# Patient Record
Sex: FEMALE | Race: WHITE | NOT HISPANIC OR LATINO | Employment: OTHER | URBAN - METROPOLITAN AREA
[De-identification: names, ages, dates, MRNs, and addresses within clinical notes are randomized per-mention and may not be internally consistent; named-entity substitution may affect disease eponyms.]

---

## 2017-01-30 DIAGNOSIS — Z32.01 PREGNANCY TEST POSITIVE: Primary | ICD-10-CM

## 2017-02-01 ENCOUNTER — PRENATAL OFFICE VISIT (OUTPATIENT)
Dept: NURSING | Facility: CLINIC | Age: 37
End: 2017-02-01
Payer: COMMERCIAL

## 2017-02-01 DIAGNOSIS — Z32.01 PREGNANCY TEST POSITIVE: Primary | ICD-10-CM

## 2017-02-01 PROCEDURE — 99207 ZZC NO CHARGE NURSE ONLY: CPT

## 2017-02-01 NOTE — NURSING NOTE
"NEW PRENATAL EDUCATion  The following encounter information was actually performed during a group prenatal education class on 2/1/17 by COTY Izquierdo RN  and entered at a later date.  Patient given information for prenatal education along with \"The Expectant Family\" booklet.     "

## 2017-02-03 DIAGNOSIS — Z32.01 PREGNANCY TEST POSITIVE: ICD-10-CM

## 2017-02-03 LAB
ABO + RH BLD: NORMAL
ABO + RH BLD: NORMAL
BETA HCG QUAL IFA URINE: POSITIVE
BLD GP AB SCN SERPL QL: NORMAL
BLOOD BANK CMNT PATIENT-IMP: NORMAL
ERYTHROCYTE [DISTWIDTH] IN BLOOD BY AUTOMATED COUNT: 13.7 % (ref 10–15)
HBV SURFACE AG SERPL QL IA: NONREACTIVE
HCT VFR BLD AUTO: 38.3 % (ref 35–47)
HGB BLD-MCNC: 12.8 G/DL (ref 11.7–15.7)
HIV 1+2 AB+HIV1 P24 AG SERPL QL IA: NORMAL
MCH RBC QN AUTO: 28.8 PG (ref 26.5–33)
MCHC RBC AUTO-ENTMCNC: 33.4 G/DL (ref 31.5–36.5)
MCV RBC AUTO: 86 FL (ref 78–100)
PLATELET # BLD AUTO: 228 10E9/L (ref 150–450)
RBC # BLD AUTO: 4.44 10E12/L (ref 3.8–5.2)
SPECIMEN EXP DATE BLD: NORMAL
WBC # BLD AUTO: 6.8 10E9/L (ref 4–11)

## 2017-02-03 PROCEDURE — 86762 RUBELLA ANTIBODY: CPT | Performed by: OBSTETRICS & GYNECOLOGY

## 2017-02-03 PROCEDURE — 84703 CHORIONIC GONADOTROPIN ASSAY: CPT | Performed by: OBSTETRICS & GYNECOLOGY

## 2017-02-03 PROCEDURE — 86780 TREPONEMA PALLIDUM: CPT | Performed by: OBSTETRICS & GYNECOLOGY

## 2017-02-03 PROCEDURE — 86850 RBC ANTIBODY SCREEN: CPT | Performed by: OBSTETRICS & GYNECOLOGY

## 2017-02-03 PROCEDURE — 87086 URINE CULTURE/COLONY COUNT: CPT | Performed by: OBSTETRICS & GYNECOLOGY

## 2017-02-03 PROCEDURE — 87389 HIV-1 AG W/HIV-1&-2 AB AG IA: CPT | Performed by: OBSTETRICS & GYNECOLOGY

## 2017-02-03 PROCEDURE — 86900 BLOOD TYPING SEROLOGIC ABO: CPT | Performed by: OBSTETRICS & GYNECOLOGY

## 2017-02-03 PROCEDURE — 87340 HEPATITIS B SURFACE AG IA: CPT | Performed by: OBSTETRICS & GYNECOLOGY

## 2017-02-03 PROCEDURE — 86901 BLOOD TYPING SEROLOGIC RH(D): CPT | Performed by: OBSTETRICS & GYNECOLOGY

## 2017-02-03 PROCEDURE — 85027 COMPLETE CBC AUTOMATED: CPT | Performed by: OBSTETRICS & GYNECOLOGY

## 2017-02-03 PROCEDURE — 36415 COLL VENOUS BLD VENIPUNCTURE: CPT | Performed by: OBSTETRICS & GYNECOLOGY

## 2017-02-04 LAB — T PALLIDUM IGG+IGM SER QL: NEGATIVE

## 2017-02-06 ENCOUNTER — RADIANT APPOINTMENT (OUTPATIENT)
Dept: ULTRASOUND IMAGING | Facility: CLINIC | Age: 37
End: 2017-02-06
Attending: OBSTETRICS & GYNECOLOGY
Payer: COMMERCIAL

## 2017-02-06 DIAGNOSIS — Z32.01 PREGNANCY TEST POSITIVE: ICD-10-CM

## 2017-02-06 LAB — RUBV IGG SERPL IA-ACNC: 5 IU/ML

## 2017-02-06 PROCEDURE — 76801 OB US < 14 WKS SINGLE FETUS: CPT | Performed by: OBSTETRICS & GYNECOLOGY

## 2017-02-07 LAB
BACTERIA SPEC CULT: NORMAL
MICRO REPORT STATUS: NORMAL
SPECIMEN SOURCE: NORMAL

## 2017-02-07 NOTE — PROGRESS NOTES
Quick Note:    Please notify pt of pos UPT and the remaining nl results  Galindo Kwong M.D.    ______

## 2017-02-16 ENCOUNTER — OFFICE VISIT (OUTPATIENT)
Dept: INTERNAL MEDICINE | Facility: CLINIC | Age: 37
End: 2017-02-16
Payer: COMMERCIAL

## 2017-02-16 VITALS
DIASTOLIC BLOOD PRESSURE: 82 MMHG | HEIGHT: 66 IN | SYSTOLIC BLOOD PRESSURE: 130 MMHG | BODY MASS INDEX: 42.65 KG/M2 | TEMPERATURE: 98.3 F | OXYGEN SATURATION: 97 % | HEART RATE: 93 BPM | WEIGHT: 265.4 LBS

## 2017-02-16 DIAGNOSIS — Z33.1 PREGNANCY, INCIDENTAL: ICD-10-CM

## 2017-02-16 DIAGNOSIS — J06.9 UPPER RESPIRATORY TRACT INFECTION, UNSPECIFIED TYPE: Primary | ICD-10-CM

## 2017-02-16 DIAGNOSIS — J45.20 INTERMITTENT ASTHMA, UNCOMPLICATED: ICD-10-CM

## 2017-02-16 PROCEDURE — 99213 OFFICE O/P EST LOW 20 MIN: CPT | Performed by: PHYSICIAN ASSISTANT

## 2017-02-16 RX ORDER — ALBUTEROL SULFATE 90 UG/1
1-2 AEROSOL, METERED RESPIRATORY (INHALATION) EVERY 4 HOURS PRN
Qty: 1 INHALER | Refills: 3 | Status: SHIPPED | OUTPATIENT
Start: 2017-02-16 | End: 2018-05-30

## 2017-02-16 NOTE — LETTER
[facility]  [address] [id number] [phone] (fax)      Regarding:  Ninoska Cottrell                                                  YOB: 1980  Date:  2/16/2017        To Whom It May Concern,    Ninoska Cottrell was seen and treated today at our clinic.   Please excuse from work/school today due to illness    Sincerely,        Cathy Trujillo PA-C  Internal Medicine  [facility]

## 2017-02-16 NOTE — PROGRESS NOTES
"  SUBJECTIVE:                                                    Ninoska Cottrell is a 36 year old female who presents to clinic today for the following health issues:      Acute Illness   Acute illness concerns: Conjestion/Cough/runny nose  Onset: x2 days    Fever: no     Chills/Sweats: YES- clamy    Headache (location?): YES- a little    Sinus Pressure:YES    Conjunctivitis:  no    Ear Pain: YES- itch    Rhinorrhea: no     Congestion: YES    Sore Throat: YES     Cough: YES    Wheeze: YES    Decreased Appetite: no     Nausea: no     Vomiting: no     Diarrhea:  no     Dysuria/Freq.: no     Fatigue/Achiness: YES    Sick/Strep Exposure: no      Therapies Tried and outcome:   New pregnancy + test on 2/3    -------------------------------------    Problem list and histories reviewed & adjusted, as indicated.  Additional history: as documented    Labs reviewed in EPIC  Problem list, Medication list, Allergies, and Medical/Social/Surgical histories reviewed in [facility] and updated as appropriate.    ROS:  Constitutional, HEENT, cardiovascular, pulmonary, gi and gu systems are negative, except as otherwise noted.    OBJECTIVE:                                                    /82 (BP Location: Left arm, Patient Position: Chair, Cuff Size: Adult Regular)  Pulse 93  Temp 98.3  F (36.8  C) (Oral)  Ht 5' 6\" (1.676 m)  Wt 265 lb 6.4 oz (120.4 kg)  LMP 12/14/2016 (Approximate)  SpO2 97%  BMI 42.84 kg/m2  Body mass index is 42.84 kg/(m^2).  GENERAL: healthy, alert and no distress  HENT: normal cephalic/atraumatic, ear canals and TM's normal, nose and mouth without ulcers or lesions, nasal mucosa edematous , rhinorrhea clear, oropharynx clear and oral mucous membranes moist  NECK: no adenopathy, no asymmetry, masses, or scars and thyroid normal to palpation  RESP: lungs clear to auscultation - no rales, rhonchi or wheezes  CV: regular rate and rhythm, normal S1 S2, no S3 or S4, no murmur, click or rub, no peripheral edema and " peripheral pulses strong  SKIN: no suspicious lesions or rashes    Diagnostic Test Results:  none      ASSESSMENT/PLAN:                                                            1. Upper respiratory tract infection, unspecified type    - albuterol (VENTOLIN HFA) 108 (90 BASE) MCG/ACT Inhaler; Inhale 1-2 puffs into the lungs every 4 hours as needed for shortness of breath / dyspnea or wheezing Reported on 2/16/2017  Dispense: 1 Inhaler; Refill: 3    2. Pregnancy, incidental      3. Intermittent asthma, uncomplicated    - albuterol (VENTOLIN HFA) 108 (90 BASE) MCG/ACT Inhaler; Inhale 1-2 puffs into the lungs every 4 hours as needed for shortness of breath / dyspnea or wheezing Reported on 2/16/2017  Dispense: 1 Inhaler; Refill: 3    See Patient Instructions    Cathy Trujillo PA-C  [facility]

## 2017-02-16 NOTE — NURSING NOTE
"Chief Complaint   Patient presents with     Nasal Congestion     runny nose/conjestion/slight cough        Initial /82 (BP Location: Left arm, Patient Position: Chair, Cuff Size: Adult Regular)  Pulse 93  Temp 98.3  F (36.8  C) (Oral)  Ht 5' 6\" (1.676 m)  Wt 265 lb 6.4 oz (120.4 kg)  LMP 12/14/2016 (Approximate)  SpO2 97%  BMI 42.84 kg/m2 Estimated body mass index is 42.84 kg/(m^2) as calculated from the following:    Height as of this encounter: 5' 6\" (1.676 m).    Weight as of this encounter: 265 lb 6.4 oz (120.4 kg).  Medication Reconciliation: complete    "

## 2017-02-16 NOTE — MR AVS SNAPSHOT
"              After Visit Summary   2/16/2017    Ninoska Cottrell    MRN: [id number]           Patient Information     Date Of Birth          1980        Visit Information        Provider Department      2/16/2017 10:20 AM Cathy Trujillo PA-C [facility]        Today's Diagnoses     Upper respiratory tract infection, unspecified type    -  1    Pregnancy, incidental        Intermittent asthma, uncomplicated          Care Instructions    Benadryl ok for nasal symptom  Ok to use Albuterol inhaler for coughing.  Tylenol.          Follow-ups after your visit        Your next 10 appointments already scheduled     Feb 24, 2017  8:30 AM CST   New Prenatal with Galindo Kwong MD   [facility] ([facility])    [address] [address] [phone]              Who to contact     If you have questions or need follow up information about today's clinic visit or your schedule please contact [facility] directly at [phone].  Normal or non-critical lab and imaging results will be communicated to you by [employer], letter or phone within 4 business days after the clinic has received the results. If you do not hear from us within 7 days, please contact the clinic through [employer] or phone. If you have a critical or abnormal lab result, we will notify you by phone as soon as possible.  Submit refill requests through [employer] or call your pharmacy and they will forward the refill request to us. Please allow 3 business days for your refill to be completed.          Additional Information About Your Visit        [employer] Information     [employer] lets you send messages to your doctor, view your test results, renew your prescriptions, schedule appointments and more. To sign up, go to [email or web address] . Click on \"Log in\" on the left side of the screen, which will take you to the Welcome page. Then click on " "\"Sign up Now\" on the right side of the page.     You will be asked to enter the access code listed below, as well as some personal information. Please follow the directions to create your username and password.     Your access code is: [email or web address]  Expires: 2017 10:46 AM     Your access code will  in 90 days. If you need help or a new code, please call your [facility] clinic or [phone].        Care EveryWhere ID     This is your Care EveryWhere ID. This could be used by other organizations to access your [facility] medical records  [id number]        Your Vitals Were     Pulse Temperature Height Last Period Pulse Oximetry BMI (Body Mass Index)    93 98.3  F (36.8  C) (Oral) 5' 6\" (1.676 m) 2016 (Approximate) 97% 42.84 kg/m2       Blood Pressure from Last 3 Encounters:   17 130/82   16 126/90   16 132/90    Weight from Last 3 Encounters:   17 265 lb 6.4 oz (120.4 kg)   16 257 lb (116.6 kg)   16 252 lb 11.2 oz (114.6 kg)              Today, you had the following     No orders found for display         Today's Medication Changes          These changes are accurate as of: 17 10:47 AM.  If you have any questions, ask your nurse or doctor.               These medicines have changed or have updated prescriptions.        Dose/Directions    albuterol 108 (90 BASE) MCG/ACT Inhaler   Commonly known as:  VENTOLIN HFA   This may have changed:    - how much to take  - how to take this  - when to take this  - reasons to take this   Used for:  Upper respiratory tract infection, unspecified type, Intermittent asthma, uncomplicated   Changed by:  Cathy Trujillo PA-C        Dose:  1-2 puff   Inhale 1-2 puffs into the lungs every 4 hours as needed for shortness of breath / dyspnea or wheezing Reported on 2017   Quantity:  1 Inhaler   Refills:  3         Stop taking these medicines if you haven't already. Please contact your care team if you have questions.     " oxyCODONE-acetaminophen 5-325 MG per tablet   Commonly known as:  PERCOCET   Stopped by:  Cathy Trujillo PA-C                Where to get your medicines      These medications were sent to [id number] - [place], [place] - [address]  [address] [facility] [address]     Phone:  [phone]     albuterol 108 (90 BASE) MCG/ACT Inhaler                Primary Care Provider Office Phone # Fax #    Gage Pacheco [phone]       [facility] [address]  [facility] [address]        Thank you!     Thank you for choosing [facility]  for your care. Our goal is always to provide you with excellent care. Hearing back from our patients is one way we can continue to improve our services. Please take a few minutes to complete the written survey that you may receive in the mail after your visit with us. Thank you!             Your Updated Medication List - Protect others around you: Learn how to safely use, store and throw away your medicines at www.disposemymeds.org.          This list is accurate as of: 2/16/17 10:47 AM.  Always use your most recent med list.                   Brand Name Dispense Instructions for use    ADVIL PO      Take 800 mg by mouth every 6 hours as needed for moderate pain Reported on 2/16/2017       albuterol 108 (90 BASE) MCG/ACT Inhaler    VENTOLIN HFA    1 Inhaler    Inhale 1-2 puffs into the lungs every 4 hours as needed for shortness of breath / dyspnea or wheezing Reported on 2/16/2017       CYCLOBENZAPRINE HCL PO      Take 10 mg by mouth 3 times daily as needed for muscle spasms (5 mg at HS) Reported on 2/16/2017       ELAVIL PO      Take 30 mg by mouth Reported on 2/16/2017       RIZATRIPTAN BENZOATE PO      Take 10 mg by mouth Reported on 2/16/2017

## 2017-02-24 ENCOUNTER — PRENATAL OFFICE VISIT (OUTPATIENT)
Dept: OBGYN | Facility: CLINIC | Age: 37
End: 2017-02-24
Payer: COMMERCIAL

## 2017-02-24 VITALS
WEIGHT: 264 LBS | DIASTOLIC BLOOD PRESSURE: 72 MMHG | SYSTOLIC BLOOD PRESSURE: 136 MMHG | HEART RATE: 97 BPM | BODY MASS INDEX: 42.61 KG/M2 | OXYGEN SATURATION: 98 %

## 2017-02-24 DIAGNOSIS — Z83.2 FAMILY HISTORY OF HEMOPHILIA: ICD-10-CM

## 2017-02-24 DIAGNOSIS — Z23 NEED FOR PROPHYLACTIC VACCINATION AND INOCULATION AGAINST INFLUENZA: ICD-10-CM

## 2017-02-24 DIAGNOSIS — O09.529 SUPERVISION OF HIGH-RISK PREGNANCY OF ELDERLY MULTIGRAVIDA: Primary | ICD-10-CM

## 2017-02-24 DIAGNOSIS — Z11.3 SCREEN FOR STD (SEXUALLY TRANSMITTED DISEASE): ICD-10-CM

## 2017-02-24 DIAGNOSIS — O26.90 PREGNANCY RELATED CONDITION, UNSPECIFIED TRIMESTER: Primary | ICD-10-CM

## 2017-02-24 PROCEDURE — 99207 ZZC FIRST OB VISIT: CPT | Performed by: OBSTETRICS & GYNECOLOGY

## 2017-02-24 PROCEDURE — 87491 CHLMYD TRACH DNA AMP PROBE: CPT | Performed by: OBSTETRICS & GYNECOLOGY

## 2017-02-24 PROCEDURE — 90686 IIV4 VACC NO PRSV 0.5 ML IM: CPT | Performed by: OBSTETRICS & GYNECOLOGY

## 2017-02-24 PROCEDURE — 90471 IMMUNIZATION ADMIN: CPT | Performed by: OBSTETRICS & GYNECOLOGY

## 2017-02-24 PROCEDURE — 87591 N.GONORRHOEAE DNA AMP PROB: CPT | Performed by: OBSTETRICS & GYNECOLOGY

## 2017-02-24 RX ORDER — PRENATAL VIT/IRON FUM/FOLIC AC 27MG-0.8MG
1 TABLET ORAL
COMMUNITY
Start: 2012-07-17 | End: 2022-05-15

## 2017-02-24 NOTE — PROGRESS NOTES
SUBJECTIVE: CC: Ninoska Cottrell is a 36 year old female  10w2d Estimated Date of Delivery: Sep 20, 2017 blood group O Rh pos, Rubella not immune s/p vac assisted vaginal birth with her second for fetal concerns who presents for an initial prenatal visit. AMA issues discussed     HPI: she notes that her nausea is controlled when she continues to eat periodically through out the day. She states that it worse at night when she doesn't eat for an extended period of time, and she will have to get up and eat during the middle of the night.   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 She has a nephew who has hemophilia.    ULTRASOUND - EARLY OB (0-11 WEEKS) - 17   INDICATIONS FOR ULTRASOUND:   OB History: 1st trimester loss (miscarriage)   Present Conditions: Initial S&D (0-17 weeks)   CLINICAL INFORMATION   LMP: 14 Dec 16 - sure EDC: 20 Sep 17 EGA: 7w5d   ===================   MEASUREMENTS   Gest Sac: vis 4.6 x 1.5 x 3.5cm   Position:nl Contour:smth/reg   Yolk sac: 2.2 mm wnl   CRL: 1.56 cm. EGA: 8w0d   U/S EDC: 18 Sep 17 correspond   Cardiac Activity:Yes FHR: 153 bpm reg   Rt Ov: NV   Lt Ov: 4.2 x 2.7 x 3.4cm Complex cyst 2.6 x 1.8 x 2.5cm   Cul de sac: no free fluid   *Other Findings:   ======================================   Early obstetric transabdominal ultrasound. Living intrauterine pregnancy. Corresponding sonographic and menstrual EGA and EDC. Small , complex , left ovarian cyst.     testing including amniocentesis, CVS sampling, the Verify screen, the  1st trimester screen, the quad test, the AFP test, the modified sequential test, cystic fibrosis screening and Ultrasound and the time frames for each of these were reviewed.       HISTORIES:  Patient Active Problem List   Diagnosis     Intermittent asthma     Migraine headache      Past Medical History   Diagnosis Date     Anxiety, generalized      Asthma      Hyperlipidemia       Migraine      Obesity      Past Surgical History   Procedure Laterality Date     Appendectomy       C nonspecific procedure       right ankle fracture      Allergies   Allergen Reactions     Blueberry      Codeine      Latex      Social History     Social History     Marital status:      Spouse name: Deandre     Number of children: 2     Years of education: N/A     Occupational History      [employer]     Social History Main Topics     Smoking status: Former Smoker     Quit date: 1/1/2000     Smokeless tobacco: Never Used     Alcohol use No     Drug use: No     Sexual activity: Yes     Partners: Male     Birth control/ protection: Pill     Other Topics Concern      Service No     Blood Transfusions No     Caffeine Concern No     Occupational Exposure No     Hobby Hazards No     Sleep Concern No     Stress Concern No     Weight Concern No     Special Diet No     Back Care No     Exercise Yes     1 time a week for 2 hours     Bike Helmet Yes     Seat Belt Yes     Self-Exams No     Social History Narrative     Family History   Problem Relation Age of Onset     Blood Disease Paternal Grandmother      Blood Disease Paternal Grandfather      Blood Disease Maternal Uncle      Blood Disease Father      Hemophilia     HEALTH MAINTENANCE:  Health Maintenance   Topic Date Due     ASTHMA ACTION PLAN Q1 YR (NO INBASKET)  01/13/2015     ASTHMA CONTROL TEST Q6 MOS (NO INBASKET)  12/15/2015     INFLUENZA VACCINE (SYSTEM ASSIGNED)  09/01/2016     PAP Q3 YR  06/21/2019     TETANUS IMMUNIZATION (SYSTEM ASSIGNED)  05/29/2023     MIGRAINE ACTION PLAN,ONE TIME,NO INBASKET  Completed       REVIEW OF OUTSIDE RECORDS: NO    ROS:  Constitutional, HEENT, cardiovascular, pulmonary, gi and gu systems are negative, except as otherwise noted.    This document serves as a record of the services and decisions personally performed and made by Galindo Kwong M.D. It was created on his behalf by Yesenia Tucker, a trained medical scribe. The  creation of this document is based the provider's statements to the medical scribe.  Yesenia Tucker February 24, 2017 8:29 AM     /72 (Cuff Size: Adult Large)  Pulse 97  Wt 264 lb (119.7 kg)  LMP 12/14/2016 (Approximate)  SpO2 98%  BMI 42.61 kg/m2   EXAM:  GENERAL APPEARANCE: healthy, alert and no distress  EYES: Eyes grossly normal to inspection, PERRL and conjunctivae and sclerae normal  NECK: no adenopathy, no asymmetry, masses, or scars and thyroid normal to palpation  RESP: lungs clear to auscultation - no rales, rhonchi or wheezes  BREAST: normal without masses, tenderness or nipple discharge and no palpable axillary masses or adenopathy  CV: regular rates and rhythm, normal S1 S2, no S3 or S4 and no murmur, click or rub  ABDOMEN: soft, nontender, without hepatosplenomegaly or masses and bowel sounds normal   (female): normal external genitalia, normal cervix, adnexae, and 10 weeks in  uterus without masses or discharge, pubic arch is 90 degrees, pelvis is adequate, RV: normal sphincter tone, no nodularity, no masses   MS: extremities normal- no gross deformities noted  SKIN: no suspicious lesions or rashes  NEURO: Normal strength and tone, mentation intact and speech normal  PSYCH: mentation appears normal and affect normal/bright  Low paravertebral tenderness     ASSESSMENT/PLAN  (O09.529) Supervision of high-risk pregnancy of elderly multigravida  (primary encounter diagnosis)  Comment: normal exam, Follow up in 4 weeks.   Plan: MAT FETAL MED CTR REFERRAL-PREGNANCY          (Z23) Need for prophylactic vaccination and inoculation against influenza  Comment:   Plan: FLU VAC, SPLIT VIRUS IM > 3 YO (QUADRIVALENT)         [05283]          (Z11.3) Screen for STD (sexually transmitted disease)  Comment:   Plan: NEISSERIA GONORRHOEA PCR, CHLAMYDIA TRACHOMATIS        PCR        Detailed written plan and outline given  (Z83.2) Family history of hemophilia  Comment: patient is carrier for hemophilia, previous  pregnancies have been female  Plan: MAT FETAL MED CTR REFERRAL-PREGNANCY                          I have discussed with patient the risks, benefits, medications, treatment options and modalities.   I have instructed the patient to call or schedule a follow-up appointment if any problems or failure to improve.          The information in this document, created by the medical scribe for me, accurately reflects the services I personally performed and the decisions made by me. I have reviewed and approved this document for accuracy prior to leaving the patient care area.  2/24/2017 8:29 AM        Galindo Kwong M.D.

## 2017-02-24 NOTE — LETTER
[address] [address]  Tel. [phone]  Fax [phone]        February 27, 2017    Ninoska Cottrell  [address] [place] [address]            Dear Ms. Ninoska Cottrell:      The results of your recent GC Chlamydia cultures were NORMAL.  If you have any further questions or problems, please contact our office.    Sincerely,      DR. Varghese MCCANN/NUBIA RN

## 2017-02-24 NOTE — PATIENT INSTRUCTIONS
You can reach your [facility] Care Team any time of the day by calling [phone]. This number will put you in touch with the 24 hour nurse line if the clinic is closed.    To contact your OB/GYN Surgery Scheduler please call [phone]. This is a direct number for your care team between 8 a.m. and 4 p.m. Monday through Friday.    [facility] is open for your convenience: [phone]  Monday through Friday 8 a.m. to 8:30 p.m.  Saturday 9 a.m. to 6 p.m.  Sunday Noon to 6 p.m.    They are closed on all major holidays.

## 2017-02-24 NOTE — MR AVS SNAPSHOT
After Visit Summary   2/24/2017    Ninoska Cottrell    MRN: [id number]           Patient Information     Date Of Birth          1980        Visit Information        Provider Department      2/24/2017 8:30 AM Galindo Kwong MD [facility]        Today's Diagnoses     Supervision of high-risk pregnancy of elderly multigravida    -  1    Need for prophylactic vaccination and inoculation against influenza        Screen for STD (sexually transmitted disease)        Family history of hemophilia          Care Instructions    You can reach your [facility] Care Team any time of the day by calling [phone]. This number will put you in touch with the 24 hour nurse line if the clinic is closed.    To contact your OB/GYN Surgery Scheduler please call [phone]. This is a direct number for your care team between 8 a.m. and 4 p.m. Monday through Friday.    [facility] is open for your convenience: [phone]  Monday through Friday 8 a.m. to 8:30 p.m.  Saturday 9 a.m. to 6 p.m.  Sunday Noon to 6 p.m.    They are closed on all major holidays.           Follow-ups after your visit        Additional Services     MAT FETAL MED CTR REFERRAL-PREGNANCY       >> Patient may proceed with recommendations for further testing as directed by the Maternal Fetal Medicine Specialist >>    >> If requesting Fetal Echo: MFM will determine appropriate location for exam due to indication.    >> If requesting Lung Maturity Amnio:  If results indicate fetal lung maturity, induction or C/S is recommended within 36 hours.  Please schedule accordingly.     Dear Patient:   Please be aware that coverage of these services is subject to the terms and limitations of your health insurance plan.  Call member services at your health plan with any benefit or coverage questions.      Please bring the following to your appointment:    >>  Any x-rays, CTs or MRIs which have been performed.  Contact the facility where  "they were done to arrange for  prior to your scheduled appointment.  Any new CT, MRI or other procedures ordered by your specialist must be performed at a [place] facility or coordinated by your clinic's referral office.  >>  List of current medications   >>  This referral request   >>  Any documents/labs given to you for this referral                  Follow-up notes from your care team     Return in about 1 month (around 3/24/2017).      Who to contact     If you have questions or need follow up information about today's clinic visit or your schedule please contact [facility] directly at [phone].  Normal or non-critical lab and imaging results will be communicated to you by [employer], letter or phone within 4 business days after the clinic has received the results. If you do not hear from us within 7 days, please contact the clinic through [employer] or phone. If you have a critical or abnormal lab result, we will notify you by phone as soon as possible.  Submit refill requests through [employer] or call your pharmacy and they will forward the refill request to us. Please allow 3 business days for your refill to be completed.          Additional Information About Your Visit        MyChart Information     [employer] lets you send messages to your doctor, view your test results, renew your prescriptions, schedule appointments and more. To sign up, go to [email or web address] . Click on \"Log in\" on the left side of the screen, which will take you to the Welcome page. Then click on \"Sign up Now\" on the right side of the page.     You will be asked to enter the access code listed below, as well as some personal information. Please follow the directions to create your username and password.     Your access code is: [email or web address]  Expires: 2017 10:46 AM     Your access code will  in 90 days. If you need help or a new code, please call your [facility] or [phone].      "   Care EveryWhere ID     This is your Care EveryWhere ID. This could be used by other organizations to access your [facility] medical records  [id number]        Your Vitals Were     Pulse Last Period Pulse Oximetry BMI (Body Mass Index)          97 12/14/2016 (Approximate) 98% 42.61 kg/m2         Blood Pressure from Last 3 Encounters:   02/24/17 136/72   02/16/17 130/82   11/03/16 126/90    Weight from Last 3 Encounters:   02/24/17 264 lb (119.7 kg)   02/16/17 265 lb 6.4 oz (120.4 kg)   11/03/16 257 lb (116.6 kg)              We Performed the Following     CHLAMYDIA TRACHOMATIS PCR     FLU VAC, SPLIT VIRUS IM > 3 YO (QUADRIVALENT) [07630]     MAT FETAL MED CTR REFERRAL-PREGNANCY     NEISSERIA GONORRHOEA PCR        Primary Care Provider Office Phone # Fax #    Gage Pacheco [phone]       [facility] [address] [facility] [address]        Thank you!     Thank you for choosing [facility]  for your care. Our goal is always to provide you with excellent care. Hearing back from our patients is one way we can continue to improve our services. Please take a few minutes to complete the written survey that you may receive in the mail after your visit with us. Thank you!             Your Updated Medication List - Protect others around you: Learn how to safely use, store and throw away your medicines at www.disposemymeds.org.          This list is accurate as of: 2/24/17 11:49 AM.  Always use your most recent med list.                   Brand Name Dispense Instructions for use    ADVIL PO      Take 800 mg by mouth every 6 hours as needed for moderate pain Reported on 2/16/2017       albuterol 108 (90 BASE) MCG/ACT Inhaler    VENTOLIN HFA    1 Inhaler    Inhale 1-2 puffs into the lungs every 4 hours as needed for shortness of breath / dyspnea or wheezing Reported on 2/16/2017       CYCLOBENZAPRINE HCL PO      Take 10 mg by mouth 3 times daily as needed for  muscle spasms (5 mg at HS) Reported on 2/16/2017       ELAVIL PO      Take 30 mg by mouth Reported on 2/16/2017       prenatal multivitamin  plus iron 27-0.8 MG Tabs per tablet      Take 1 tablet by mouth       RIZATRIPTAN BENZOATE PO      Take 10 mg by mouth Reported on 2/16/2017

## 2017-02-24 NOTE — PROGRESS NOTES
Injectable Influenza Immunization Documentation    1.  Is the person to be vaccinated sick today?  No    2. Does the person to be vaccinated have an allergy to eggs or to a component of the vaccine?  No    3. Has the person to be vaccinated today ever had a serious reaction to influenza vaccine in the past?  No    4. Has the person to be vaccinated ever had Guillain-Century syndrome?  No     Form completed by Gaby Florian [facility]

## 2017-02-24 NOTE — NURSING NOTE
"Chief Complaint   Patient presents with     Prenatal Care     Flu       Initial /72 (Cuff Size: Adult Large)  Pulse 97  Wt 264 lb (119.7 kg)  LMP 2016 (Approximate)  SpO2 98%  BMI 42.61 kg/m2 Estimated body mass index is 42.61 kg/(m^2) as calculated from the following:    Height as of 17: 5' 6\" (1.676 m).    Weight as of this encounter: 264 lb (119.7 kg).  BP completed using cuff size: regular        The following HM Due: NONE      The following patient reported/Care Every where data was sent to:  P ABSTRACT QUALITY INITIATIVES [49608]       10w2d  States feeling good, No concerns.         Gaby Florian, [facility]                "

## 2017-02-26 LAB
C TRACH DNA SPEC QL NAA+PROBE: NORMAL
N GONORRHOEA DNA SPEC QL NAA+PROBE: NORMAL
SPECIMEN SOURCE: NORMAL
SPECIMEN SOURCE: NORMAL

## 2017-03-03 ENCOUNTER — PRE VISIT (OUTPATIENT)
Dept: MATERNAL FETAL MEDICINE | Facility: CLINIC | Age: 37
End: 2017-03-03

## 2017-03-07 ENCOUNTER — OFFICE VISIT (OUTPATIENT)
Dept: MATERNAL FETAL MEDICINE | Facility: CLINIC | Age: 37
End: 2017-03-07
Attending: OBSTETRICS & GYNECOLOGY
Payer: COMMERCIAL

## 2017-03-07 ENCOUNTER — HOSPITAL ENCOUNTER (OUTPATIENT)
Dept: LAB | Facility: CLINIC | Age: 37
Discharge: HOME | End: 2017-03-07
Attending: OBSTETRICS & GYNECOLOGY | Admitting: OBSTETRICS & GYNECOLOGY
Payer: COMMERCIAL

## 2017-03-07 DIAGNOSIS — O09.522 ELDERLY MULTIGRAVIDA IN SECOND TRIMESTER: Primary | ICD-10-CM

## 2017-03-07 DIAGNOSIS — O09.521 MULTIGRAVIDA OF ADVANCED MATERNAL AGE IN FIRST TRIMESTER: ICD-10-CM

## 2017-03-07 DIAGNOSIS — O26.90 PREGNANCY RELATED CONDITION, UNSPECIFIED TRIMESTER: ICD-10-CM

## 2017-03-07 PROCEDURE — 36415 COLL VENOUS BLD VENIPUNCTURE: CPT | Performed by: OBSTETRICS & GYNECOLOGY

## 2017-03-07 PROCEDURE — 96040 ZZH GENETIC COUNSELING, EACH 30 MINUTES: CPT | Mod: ZF | Performed by: GENETIC COUNSELOR, MS

## 2017-03-07 PROCEDURE — 40000791 ZZHCL STATISTIC VERIFI PRENATAL TRISOMY 21,18,13: Performed by: OBSTETRICS & GYNECOLOGY

## 2017-03-07 NOTE — MR AVS SNAPSHOT
After Visit Summary   3/7/2017    Ninoska Cottrell    MRN: [id number]           Patient Information     Date Of Birth          1980        Visit Information        Provider Department      3/7/2017 1:30 PM Kari Saucedo, THELMA [facility] Maternal Fetal Medicine [facility]        Today's Diagnoses     Elderly multigravida in second trimester    -  1    Pregnancy related condition, unspecified trimester        Multigravida of advanced maternal age in first trimester           Follow-ups after your visit        Your next 10 appointments already scheduled     Apr 21, 2017  1:30 PM CDT   M US COMP with [id number]   [facility] Maternal Fetal Medicine Ultrasound - [facility] ([facility])    [address]  [facility] [address] [phone]           Wear comfortable clothes and leave your valuables at home.            Apr 21, 2017  2:00 PM CDT   Radiology MD with [facility]   [facility] Maternal Fetal Medicine [facility] ([facility])    [address]  [facility] [address] [phone]           Please arrive at the time given for your first appointment.  This visit is used internally to schedule the physician's time during your ultrasound.              Future tests that were ordered for you today     Open Future Orders        Priority Expected Expires Ordered    MFM US Comprehensive Single Routine  1/7/2018 3/7/2017    Verifi Test by [employer] Routine  6/5/2017 3/7/2017            Who to contact     If you have questions or need follow up information about today's clinic visit or your schedule please contact [facility] directly at [phone].  Normal or non-critical lab and imaging results will be communicated to you by MyChart, letter or phone within 4 business days after the clinic has received the results. If you do not hear from us within 7 days, please contact the clinic through MyChart or phone. If you have a  "critical or abnormal lab result, we will notify you by phone as soon as possible.  Submit refill requests through [employer] or call your pharmacy and they will forward the refill request to us. Please allow 3 business days for your refill to be completed.          Additional Information About Your Visit        MyChart Information     [employer] lets you send messages to your doctor, view your test results, renew your prescriptions, schedule appointments and more. To sign up, go to [email or web address] . Click on \"Log in\" on the left side of the screen, which will take you to the Welcome page. Then click on \"Sign up Now\" on the right side of the page.     You will be asked to enter the access code listed below, as well as some personal information. Please follow the directions to create your username and password.     Your access code is: [email or web address]  Expires: 2017 10:46 AM     Your access code will  in 90 days. If you need help or a new code, please call your [place] clinic or [phone].        Care EveryWhere ID     This is your Care EveryWhere ID. This could be used by other organizations to access your [facility] medical records  [id number]        Your Vitals Were     Last Period                   2016 (Approximate)            Blood Pressure from Last 3 Encounters:   17 136/72   17 130/82   16 126/90    Weight from Last 3 Encounters:   17 119.7 kg (264 lb)   17 120.4 kg (265 lb 6.4 oz)   16 116.6 kg (257 lb)              We Performed the Following     [employer] [facility]        Primary Care Provider Office Phone # Fax #    Gage Pacheco [phone]       [facility] [address] [facility] [address]        Thank you!     Thank you for choosing [facility]  for your care. Our goal is always to provide you with excellent care. Hearing back from our patients is one way we can continue to " improve our services. Please take a few minutes to complete the written survey that you may receive in the mail after your visit with us. Thank you!             Your Updated Medication List - Protect others around you: Learn how to safely use, store and throw away your medicines at www.disposemymeds.org.          This list is accurate as of: 3/7/17  4:26 PM.  Always use your most recent med list.                   Brand Name Dispense Instructions for use    ADVIL PO      Take 800 mg by mouth every 6 hours as needed for moderate pain Reported on 2/16/2017       albuterol 108 (90 BASE) MCG/ACT Inhaler    VENTOLIN HFA    1 Inhaler    Inhale 1-2 puffs into the lungs every 4 hours as needed for shortness of breath / dyspnea or wheezing Reported on 2/16/2017       CYCLOBENZAPRINE HCL PO      Take 10 mg by mouth 3 times daily as needed for muscle spasms (5 mg at HS) Reported on 2/16/2017       ELAVIL PO      Take 30 mg by mouth Reported on 2/16/2017       prenatal multivitamin  plus iron 27-0.8 MG Tabs per tablet      Take 1 tablet by mouth       RIZATRIPTAN BENZOATE PO      Take 10 mg by mouth Reported on 2/16/2017

## 2017-03-07 NOTE — PROGRESS NOTES
Patient Name: Ninoska Cottrell   YOB: 1980  Date of Service: 3/7/2017    Ninoska Cottrell was seen at [facility] for genetic consultation and first trimester screening.  The indication for genetic counseling is AMA and a family history of hemophilia A.  She was accompanied today by her , Adelfo.      Impression/Plan:     1.  Ninoska had a blood draw for NIPT (Innatal (formally Verifi) test through [facility]).  Results are expected within 7-10 days, and will be sent to her OB.  She requested we call her at [phone], and to leave a detailed message with results including fetal sex information on her voicemail.    2. 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  If this is a male fetus, there is a 50% risk for him to be affected.  Prenatal testing for hemophilia was declined and Ninoska plans evaluation after birth.  If this is a male fetus, we will contact the [facility] at the [facility] for additional consultation on delivery/infant care.      2.  Maternal serum AFP (single marker screen) is recommended after 15 weeks to screen for open neural tube defects.    3. Ninoska will return for a comprehensive ultrasound at 18-20 weeks. A fetal echocardiogram is also recommended as Ninoska's daughter had a VSD.      Pregnancy History:     /Parity:   Age at Delivery: 37 years old     ZARA: 2017, by LMP c/w ultrasound  Gestational Age: 11w6d    Rangels pregnancy history is as follows:    : SAB first trimester    2012: 36w6d spont vag, F.    2013: 40w0d vag, F.  Miscarriage of a twin also occurred in this pregnancy.    - No significant complications or exposures were reported in the current pregnancy.    Family History:     A three-generation pedigree was obtained, and is scanned under the  Media  tab.    The following is per Ninoska's report:      Ninoska's 3-year was born with a VSD that resolved by age 1; she is now  healthy.  This history increases the risk of a heart defect in the current pregnancy.  Level II ultrasound is indicated along with fetal echocardiogram later in pregnancy.    Ninoska's father, a  paternal uncle, and two sons of Ninoska's sister all have/had hemophilia A.  See assessment below.    Ninoska's sister and father both have rheumatoid arthritis.  We briefly discussed that auto-immune diseases can run in families, generally with multifactorial inheritance.    Adelfo's mother, maternal grandmother, and a maternal cousin all have Durcem s disease.  We discussed that the genetic etiology of familial Durcem s disease is unknown but can follow an autosomal dominant inheritance pattern and that women are much more frequently affected then men.  Therefore, it is unclear what the recurrence risk would be for Adelfo's children.    The reported family history is negative intellectual or learning disability, childhood hearing or vision loss, or consanguinity.    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  Ninoska reports that she has not had factor level or genetic testing.  Affected family members include Ninoska's father, a  paternal uncle, and two sons of Rangels sister.  These individuals were diagnosed by factor level testing showing factor VIII deficiency; there has not been genetic testing.  Ninoska's report of the affected individuals is consistent with a more mild disease presentation.  She reports that affected family members do not bleed spontaneously but that some receive blood transfusions with dental procedures and that one nephew born with ankyloglossia had his tongue surgery delayed due to bleeding concerns.  We discussed that for most affected individuals, treatment with recombinant clotting factor leads to normal lifespan with few bleeding crises.    We discussed that female carriers can show symptoms, and Ninoska  report that she bruises easily and has had excessive bleeding from small cuts.  We discussed that in some cases, special precautions are taken during pregnancy and delivery for symptomatic female carriers.  Ninoska reports no special management in her previous pregnancies and deliveries.    We reviewed that hemophilia A is a genetic condition caused by mutation of the factor VIII gene (F8).  Inheritance follows an X-linked recessive pattern.  In the current pregnancy, there is a 25% chance of an affected male, a 25% chance of an unaffected male who is not a carrier, a 25% chance of a female carrier, and a 25% chance of a female who is not a carrier.  We discussed that prenatal testing for hemophilia would only be possible if there were a known familial mutation.  Genetic testing of Ninoska or an affected relative would need to occur first. This testing was declined.      We discussed that there is a specialty [facility] at the [facility] which could assist with consultation and/or genetic testing.  Factor VIII testing can be performed at birth and certain precautions until disease status is known (such as delaying circumcision) is recommended.  Ninoska reports that she is not concerned about the risk of hemophilia because it is a treatable condition and her affected relatives are generally healthy.  She declined prenatal testing options for hemophilia.      Carrier Screening:       Expanded carrier screening for mutations in a large panel of genes associated with autosomal recessive conditions including cystic fibrosis, spinal muscular atrophy, and others, is now available.  In addition,  screening in the [facility] includes cystic fibrosis.    Carrier screening options were not discussed today.  If desired, I am available to address testing later in pregnancy.    Risk Assessment for Chromosome Conditions:     We explained that the risk for fetal chromosome  abnormalities increases with maternal age. We discussed specific features of common chromosome abnormalities, including Down syndrome, trisomy 13, trisomy 18, and sex chromosome trisomies.    - At age 37, the mid-pregnancy risk for Down syndrome is 1 in 168.     - At age 37, the mid-pregnancy risk for any chromosome abnormality is 1 in 82.      Testing Options:     We discussed the following options:    Non-invasive Prenatal Testing (NIPT)  - Maternal plasma cell-free DNA testing; first trimester ultrasound with nuchal translucency and nasal bone assessment is recommended, when appropriate  - Screens for fetal trisomy 21, trisomy 13, trisomy 18, and sex chromosome aneuploidy  - Cannot screen for open neural tube defects; maternal serum AFP after 15 weeks is recommended    Chorionic villus sampling (CVS)  - Invasive procedure typically performed in the first trimester by which placental villi are obtained for the purpose of chromosome analysis and/or other prenatal genetic analysis  - Diagnostic results; >99% sensitivity for fetal chromosome abnormalities  - Cannot test for open neural tube defects; maternal serum AFP after 15 weeks is recommended    Genetic Amniocentesis  - Invasive procedure typically performed in the second trimester by which amniotic fluid is obtained for the purpose of chromosome analysis and/or other prenatal genetic analysis  - Diagnostic results; >99% sensitivity for fetal chromosome abnormalities  - AFAFP measurement tests for open neural tube defects    Comprehensive (Level II) ultrasound: Detailed ultrasound performed between 18-22 weeks gestation to screen for major birth defects and markers for aneuploidy.    We reviewed the benefits and limitations of this testing.  Screening tests provide a risk assessment specific to the pregnancy for certain fetal chromosome abnormalities, but cannot definitively diagnose or exclude a fetal chromosome abnormality.  Follow-up genetic counseling and  consideration of diagnostic testing is recommended with any abnormal screening result. Diagnostic tests carry inherent risks- including risk of miscarriage- that require careful consideration.  These tests can detect fetal chromosome abnormalities with greater than 99% certainty.  Results can be compromised by maternal cell contamination or mosaicism, and are limited by the resolution of cytogenetic G-banding technology.  There is no screening nor diagnostic test that can detect all forms of birth defects or mental disability.      It was a pleasure to be involved with Ninoska's care. Face-to-face time of the meeting was 60 minutes.    Maddie Mahmood  Genetic counseling intern, scribing for:    Kari Saucedo MS, [facility]  Certified Genetic Counselor

## 2017-03-15 ENCOUNTER — TELEPHONE (OUTPATIENT)
Dept: MATERNAL FETAL MEDICINE | Facility: CLINIC | Age: 37
End: 2017-03-15

## 2017-03-15 LAB — LAB SCANNED RESULT: NORMAL

## 2017-03-15 NOTE — TELEPHONE ENCOUNTER
Spoke with Ninoska regarding normal Innatal (Verifi) results consistent with diploid chromosomes 21,18 and 13. This result significantly reduces the chance for her pregnancy to be affected with Down syndrome, trisomy 18 or trisomy 13, but does not reduce it to zero.  Fetal sex was (XX) female.  This daughter has a 50% to be a carrier for hemophilia A as Ninoska is a known carrier.  Discussed fetal sex will also be evaluated at her level II ultrasound.      Kari Saucedo MS, [facility]  Certified Genetic Counselor

## 2017-03-24 ENCOUNTER — OFFICE VISIT (OUTPATIENT)
Dept: URGENT CARE | Facility: URGENT CARE | Age: 37
End: 2017-03-24
Payer: COMMERCIAL

## 2017-03-24 VITALS
SYSTOLIC BLOOD PRESSURE: 130 MMHG | WEIGHT: 268.4 LBS | DIASTOLIC BLOOD PRESSURE: 76 MMHG | HEIGHT: 66 IN | BODY MASS INDEX: 43.13 KG/M2 | TEMPERATURE: 97.8 F | HEART RATE: 89 BPM | OXYGEN SATURATION: 99 %

## 2017-03-24 DIAGNOSIS — G44.209 TENSION HEADACHE: ICD-10-CM

## 2017-03-24 DIAGNOSIS — Z20.828 EXPOSURE TO THE FLU: ICD-10-CM

## 2017-03-24 DIAGNOSIS — Z20.818 STREP THROAT EXPOSURE: Primary | ICD-10-CM

## 2017-03-24 LAB
DEPRECATED S PYO AG THROAT QL EIA: NORMAL
FLUAV+FLUBV AG SPEC QL: NEGATIVE
FLUAV+FLUBV AG SPEC QL: NORMAL
MICRO REPORT STATUS: NORMAL
SPECIMEN SOURCE: NORMAL
SPECIMEN SOURCE: NORMAL

## 2017-03-24 PROCEDURE — 99213 OFFICE O/P EST LOW 20 MIN: CPT | Performed by: FAMILY MEDICINE

## 2017-03-24 PROCEDURE — 87880 STREP A ASSAY W/OPTIC: CPT | Performed by: FAMILY MEDICINE

## 2017-03-24 PROCEDURE — 87804 INFLUENZA ASSAY W/OPTIC: CPT | Performed by: FAMILY MEDICINE

## 2017-03-24 PROCEDURE — 87081 CULTURE SCREEN ONLY: CPT | Performed by: FAMILY MEDICINE

## 2017-03-24 NOTE — MR AVS SNAPSHOT
After Visit Summary   3/24/2017    Ninoska Cottrell    MRN: [id number]           Patient Information     Date Of Birth          1980        Visit Information        Provider Department      3/24/2017 8:15 PM Martha Leon MD [facility]        Today's Diagnoses     Strep throat exposure    -  1    Exposure to the flu        Tension headache           Follow-ups after your visit        Your next 10 appointments already scheduled     Apr 04, 2017  4:15 PM CDT   ESTABLISHED PRENATAL with Traci Padrno MD   [facility]    [address] [address]   [phone]            Apr 21, 2017  1:30 PM CDT   MFM US COMP with [id number]   [facility] Maternal Fetal Medicine Ultrasound - [facility])    [address]  [facility] [address] [phone]           Wear comfortable clothes and leave your valuables at home.            Apr 21, 2017  2:00 PM CDT   Radiology MD with  KOJO MCCANN   [facility] Maternal Fetal Medicine [facility])    [address]  [facility] [address] [phone]           Please arrive at the time given for your first appointment.  This visit is used internally to schedule the physician's time during your ultrasound.              Who to contact     If you have questions or need follow up information about today's clinic visit or your schedule please contact [facility] directly at [phone].  Normal or non-critical lab and imaging results will be communicated to you by MyChart, letter or phone within 4 business days after the clinic has received the results. If you do not hear from us within 7 days, please contact the clinic through MyChart or phone. If you have a critical or abnormal lab result, we will notify you by phone as soon as possible.  Submit refill requests  "through [employer] or call your pharmacy and they will forward the refill request to us. Please allow 3 business days for your refill to be completed.          Additional Information About Your Visit        Recombinehart Information     [employer] lets you send messages to your doctor, view your test results, renew your prescriptions, schedule appointments and more. To sign up, go to [email or web address] . Click on \"Log in\" on the left side of the screen, which will take you to the Welcome page. Then click on \"Sign up Now\" on the right side of the page.     You will be asked to enter the access code listed below, as well as some personal information. Please follow the directions to create your username and password.     Your access code is: [email or web address]  Expires: 2017 11:46 AM     Your access code will  in 90 days. If you need help or a new code, please call your [place] clinic or [phone].        Care EveryWhere ID     This is your Care EveryWhere ID. This could be used by other organizations to access your [place] medical records  [id number]        Your Vitals Were     Pulse Temperature Height Last Period Pulse Oximetry BMI (Body Mass Index)    89 97.8  F (36.6  C) (Oral) 5' 6\" (1.676 m) 2016 (Approximate) 99% 43.32 kg/m2       Blood Pressure from Last 3 Encounters:   17 130/76   17 136/72   17 130/82    Weight from Last 3 Encounters:   17 268 lb 6.4 oz (121.7 kg)   17 264 lb (119.7 kg)   17 265 lb 6.4 oz (120.4 kg)              We Performed the Following     Beta strep group A culture     Influenza A/B antigen     Rapid strep screen        Primary Care Provider Office Phone # Fax #    Gage Pacheco [phone]       [facility] [address] [facility] [address]        Thank you!     Thank you for choosing [facility]  for your care. Our goal is always to provide you with excellent care. Hearing " back from our patients is one way we can continue to improve our services. Please take a few minutes to complete the written survey that you may receive in the mail after your visit with us. Thank you!             Your Updated Medication List - Protect others around you: Learn how to safely use, store and throw away your medicines at www.disposemymeds.org.          This list is accurate as of: 3/24/17  9:59 PM.  Always use your most recent med list.                   Brand Name Dispense Instructions for use    ADVIL PO      Take 800 mg by mouth every 6 hours as needed for moderate pain Reported on 2/16/2017       albuterol 108 (90 BASE) MCG/ACT Inhaler    VENTOLIN HFA    1 Inhaler    Inhale 1-2 puffs into the lungs every 4 hours as needed for shortness of breath / dyspnea or wheezing Reported on 2/16/2017       CYCLOBENZAPRINE HCL PO      Take 10 mg by mouth 3 times daily as needed for muscle spasms (5 mg at HS) Reported on 2/16/2017       ELAVIL PO      Take 30 mg by mouth Reported on 2/16/2017       prenatal multivitamin  plus iron 27-0.8 MG Tabs per tablet      Take 1 tablet by mouth       RIZATRIPTAN BENZOATE PO      Take 10 mg by mouth Reported on 2/16/2017

## 2017-03-25 NOTE — NURSING NOTE
"Chief Complaint   Patient presents with     Flu     for percation due to 15x weeks pregnacy        Initial /76  Pulse 89  Temp 97.8  F (36.6  C) (Oral)  Ht 5' 6\" (1.676 m)  Wt 268 lb 6.4 oz (121.7 kg)  LMP 12/14/2016 (Approximate)  SpO2 99%  BMI 43.32 kg/m2 Estimated body mass index is 43.32 kg/(m^2) as calculated from the following:    Height as of this encounter: 5' 6\" (1.676 m).    Weight as of this encounter: 268 lb 6.4 oz (121.7 kg).  Medication Reconciliation: complete    "

## 2017-03-25 NOTE — PROGRESS NOTES
SUBJECTIVE:  Ninoska Cottrell, a 37 year old female 15 weeks preg scheduled an appointment to discuss the following issues:     Strep throat exposure  Exposure to the flu     She is here to be tested for strep and flu as daughter got diagnosed with strep and flu   She has no sorethroat or uri symptoms just has mild cough   She has HA which is not new for her , she denies any vision changes     Past Medical History:   Diagnosis Date     Anxiety, generalized      Asthma      Hyperlipidemia      Migraine      Obesity       Past Surgical History:   Procedure Laterality Date     APPENDECTOMY       C NONSPECIFIC PROCEDURE      right ankle fracture        Social History     Social History     Marital status:      Spouse name: Deandre     Number of children: 2     Years of education: N/A     Occupational History      [employer]     Social History Main Topics     Smoking status: Former Smoker     Quit date: 1/1/2000     Smokeless tobacco: Never Used     Alcohol use No     Drug use: No     Sexual activity: Yes     Partners: Male     Birth control/ protection: Pill     Other Topics Concern      Service No     Blood Transfusions No     Caffeine Concern No     Occupational Exposure No     Hobby Hazards No     Sleep Concern No     Stress Concern No     Weight Concern No     Special Diet No     Back Care No     Exercise Yes     1 time a week for 2 hours     Bike Helmet Yes     Seat Belt Yes     Self-Exams No     Social History Narrative        Current Outpatient Prescriptions   Medication Sig Dispense Refill     Prenatal Vit-Fe Fumarate-FA (PRENATAL MULTIVITAMIN  PLUS IRON) 27-0.8 MG TABS per tablet Take 1 tablet by mouth       albuterol (VENTOLIN HFA) 108 (90 BASE) MCG/ACT Inhaler Inhale 1-2 puffs into the lungs every 4 hours as needed for shortness of breath / dyspnea or wheezing Reported on 2/16/2017 1 Inhaler 3     Amitriptyline HCl (ELAVIL PO) Take 30 mg by mouth Reported on 2/16/2017       RIZATRIPTAN BENZOATE PO Take  "10 mg by mouth Reported on 2/16/2017       CYCLOBENZAPRINE HCL PO Take 10 mg by mouth 3 times daily as needed for muscle spasms (5 mg at HS) Reported on 2/16/2017       Ibuprofen (ADVIL PO) Take 800 mg by mouth every 6 hours as needed for moderate pain Reported on 2/16/2017         Health Maintenance   Topic Date Due     ASTHMA ACTION PLAN Q1 YR (NO INBASKET)  01/13/2015     ASTHMA CONTROL TEST Q6 MOS (NO INBASKET)  12/15/2015     MATERNAL SCREENING  03/29/2017     INFLUENZA VACCINE (SYSTEM ASSIGNED)  09/01/2017     PAP Q3 YR  06/21/2019     TETANUS IMMUNIZATION (SYSTEM ASSIGNED)  05/29/2023     MIGRAINE ACTION PLAN,ONE TIME,NO INBASKET  Completed        ROS:  CONSTITUTIONAL:no fever, no chills or sweats, no excessive fatigue, no significant change in weight  CV: neg   RESP -neg  GI:  Neg   NEURO: neg   MSK - neg   Skin - neg   Pyschiatry-neg     Results for orders placed or performed in visit on 03/24/17   Influenza A/B antigen   Result Value Ref Range    Influenza A/B Agn Specimen Nasal     Influenza A Negative NEG    Influenza B  NEG     Negative   Test results must be correlated with clinical data. If necessary, results   should be confirmed by a molecular assay or viral culture.     Rapid strep screen   Result Value Ref Range    Specimen Description Throat     Rapid Strep A Screen       NEGATIVE: No Group A streptococcal antigen detected by immunoassay, await   culture report.      Micro Report Status FINAL 03/24/2017        OBJECTIVE:  /76  Pulse 89  Temp 97.8  F (36.6  C) (Oral)  Ht 5' 6\" (1.676 m)  Wt 268 lb 6.4 oz (121.7 kg)  LMP 12/14/2016 (Approximate)  SpO2 99%  BMI 43.32 kg/m2      EXAM:  GENERAL APPEARANCE: healthy, alert and no distress  EYES: EOMI,  PERRL  HENT: ear canals and TM's normal and nose and mouth without ulcers or lesions  The neck is supple and free of adenopathy or masses, the thyroid is normal without enlargement or nodules.  RESP: lungs clear to auscultation - no rales, " rhonchi or wheezes  CV: regular rates and rhythm, normal S1 S2, no S3 or S4 and no murmur, click or rub -  ABDOMEN:  soft, nontender, no HSM or masses and bowel sounds normal  NEURO: Normal strength and tone, sensory exam grossly normal, mentation intact and speech normal        ASSESSMENT/PLAN:  Ninoska was seen today for flu.    Diagnoses and all orders for this visit:    Strep throat exposure  -     Rapid strep screen  -     Beta strep group A culture    Exposure to the flu  -     Influenza A/B antigen    possible tension headache   For headache advised to do tylenol   If worsens should f/u     Notified pt about negative results     Follow up if  symptoms fail to improve or worsens   Pt understood and agreed with plan           Martha Leon MD

## 2017-03-26 LAB
BACTERIA SPEC CULT: NORMAL
MICRO REPORT STATUS: NORMAL
SPECIMEN SOURCE: NORMAL

## 2017-04-04 ENCOUNTER — PRENATAL OFFICE VISIT (OUTPATIENT)
Dept: OBGYN | Facility: CLINIC | Age: 37
End: 2017-04-04
Payer: COMMERCIAL

## 2017-04-04 VITALS
DIASTOLIC BLOOD PRESSURE: 64 MMHG | WEIGHT: 270.7 LBS | HEART RATE: 92 BPM | OXYGEN SATURATION: 99 % | BODY MASS INDEX: 43.69 KG/M2 | SYSTOLIC BLOOD PRESSURE: 112 MMHG

## 2017-04-04 DIAGNOSIS — O09.529 SUPERVISION OF HIGH-RISK PREGNANCY OF ELDERLY MULTIGRAVIDA: Primary | ICD-10-CM

## 2017-04-04 PROCEDURE — 36415 COLL VENOUS BLD VENIPUNCTURE: CPT | Performed by: OBSTETRICS & GYNECOLOGY

## 2017-04-04 PROCEDURE — 82105 ALPHA-FETOPROTEIN SERUM: CPT | Mod: 90 | Performed by: OBSTETRICS & GYNECOLOGY

## 2017-04-04 PROCEDURE — 99000 SPECIMEN HANDLING OFFICE-LAB: CPT | Performed by: OBSTETRICS & GYNECOLOGY

## 2017-04-04 PROCEDURE — 99207 ZZC PRENATAL VISIT: CPT | Performed by: OBSTETRICS & GYNECOLOGY

## 2017-04-04 PROCEDURE — 83036 HEMOGLOBIN GLYCOSYLATED A1C: CPT | Performed by: OBSTETRICS & GYNECOLOGY

## 2017-04-04 NOTE — PROGRESS NOTES
Routine OB Visit:    S: Ongoing nausea, relieved by eating every 2 hours. no contractions. Good fetal movement. No LoF, VB. Alternating constipation and diarrhea.    O: /64  Pulse 92  Wt 270 lb 11.2 oz (122.8 kg)  LMP 2016 (Approximate)  SpO2 99%  BMI 43.69 kg/m2   Gen: resting comfortably, in NAD  See Prenatal flowsheet    A: Ninoska Cottrell is a 37 year old female  at 15w6d, here for routine OB care. 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     P:   1) PNC: O+/RNI. Verify 46XX, within normal limits. Level II in late April. APF today.  2) Obesity: plan HgA1c to evaluate for DM II today.  3) return to clinic in 4 weeks for routine prenatal care    Traci Padron MD  Obstetrics and Gynecology  2017

## 2017-04-04 NOTE — NURSING NOTE
"Chief Complaint   Patient presents with     Prenatal Care       Initial /64  Pulse 92  Wt 270 lb 11.2 oz (122.8 kg)  LMP 2016 (Approximate)  SpO2 99%  BMI 43.69 kg/m2 Estimated body mass index is 43.69 kg/(m^2) as calculated from the following:    Height as of 3/24/17: 5' 6\" (1.676 m).    Weight as of this encounter: 270 lb 11.2 oz (122.8 kg).  BP completed using cuff size: large        The following HM Due: NONE      The following patient reported/Care Every where data was sent to:  P ABSTRACT QUALITY INITIATIVES [62584]       Pt complains of abdominal pain past couple weeks on and off.    15w6d    Gaby Florian, [facility]                "

## 2017-04-04 NOTE — MR AVS SNAPSHOT
After Visit Summary   4/4/2017    Ninoska Cottrell    MRN: [id number]           Patient Information     Date Of Birth          1980        Visit Information        Provider Department      4/4/2017 4:15 PM Traci Padron MD [facility]        Today's Diagnoses     Supervision of high-risk pregnancy of elderly multigravida    -  1       Follow-ups after your visit        Your next 10 appointments already scheduled     Apr 21, 2017  1:30 PM CDT   MFM US COMP with [id number]   [facility] Maternal Fetal Medicine Ultrasound - [facility])    [address]  [facility] [address] [phone]           Wear comfortable clothes and leave your valuables at home.            Apr 21, 2017  2:00 PM CDT   Radiology MD with  KOJO MCCANN   [facility] Maternal Fetal Medicine [facility])    [address]  [facility] [address] [phone]           Please arrive at the time given for your first appointment.  This visit is used internally to schedule the physician's time during your ultrasound.              Who to contact     If you have questions or need follow up information about today's clinic visit or your schedule please contact [facility] directly at [phone].  Normal or non-critical lab and imaging results will be communicated to you by MyChart, letter or phone within 4 business days after the clinic has received the results. If you do not hear from us within 7 days, please contact the clinic through MyChart or phone. If you have a critical or abnormal lab result, we will notify you by phone as soon as possible.  Submit refill requests through [employer] or call your pharmacy and they will forward the refill request to us. Please allow 3 business days for your refill to be completed.          Additional Information About Your Visit        [email or web address] Information     [employer] lets you send  "messages to your doctor, view your test results, renew your prescriptions, schedule appointments and more. To sign up, go to [email or web address] . Click on \"Log in\" on the left side of the screen, which will take you to the Welcome page. Then click on \"Sign up Now\" on the right side of the page.     You will be asked to enter the access code listed below, as well as some personal information. Please follow the directions to create your username and password.     Your access code is: [email or web address]  Expires: 2017 11:46 AM     Your access code will  in 90 days. If you need help or a new code, please call your [place] clinic or [phone].        Care EveryWhere ID     This is your Care EveryWhere ID. This could be used by other organizations to access your [place] medical records  [id number]        Your Vitals Were     Pulse Last Period Pulse Oximetry BMI (Body Mass Index)          92 2016 (Approximate) 99% 43.69 kg/m2         Blood Pressure from Last 3 Encounters:   17 112/64   17 130/76   17 136/72    Weight from Last 3 Encounters:   17 270 lb 11.2 oz (122.8 kg)   17 268 lb 6.4 oz (121.7 kg)   17 264 lb (119.7 kg)              We Performed the Following     Alpha fetoprotein maternal screen     Hemoglobin A1c        Primary Care Provider Office Phone # Fax #    Gage Pacheco [phone]       XXX RETIRED  [address] [place] [phone]        Thank you!     Thank you for choosing [facility]  for your care. Our goal is always to provide you with excellent care. Hearing back from our patients is one way we can continue to improve our services. Please take a few minutes to complete the written survey that you may receive in the mail after your visit with us. Thank you!             Your Updated Medication List - Protect others around you: Learn how to safely use, store and throw away your medicines at " www.disposemymeds.org.          This list is accurate as of: 4/4/17  4:49 PM.  Always use your most recent med list.                   Brand Name Dispense Instructions for use    ADVIL PO      Take 800 mg by mouth every 6 hours as needed for moderate pain Reported on 2/16/2017       albuterol 108 (90 BASE) MCG/ACT Inhaler    VENTOLIN HFA    1 Inhaler    Inhale 1-2 puffs into the lungs every 4 hours as needed for shortness of breath / dyspnea or wheezing Reported on 2/16/2017       CYCLOBENZAPRINE HCL PO      Take 10 mg by mouth 3 times daily as needed for muscle spasms (5 mg at HS) Reported on 2/16/2017       ELAVIL PO      Take 30 mg by mouth Reported on 2/16/2017       prenatal multivitamin  plus iron 27-0.8 MG Tabs per tablet      Take 1 tablet by mouth       RIZATRIPTAN BENZOATE PO      Take 10 mg by mouth Reported on 2/16/2017

## 2017-04-05 LAB — HBA1C MFR BLD: 4.8 % (ref 4.3–6)

## 2017-04-06 LAB
# FETUSES US: NORMAL
AFP ADJ MOM AMN: 1.52
AFP SERPL-MCNC: 32 NG/ML
AGE - REPORTED: 37.5
DATING METHOD: NORMAL
DIABETIC AT CONCEPTION: NO
FAMILY MEMBER DISEASES HX: NO
FAMILY MEMBER DISEASES HX: NO
GA METHOD: NORMAL
GA: 15.86 WK
HX OF HEREDITARY DISORDERS: NORMAL
IDDM PATIENT QL: NO
INTEGRATED SCN PATIENT-IMP: NORMAL
LMP START DATE: NORMAL
PREV HX CHROMOSOME ABNORMALITY: NO
SPECIMEN DRAWN SERPL: NORMAL
TWINS: NORMAL

## 2017-04-25 ENCOUNTER — HOSPITAL ENCOUNTER (OUTPATIENT)
Dept: ULTRASOUND IMAGING | Facility: CLINIC | Age: 37
Discharge: HOME | End: 2017-04-25
Attending: OBSTETRICS & GYNECOLOGY | Admitting: OBSTETRICS & GYNECOLOGY
Payer: COMMERCIAL

## 2017-04-25 ENCOUNTER — OFFICE VISIT (OUTPATIENT)
Dept: MATERNAL FETAL MEDICINE | Facility: CLINIC | Age: 37
End: 2017-04-25
Attending: OBSTETRICS & GYNECOLOGY
Payer: COMMERCIAL

## 2017-04-25 DIAGNOSIS — Z82.79 FAMILY HISTORY OF CONGENITAL HEART DEFECT: ICD-10-CM

## 2017-04-25 DIAGNOSIS — O35.9XX0 SUSPECTED FETAL ANOMALY, ANTEPARTUM, NOT APPLICABLE OR UNSPECIFIED FETUS: ICD-10-CM

## 2017-04-25 DIAGNOSIS — O09.522 ELDERLY MULTIGRAVIDA IN SECOND TRIMESTER: ICD-10-CM

## 2017-04-25 DIAGNOSIS — O09.522 AMA (ADVANCED MATERNAL AGE) MULTIGRAVIDA 35+, SECOND TRIMESTER: Primary | ICD-10-CM

## 2017-04-25 PROCEDURE — 76811 OB US DETAILED SNGL FETUS: CPT

## 2017-04-25 NOTE — MR AVS SNAPSHOT
After Visit Summary   4/25/2017    Ninoska Cottrell    MRN: [id number]           Patient Information     Date Of Birth          1980        Visit Information        Provider Department      4/25/2017 2:00 PM Barbie Lyons MD [facility] Maternal Fetal Medicine [facility]        Today's Diagnoses     AMA (advanced maternal age) multigravida 35+, second trimester    -  1    Suspected fetal anomaly, antepartum, not applicable or unspecified fetus        Family history of congenital heart defect           Follow-ups after your visit        Your next 10 appointments already scheduled     May 02, 2017  3:30 PM CDT   ESTABLISHED PRENATAL with Tarci Padron MD   [facility]    [address] [id number] [phone]            May 30, 2017  1:30 PM CDT   MFM US COMPRE SINGLE F/U with [id number]   [facility] Maternal Fetal Medicine Ultrasound - [facility] ([facility])    [address]  [facility] [address] [phone]           Wear comfortable clothes and leave your valuables at home.            May 30, 2017  2:15 PM CDT   MFM READ SCREEN FETAL EHCO Vazquez with RHMFMUSR3   [facility] Maternal Fetal Medicine Ultrasound - [facility] ([facility])    [address]  [facility] [id number] [phone]            May 30, 2017  2:45 PM CDT   Radiology MD with  MFM MD   [facility] Maternal Fetal Medicine [facility] ([facility])    [address]  [facility] [id number] [phone]           Please arrive at the time given for your first appointment.  This visit is used internally to schedule the physician's time during your ultrasound.              Future tests that were ordered for you today     Open Future Orders        Priority Expected Expires Ordered    MFM US Comprehensive Single F/U Routine  4/25/2018 4/25/2017    MFM Read Screen Fetal  "Echo Single Routine  2018            Who to contact     If you have questions or need follow up information about today's clinic visit or your schedule please contact [facility]ERNAL FETAL MEDICINE [facility] directly at [phone].  Normal or non-critical lab and imaging results will be communicated to you by MyChart, letter or phone within 4 business days after the clinic has received the results. If you do not hear from us within 7 days, please contact the clinic through [employer] or phone. If you have a critical or abnormal lab result, we will notify you by phone as soon as possible.  Submit refill requests through [employer] or call your pharmacy and they will forward the refill request to us. Please allow 3 business days for your refill to be completed.          Additional Information About Your Visit        [employer] Information     [employer] lets you send messages to your doctor, view your test results, renew your prescriptions, schedule appointments and more. To sign up, go to [email or web address] . Click on \"Log in\" on the left side of the screen, which will take you to the Welcome page. Then click on \"Sign up Now\" on the right side of the page.     You will be asked to enter the access code listed below, as well as some personal information. Please follow the directions to create your username and password.     Your access code is: [email or web address]  Expires: 2017 11:46 AM     Your access code will  in 90 days. If you need help or a new code, please call your [place] clinic or [phone].        Care EveryWhere ID     This is your Care EveryWhere ID. This could be used by other organizations to access your [place] medical records  [id number]        Your Vitals Were     Last Period                   2016 (Approximate)            Blood Pressure from Last 3 Encounters:   17 112/64   17 130/76   17 136/72    Weight from Last 3 Encounters:   17 122.8 kg (270 " lb 11.2 oz)   03/24/17 121.7 kg (268 lb 6.4 oz)   02/24/17 119.7 kg (264 lb)               Primary Care Provider Office Phone # Fax #    Gage Pacheco [phone]       XXX RETIRED  [address] [facility] [address]        Thank you!     Thank you for choosing [facility]  for your care. Our goal is always to provide you with excellent care. Hearing back from our patients is one way we can continue to improve our services. Please take a few minutes to complete the written survey that you may receive in the mail after your visit with us. Thank you!             Your Updated Medication List - Protect others around you: Learn how to safely use, store and throw away your medicines at www.disposemymeds.org.          This list is accurate as of: 4/25/17  2:21 PM.  Always use your most recent med list.                   Brand Name Dispense Instructions for use    ADVIL PO      Take 800 mg by mouth every 6 hours as needed for moderate pain Reported on 2/16/2017       albuterol 108 (90 BASE) MCG/ACT Inhaler    VENTOLIN HFA    1 Inhaler    Inhale 1-2 puffs into the lungs every 4 hours as needed for shortness of breath / dyspnea or wheezing Reported on 2/16/2017       CYCLOBENZAPRINE HCL PO      Take 10 mg by mouth 3 times daily as needed for muscle spasms (5 mg at HS) Reported on 2/16/2017       ELAVIL PO      Take 30 mg by mouth Reported on 2/16/2017       prenatal multivitamin  plus iron 27-0.8 MG Tabs per tablet      Take 1 tablet by mouth       RIZATRIPTAN BENZOATE PO      Take 10 mg by mouth Reported on 2/16/2017

## 2017-05-02 ENCOUNTER — PRENATAL OFFICE VISIT (OUTPATIENT)
Dept: OBGYN | Facility: CLINIC | Age: 37
End: 2017-05-02
Payer: COMMERCIAL

## 2017-05-02 VITALS
BODY MASS INDEX: 44.06 KG/M2 | HEART RATE: 99 BPM | DIASTOLIC BLOOD PRESSURE: 62 MMHG | OXYGEN SATURATION: 99 % | WEIGHT: 273 LBS | SYSTOLIC BLOOD PRESSURE: 120 MMHG

## 2017-05-02 DIAGNOSIS — O09.529 SUPERVISION OF HIGH-RISK PREGNANCY OF ELDERLY MULTIGRAVIDA: Primary | ICD-10-CM

## 2017-05-02 PROCEDURE — 99207 ZZC PRENATAL VISIT: CPT | Performed by: OBSTETRICS & GYNECOLOGY

## 2017-05-02 NOTE — MR AVS SNAPSHOT
After Visit Summary   5/2/2017    Ninoska Cottrell    MRN: [id number]           Patient Information     Date Of Birth          1980        Visit Information        Provider Department      5/2/2017 3:30 PM Traci Padron MD [facility]        Today's Diagnoses     Supervision of high-risk pregnancy of elderly multigravida    -  1       Follow-ups after your visit        Your next 10 appointments already scheduled     May 30, 2017  1:30 PM CDT   MFM US COMPRE SINGLE F/U with [id number]   [facility] Maternal [facility]etal [facility] Ultrasound - [facility] ([facility])    [address]  [facility] [address]   [phone]           Wear comfortable clothes and leave your valuables at home.            May 30, 2017  2:15 PM CDT   MFM READ SCREEN FETAL EHCO Vazquez with [id number]   [facility] Maternal [facility]etal Medicine Ultrasound - [facility] ([facility])    [address]  [facility] [id number] [phone]            May 30, 2017  2:45 PM CDT   Radiology MD with  MFM MD   [facility] - [facility] ([facility])    [address]  [facility] [phone] [phone]           Please arrive at the time given for your first appointment.  This visit is used internally to schedule the physician's time during your ultrasound.            Jun 06, 2017  2:45 PM CDT   ESTABLISHED PRENATAL with Traci Padron MD   [facility])    [address] [place] [address] [phone]              Who to contact     If you have questions or need follow up information about today's clinic visit or your schedule please contact [facility] directly at [phone].  Normal or non-critical lab and imaging results will be communicated to you by MyChart, letter or phone within 4 business days after the clinic has  "received the results. If you do not hear from us within 7 days, please contact the clinic through [employer] or phone. If you have a critical or abnormal lab result, we will notify you by phone as soon as possible.  Submit refill requests through [employer] or call your pharmacy and they will forward the refill request to us. Please allow 3 business days for your refill to be completed.          Additional Information About Your Visit        [employer] Information     [employer] lets you send messages to your doctor, view your test results, renew your prescriptions, schedule appointments and more. To sign up, go to [email or web address] . Click on \"Log in\" on the left side of the screen, which will take you to the Welcome page. Then click on \"Sign up Now\" on the right side of the page.     You will be asked to enter the access code listed below, as well as some personal information. Please follow the directions to create your username and password.     Your access code is: [email or web address]  Expires: 2017 11:46 AM     Your access code will  in 90 days. If you need help or a new code, please call your [place] clinic or [phone].        Care EveryWhere ID     This is your Care EveryWhere ID. This could be used by other organizations to access your [place] medical records  [id number]        Your Vitals Were     Pulse Last Period Pulse Oximetry BMI (Body Mass Index)          99 2016 (Approximate) 99% 44.06 kg/m2         Blood Pressure from Last 3 Encounters:   17 120/62   17 112/64   17 130/76    Weight from Last 3 Encounters:   17 273 lb (123.8 kg)   17 270 lb 11.2 oz (122.8 kg)   17 268 lb 6.4 oz (121.7 kg)              Today, you had the following     No orders found for display       Primary Care Provider Office Phone # Fax #    Gage Pacheco [phone]       XXX RETIRED  [address] [place] [phone]        Thank you!     Thank you " for choosing [facility]  for your care. Our goal is always to provide you with excellent care. Hearing back from our patients is one way we can continue to improve our services. Please take a few minutes to complete the written survey that you may receive in the mail after your visit with us. Thank you!             Your Updated Medication List - Protect others around you: Learn how to safely use, store and throw away your medicines at www.disposemymeds.org.          This list is accurate as of: 5/2/17  4:14 PM.  Always use your most recent med list.                   Brand Name Dispense Instructions for use    ADVIL PO      Take 800 mg by mouth every 6 hours as needed for moderate pain Reported on 2/16/2017       albuterol 108 (90 BASE) MCG/ACT Inhaler    VENTOLIN HFA    1 Inhaler    Inhale 1-2 puffs into the lungs every 4 hours as needed for shortness of breath / dyspnea or wheezing Reported on 2/16/2017       CYCLOBENZAPRINE HCL PO      Take 10 mg by mouth 3 times daily as needed for muscle spasms (5 mg at HS) Reported on 2/16/2017       ELAVIL PO      Take 30 mg by mouth Reported on 2/16/2017       prenatal multivitamin  plus iron 27-0.8 MG Tabs per tablet      Take 1 tablet by mouth       RIZATRIPTAN BENZOATE PO      Take 10 mg by mouth Reported on 2/16/2017       vitamin B complex with vitamin C Tabs tablet      Take 1 tablet by mouth daily       VITAMIN D PO      Take 2,000 Units by mouth daily

## 2017-05-02 NOTE — NURSING NOTE
"Chief Complaint   Patient presents with     Prenatal Care       Initial /62 (Cuff Size: Adult Large)  Pulse 99  Wt 273 lb (123.8 kg)  LMP 2016 (Approximate)  SpO2 99%  BMI 44.06 kg/m2 Estimated body mass index is 44.06 kg/(m^2) as calculated from the following:    Height as of 3/24/17: 5' 6\" (1.676 m).    Weight as of this encounter: 273 lb (123.8 kg).  BP completed using cuff size: regular        The following HM Due: NONE      The following patient reported/Care Every where data was sent to:  P ABSTRACT QUALITY INITIATIVES [36776]       19w6d  States feeling good, No concerns.  Having good fetal movements.      Gaby Florian, [facility]                "

## 2017-05-02 NOTE — PROGRESS NOTES
Routine OB Visit:    S: Nausea resolved. Continued fatigue, sleeping poorly with interruptions during the night from her cat. no contractions. No LoF, VB.     O: /62 (Cuff Size: Adult Large)  Pulse 99  Wt 273 lb (123.8 kg)  LMP 2016 (Approximate)  SpO2 99%  BMI 44.06 kg/m2   Gen: resting comfortably, in NAD  See Prenatal flowsheet    A: Ninoska Cottrell is a 37 year old female  at 19w6d, here for routine OB care. 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     P:   1) PNC: O+/RNI. Verify 46XX, within normal limits. Level II with small stomach, repeat in 4 wks. APF and NIPT normal. Echo 2/2 hx child with VSD.  2) Obesity: plan HgA1c normal at 15 wks. Plan GCT at usual time.  3) Topical pain relief: patient using [employer] Gel, wondering about safety. Reviewed ingredient list, no concerns.  4) return to clinic in 4 weeks for routine prenatal care    Traci Padron MD  Obstetrics and Gynecology

## 2017-05-30 ENCOUNTER — HOSPITAL ENCOUNTER (OUTPATIENT)
Dept: ULTRASOUND IMAGING | Facility: CLINIC | Age: 37
Discharge: HOME | End: 2017-05-30
Attending: OBSTETRICS & GYNECOLOGY | Admitting: OBSTETRICS & GYNECOLOGY
Payer: COMMERCIAL

## 2017-05-30 ENCOUNTER — HOSPITAL ENCOUNTER (OUTPATIENT)
Dept: ULTRASOUND IMAGING | Facility: CLINIC | Age: 37
End: 2017-05-30
Attending: OBSTETRICS & GYNECOLOGY
Payer: COMMERCIAL

## 2017-05-30 ENCOUNTER — OFFICE VISIT (OUTPATIENT)
Dept: MATERNAL FETAL MEDICINE | Facility: CLINIC | Age: 37
End: 2017-05-30
Attending: OBSTETRICS & GYNECOLOGY
Payer: COMMERCIAL

## 2017-05-30 DIAGNOSIS — Z82.79 FAMILY HISTORY OF CONGENITAL HEART DEFECT: ICD-10-CM

## 2017-05-30 DIAGNOSIS — Z03.73 SUSPECTED FETAL ANOMALY NOT FOUND: ICD-10-CM

## 2017-05-30 DIAGNOSIS — O35.9XX0 SUSPECTED FETAL ANOMALY, ANTEPARTUM, NOT APPLICABLE OR UNSPECIFIED FETUS: ICD-10-CM

## 2017-05-30 DIAGNOSIS — O09.522 AMA (ADVANCED MATERNAL AGE) MULTIGRAVIDA 35+, SECOND TRIMESTER: Primary | ICD-10-CM

## 2017-05-30 PROCEDURE — 76816 OB US FOLLOW-UP PER FETUS: CPT

## 2017-05-30 PROCEDURE — 76825 ECHO EXAM OF FETAL HEART: CPT

## 2017-05-30 NOTE — PROGRESS NOTES
"Please see \"Imaging\" tab under \"Chart Review\" for details of today's US.      Brittany Kearney, DO  Maternal-Fetal Medicine        "

## 2017-05-30 NOTE — MR AVS SNAPSHOT
After Visit Summary   5/30/2017    Ninoska Cottrell    MRN: [id number]           Patient Information     Date Of Birth          1980        Visit Information        Provider Department      5/30/2017 2:45 PM Travis Pinedo MD [facility]        Today's Diagnoses     AMA (advanced maternal age) multigravida 35+, second trimester    -  1    Suspected fetal anomaly not found        Family history of congenital heart defect           Follow-ups after your visit        Your next 10 appointments already scheduled     May 30, 2017  2:45 PM CDT   Radiology MD with Travis Pinedo MD   [facility] ([facility])    [address]  [facility] [address] [phone]           Please arrive at the time given for your first appointment.  This visit is used internally to schedule the physician's time during your ultrasound.            Jun 06, 2017  2:45 PM CDT   ESTABLISHED PRENATAL with Traci Padron MD   [facility] ([facility]    [address] [address] [phone]              Who to contact     If you have questions or need follow up information about today's clinic visit or your schedule please contact [facility] directly at [phone].  Normal or non-critical lab and imaging results will be communicated to you by MyChart, letter or phone within 4 business days after the clinic has received the results. If you do not hear from us within 7 days, please contact the clinic through MyChart or phone. If you have a critical or abnormal lab result, we will notify you by phone as soon as possible.  Submit refill requests through [employer] or call your pharmacy and they will forward the refill request to us. Please allow 3 business days for your refill to be completed.          Additional Information About Your  "Visit        MyChart Information     [employer] lets you send messages to your doctor, view your test results, renew your prescriptions, schedule appointments and more. To sign up, go to [email or web address] . Click on \"Log in\" on the left side of the screen, which will take you to the Welcome page. Then click on \"Sign up Now\" on the right side of the page.     You will be asked to enter the access code listed below, as well as some personal information. Please follow the directions to create your username and password.     Your access code is: [email or web address]  Expires: 2017  2:18 PM     Your access code will  in 90 days. If you need help or a new code, please call your [place] clinic or [phone].        Care EveryWhere ID     This is your Care EveryWhere ID. This could be used by other organizations to access your [place] medical records  [id number]        Your Vitals Were     Last Period                   2016 (Approximate)            Blood Pressure from Last 3 Encounters:   17 120/62   17 112/64   17 130/76    Weight from Last 3 Encounters:   17 123.8 kg (273 lb)   17 122.8 kg (270 lb 11.2 oz)   17 121.7 kg (268 lb 6.4 oz)              Today, you had the following     No orders found for display       Primary Care Provider Office Phone # Fax #    Traci Padron [phone]       [place] [address] [address] [facility] [address]        Thank you!     Thank you for choosing [facility]  for your care. Our goal is always to provide you with excellent care. Hearing back from our patients is one way we can continue to improve our services. Please take a few minutes to complete the written survey that you may receive in the mail after your visit with us. Thank you!             Your Updated Medication List - Protect others around you: Learn how to safely use, store and throw away your medicines at " www.disposemymeds.org.          This list is accurate as of: 5/30/17  2:18 PM.  Always use your most recent med list.                   Brand Name Dispense Instructions for use    ADVIL PO      Take 800 mg by mouth every 6 hours as needed for moderate pain Reported on 2/16/2017       albuterol 108 (90 BASE) MCG/ACT Inhaler    VENTOLIN HFA    1 Inhaler    Inhale 1-2 puffs into the lungs every 4 hours as needed for shortness of breath / dyspnea or wheezing Reported on 2/16/2017       CYCLOBENZAPRINE HCL PO      Take 10 mg by mouth 3 times daily as needed for muscle spasms (5 mg at HS) Reported on 2/16/2017       ELAVIL PO      Take 30 mg by mouth Reported on 2/16/2017       prenatal multivitamin  plus iron 27-0.8 MG Tabs per tablet      Take 1 tablet by mouth       RIZATRIPTAN BENZOATE PO      Take 10 mg by mouth Reported on 2/16/2017       vitamin B complex with vitamin C Tabs tablet      Take 1 tablet by mouth daily       VITAMIN D PO      Take 2,000 Units by mouth daily

## 2017-05-30 NOTE — PROGRESS NOTES
"Please see \"Imaging\" tab under \"Chart Review\" for details of the fetal echo at the [facility].    Travis Pinedo MD  Maternal-Fetal Medicine    "

## 2017-06-06 ENCOUNTER — PRENATAL OFFICE VISIT (OUTPATIENT)
Dept: OBGYN | Facility: CLINIC | Age: 37
End: 2017-06-06
Payer: COMMERCIAL

## 2017-06-06 VITALS
SYSTOLIC BLOOD PRESSURE: 138 MMHG | HEART RATE: 96 BPM | BODY MASS INDEX: 45.19 KG/M2 | DIASTOLIC BLOOD PRESSURE: 86 MMHG | OXYGEN SATURATION: 99 % | WEIGHT: 280 LBS

## 2017-06-06 DIAGNOSIS — O09.529 SUPERVISION OF HIGH-RISK PREGNANCY OF ELDERLY MULTIGRAVIDA: Primary | ICD-10-CM

## 2017-06-06 LAB
ERYTHROCYTE [DISTWIDTH] IN BLOOD BY AUTOMATED COUNT: 14 % (ref 10–15)
GLUCOSE 1H P 50 G GLC PO SERPL-MCNC: 141 MG/DL (ref 60–129)
HCT VFR BLD AUTO: 32.2 % (ref 35–47)
HGB BLD-MCNC: 10.6 G/DL (ref 11.7–15.7)
MCH RBC QN AUTO: 29.9 PG (ref 26.5–33)
MCHC RBC AUTO-ENTMCNC: 32.9 G/DL (ref 31.5–36.5)
MCV RBC AUTO: 91 FL (ref 78–100)
PLATELET # BLD AUTO: 157 10E9/L (ref 150–450)
RBC # BLD AUTO: 3.54 10E12/L (ref 3.8–5.2)
WBC # BLD AUTO: 9.2 10E9/L (ref 4–11)

## 2017-06-06 PROCEDURE — 99207 ZZC PRENATAL VISIT: CPT | Performed by: OBSTETRICS & GYNECOLOGY

## 2017-06-06 PROCEDURE — 85027 COMPLETE CBC AUTOMATED: CPT | Performed by: OBSTETRICS & GYNECOLOGY

## 2017-06-06 PROCEDURE — 82950 GLUCOSE TEST: CPT | Performed by: OBSTETRICS & GYNECOLOGY

## 2017-06-06 PROCEDURE — 36415 COLL VENOUS BLD VENIPUNCTURE: CPT | Performed by: OBSTETRICS & GYNECOLOGY

## 2017-06-06 NOTE — PROGRESS NOTES
Routine OB Visit:    S: +Back pain and increased pelvic pressure. Working with physical therapy at [facility] for relief measures. no contractions. No LoF, VB.     O: /86  Pulse 96  Wt 280 lb (127 kg)  LMP 2016 (Approximate)  SpO2 99%  BMI 45.19 kg/m2   Gen: resting comfortably, in NAD  Abd: obese. Fundal height difficult to obtain due to body habitus.  See Prenatal flowsheet    A: Ninoska Cottrell is a 37 year old female  at 24w6d, here for routine OB care. 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     P:   1) PNC: O+/RNI. Verify 46XX, within normal limits. Level II with small stomach, 4wk repeat normal. APF and NIPT normal. Echo 2/2 hx child with VSD, normal.  2) Obesity:  HgA1c normal at 15 wks. GCT today.  3) Back pain: discussed relief measures. Encouraged continued physical therapy and maternity support belt  4) return to clinic in 4 weeks for routine prenatal care    Traci Padron MD  Obstetrics and Gynecology

## 2017-06-06 NOTE — NURSING NOTE
"Chief Complaint   Patient presents with     Prenatal Care       Initial /86  Pulse 96  Wt 280 lb (127 kg)  LMP 2016 (Approximate)  SpO2 99%  BMI 45.19 kg/m2 Estimated body mass index is 45.19 kg/(m^2) as calculated from the following:    Height as of 3/24/17: 5' 6\" (1.676 m).    Weight as of this encounter: 280 lb (127 kg).  BP completed using cuff size: large        The following HM Due: NONE      The following patient reported/Care Every where data was sent to:  P ABSTRACT QUALITY INITIATIVES [1    24w6d  States feeling good,  Having good fetal movements.     Pelvic pressure  Back pain-in physical therapy       GTT  CBC    Gaby Florian, CMA             "

## 2017-06-06 NOTE — MR AVS SNAPSHOT
"              After Visit Summary   2017    Ninoska Cottrell    MRN: [id number]           Patient Information     Date Of Birth          1980        Visit Information        Provider Department      2017 2:45 PM Traci Padron MD [facility]        Today's Diagnoses     Supervision of high-risk pregnancy of elderly multigravida    -  1       Follow-ups after your visit        Who to contact     If you have questions or need follow up information about today's clinic visit or your schedule please contact [facility] directly at [phone].  Normal or non-critical lab and imaging results will be communicated to you by nDream[employer]art, letter or phone within 4 business days after the clinic has received the results. If you do not hear from us within 7 days, please contact the clinic through nDreamshart or phone. If you have a critical or abnormal lab result, we will notify you by phone as soon as possible.  Submit refill requests through [employer] or call your pharmacy and they will forward the refill request to us. Please allow 3 business days for your refill to be completed.          Additional Information About Your Visit        MyChart Information     [employer] lets you send messages to your doctor, view your test results, renew your prescriptions, schedule appointments and more. To sign up, go to [email or web address] . Click on \"Log in\" on the left side of the screen, which will take you to the Welcome page. Then click on \"Sign up Now\" on the right side of the page.     You will be asked to enter the access code listed below, as well as some personal information. Please follow the directions to create your username and password.     Your access code is: [email or web address]  Expires: 2017  2:18 PM     Your access code will  in 90 days. If you need help or a new code, please call your [facility] or [phone].        Care EveryWhere ID     This is your Care " EveryWhere ID. This could be used by other organizations to access your [place] medical records  [id number]        Your Vitals Were     Pulse Last Period Pulse Oximetry BMI (Body Mass Index)          96 12/14/2016 (Approximate) 99% 45.19 kg/m2         Blood Pressure from Last 3 Encounters:   06/06/17 138/86   05/02/17 120/62   04/04/17 112/64    Weight from Last 3 Encounters:   06/06/17 280 lb (127 kg)   05/02/17 273 lb (123.8 kg)   04/04/17 270 lb 11.2 oz (122.8 kg)              We Performed the Following     CBC ([place])     Glucose tolerance gest screen 1 hour        Primary Care Provider Office Phone # Fax #    Traci Padron [phone]       [place] [address] [facility] [address]        Thank you!     Thank you for choosing [facility]  for your care. Our goal is always to provide you with excellent care. Hearing back from our patients is one way we can continue to improve our services. Please take a few minutes to complete the written survey that you may receive in the mail after your visit with us. Thank you!             Your Updated Medication List - Protect others around you: Learn how to safely use, store and throw away your medicines at www.disposemymeds.org.          This list is accurate as of: 6/6/17  3:13 PM.  Always use your most recent med list.                   Brand Name Dispense Instructions for use    ADVIL PO      Take 800 mg by mouth every 6 hours as needed for moderate pain Reported on 2/16/2017       albuterol 108 (90 BASE) MCG/ACT Inhaler    VENTOLIN HFA    1 Inhaler    Inhale 1-2 puffs into the lungs every 4 hours as needed for shortness of breath / dyspnea or wheezing Reported on 2/16/2017       CYCLOBENZAPRINE HCL PO      Take 10 mg by mouth 3 times daily as needed for muscle spasms (5 mg at HS) Reported on 2/16/2017       ELAVIL PO      Take 30 mg by mouth Reported on 2/16/2017       prenatal multivitamin  plus  iron 27-0.8 MG Tabs per tablet      Take 1 tablet by mouth       RIZATRIPTAN BENZOATE PO      Take 10 mg by mouth Reported on 2/16/2017       vitamin B complex with vitamin C Tabs tablet      Take 1 tablet by mouth daily       VITAMIN D PO      Take 2,000 Units by mouth daily

## 2017-06-07 ENCOUNTER — TELEPHONE (OUTPATIENT)
Dept: OBGYN | Facility: CLINIC | Age: 37
End: 2017-06-07

## 2017-06-07 DIAGNOSIS — O09.529 SUPERVISION OF HIGH-RISK PREGNANCY OF ELDERLY MULTIGRAVIDA: Primary | ICD-10-CM

## 2017-06-13 DIAGNOSIS — O09.529 SUPERVISION OF HIGH-RISK PREGNANCY OF ELDERLY MULTIGRAVIDA: ICD-10-CM

## 2017-06-13 PROCEDURE — 82951 GLUCOSE TOLERANCE TEST (GTT): CPT | Performed by: OBSTETRICS & GYNECOLOGY

## 2017-06-13 PROCEDURE — 36415 COLL VENOUS BLD VENIPUNCTURE: CPT | Performed by: OBSTETRICS & GYNECOLOGY

## 2017-06-13 PROCEDURE — 82952 GTT-ADDED SAMPLES: CPT | Performed by: OBSTETRICS & GYNECOLOGY

## 2017-06-13 NOTE — LETTER
[facility]  [address] [address] [phone]          Ninoska Cottrell  [address] [place] [address]          Dear Ninoska,        I tried to reach you by phone but your mailbox is full.  The results of your recent 3 hour glucose test were NORMAL.  If you have any further questions or problems, please contact our office.    Sincerely,      Traci Padron MD /shahram

## 2017-06-14 LAB
GLUCOSE 1H P 100 G GLC PO SERPL-MCNC: 160 MG/DL (ref 60–179)
GLUCOSE 2H P 100 G GLC PO SERPL-MCNC: 157 MG/DL (ref 60–154)
GLUCOSE 3H P 100 G GLC PO SERPL-MCNC: 78 MG/DL (ref 60–139)
GLUCOSE P FAST SERPL-MCNC: 88 MG/DL (ref 60–94)

## 2017-06-20 ENCOUNTER — TELEPHONE (OUTPATIENT)
Dept: OBGYN | Facility: CLINIC | Age: 37
End: 2017-06-20

## 2017-06-20 DIAGNOSIS — M54.50 LOW BACK PAIN DURING PREGNANCY: Primary | ICD-10-CM

## 2017-06-20 DIAGNOSIS — O26.899 LOW BACK PAIN DURING PREGNANCY: Primary | ICD-10-CM

## 2017-06-20 NOTE — TELEPHONE ENCOUNTER
Patient can obtain a maternity belt OTC, I often recommend looking on amazon or at any local pharmacy or [employer]. If there is a specific belt she desires based on recommendation by her physical therapy then we will need to know the exact belt desired. If she is able to provide this information you can place this order and co-sign to me.     Thanks,  Traci Padron MD

## 2017-06-20 NOTE — TELEPHONE ENCOUNTER
Reason for Call: Request for an order or referral:    Order or referral being requested: Maternity belt for back pain    Date needed: as soon as possible    Has the patient been seen by the PCP for this problem? YES    Additional comments: fax to [facility] --attn: Carolann Reed fax # [phone]    Phone number Patient can be reached at:  Home number on file [phone] (home)    Best Time:  Anytime     Can we leave a detailed message on this number?  YES    Call taken on 6/20/2017 at 1:31 PM by CALIXTO VARGAS

## 2017-06-22 NOTE — TELEPHONE ENCOUNTER
Received a call from [facility] Courtney marv Belcher about needing the order.  The state they have the product to give but need the order from the MD.    Dr. Padron wrote I could write the order and sign as a VERB and then leave it for the MD to sign it next time they are in.    I had realized Dr. Padron was on a weeks vacation.  Asked Dr. Anderson's to sign.    Faxed RX with my signature on it to [phone]  Attn:  Carolann at [facility].    Amanuel HERRERA RN

## 2017-06-22 NOTE — TELEPHONE ENCOUNTER
Set RX on Dr. Queen' and asked to sign for Dr. Padron.    RX was placed on my desk.    So re-faxed RX order with Dr. Queen' signature.  Put into Dr. Queen' basket.    Amanuel HERRERA RN

## 2017-07-16 ENCOUNTER — NURSE TRIAGE (OUTPATIENT)
Dept: NURSING | Facility: CLINIC | Age: 37
End: 2017-07-16

## 2017-07-16 NOTE — TELEPHONE ENCOUNTER
"  Reason for Disposition    MODERATE-SEVERE abdominal pain (e.g., interferes with normal activities, awakens from sleep)    Additional Information    Negative: Passed out (i.e., lost consciousness, collapsed and was not responding)    Negative: Shock suspected (e.g., cold/pale/clammy skin, too weak to stand, low BP, rapid pulse)    Negative: Difficult to awaken or acting confused  (e.g., disoriented, slurred speech)    Negative: [1] SEVERE abdominal pain (e.g., excruciating) AND [2] constant AND [3] present > 1 hour    Negative: SEVERE vaginal bleeding (e.g., continuous red blood from vagina, or large blood clots)    Negative: Sounds like a life-threatening emergency to the triager    Negative: Followed an abdomen (stomach) injury    Negative: [1] Having contractions or other symptoms of labor AND [2] < 37 weeks pregnant (i.e., )    Negative: [1] Having contractions or other symptoms of labor AND [2] >= 37 weeks pregnant (i.e., term pregnancy)    Negative: [1] Abdominal pain AND [2] pregnant < 20 weeks    Negative: [1] Vomiting AND [2] contains red blood or black (\"coffee ground\") material  (Exception: few red streaks in vomit that only happened once)    Protocols used: PREGNANCY - ABDOMINAL PAIN GREATER THAN 20 WEEKS EGA-ADULT-  FNA paged on call MD KISHAN Ferrara to phone patient back at 5:38am.    "

## 2017-07-18 ENCOUNTER — PRENATAL OFFICE VISIT (OUTPATIENT)
Dept: OBGYN | Facility: CLINIC | Age: 37
End: 2017-07-18
Payer: COMMERCIAL

## 2017-07-18 ENCOUNTER — TELEPHONE (OUTPATIENT)
Dept: OBGYN | Facility: CLINIC | Age: 37
End: 2017-07-18

## 2017-07-18 VITALS
HEART RATE: 89 BPM | SYSTOLIC BLOOD PRESSURE: 128 MMHG | OXYGEN SATURATION: 99 % | BODY MASS INDEX: 46 KG/M2 | WEIGHT: 285 LBS | DIASTOLIC BLOOD PRESSURE: 72 MMHG

## 2017-07-18 DIAGNOSIS — Z23 ENCOUNTER FOR IMMUNIZATION: ICD-10-CM

## 2017-07-18 DIAGNOSIS — O09.529 SUPERVISION OF HIGH-RISK PREGNANCY OF ELDERLY MULTIGRAVIDA: Primary | ICD-10-CM

## 2017-07-18 PROCEDURE — 90471 IMMUNIZATION ADMIN: CPT | Performed by: OBSTETRICS & GYNECOLOGY

## 2017-07-18 PROCEDURE — 90715 TDAP VACCINE 7 YRS/> IM: CPT | Performed by: OBSTETRICS & GYNECOLOGY

## 2017-07-18 PROCEDURE — 99207 ZZC PRENATAL VISIT: CPT | Performed by: OBSTETRICS & GYNECOLOGY

## 2017-07-18 NOTE — NURSING NOTE
"Chief Complaint   Patient presents with     Prenatal Care       Initial /72  Pulse 89  Wt 285 lb (129.3 kg)  LMP 2016 (Approximate)  SpO2 99%  BMI 46 kg/m2 Estimated body mass index is 46 kg/(m^2) as calculated from the following:    Height as of 3/24/17: 5' 6\" (1.676 m).    Weight as of this encounter: 285 lb (129.3 kg).  BP completed using cuff size: large        The following HM Due: NONE      The following patient reported/Care Every where data was sent to:  P ABSTRACT QUALITY INITIATIVES [86237]       30w6d  States feeling good,.  Having good fetal movements.    Pt is having swelling  Tired  Abd cramping and diarrhea over the weekend      Gaby Florian, AMY               "

## 2017-07-18 NOTE — MR AVS SNAPSHOT
"              After Visit Summary   2017    Ninoska Cottrell    MRN: [id number]           Patient Information     Date Of Birth          1980        Visit Information        Provider Department      2017 2:30 PM Traci Padron MD [facility]        Today's Diagnoses     Supervision of high-risk pregnancy of elderly multigravida    -  1    Encounter for immunization           Follow-ups after your visit        Who to contact     If you have questions or need follow up information about today's clinic visit or your schedule please contact [facility] directly at [phone].  Normal or non-critical lab and imaging results will be communicated to you by MyChart, letter or phone within 4 business days after the clinic has received the results. If you do not hear from us within 7 days, please contact the clinic through H[employer] or phone. If you have a critical or abnormal lab result, we will notify you by phone as soon as possible.  Submit refill requests through [employer] or call your pharmacy and they will forward the refill request to us. Please allow 3 business days for your refill to be completed.          Additional Information About Your Visit        MyChart Information     [employer] lets you send messages to your doctor, view your test results, renew your prescriptions, schedule appointments and more. To sign up, go to [email or web address] . Click on \"Log in\" on the left side of the screen, which will take you to the Welcome page. Then click on \"Sign up Now\" on the right side of the page.     You will be asked to enter the access code listed below, as well as some personal information. Please follow the directions to create your username and password.     Your access code is: [email or web address]  Expires: 2017  2:18 PM     Your access code will  in 90 days. If you need help or a new code, please call your [facility] or [phone].        Care " EveryWhere ID     This is your Care EveryWhere ID. This could be used by other organizations to access your [place] medical records  [id number]        Your Vitals Were     Pulse Last Period Pulse Oximetry BMI (Body Mass Index)          89 12/14/2016 (Approximate) 99% 46 kg/m2         Blood Pressure from Last 3 Encounters:   07/18/17 128/72   06/06/17 138/86   05/02/17 120/62    Weight from Last 3 Encounters:   07/18/17 285 lb (129.3 kg)   06/06/17 280 lb (127 kg)   05/02/17 273 lb (123.8 kg)              We Performed the Following     TDAP VACCINE (ADACEL)        Primary Care Provider Office Phone # Fax #    Traci Padron [phone]       [facility] [address] [facility] [address]        Equal Access to Services     [facility] : Hadii aad ku hadasho Soomaali, waaxda luqadaha, qaybta kaalmada adeegyada, waxay alicein hayaan balta elmore . So [facility] [phone].    ATENCIÓN: Si habla español, tiene a irvin disposición servicios gratuitos de asistencia lingüística. Llame al [phone].    We comply with applicable federal civil rights laws and [place] laws. We do not discriminate on the basis of race, color, national origin, age, disability sex, sexual orientation or gender identity.            Thank you!     Thank you for choosing [facility]  for your care. Our goal is always to provide you with excellent care. Hearing back from our patients is one way we can continue to improve our services. Please take a few minutes to complete the written survey that you may receive in the mail after your visit with us. Thank you!             Your Updated Medication List - Protect others around you: Learn how to safely use, store and throw away your medicines at www.disposemymeds.org.          This list is accurate as of: 7/18/17  2:59 PM.  Always use your most recent med list.                   Brand Name Dispense Instructions for use Diagnosis     ADVIL PO      Take 800 mg by mouth every 6 hours as needed for moderate pain Reported on 2/16/2017        albuterol 108 (90 BASE) MCG/ACT Inhaler    VENTOLIN HFA    1 Inhaler    Inhale 1-2 puffs into the lungs every 4 hours as needed for shortness of breath / dyspnea or wheezing Reported on 2/16/2017    Upper respiratory tract infection, unspecified type, Intermittent asthma, uncomplicated       CYCLOBENZAPRINE HCL PO      Take 10 mg by mouth 3 times daily as needed for muscle spasms (5 mg at HS) Reported on 2/16/2017        ELAVIL PO      Take 30 mg by mouth Reported on 2/16/2017        order for DME     1 Device    Lumbar Maternity Corset    Low back pain during pregnancy       prenatal multivitamin  plus iron 27-0.8 MG Tabs per tablet      Take 1 tablet by mouth    Supervision of high-risk pregnancy of elderly multigravida       RIZATRIPTAN BENZOATE PO      Take 10 mg by mouth Reported on 2/16/2017        vitamin B complex with vitamin C Tabs tablet      Take 1 tablet by mouth daily        VITAMIN D PO      Take 2,000 Units by mouth daily

## 2017-07-18 NOTE — TELEPHONE ENCOUNTER
Reason for Call:  Same Day Appointment, Requested Provider:  Sheri Padron    PCP: Traci Padron    Reason for visit: Est. prenatal    Duration of symptoms: n/a    Have you been treated for this in the past? Yes    Additional comments: pt is now seeing doctor every two weeks. Saw doctor today and two weeks from today is august 1st. No openings. Pt is going out of town on the 4th and would like to be seen before they go. She is scheduled on the 8th as a back up.    Can we leave a detailed message on this number? YES    Phone number patient can be reached at: Home number on file [phone] (home)    Best Time: asap    Call taken on 7/18/2017 at 3:28 PM by Leslie Shepard

## 2017-07-18 NOTE — PROGRESS NOTES
Routine OB Visit:    S: +fatigue, difficulty sleeping. no contractions. No LoF, VB.  Planning a trip to [place] in 2 weeks. Discussed taking prenatal record with her and stops every 2 hours to ambulate.    O: /72  Pulse 89  Wt 285 lb (129.3 kg)  LMP 2016 (Approximate)  SpO2 99%  BMI 46 kg/m2   Gen: resting comfortably, in NAD  Abd: obese. Fundal height difficult to obtain due to body habitus.  See Prenatal flowsheet    A: Ninoska Cottrell is a 37 year old female  at 24w6d, here for routine OB care. 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     P:   1) PNC: O+/RNI. Verify 46XX, within normal limits. Level II with small stomach, 4wk repeat normal. APF and NIPT normal. Echo 2/2 hx child with VSD, normal. Failed GCT, passed GTT.  2) Obesity:  HgA1c normal at 15 wks. Plan 40w delivery.  3) PP: considering restarting oral contraceptives vs LARC option for ease of use. Information provided, will discuss further at future appointments.   4) return to clinic in 2 weeks for routine prenatal care    Traci Padron MD  Obstetrics and Gynecology

## 2017-07-19 NOTE — TELEPHONE ENCOUNTER
Called pt, left detailed vm (per consent to communicate) relaying provider does want every 2 week appt and okay to see different provider for next appt. Asked pt to call back and re-schedule with different provider during more desired timing of appt.

## 2017-07-19 NOTE — TELEPHONE ENCOUNTER
No openings in [place] or [place] with Dr. Padron during the week of July 31st. Dr. Albright and Debra Holliday CNM have availabilities that week in [place] (pt's normal location).     Please advise if you'd like pt to be fit in with you vs alternative provider for that week, thanks.

## 2017-07-30 ENCOUNTER — HOSPITAL ENCOUNTER (INPATIENT)
Facility: CLINIC | Age: 37
LOS: 6 days | Discharge: HOME | End: 2017-08-07
Attending: OBSTETRICS & GYNECOLOGY | Admitting: OBSTETRICS & GYNECOLOGY
Payer: COMMERCIAL

## 2017-07-30 ENCOUNTER — NURSE TRIAGE (OUTPATIENT)
Dept: NURSING | Facility: CLINIC | Age: 37
End: 2017-07-30

## 2017-07-30 DIAGNOSIS — O09.529 SUPERVISION OF HIGH-RISK PREGNANCY OF ELDERLY MULTIGRAVIDA: Primary | ICD-10-CM

## 2017-07-30 LAB
ABO + RH BLD: NORMAL
ABO + RH BLD: NORMAL
ALT SERPL W P-5'-P-CCNC: 13 U/L (ref 0–50)
ALT SERPL W P-5'-P-CCNC: 15 U/L (ref 0–50)
AST SERPL W P-5'-P-CCNC: 12 U/L (ref 0–45)
AST SERPL W P-5'-P-CCNC: 14 U/L (ref 0–45)
BLD GP AB SCN SERPL QL: NORMAL
BLOOD BANK CMNT PATIENT-IMP: NORMAL
CREAT SERPL-MCNC: 0.47 MG/DL (ref 0.52–1.04)
CREAT SERPL-MCNC: 0.52 MG/DL (ref 0.52–1.04)
CREAT UR-MCNC: 38 MG/DL
ERYTHROCYTE [DISTWIDTH] IN BLOOD BY AUTOMATED COUNT: 14.6 % (ref 10–15)
ERYTHROCYTE [DISTWIDTH] IN BLOOD BY AUTOMATED COUNT: 14.6 % (ref 10–15)
FIBRINOGEN PPP-MCNC: 474 MG/DL (ref 200–420)
FIBRINOGEN PPP-MCNC: 485 MG/DL (ref 200–420)
GFR SERPL CREATININE-BSD FRML MDRD: ABNORMAL ML/MIN/1.7M2
GFR SERPL CREATININE-BSD FRML MDRD: NORMAL ML/MIN/1.7M2
HCT VFR BLD AUTO: 34.3 % (ref 35–47)
HCT VFR BLD AUTO: 35.2 % (ref 35–47)
HGB BLD-MCNC: 11.5 G/DL (ref 11.7–15.7)
HGB BLD-MCNC: 11.6 G/DL (ref 11.7–15.7)
MCH RBC QN AUTO: 28.8 PG (ref 26.5–33)
MCH RBC QN AUTO: 29 PG (ref 26.5–33)
MCHC RBC AUTO-ENTMCNC: 33 G/DL (ref 31.5–36.5)
MCHC RBC AUTO-ENTMCNC: 33.5 G/DL (ref 31.5–36.5)
MCV RBC AUTO: 87 FL (ref 78–100)
MCV RBC AUTO: 87 FL (ref 78–100)
PLATELET # BLD AUTO: 179 10E9/L (ref 150–450)
PLATELET # BLD AUTO: 188 10E9/L (ref 150–450)
PROT UR-MCNC: 0.07 G/L
PROT/CREAT 24H UR: 0.18 G/G CR (ref 0–0.2)
RBC # BLD AUTO: 3.96 10E12/L (ref 3.8–5.2)
RBC # BLD AUTO: 4.03 10E12/L (ref 3.8–5.2)
SPECIMEN EXP DATE BLD: NORMAL
WBC # BLD AUTO: 10.2 10E9/L (ref 4–11)
WBC # BLD AUTO: 9.2 10E9/L (ref 4–11)

## 2017-07-30 PROCEDURE — 76815 OB US LIMITED FETUS(S): CPT

## 2017-07-30 PROCEDURE — 84450 TRANSFERASE (AST) (SGOT): CPT | Performed by: OBSTETRICS & GYNECOLOGY

## 2017-07-30 PROCEDURE — 84156 ASSAY OF PROTEIN URINE: CPT | Performed by: OBSTETRICS & GYNECOLOGY

## 2017-07-30 PROCEDURE — 59025 FETAL NON-STRESS TEST: CPT

## 2017-07-30 PROCEDURE — 85384 FIBRINOGEN ACTIVITY: CPT | Performed by: OBSTETRICS & GYNECOLOGY

## 2017-07-30 PROCEDURE — 86900 BLOOD TYPING SEROLOGIC ABO: CPT | Performed by: OBSTETRICS & GYNECOLOGY

## 2017-07-30 PROCEDURE — 84460 ALANINE AMINO (ALT) (SGPT): CPT | Performed by: OBSTETRICS & GYNECOLOGY

## 2017-07-30 PROCEDURE — 96374 THER/PROPH/DIAG INJ IV PUSH: CPT

## 2017-07-30 PROCEDURE — 86850 RBC ANTIBODY SCREEN: CPT | Performed by: OBSTETRICS & GYNECOLOGY

## 2017-07-30 PROCEDURE — 85027 COMPLETE CBC AUTOMATED: CPT | Performed by: OBSTETRICS & GYNECOLOGY

## 2017-07-30 PROCEDURE — 25000125 ZZHC RX 250: Performed by: OBSTETRICS & GYNECOLOGY

## 2017-07-30 PROCEDURE — 40000809 ZZH STATISTIC NO DOCUMENTATION TO SUPPORT CHARGE

## 2017-07-30 PROCEDURE — 96372 THER/PROPH/DIAG INJ SC/IM: CPT

## 2017-07-30 PROCEDURE — 99219 ZZC INITIAL OBSERVATION CARE,LEVL II: CPT | Performed by: OBSTETRICS & GYNECOLOGY

## 2017-07-30 PROCEDURE — 36415 COLL VENOUS BLD VENIPUNCTURE: CPT | Performed by: OBSTETRICS & GYNECOLOGY

## 2017-07-30 PROCEDURE — 82565 ASSAY OF CREATININE: CPT | Performed by: OBSTETRICS & GYNECOLOGY

## 2017-07-30 PROCEDURE — 86901 BLOOD TYPING SEROLOGIC RH(D): CPT | Performed by: OBSTETRICS & GYNECOLOGY

## 2017-07-30 PROCEDURE — G0378 HOSPITAL OBSERVATION PER HR: HCPCS

## 2017-07-30 RX ORDER — LIDOCAINE 40 MG/G
CREAM TOPICAL
Status: DISCONTINUED | OUTPATIENT
Start: 2017-07-30 | End: 2017-08-07 | Stop reason: HOSPADM

## 2017-07-30 RX ORDER — BETAMETHASONE SODIUM PHOSPHATE AND BETAMETHASONE ACETATE 3; 3 MG/ML; MG/ML
12 INJECTION, SUSPENSION INTRA-ARTICULAR; INTRALESIONAL; INTRAMUSCULAR; SOFT TISSUE EVERY 24 HOURS
Status: COMPLETED | OUTPATIENT
Start: 2017-07-30 | End: 2017-07-31

## 2017-07-30 RX ADMIN — BETAMETHASONE SODIUM PHOSPHATE AND BETAMETHASONE ACETATE 12 MG: 3; 3 INJECTION, SUSPENSION INTRA-ARTICULAR; INTRALESIONAL; INTRAMUSCULAR at 02:44

## 2017-07-30 NOTE — LETTER
[facility] [address], [address]  [place]  [address]  Telephone [phone]  Fax [phone]              Date: 7/31/2017      Name: Ninoska Cottrell                       YOB: 1980      To Whom It May Concern;      Ms. Ninoska Cottrell is pregnant with an Estimated Date of Delivery: Sep 20, 2017.  Mr. Cottrell    -has been present with his wife during her hospitalization beginning 7/30 until present                  _________________________  Lita MCCANN

## 2017-07-30 NOTE — PROVIDER NOTIFICATION
07/30/17 1029   Provider Notification   Provider Name/Title chante   Method of Notification At Bedside   Request Evaluate in Person   Notification Reason Status Update   [facility] monitoring 2 hours per shift

## 2017-07-30 NOTE — H&P
[facility] Labor and Delivery History and Physical    Ninoska Cottrell MRN# [id number]   Age: 37 year old YOB: 1980     Date of Admission:  2017    Primary care provider: Traci Padron           Chief Complaint:   Ninoska Cottrell is a 37 year old white female  Estimated Date of Delivery: Sep 20, 2017  who is 32w4d pregnant and being admitted for observation and evaluation of an acute episode of painless vaginal bleeding without recent intercourse or other known ppt'g event. On admission she was also noted to have elevated BP readings and a concern of possible preeclampsia.  PIH labs were drawn and have been reviewed.  An OB U/S was done with the following results  FINDINGS:      Presentation: Breech.  Cardiac activity: 149 bpm. Regular rhythm.  Movement: Unremarkable.  Placenta: Anterior. No evidence for placenta previa.  Adnexa: Not visualized.   Cervical length: 3.9 cm.   Amniotic fluid: Unremarkable. JOHNY: 14.4 cm.      Other findings: None. No convincing sonographic evidence for hematoma  or placental abruption.  A complete anatomy scan was not performed.      Measured parameters:       BPD:  8.0 cm      Age: 32 weeks 1 day.       HC:    29.0 cm      Age: 32 weeks.       AC:  29.0 cm      Age: 33 weeks.       FL:   6.0 cm      Age: 33 weeks 2 days.     Gestational age by current ultrasound measurement: 32 weeks 5 days,  corresponding to an ZARA of 2017.     Gestational age based on the reported previously established due date:  32 weeks 4 days, corresponding to an ZARA of 2017.     Estimated fetal weight: 2057 grams, corresponding to the 62nd  percentile based on the reported previously established due date.     The pt has received a dose of Betamethasone.  Her BP readings have returned to a more acceptable range and she has not had any more vaginal bleeding            Pregnancy history:     OBSTETRIC HISTORY:    Obstetric History       T1      L2     SAB0    TAB0   Ectopic0   Multiple0   Live Births2       # Outcome Date GA Lbr Anthony/2nd Weight Sex Delivery Anes PTL Lv   4 Current            3 Term 13 40w0d / 00:51 2.855 kg (6 lb 4.7 oz) F Vag-Vacuum EPI N MILLER      Name: GISSEL YOUNGBLOOD      Apgar1:  8                Apgar5: 9   2  12 36w6d 03:30 / 02:06 2.78 kg (6 lb 2.1 oz) F Vag-Spont   MILLER      Name: Melissa      Apgar1:  9                Apgar5: 9   1 SAB 2011                  EDC: Estimated Date of Delivery: 17    Prenatal Labs:   Lab Results   Component Value Date    ABO O 2017    RH  Pos 2017    AS Neg 2017    HEPBANG Nonreactive 2017    CHPCRT  2017     Negative   Negative for C. trachomatis rRNA by transcription mediated amplification.   A negative result by transcription mediated amplification does not preclude the   presence of C. trachomatis infection because results are dependent on proper   and adequate collection, absence of inhibitors, and sufficient rRNA to be   detected.      GCPCRT  2017     Negative   Negative for N. gonorrhoeae rRNA by transcription mediated amplification.   A negative result by transcription mediated amplification does not preclude the   presence of N. gonorrhoeae infection because results are dependent on proper   and adequate collection, absence of inhibitors, and sufficient rRNA to be   detected.      TREPAB Negative 2017    RUBELLAABIGG 23 2012    HGB 11.5 (L) 2017    HIV Negative 2012       GBS Status:   Lab Results   Component Value Date    GBS  2013     Positive: GBS DNA detected, presumed positive for GBS.   Assay performed on incubated broth culture of specimen using [employer] real-time   PCR.       Active Problem List  Patient Active Problem List   Diagnosis     Intermittent asthma     Migraine headache     Supervision of high-risk pregnancy of elderly multigravida     Indication for care in labor or delivery       Medication Prior to  Admission  Prescriptions Prior to Admission   Medication Sig Dispense Refill Last Dose     Acetaminophen (TYLENOL PO) Take 1,000 mg by mouth every 8 hours as needed for mild pain or fever   7/29/2017 at Unknown time     Cholecalciferol (VITAMIN D PO) Take 2,000 Units by mouth daily   7/29/2017 at Unknown time     vitamin B complex with vitamin C (VITAMIN  B COMPLEX) TABS tablet Take 1 tablet by mouth daily   Past Week at Unknown time     Prenatal Vit-Fe Fumarate-FA (PRENATAL MULTIVITAMIN  PLUS IRON) 27-0.8 MG TABS per tablet Take 1 tablet by mouth   7/29/2017 at Unknown time     albuterol (VENTOLIN HFA) 108 (90 BASE) MCG/ACT Inhaler Inhale 1-2 puffs into the lungs every 4 hours as needed for shortness of breath / dyspnea or wheezing Reported on 2/16/2017 1 Inhaler 3 Past Month at Unknown time     order for DME Lumbar Maternity Corset 1 Device 0    .        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     Hyperlipidemia      Migraine      Obesity                        Family History:   I have reviewed this patient's family history            Social History:   I have reviewed this patient's social history         Review of Systems:   C: NEGATIVE for fever, chills, change in weight  I: NEGATIVE for worrisome rashes, moles or lesions  E: NEGATIVE for vision changes or irritation  E/M: NEGATIVE for ear, mouth and throat problems  R: NEGATIVE for significant cough or SOB  B: NEGATIVE for masses, tenderness or discharge  CV: NEGATIVE for chest pain, palpitations or peripheral edema  GI: NEGATIVE for nausea, abdominal pain, heartburn, or change in bowel habits  : NEGATIVE for frequency, dysuria, or hematuria  M: NEGATIVE for significant arthralgias or myalgia  N: NEGATIVE for weakness, dizziness or paresthesias  E: NEGATIVE for temperature intolerance, skin/hair  changes  H: NEGATIVE for bleeding problems  P: NEGATIVE for changes in mood or affect          Physical Exam:   Vitals were reviewed  All vitals stable  Temp: 98.2  F (36.8  C) Temp src: Oral BP: 110/61 Pulse: 96   Resp: (P) 18          Constitutional:   awake, alert, cooperative, no apparent distress, and appears stated age     Eyes:   Lids and lashes normal, pupils equal, round and reactive to light, extra ocular muscles intact, sclera clear, conjunctiva normal     ENT:   Normocephalic, without obvious abnormality, atraumatic, sinuses nontender on palpation, external ears without lesions, oral pharynx with moist mucous membranes, tonsils without erythema or exudates, gums normal and good dentition.     Neck:   Supple, symmetrical, trachea midline, no adenopathy, thyroid symmetric, not enlarged and no tenderness, skin normal     Hematologic / Lymphatic:   no cervical lymphadenopathy and no supraclavicular lymphadenopathy     Back:   Symmetric, no curvature, spinous processes are non-tender on palpation, paraspinous muscles are non-tender on palpation, no costal vertebral tenderness     Lungs:   No increased work of breathing, good air exchange, clear to auscultation bilaterally, no crackles or wheezing     Cardiovascular:   Normal apical impulse, regular rate and rhythm, normal S1 and S2, no S3 or S4, and no murmur noted     Abdomen:   No scars, normal bowel sounds, soft, gravid uterus lindquist infant breech rare infrequent ctx's, no masses palpated, no hepatosplenomegally     Genitounirinary:   External Genitalia:  General appearance; normal     Musculoskeletal:   There is no redness, warmth, or swelling of the joints.  Full range of motion noted.  Motor strength is 5 out of 5 all extremities bilaterally.  Tone is normal.  tone is normal     Neurologic:   Awake, alert, oriented to name, place and time.  Cranial nerves II-XII are grossly intact.  Motor is 5 out of 5 bilaterally.  Cerebellar finger to nose, heel to  shin intact.  Sensory is intact.  Babinski down going, Romberg negative, and gait is normal.  Deep Tendon Reflexes:  Reflexes are intact and symmetrical bilaterally     Skin:   no bruising or bleeding, normal skin color, texture, turgor and no redness, warmth, or swelling                                Cervix:   Membranes: intact   Dilation: closed   Effacement: 0%   Station:FLOATING   Consistency: firm   Position: Posterior  Presentation:Breech  Fetal Heart Rate Tracing: reactive and reassuring  Tocometer: external monitor                       Assessment:   Ninoska Cottrell is a 32w4d pregnant female admitted with observation and monitoring in a pt with an acute episode of painless vaginal bleeding and hypertension who has received a first dose of BMZ  .          Plan:   Observe  PIH labs acceptable  No more vag bleeding  Will monitor BP and give 2nd dose of BMZ   Admit - see IP orders  Harrison Kwong MD

## 2017-07-30 NOTE — PROVIDER NOTIFICATION
07/30/17 0152   Provider Notification   Provider Name/Title Dr. Ibanez   Method of Notification Phone   Request Evaluate - Remote   Notification Reason Patient Arrived   Dr. Ibanez updated on pt arrival, gestational age, G&P, pt complaint (see admitting nursing note), FHR difficult to trace and had period of minimal variability, no uterine activity noted, no bleeding visualized on external exam.  Reviewed BP's of 177/102, 164/90, 168/82 over the last 30 minutes.  Discussed pt report of a headache with pain 3/10, denies visual disturbances, abdominal pain at this time.  Discussed pt report of right sided abdominal pain on Friday.  Discussed pt history, including pt history of pre-eclampsia with IOL at 36 weeks with her first pregnancy.  Pt denies history of hypertension otherwise.  Dr. Ibanez verbalized understanding, TORB for PIH labs, protein random urine, fibrinogen, start PIV, give betamethasone IM 12mg q24 hours for 2 doses, bedside ultrasound for EFW, fetal position, and to r/o placental abruption.  Will update pt on POC and continue to monitor.

## 2017-07-30 NOTE — PROVIDER NOTIFICATION
"   07/30/17 0610   Provider Notification   Provider Name/Title Dr. Kwong   Method of Notification Phone   Request Evaluate - Remote   Notification Reason Status Update   Dr. Kwong called unit for update on pt - he is taking over today.  Discussed BP's in the 140's/80's, pt sleeping since room transfer.  Updated that over the last hour pt beginning to contract more regularly - when this RN asked pt about UC's pt reports, \"I maybe feel one every half hour or so. But they aren't painful, they feel like [facility].\"  Dr. Kwong verbalized understanding.  No new orders at this time.  Will continue to monitor.  "

## 2017-07-30 NOTE — PLAN OF CARE
Pt transferred to room 407 via wheelchair without complication.  Oriented to room and call light.  At bedside adjusting EFM for quality tracing.

## 2017-07-30 NOTE — PLAN OF CARE
"[facility]  Reason for maternal/fetal assessment per patient is vaginal bleeding. Patient reports getting up to use the bathroom and noting dark red blood in the toilet.  Patient reports it \"wasn't really a lot of blood.\"  Patient then reports wiping and noticed a bright red streak of blood on the toilet tissue.  Patient wore pad in to hospital which has pea sized red spotting on it.  Patient reports she had right sided abdominal pain on Friday night that woke her up, but denies abdominal pain at this time.  Patient reports headache pain 3/10.  Patient is a .  Prenatal record reviewed.  Gestational Age 32.3. Fetal movement present. Attempting to trace fetal heart rate, two RN's at bedside attempting to obtain quality tracing.  Fetal heart rate baseline heard at 140bpm.  Patient denies contractions, backache, abnormal vaginal discharge, pelvic pressure, UTI symptoms, GI problems, visual disturbances, epigastric or URQ pain, abdominal pain, rupture of membranes. Support person, Adelfo Richards), is present.   Action: Verbal consent for EFM. Triage assessment completed. Bill of rights reviewed.    Response: Patient verbalized agreement with plan. Will contact Dr Ibanez.  "

## 2017-07-30 NOTE — IP AVS SNAPSHOT
MRN:[id number]                      After Visit Summary   7/30/2017    Ninoska Cottrell    MRN: [id number]           Thank you!     Thank you for choosing [facility] for your care. Our goal is always to provide you with excellent care. Hearing back from our patients is one way we can continue to improve our services. Please take a few minutes to complete the written survey that you may receive in the mail after you visit. If you would like to speak to someone directly about your visit please contact Patient Relations at [phone]. Thank you!          Patient Information     Date Of Birth          1980        About your hospital stay     You were admitted on:  July 30, 2017 You last received care in the:  [facility] Labor and Delivery    You were discharged on:  August 7, 2017        Reason for your hospital stay       IUP 33.5 weeks admitted for 2nd episode of vaginal bleeding and labile hypertension  Labs normal BPP reassuring no definite source of bleeding noted  See D/C summary                  Who to Call     For medical emergencies, please call 911.  For non-urgent questions about your medical care, please call your primary care provider or clinic, [phone]          Attending Provider     Provider Specialty    Galindo Kwong MD OB/Gyn    Ralph Persaud MD OB/Gyn    Joy Queen DO OB/Gyn    Vito, Traci HERRERA MD OB/Gyn    Shamar, Toshia Martínez MD OB/Gyn    Jose Ramon, Silvestre Christy MD OB/Gyn       Primary Care Provider Office Phone # Fax #    Traci Padron [phone]      After Care Instructions     Activity       Your activity upon discharge: bedrest with bathroom privileges  Pelvic rest            Diet       Follow this diet upon discharge: Orders Placed This Encounter      Regular Diet Adult                  Follow-up Appointments     Follow-up and recommended labs and tests        Follow up with primary care provider, Traci Padron,  within  5 days, for hospital follow- up. The following labs/tests are recommended: twice weekly BPP, daily home BP checks and call if > 140/90  Monitor fetal kick counts, pelvic rest  Pt to be on BR with BRPs.                  Your next 10 appointments already scheduled     Aug 08, 2017  3:00 PM CDT   ESTABLISHED PRENATAL with Traci Padron MD   [facility]    [address] [address] [phone]              Further instructions from your care team       Discharge Instruction for Undelivered Patients      You were seen for: Bleeding Assessment, PIH Evaluation   We Consulted: [facility] OB/Gyn & [facility]FM  You had (Test or Medicine):Labs, NST's, Betamethasone & Observation of bleeding     Diet:   Drink 8 to 12 glasses of liquids (milk, juice, water) every day.  You may eat meals and snacks.     Activity:  Rest the pelvic area. No sex. Do not stimulate breasts or nipples.  Stay on bed rest or partial bed rest. This means activity as discussed with your doctor  Call your doctor or nurse midwife if your baby is moving less than usual.     Call your provider if you notice:  Swelling in your face or increased swelling in your hands or legs.  Headaches that are not relieved by Tylenol (acetaminophen).  Changes in your vision (blurring: seeing spots or stars.)  Nausea (sick to your stomach) and vomiting (throwing up).   Weight gain of 5 pounds or more per week.  Heartburn that doesn't go away.  Signs of bladder infection: pain when you urinate (use the toilet), need to go more often and more urgently.  The bag of moraes (rupture of membranes) breaks, or you notice leaking in your underwear.  Bright red blood in your underwear.  Abdominal (lower belly) or stomach pain.  Second (plus) baby: Contractions (tightening) less than 10 minutes apart and getting stronger.  *If less than 34 weeks: Contractions (tightenings) more than 6 times in  "one hour.  Increase or change in vaginal discharge (note the color and amount)  Other: MFM on 17 at 3:30 pm and 17 at 3:00 pm    Follow-up:  Make an appointment to be seen on this week with Dr Padron          Pending Results     No orders found from 2017 to 2017.            Statement of Approval     Ordered          17 0949  I have reviewed and agree with all the recommendations and orders detailed in this document.  EFFECTIVE NOW     Approved and electronically signed by:  Galindo Kwong MD             Admission Information     Date & Time Provider Department Dept. Phone    2017 Silvestre Albright MD [facility] Labor and Delivery [phone]      Your Vitals Were     Blood Pressure Pulse Temperature Respirations Height Weight    133/75 86 98.5  F (36.9  C) (Oral) 18 1.676 m (5' 6\") 126.3 kg (278 lb 8 oz)    Last Period BMI (Body Mass Index)                2016 (Approximate) 44.95 kg/m2          [facility] Information     myaNUMBER lets you send messages to your doctor, view your test results, renew your prescriptions, schedule appointments and more. To sign up, go to [email or web address] . Click on \"Log in\" on the left side of the screen, which will take you to the Welcome page. Then click on \"Sign up Now\" on the right side of the page.     You will be asked to enter the access code listed below, as well as some personal information. Please follow the directions to create your username and password.     Your access code is: [email or web address]  Expires: 2017  2:18 PM     Your access code will  in 90 days. If you need help or a new code, please call your [facility] clinic or [phone].        Care EveryWhere ID     This is your Care EveryWhere ID. This could be used by other organizations to access your [facility] medical records  [id number]        Equal Access to Services     LAUREN CALDWELL AH: yecenia Parham qaybta kaalmada adeegyada, waxay " dawna gutierrezeric agnieszkarichard la'aan ah. So [facility] [phone].    ATENCIÓN: Si goldla aldair, tiene a irvin disposición servicios gratuitos de asistencia lingüística. Jocelyn al [phone].    We comply with applicable federal civil rights laws and [place] laws. We do not discriminate on the basis of race, color, national origin, age, disability sex, sexual orientation or gender identity.               Review of your medicines      START taking        Dose / Directions    calcium carbonate 500 MG chewable tablet   Commonly known as:  TUMS   Used for:  Supervision of high-risk pregnancy of elderly multigravida        Dose:  2 chew tab   Take 2 tablets (1,000 mg) by mouth 3 times daily   Quantity:  150 tablet   Refills:  0         CONTINUE these medicines which have NOT CHANGED        Dose / Directions    albuterol 108 (90 BASE) MCG/ACT Inhaler   Commonly known as:  VENTOLIN HFA   Used for:  Upper respiratory tract infection, unspecified type, Intermittent asthma, uncomplicated        Dose:  1-2 puff   Inhale 1-2 puffs into the lungs every 4 hours as needed for shortness of breath / dyspnea or wheezing Reported on 2/16/2017   Quantity:  1 Inhaler   Refills:  3       order for DME   Used for:  Low back pain during pregnancy        Lumbar Maternity Corset   Quantity:  1 Device   Refills:  0       prenatal multivitamin plus iron 27-0.8 MG Tabs per tablet   Used for:  Supervision of high-risk pregnancy of elderly multigravida        Dose:  1 tablet   Take 1 tablet by mouth   Refills:  0       TYLENOL PO        Dose:  1000 mg   Take 1,000 mg by mouth every 8 hours as needed for mild pain or fever   Refills:  0       vitamin B complex with vitamin C Tabs tablet        Dose:  1 tablet   Take 1 tablet by mouth daily   Refills:  0       VITAMIN D PO        Dose:  2000 Units   Take 2,000 Units by mouth daily   Refills:  0            Where to get your medicines      Some of these will need a paper prescription and others can be  bought over the counter. Ask your nurse if you have questions.     You don't need a prescription for these medications     calcium carbonate 500 MG chewable tablet                Protect others around you: Learn how to safely use, store and throw away your medicines at www.disposemymeds.org.             Medication List: This is a list of all your medications and when to take them. Check marks below indicate your daily home schedule. Keep this list as a reference.      Medications           Morning Afternoon Evening Bedtime As Needed    albuterol 108 (90 BASE) MCG/ACT Inhaler   Commonly known as:  VENTOLIN HFA   Inhale 1-2 puffs into the lungs every 4 hours as needed for shortness of breath / dyspnea or wheezing Reported on 2/16/2017                                calcium carbonate 500 MG chewable tablet   Commonly known as:  TUMS   Take 2 tablets (1,000 mg) by mouth 3 times daily   Last time this was given:  500 mg on 8/6/2017 12:18 AM                                order for DME   Lumbar Maternity Corset                                prenatal multivitamin plus iron 27-0.8 MG Tabs per tablet   Take 1 tablet by mouth                                TYLENOL PO   Take 1,000 mg by mouth every 8 hours as needed for mild pain or fever   Last time this was given:  975 mg on 8/2/2017 12:51 AM                                vitamin B complex with vitamin C Tabs tablet   Take 1 tablet by mouth daily                                VITAMIN D PO   Take 2,000 Units by mouth daily

## 2017-07-30 NOTE — IP AVS SNAPSHOT
[facility] Labor and Delivery    [address]    [facility] [address]    Phone:  [phone]    Fax:  [phone]                                       After Visit Summary   7/30/2017    Ninoska Cottrell    MRN: [id number]           After Visit Summary Signature Page     I have received my discharge instructions, and my questions have been answered. I have discussed any challenges I see with this plan with the nurse or doctor.    ..........................................................................................................................................  Patient/Patient Representative Signature      ..........................................................................................................................................  Patient Representative Print Name and Relationship to Patient    ..................................................               ................................................  Date                                            Time    ..........................................................................................................................................  Reviewed by Signature/Title    ...................................................              ..............................................  Date                                                            Time

## 2017-07-30 NOTE — PROVIDER NOTIFICATION
Dr. Kwong updated re: status.  No further bleeding, BP's stabilizied. Can go to every 2 hour BP checks and q 4 at night if remains stable.

## 2017-07-30 NOTE — TELEPHONE ENCOUNTER
"  Reason for Disposition    Vaginal bleeding    (Exception: spotting after intercourse or pelvic exam, or slight pinkish or brownish mucous discharge)    Additional Information    Negative: Passed out (i.e., lost consciousness, collapsed and was not responding)    Negative: Shock suspected (e.g., cold/pale/clammy skin, too weak to stand, low BP, rapid pulse)    Negative: Difficult to awaken or acting confused  (e.g., disoriented, slurred speech)    Negative: [1] SEVERE abdominal pain (e.g., excruciating) AND [2] constant AND [3] present > 1 hour    Negative: Severe bleeding (e.g., continuous red blood from vagina, or large blood clots)    Negative: Umbilical cord hanging out of the vagina (shiny, white, curled appearance, \"like telephone cord\")    Negative: Uncontrollable urge to push (i.e., feels like baby is coming out now)    Negative: Can see baby    Negative: Sounds like a life-threatening emergency to the triager    Negative: MODERATE-SEVERE abdominal pain    Negative: Contractions < 10 minutes apart for 1 hour (i.e., 6 or more contractions an hour)    Negative: [1] Contractions > 10 minutes apart AND [2] persist > 24 hours AND [3] no improvement using Care Advice    Negative: [1] Contractions AND [2] any vaginal bleeding (including: red blood, clots, spotting, or pink/brown mucous)    Protocols used: PREGNANCY - LABOR - -ADULT-AH    32.5 weeks pregnant. Went to the bathroom and there was blood with wiping. Right sided pains for two days.  Aylin Paredes RN-[facility]    "

## 2017-07-30 NOTE — PROVIDER NOTIFICATION
07/30/17 0331   Provider Notification   Provider Name/Title Dr. Ibanez   Method of Notification Phone   Request Evaluate - Remote   Notification Reason Lab/Diagnostic Study;Status Update   Called Dr. Ibanez for updated lab results.  Dr. Ibanez currently viewing pt labs and BP's.  Reviewed pt labs, BP's, and preliminary ultrasound report including the fetal position being breech.  Orders to admit pt for observation, and repeat PIH labs and fibrinogen at 0830.  Orders to check BP q30 min x4, and then if <160/110, may take BP's q1 hour.  Will update pt on POC and continue to monitor.

## 2017-07-31 LAB
ALT SERPL W P-5'-P-CCNC: 12 U/L (ref 0–50)
AST SERPL W P-5'-P-CCNC: 10 U/L (ref 0–45)
CREAT SERPL-MCNC: 0.52 MG/DL (ref 0.52–1.04)
ERYTHROCYTE [DISTWIDTH] IN BLOOD BY AUTOMATED COUNT: 14.5 % (ref 10–15)
GFR SERPL CREATININE-BSD FRML MDRD: NORMAL ML/MIN/1.7M2
HCT VFR BLD AUTO: 32.9 % (ref 35–47)
HGB BLD-MCNC: 10.9 G/DL (ref 11.7–15.7)
MCH RBC QN AUTO: 29.1 PG (ref 26.5–33)
MCHC RBC AUTO-ENTMCNC: 33.1 G/DL (ref 31.5–36.5)
MCV RBC AUTO: 88 FL (ref 78–100)
PLATELET # BLD AUTO: 165 10E9/L (ref 150–450)
RBC # BLD AUTO: 3.74 10E12/L (ref 3.8–5.2)
WBC # BLD AUTO: 11.3 10E9/L (ref 4–11)

## 2017-07-31 PROCEDURE — 25000132 ZZH RX MED GY IP 250 OP 250 PS 637: Performed by: OBSTETRICS & GYNECOLOGY

## 2017-07-31 PROCEDURE — 36415 COLL VENOUS BLD VENIPUNCTURE: CPT | Performed by: OBSTETRICS & GYNECOLOGY

## 2017-07-31 PROCEDURE — 59025 FETAL NON-STRESS TEST: CPT | Mod: 76

## 2017-07-31 PROCEDURE — G0378 HOSPITAL OBSERVATION PER HR: HCPCS

## 2017-07-31 PROCEDURE — 82565 ASSAY OF CREATININE: CPT | Performed by: OBSTETRICS & GYNECOLOGY

## 2017-07-31 PROCEDURE — 84450 TRANSFERASE (AST) (SGOT): CPT | Performed by: OBSTETRICS & GYNECOLOGY

## 2017-07-31 PROCEDURE — 84460 ALANINE AMINO (ALT) (SGPT): CPT | Performed by: OBSTETRICS & GYNECOLOGY

## 2017-07-31 PROCEDURE — 85027 COMPLETE CBC AUTOMATED: CPT | Performed by: OBSTETRICS & GYNECOLOGY

## 2017-07-31 PROCEDURE — 59025 FETAL NON-STRESS TEST: CPT

## 2017-07-31 PROCEDURE — 99231 SBSQ HOSP IP/OBS SF/LOW 25: CPT | Performed by: OBSTETRICS & GYNECOLOGY

## 2017-07-31 PROCEDURE — 96372 THER/PROPH/DIAG INJ SC/IM: CPT

## 2017-07-31 PROCEDURE — 40000809 ZZH STATISTIC NO DOCUMENTATION TO SUPPORT CHARGE: Mod: 76

## 2017-07-31 PROCEDURE — 25000125 ZZHC RX 250: Performed by: OBSTETRICS & GYNECOLOGY

## 2017-07-31 RX ORDER — AMOXICILLIN 250 MG
1 CAPSULE ORAL 2 TIMES DAILY
Status: DISCONTINUED | OUTPATIENT
Start: 2017-07-31 | End: 2017-08-07 | Stop reason: HOSPADM

## 2017-07-31 RX ADMIN — BETAMETHASONE SODIUM PHOSPHATE AND BETAMETHASONE ACETATE 12 MG: 3; 3 INJECTION, SUSPENSION INTRA-ARTICULAR; INTRALESIONAL; INTRAMUSCULAR at 02:39

## 2017-07-31 RX ADMIN — SENNOSIDES AND DOCUSATE SODIUM 1 TABLET: 8.6; 5 TABLET ORAL at 12:34

## 2017-07-31 RX ADMIN — SENNOSIDES AND DOCUSATE SODIUM 1 TABLET: 8.6; 5 TABLET ORAL at 21:24

## 2017-07-31 NOTE — PLAN OF CARE
Bedside handoff received from Jessica FERRER. Patient watching TV. /74. Denies pain, but admits to some lower back discomfort due to sciatic nerve problems.

## 2017-07-31 NOTE — PLAN OF CARE
Report received from NABIL Ramires.  Care assumed at this time.  Pt in left lateral position, playing cards with her .  Baseline assessment completed, see flowsheet for details.  Discussed POC with pt and spouse and they verbalized understanding.  They deny needs concerns at this time.  Requested pt call out on call light when ready for her NST.  Plan made that if pt has not called out by 0130, this RN would go to room and perform NST at that time.  Call light within reach, will continue to monitor.

## 2017-07-31 NOTE — PROGRESS NOTES
[facility] Labor and Delivery Progress Note    Ninoska Cottrell MRN# [id number]   Age: 37 year old YOB: 1980           Subjective:   Patient has had second episode of vaginal bleeding this am    Has now has had 2 doses of Betamethasone    -was going to be released this AM, until noted second episode of bleeding             Objective:   Patient Vitals for the past 8 hrs:   BP Temp Temp src Resp   17 1517 149/74 97.7  F (36.5  C) Oral 18   17 1120 142/71 - - -        Cervical Exam:   No visible lesions or bleeding noted     Visibly closed    Membranes: Intact     Fetal Heart Rate:    Monitor: External US    Variability: Moderate    Baseline Rate: 140 bpm    Fetal Heart Rate Tracing: reassuring          Assessment:   Ninoska Cottrell is a 37 year old  who is 32w5d here with vaginal bleeding     -etiology uncertain, though suspect small abruption  Completed Betamethasone          Plan:   MFM consult in AM      @sign@

## 2017-07-31 NOTE — PROVIDER NOTIFICATION
07/31/17 1010   Provider Notification   Provider Name/Title Dr. Persaud   Method of Notification Phone   Request Evaluate - Remote   Notification Reason Bleeding   Dr. Persaud updated regarding bleeding as in previous note. Will continue to observe patient for now. Order received for stool softener.

## 2017-07-31 NOTE — PLAN OF CARE
Patient has been napping this afternoon. Awake now. Denies any further bleeding since this AM. Bedside handoff given to Harpre Yoon RN.

## 2017-07-31 NOTE — PROVIDER NOTIFICATION
07/31/17 0910   Provider Notification   Provider Name/Title Dr. Persaud   Method of Notification At Bedside   Request Evaluate in Person

## 2017-07-31 NOTE — PROVIDER NOTIFICATION
07/31/17 1600   Provider Notification   Provider Name/Title Dr Persaud   Method of Notification At Bedside   Request Evaluate in Person   Notification Reason Status Update;Maternal Vital Sign Change   Dr Persaud at bedside, updated on recent elevated BP's.  No further bleeding since this AM.  Speculum done at bedside.  Will continue to monitor.

## 2017-07-31 NOTE — PLAN OF CARE
Patient had small amount bright red discharge on tissue after wiping after voiding, none noted on pad or in toilet. States she has had no other bleeding since admission and it is the same amount she had before admission. Denies contractions or cramping. Will update Dr. Persaud.

## 2017-08-01 ENCOUNTER — HOSPITAL ENCOUNTER (INPATIENT)
Dept: ULTRASOUND IMAGING | Facility: CLINIC | Age: 37
End: 2017-08-01
Attending: OBSTETRICS & GYNECOLOGY
Payer: COMMERCIAL

## 2017-08-01 PROCEDURE — 76816 OB US FOLLOW-UP PER FETUS: CPT

## 2017-08-01 PROCEDURE — 25000132 ZZH RX MED GY IP 250 OP 250 PS 637: Performed by: OBSTETRICS & GYNECOLOGY

## 2017-08-01 PROCEDURE — 12000029 ZZH R&B OB INTERMEDIATE

## 2017-08-01 PROCEDURE — 40000809 ZZH STATISTIC NO DOCUMENTATION TO SUPPORT CHARGE: Mod: 76

## 2017-08-01 PROCEDURE — 59025 FETAL NON-STRESS TEST: CPT | Mod: 76

## 2017-08-01 PROCEDURE — 96376 TX/PRO/DX INJ SAME DRUG ADON: CPT

## 2017-08-01 PROCEDURE — 99231 SBSQ HOSP IP/OBS SF/LOW 25: CPT | Performed by: OBSTETRICS & GYNECOLOGY

## 2017-08-01 PROCEDURE — 76819 FETAL BIOPHYS PROFIL W/O NST: CPT | Performed by: OBSTETRICS & GYNECOLOGY

## 2017-08-01 PROCEDURE — G0378 HOSPITAL OBSERVATION PER HR: HCPCS

## 2017-08-01 RX ORDER — ACETAMINOPHEN 325 MG/1
TABLET ORAL
Status: DISPENSED
Start: 2017-08-01 | End: 2017-08-01

## 2017-08-01 RX ORDER — ACETAMINOPHEN 325 MG/1
975 TABLET ORAL EVERY 6 HOURS PRN
Status: DISCONTINUED | OUTPATIENT
Start: 2017-08-01 | End: 2017-08-07 | Stop reason: HOSPADM

## 2017-08-01 RX ADMIN — ACETAMINOPHEN 975 MG: 325 TABLET, FILM COATED ORAL at 10:23

## 2017-08-01 RX ADMIN — SENNOSIDES AND DOCUSATE SODIUM 1 TABLET: 8.6; 5 TABLET ORAL at 10:24

## 2017-08-01 NOTE — PROGRESS NOTES
[facility] recommendations:    1) surveillance with twice-weekly biophysical profiles is recommended to begin, A repeat ultrasound has been scheduled in 3 weeks to reevaluate fetal growth.,  2) 1 week hospitalization

## 2017-08-01 NOTE — PROGRESS NOTES
[facility]  Acute Rehabilatation Progress Note          Assessment and Plan:     Indication for care in labor or delivery    Indication for care in labor and delivery, antepartum    MFM consult today about duration of stay             Interval History:   doing well; no cp, sob, n/v/d, or abd pain. Small amount blood wheb                Review of Systems:   Review of Systems    CONSTITUTIONAL:NEGATIVE  EYES: NEGATIVE  ENT/MOUTH: NEGATIVE  RESP: NEGATIVE  CV: NEGATIVE  GI: NEGATIVE  : NEGATIVE  MUSCULOSKELATAL: NEGATIVE  INTEGUMENTARY/SKIN: NEGATIVE  BREAST: NEGATIVE  NEURO: NEGATIVE.              Medications:   I have reviewed this patient's current medications               Physical Exam:   Vitals were reviewed  Constitutional:        Abdomen:   Abdomen: BS active. Soft, non-tender, gravid.                   Data:   All laboratory and imaging data in the past 24 hours reviewed  -  -

## 2017-08-01 NOTE — PROVIDER NOTIFICATION
08/01/17 0645   Provider Notification   Provider Name/Title Dr. Persaud   Method of Notification Phone   Notification Reason Status Update   Comments   Comments Updated MD on patient resting throughout the night. No episodes of v aginal bleeding. Sleeping at this time. Plan for MFM today and discuss discharge plan.

## 2017-08-01 NOTE — PLAN OF CARE
Pt has been resting comfortably this afternoon awaiting appt at 1445 with MFM denies any bleeding or Labor symptoms, denies any symptoms of PIH

## 2017-08-01 NOTE — CONSULTS
Maternal-Fetal Medicine Consult Note    Ninoska Cottrell MRN# [id number]   Age: 37 year old  Gestational age: 32w6d YOB: 1980       Date of Admission: 2017         HPI:       Ninoska Cottrell is a 37 year old  at 32w6d admitted 2 days ago following an episode of painless vaginal bleeding, which started out brown in clolor and then turned bright red.  Since that time she had one additional episode of bright red bleeding this yesterday morning while wiping.  She denies any abdominal trauma, recent intercourse, contractions, leaking of fluid.  Reports good fetal movement.  BP was also noted to be elevated on admission in the 140's/70-80's.  HELLP labs and KY:CR ratio (0.18) were normal at that time and recorded BP have been normal range since that time.  Denies HA, changes in vision, RUQ or ABD pain.           Prenatal Course:   Labs:    Lab Results   Component Value Date    ABO O 2017    RH  Pos 2017    AS Neg 2017    HEPBANG Nonreactive 2017    CHPCRT  2017     Negative   Negative for C. trachomatis rRNA by transcription mediated amplification.   A negative result by transcription mediated amplification does not preclude the   presence of C. trachomatis infection because results are dependent on proper   and adequate collection, absence of inhibitors, and sufficient rRNA to be   detected.      GCPCRT  2017     Negative   Negative for N. gonorrhoeae rRNA by transcription mediated amplification.   A negative result by transcription mediated amplification does not preclude the   presence of N. gonorrhoeae infection because results are dependent on proper   and adequate collection, absence of inhibitors, and sufficient rRNA to be   detected.      TREPAB Negative 2017    RUBELLAABIGG 23 2012    HGB 10.9 (L) 2017    HIV Negative 2012       GBS Status:   Lab Results   Component Value Date    GBS  2013     Positive: GBS DNA detected, presumed  positive for GBS.   Assay performed on incubated broth culture of specimen using [employer] real-time   PCR.       Lab Results   Component Value Date    PAP NIL 2016       Genetic Testing:   Normal NIPT    Pregnancy complicated by:  1. Maternal Obesity  2. Fetal small stomach noted on Level II ultrasound, resolved.  3. Elevated, GST, normal GTT  4. Prior  delivery         POBHx:          Obstetric History       T1      L2     SAB0   TAB0   Ectopic0   Multiple0   Live Births2       # Outcome Date GA Lbr Anthony/2nd Weight Sex Delivery Anes PTL Lv   4 Current            3 Term 13 40w0d / 00:51 2.855 kg (6 lb 4.7 oz) F Vag-Vacuum EPI N MILLER      Name: LE,G1 JULIO      Apgar1:  8                Apgar5: 9   2  12 36w6d 03:30 / 02:06 2.78 kg (6 lb 2.1 oz) F Vag-Spont   MILLER      Name: Melissa      Apgar1:  9                Apgar5: 9   1 SAB 2011                       PMH:          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   Current Outpatient Prescriptions on File Prior to Encounter:  Cholecalciferol (VITAMIN D PO) Take 2,000 Units by mouth daily   vitamin B complex with vitamin C (VITAMIN  B COMPLEX) TABS tablet Take 1 tablet by mouth daily   Prenatal Vit-Fe Fumarate-FA (PRENATAL MULTIVITAMIN  PLUS IRON) 27-0.8 MG TABS per tablet Take 1 tablet by mouth   albuterol (VENTOLIN HFA) 108 (90 BASE) MCG/ACT Inhaler Inhale 1-2 puffs into the lungs every 4 hours as needed for shortness of breath / dyspnea or wheezing Reported on 2017             Allergies:          Allergies   Allergen Reactions     Blueberry      Codeine      Latex             Family History:          Family History   Problem Relation Age of Onset     Blood Disease Paternal Grandmother      Blood Disease Paternal Grandfather      Blood Disease Maternal Uncle      Blood Disease Father      Hemophilia            Social History:          Social History     Social History     Marital status:      Spouse name: Deandre     Number of children: 2     Years of education: N/A     Occupational History      [employer]     Social History Main Topics     Smoking status: Former Smoker     Quit date: 1/1/2000     Smokeless tobacco: Never Used     Alcohol use No     Drug use: No     Sexual activity: Yes     Partners: Male     Birth control/ protection: Pill     Other Topics Concern      Service No     Blood Transfusions No     Caffeine Concern No     Occupational Exposure No     Hobby Hazards No     Sleep Concern No     Stress Concern No     Weight Concern No     Special Diet No     Back Care No     Exercise Yes     1 time a week for 2 hours     Bike Helmet Yes     Seat Belt Yes     Self-Exams No     Social History Narrative            Review of Systems:        10-point ROS negative except as in HPI.         Physical Exam:        Patient Vitals for the past 24 hrs:   BP Temp Temp src Resp   08/01/17 0807 - 98  F (36.7  C) Oral 18 07/31/17 2314 131/79 98  F (36.7  C) Oral 18   07/31/17 1918 135/89 98.4  F (36.9  C) Oral 16   07/31/17 1517 149/74 97.7  F (36.5  C) Oral 18 07/31/17 1120 142/71 - - -     A&O x 3  Cardio: RRR  Resp: Non-labored breathing  ABD: gravid, NT  LE: No edema or tenderness    Lab Results   Component Value Date    WBC 11.3 07/31/2017     Lab Results   Component Value Date    RBC 3.74 07/31/2017     Lab Results   Component Value Date    HGB 10.9 07/31/2017     Lab Results   Component Value Date    HCT 32.9 07/31/2017     No components found for: MCT  Lab Results  "  Component Value Date    MCV 88 2017     Lab Results   Component Value Date    MCH 29.1 2017     Lab Results   Component Value Date    MCHC 33.1 2017     Lab Results   Component Value Date    RDW 14.5 2017     Lab Results   Component Value Date     2017         Please see \"Imaging\" tab under \"Chart Review\" for details of today's US.         Assessment:        37 year old y.o.  at 32w6d with suspected marginal placental abruption in setting of gestational hypertension.          Plan:        1) Follow-up KB stain.  Repeat CBC, Fibrinogen, PT/INR with further bleeds.       2) Recommend inpatient monitoring for 5-7 days after last bleed given 2 bleeds in close proximity to each other.  If further bleeding occurs recommend inpatient management until delivery.       3) S/p betamethasone       4) Weekly HELLP labs or sooner if clinically indicated.  No signs or symptoms of severe preeclampsia at this time.  Delivery is recommended by 37 weeks or sooner if severe features develop.       5) Twice weekly BPPs are recommended to begin with a repeat growth ultrasound in 3 weeks.            Attestation:     I spent 30 minutes total face-to-face with this inpatient, and >50% of the time was spent in counseling and/or coordination of care.    Brittany Kearney, DO  Maternal Fetal Medicine      "

## 2017-08-01 NOTE — PLAN OF CARE
Bedside report received from Alma HERRERA RN and assumed patient cares. Patient denies pain, UC's or cramping. Abdomen palpates soft. Fetus active. Denies LOF or any recent vaginal blood. EFM/[place] applied for 30 min and reactive NST obtained. VSS. Resting and playing cards with . No complaints. Mood happy and accepting. Continue with current plan of care.

## 2017-08-01 NOTE — PROVIDER NOTIFICATION
Discussed POC with Dr. Albright:  Per MFM consult-  Patient needs to stay hospitalized for a week past last bleeding episode.  Pt aware she is here until next Monday.  Denies any bleeding now and states fetus is active

## 2017-08-02 LAB
FETAL HGB STAIN: NORMAL
HGB F BLD QL KLEIH BETKE: NORMAL

## 2017-08-02 PROCEDURE — 99231 SBSQ HOSP IP/OBS SF/LOW 25: CPT | Performed by: FAMILY MEDICINE

## 2017-08-02 PROCEDURE — 25000132 ZZH RX MED GY IP 250 OP 250 PS 637: Performed by: OBSTETRICS & GYNECOLOGY

## 2017-08-02 PROCEDURE — 36415 COLL VENOUS BLD VENIPUNCTURE: CPT | Performed by: OBSTETRICS & GYNECOLOGY

## 2017-08-02 PROCEDURE — 85460 HEMOGLOBIN FETAL: CPT | Performed by: OBSTETRICS & GYNECOLOGY

## 2017-08-02 PROCEDURE — 12000029 ZZH R&B OB INTERMEDIATE

## 2017-08-02 RX ADMIN — ACETAMINOPHEN 975 MG: 325 TABLET, FILM COATED ORAL at 00:51

## 2017-08-02 RX ADMIN — SENNOSIDES AND DOCUSATE SODIUM 1 TABLET: 8.6; 5 TABLET ORAL at 23:05

## 2017-08-02 RX ADMIN — SENNOSIDES AND DOCUSATE SODIUM 1 TABLET: 8.6; 5 TABLET ORAL at 07:48

## 2017-08-02 NOTE — PLAN OF CARE
Patient denies UC's, cramping, LOF or VB. Continues to have low back pain with bedrest situation. Tylenol given and using heating pad. Fetus active.  moderate variability with accelerations, and no decels. Tracing received by continuously holding u/s in place. Patient accepting plan of care. Encouraged ankle pumping and LE movements and frequent position changes. Independent with movement and ambulation to BR without incident. Will continue with plan of care.

## 2017-08-02 NOTE — PROGRESS NOTES
[facility] Antepartum          Assessment and Plan:    Assessment:   38 y/o  @ 33w0d with 2nd episode of spotting in the pregnancy,   S/p BMZ    No bleeding overnight  Fetal status reassuring      Plan:   Per MFM 1) surveillance with twice-weekly biophysical profiles is recommended to begin, A repeat ultrasound has been scheduled in 3 weeks to reevaluate fetal growth.,  2) 1 week hospitalization   NST per shift              Interval History:   Doing well.  Continues to improve.  Pain is well-controlled.  No fevers.              Significant Problems:    None          Review of Systems:    C: NEGATIVE for fever, chills, change in weight  I: NEGATIVE for worrisome rashes, moles or lesions  E/M: NEGATIVE for ear, mouth and throat problems  R: NEGATIVE for significant cough or SOB  B: NEGATIVE for masses, tenderness or discharge  CV: NEGATIVE for chest pain, palpitations or peripheral edema  GI: NEGATIVE for nausea, abdominal pain, heartburn, or change in bowel habits  : NEGATIVE for frequency, dysuria, or hematuria  M: NEGATIVE for significant arthralgias or myalgia  N: NEGATIVE for weakness, dizziness or paresthesias  E: NEGATIVE for temperature intolerance, skin/hair changes  H: NEGATIVE for bleeding problems  P: NEGATIVE for changes in mood or affect          Medications:   All medications related to the patient's surgery have been reviewed  -          Physical Exam:     All vitals stable  Patient Vitals for the past 8 hrs:   BP Temp Temp src Resp   17 1603 137/66 97.9  F (36.6  C) Oral 16   17 1223 133/80 98.3  F (36.8  C) Oral 16     Abdomen gravid          Data:     All laboratory data related to this surgery reviewed  Hemoglobin   Date Value Ref Range Status   2017 10.9 (L) 11.7 - 15.7 g/dL Final   2017 11.5 (L) 11.7 - 15.7 g/dL Final   2017 11.6 (L) 11.7 - 15.7 g/dL Final   2017 10.6 (L) 11.7 - 15.7 g/dL Final   2017 12.8 11.7 - 15.7 g/dL Final      -  Joy Queen, DO

## 2017-08-03 PROCEDURE — 25000132 ZZH RX MED GY IP 250 OP 250 PS 637: Performed by: OBSTETRICS & GYNECOLOGY

## 2017-08-03 PROCEDURE — 59025 FETAL NON-STRESS TEST: CPT | Mod: 26 | Performed by: OBSTETRICS & GYNECOLOGY

## 2017-08-03 PROCEDURE — 99231 SBSQ HOSP IP/OBS SF/LOW 25: CPT | Mod: 25 | Performed by: OBSTETRICS & GYNECOLOGY

## 2017-08-03 PROCEDURE — 12000029 ZZH R&B OB INTERMEDIATE

## 2017-08-03 RX ADMIN — SENNOSIDES AND DOCUSATE SODIUM 1 TABLET: 8.6; 5 TABLET ORAL at 12:22

## 2017-08-03 NOTE — PLAN OF CARE
and daughters at bedside, BPs elevated. Denies HA, visual changes, epigastric pain. Will update OB.

## 2017-08-03 NOTE — PROVIDER NOTIFICATION
08/03/17 1700   Provider Notification   Provider Name/Title Dr Padron   Method of Notification Electronic Page  (text paged)   BPs 142/96, 152/86. Denies s/s, was when her children were present. Had normal PIH labs on 7/30 and 7/31. Please call if additional orders.

## 2017-08-03 NOTE — PROGRESS NOTES
Antepartum progress note:    S: Patient is feeling tired today but otherwise well. Active fetus. No VB, LoF. Denies dyspnea, HA, upper abdominal pain. Mild intermittent cramping.    O:   Vitals:    17 0739 17 1223 17 1603 17 2204   BP: 133/73 133/80 137/66 145/78   Pulse:       Resp: 18 16 16 16   Temp: 98.3  F (36.8  C) 98.3  F (36.8  C) 97.9  F (36.6  C) 97.8  F (36.6  C)   TempSrc:  Oral Oral Oral   Weight:       Height:          Gen: resting in bed, in NAD  Pulm: non-labored breathing, no cough  CV: regular rate, well perfused  Ext: Reflexes 1+  Psych: appropriate affect, mood stable  FHT: baseline 150, moderate variability, + accels, no decels  [place]: Quiet    A/P: Ninoska Cottrell is a 37 year old female  at 33w1d here for monitoring for 2nd episode of vaginal bleeding in pregnancy  - FWB: Cat I tracing, reactive   S/p BMZ on  and , now beta complete  - Intermittent elevated blood pressures: HELLP labs within normal limits, asymptomatic. Meets criteria for GHTN, plan 37w IOL. If BP persistently >150 systolic of 100 diastolic plan to initiate procardia XL 30mg daily.  - Maternal wellbeing:   - Coags normal, no signs of abruption   - Continue monitoring for signs/symptoms of abruption.     - Dispo: plan 1 week of inpatient monitoring per [facility], anticipate discharge on 17 if no further bleeding    Traci Padron 8/3/2017 10:25 AM

## 2017-08-03 NOTE — PROVIDER NOTIFICATION
08/03/17 1722   Provider Notification   Provider Name/Title Dr Padron   Method of Notification In Department   OB at bedside, orders received to check BP again at 1900 and 2100 and if >150/100 continue to monitor q 2 hours and update OB.

## 2017-08-03 NOTE — PLAN OF CARE
Problem: Pregnancy, 2nd/3rd Trimester Bleeding (Adult,Obstetrics,Pediatric)  Goal: Signs and Symptoms of Listed Potential Problems Will be Absent or Manageable (Pregnancy, 2nd/3rd Trimester Bleeding)  Signs and symptoms of listed potential problems will be absent or manageable by discharge/transition of care (reference Pregnancy, 2nd/3rd Trimester Bleeding (Adult,Obstetrics,Pediatric) CPG).   Outcome: No Change  Stable patient at 33 1/7 weeks pregnant.  No vaginal bleeding, cramping or leaking of fluid.  Category 1 tracing with NST.  Will continue to monitor for vaginal bleeding.

## 2017-08-03 NOTE — PLAN OF CARE
Report received from Toña FERRER.  Will assume cares at this time.  Plan to perform assessment and NST at bedtime

## 2017-08-03 NOTE — PLAN OF CARE
Patient sleeping till 1000. Denies bleeding or LOF. Does state she has felt some contractions here and there. Active baby.

## 2017-08-03 NOTE — PLAN OF CARE
Patient settling in for sleep.  Informed her to call to if vaginal bleeding, rupture of membranes, cramping or any other concerns.

## 2017-08-04 ENCOUNTER — HOSPITAL ENCOUNTER (INPATIENT)
Dept: ULTRASOUND IMAGING | Facility: CLINIC | Age: 37
End: 2017-08-04
Attending: FAMILY MEDICINE
Payer: COMMERCIAL

## 2017-08-04 PROCEDURE — 12000029 ZZH R&B OB INTERMEDIATE

## 2017-08-04 PROCEDURE — 76819 FETAL BIOPHYS PROFIL W/O NST: CPT

## 2017-08-04 PROCEDURE — 25000132 ZZH RX MED GY IP 250 OP 250 PS 637: Performed by: OBSTETRICS & GYNECOLOGY

## 2017-08-04 PROCEDURE — 99231 SBSQ HOSP IP/OBS SF/LOW 25: CPT | Mod: 25 | Performed by: OBSTETRICS & GYNECOLOGY

## 2017-08-04 PROCEDURE — 59025 FETAL NON-STRESS TEST: CPT | Mod: 26 | Performed by: OBSTETRICS & GYNECOLOGY

## 2017-08-04 RX ORDER — CALCIUM CARBONATE 500 MG/1
500 TABLET, CHEWABLE ORAL 3 TIMES DAILY PRN
Status: DISCONTINUED | OUTPATIENT
Start: 2017-08-04 | End: 2017-08-07 | Stop reason: HOSPADM

## 2017-08-04 RX ORDER — AMMONIA INHALANTS 0.04 G/.3ML
INHALANT RESPIRATORY (INHALATION)
Status: DISPENSED
Start: 2017-08-04 | End: 2017-08-04

## 2017-08-04 RX ADMIN — CALCIUM CARBONATE (ANTACID) CHEW TAB 500 MG 500 MG: 500 CHEW TAB at 20:29

## 2017-08-04 NOTE — PROVIDER NOTIFICATION
08/03/17 2123   Provider Notification   Provider Name/Title Dr. Padron   Method of Notification Phone   Request Evaluate - Remote   Notification Reason Status Update   MD okay with checking BP's every 4hrs while asleep. No new orders at this time.

## 2017-08-04 NOTE — PLAN OF CARE
Evening NST reactive. Patient denies LOF, vaginal bleeding, headache, blurred vision, epigastric pain. Has had some elevated BP's during the day. Will continue to monitor every 2hrs unless stable. No new orders at this time. Patient provided fan to keep cool in room. Taking a break from SCD's at this time. Will turn them on when going to bed. Declines stool softener this evening.

## 2017-08-04 NOTE — PROVIDER NOTIFICATION
08/03/17 2117   Provider Notification   Provider Name/Title Dr. Padron   Method of Notification Electronic Page  (web-based)   Request Evaluate - Remote   Notification Reason Other (Comment)  (BP's)   Web paged MD to notify of BP's. Patient denies symptoms. Plan to recheck in 2hrs. MD will call if additional orders are needed.

## 2017-08-04 NOTE — PROGRESS NOTES
Please refer to ultrasound report under 'Imaging' Studies of 'Chart Review' tabs.    Jose Aguiar M.D.

## 2017-08-05 LAB
ALT SERPL W P-5'-P-CCNC: 12 U/L (ref 0–50)
AST SERPL W P-5'-P-CCNC: 11 U/L (ref 0–45)
CREAT SERPL-MCNC: 0.46 MG/DL (ref 0.52–1.04)
CREAT UR-MCNC: 37 MG/DL
ERYTHROCYTE [DISTWIDTH] IN BLOOD BY AUTOMATED COUNT: 14.4 % (ref 10–15)
GFR SERPL CREATININE-BSD FRML MDRD: ABNORMAL ML/MIN/1.7M2
HCT VFR BLD AUTO: 32.5 % (ref 35–47)
HGB BLD-MCNC: 10.7 G/DL (ref 11.7–15.7)
MCH RBC QN AUTO: 28.9 PG (ref 26.5–33)
MCHC RBC AUTO-ENTMCNC: 32.9 G/DL (ref 31.5–36.5)
MCV RBC AUTO: 88 FL (ref 78–100)
PLATELET # BLD AUTO: 159 10E9/L (ref 150–450)
PROT UR-MCNC: 0.12 G/L
PROT/CREAT 24H UR: 0.31 G/G CR (ref 0–0.2)
RBC # BLD AUTO: 3.7 10E12/L (ref 3.8–5.2)
URATE SERPL-MCNC: 3.4 MG/DL (ref 2.6–6)
WBC # BLD AUTO: 10.5 10E9/L (ref 4–11)

## 2017-08-05 PROCEDURE — 82565 ASSAY OF CREATININE: CPT | Performed by: OBSTETRICS & GYNECOLOGY

## 2017-08-05 PROCEDURE — 84156 ASSAY OF PROTEIN URINE: CPT | Performed by: OBSTETRICS & GYNECOLOGY

## 2017-08-05 PROCEDURE — 84460 ALANINE AMINO (ALT) (SGPT): CPT | Performed by: OBSTETRICS & GYNECOLOGY

## 2017-08-05 PROCEDURE — 84550 ASSAY OF BLOOD/URIC ACID: CPT | Performed by: OBSTETRICS & GYNECOLOGY

## 2017-08-05 PROCEDURE — 25000132 ZZH RX MED GY IP 250 OP 250 PS 637: Performed by: OBSTETRICS & GYNECOLOGY

## 2017-08-05 PROCEDURE — 99231 SBSQ HOSP IP/OBS SF/LOW 25: CPT | Performed by: OBSTETRICS & GYNECOLOGY

## 2017-08-05 PROCEDURE — 12000029 ZZH R&B OB INTERMEDIATE

## 2017-08-05 PROCEDURE — 84450 TRANSFERASE (AST) (SGOT): CPT | Performed by: OBSTETRICS & GYNECOLOGY

## 2017-08-05 PROCEDURE — 36415 COLL VENOUS BLD VENIPUNCTURE: CPT | Performed by: OBSTETRICS & GYNECOLOGY

## 2017-08-05 PROCEDURE — 85027 COMPLETE CBC AUTOMATED: CPT | Performed by: OBSTETRICS & GYNECOLOGY

## 2017-08-05 RX ADMIN — SENNOSIDES AND DOCUSATE SODIUM 1 TABLET: 8.6; 5 TABLET ORAL at 09:07

## 2017-08-05 NOTE — PROVIDER NOTIFICATION
08/05/17 0914   Provider Notification   Provider Name/Title Dr. Persaud   Method of Notification In Department   Request Evaluate - Remote   Notification Reason Status Update   Dr. Persaud updated regarding active fetal movement making NST difficult. Accels present, moderate variability, no decels noted or audible. Ok to complete NST. FHR then consistent and reactive

## 2017-08-05 NOTE — PLAN OF CARE
NST completed for night shift, reactive. Nurse needed to hold monitor for entire 20 min.  VSS, afebrile. Denies any contractions, none picked up on the monitor, denies any further vaginal bleeding. Denies any pain, headaches, epigastric pain, shortness of breath, vaginal leaking, or blurred vision. Saline Lock flushed. Will continue to monitor.

## 2017-08-05 NOTE — PROGRESS NOTES
Antepartum Progress Note     S: denies vaginal bleeding, regular contractions, loss of fluid; +fetal movement reported.     States last bleeding Monday.    Denies headache, visual changes, shortness of breath, right upper quadrant/epigastic pain.  Reports some fatigue     O:   Temp:  [97.8  F (36.6  C)-98.2  F (36.8  C)] 98.2  F (36.8  C)  Resp:  [18] 18  BP: (132-149)/(73-99) 148/99    Physical exam:  GENERAL APPEARANCE: healthy, alert and no distress  RESP: lungs clear to auscultation - no rales, rhonchi or wheezes  CV: regular rates and rhythm, normal S1 S2, no S3 or S4 and no murmur, click or rub -  ABDOMEN:  soft, nontender, gravid  MS: extremities normal- no edema  SKIN: no suspicious lesions or rashes  PSYCH: mentation appears normal and affect normal/bright    FHT: baseline 150, moderate variability, + accels, no decels  [place]: no regular contractions noted     A/P: Ninoska Cottrell is a 37 year old female  at 33w2d here for monitoring for 2nd episode of vaginal bleeding in pregnancy  - FWB: Cat I tracing, reactive                         S/p BMZ on  and , now beta complete  - Intermittent elevated blood pressures, mildly elevated: HELLP labs within normal limits, asymptomatic. Meets criteria for GHTN, plan 37w IOL. If BP persistently >150 systolic of 100 diastolic plan to initiate procardia XL 30mg daily.   Plan repeat PIH labs tomorrow.  - Maternal wellbeing:                         - Coags normal, no signs of abruption                         - Continue monitoring for signs/symptoms of abruption.                            - Dispo: plan 1 week of inpatient monitoring per [facility], anticipate discharge on 17 if no further bleeding

## 2017-08-05 NOTE — PROGRESS NOTES
[facility] Labor and Delivery Progress Note    Ninoska Cottrell MRN# [id number]   Age: 37 year old YOB: 1980           Subjective:   IUP at 33 3/7 weeks; hospitalized for vaginal bleeding     -no bleeding since Monday    Noted to have intermittent elevated BP     -laboratory evaluation today; blood work remains in normal range, but protein now         -increased    No vaginal bleeding ROM  No HA, blurred vision or epigastric pain           Objective:   Patient Vitals for the past 8 hrs:   BP Temp Temp src Resp   17 0816 140/88 98.1  F (36.7  C) Oral 16            Membranes: Intact     Fetal Heart Rate:    Monitor: External US    Variability: Moderate    Baseline Rate: 145 bpm    Fetal Heart Rate Tracing: reactive          Assessment:   Ninoska Cottrell is a 37 year old  who is 33w3d here with vaginal bleeding  Evolving PIH (HBP and proteinuria)          Plan:   Continue in hospital monitoring      @sign@

## 2017-08-06 PROCEDURE — 12000031 ZZH R&B OB CRITICAL

## 2017-08-06 PROCEDURE — 25000132 ZZH RX MED GY IP 250 OP 250 PS 637: Performed by: OBSTETRICS & GYNECOLOGY

## 2017-08-06 PROCEDURE — 99231 SBSQ HOSP IP/OBS SF/LOW 25: CPT | Performed by: OBSTETRICS & GYNECOLOGY

## 2017-08-06 RX ADMIN — CALCIUM CARBONATE (ANTACID) CHEW TAB 500 MG 500 MG: 500 CHEW TAB at 00:18

## 2017-08-06 RX ADMIN — SENNOSIDES AND DOCUSATE SODIUM 1 TABLET: 8.6; 5 TABLET ORAL at 09:21

## 2017-08-06 NOTE — PLAN OF CARE
NST completed for NOC shift, reactive. VSS, afebrile, having heartburn, TUMS given with relief. Patient is tired tonight. Denies any vaginal bleeding, leaking of vaginal fluid, blurred vision, shortness of breath, or headaches. BP slightly elevated. Will continue to monitor.

## 2017-08-06 NOTE — PROVIDER NOTIFICATION
08/06/17 0934   Provider Notification   Provider Name/Title Dr. Albright   Method of Notification Phone   Request Evaluate - Remote   Notification Reason Other (Comment)   Dr. Albright updated regarding damaged Saline Lock with painful/difficult flush. SL D/C'd and order's received to D/C IV access.

## 2017-08-06 NOTE — PLAN OF CARE
IV Saline lock removed after saline flush difficult with blood at site. Patient reported significant pain with IV flush.

## 2017-08-06 NOTE — PROVIDER NOTIFICATION
08/06/17 0932   Provider Notification   Provider Name/Title Dr. Albright   Method of Notification Electronic Page   Notification Reason Other (Comment)

## 2017-08-07 ENCOUNTER — TELEPHONE (OUTPATIENT)
Dept: OBGYN | Facility: CLINIC | Age: 37
End: 2017-08-07

## 2017-08-07 ENCOUNTER — HOSPITAL ENCOUNTER (OUTPATIENT)
Facility: CLINIC | Age: 37
Discharge: HOME | End: 2017-08-07
Attending: OBSTETRICS & GYNECOLOGY | Admitting: OBSTETRICS & GYNECOLOGY
Payer: COMMERCIAL

## 2017-08-07 ENCOUNTER — NURSE TRIAGE (OUTPATIENT)
Dept: NURSING | Facility: CLINIC | Age: 37
End: 2017-08-07

## 2017-08-07 VITALS
RESPIRATION RATE: 18 BRPM | HEIGHT: 66 IN | BODY MASS INDEX: 44.76 KG/M2 | SYSTOLIC BLOOD PRESSURE: 133 MMHG | DIASTOLIC BLOOD PRESSURE: 75 MMHG | TEMPERATURE: 98.5 F | HEART RATE: 86 BPM | WEIGHT: 278.5 LBS

## 2017-08-07 VITALS — DIASTOLIC BLOOD PRESSURE: 67 MMHG | SYSTOLIC BLOOD PRESSURE: 118 MMHG | TEMPERATURE: 97.5 F

## 2017-08-07 LAB
ALBUMIN SERPL-MCNC: 2.4 G/DL (ref 3.4–5)
ALP SERPL-CCNC: 122 U/L (ref 40–150)
ALT SERPL W P-5'-P-CCNC: 13 U/L (ref 0–50)
ANION GAP SERPL CALCULATED.3IONS-SCNC: 4 MMOL/L (ref 3–14)
AST SERPL W P-5'-P-CCNC: 12 U/L (ref 0–45)
BILIRUB SERPL-MCNC: 0.2 MG/DL (ref 0.2–1.3)
BUN SERPL-MCNC: 11 MG/DL (ref 7–30)
CALCIUM SERPL-MCNC: 8.4 MG/DL (ref 8.5–10.1)
CHLORIDE SERPL-SCNC: 105 MMOL/L (ref 94–109)
CO2 SERPL-SCNC: 30 MMOL/L (ref 20–32)
CREAT SERPL-MCNC: 0.56 MG/DL (ref 0.52–1.04)
ERYTHROCYTE [DISTWIDTH] IN BLOOD BY AUTOMATED COUNT: 14.3 % (ref 10–15)
GFR SERPL CREATININE-BSD FRML MDRD: ABNORMAL ML/MIN/1.7M2
GLUCOSE SERPL-MCNC: 107 MG/DL (ref 70–99)
HCT VFR BLD AUTO: 31.5 % (ref 35–47)
HGB BLD-MCNC: 10.3 G/DL (ref 11.7–15.7)
MCH RBC QN AUTO: 28.9 PG (ref 26.5–33)
MCHC RBC AUTO-ENTMCNC: 32.7 G/DL (ref 31.5–36.5)
MCV RBC AUTO: 89 FL (ref 78–100)
PLATELET # BLD AUTO: 163 10E9/L (ref 150–450)
POTASSIUM SERPL-SCNC: 4.6 MMOL/L (ref 3.4–5.3)
PROT SERPL-MCNC: 6.2 G/DL (ref 6.8–8.8)
PROT UR-MCNC: 0.1 G/L
PROT/CREAT 24H UR: 0.15 G/G CR (ref 0–0.2)
RBC # BLD AUTO: 3.56 10E12/L (ref 3.8–5.2)
SODIUM SERPL-SCNC: 139 MMOL/L (ref 133–144)
WBC # BLD AUTO: 11 10E9/L (ref 4–11)

## 2017-08-07 PROCEDURE — 99214 OFFICE O/P EST MOD 30 MIN: CPT

## 2017-08-07 PROCEDURE — 36415 COLL VENOUS BLD VENIPUNCTURE: CPT | Performed by: OBSTETRICS & GYNECOLOGY

## 2017-08-07 PROCEDURE — 25000132 ZZH RX MED GY IP 250 OP 250 PS 637: Performed by: OBSTETRICS & GYNECOLOGY

## 2017-08-07 PROCEDURE — 59025 FETAL NON-STRESS TEST: CPT

## 2017-08-07 PROCEDURE — 85027 COMPLETE CBC AUTOMATED: CPT | Performed by: OBSTETRICS & GYNECOLOGY

## 2017-08-07 PROCEDURE — 99214 OFFICE O/P EST MOD 30 MIN: CPT | Mod: 25

## 2017-08-07 PROCEDURE — 84156 ASSAY OF PROTEIN URINE: CPT | Performed by: OBSTETRICS & GYNECOLOGY

## 2017-08-07 PROCEDURE — 80053 COMPREHEN METABOLIC PANEL: CPT | Performed by: OBSTETRICS & GYNECOLOGY

## 2017-08-07 PROCEDURE — 59025 FETAL NON-STRESS TEST: CPT | Mod: 26 | Performed by: OBSTETRICS & GYNECOLOGY

## 2017-08-07 RX ORDER — ONDANSETRON 2 MG/ML
4 INJECTION INTRAMUSCULAR; INTRAVENOUS EVERY 6 HOURS PRN
Status: DISCONTINUED | OUTPATIENT
Start: 2017-08-07 | End: 2017-08-08 | Stop reason: HOSPADM

## 2017-08-07 RX ORDER — CALCIUM CARBONATE 500 MG/1
2 TABLET, CHEWABLE ORAL 3 TIMES DAILY
Qty: 150 TABLET | COMMUNITY
Start: 2017-08-07 | End: 2017-09-06

## 2017-08-07 RX ADMIN — SENNOSIDES AND DOCUSATE SODIUM 1 TABLET: 8.6; 5 TABLET ORAL at 08:44

## 2017-08-07 NOTE — PLAN OF CARE
Pt has clear understanding of discharge instructions and follow up plan with KOJO and Dr Padron. States clear understanding of when to call doctor with any concerns, denies any futher questions, discharged home with  and all personal belongings

## 2017-08-07 NOTE — IP AVS SNAPSHOT
[facility] Labor and Delivery    [address]    [facility] [address]    Phone:  [phone]    Fax:  [phone]                                       After Visit Summary   8/7/2017    Ninoska Cottrell    MRN: [id number]           After Visit Summary Signature Page     I have received my discharge instructions, and my questions have been answered. I have discussed any challenges I see with this plan with the nurse or doctor.    ..........................................................................................................................................  Patient/Patient Representative Signature      ..........................................................................................................................................  Patient Representative Print Name and Relationship to Patient    ..................................................               ................................................  Date                                            Time    ..........................................................................................................................................  Reviewed by Signature/Title    ...................................................              ..............................................  Date                                                            Time

## 2017-08-07 NOTE — PROGRESS NOTES
"Ninoska Cottrell is a 37 year old female  Estimated Date of Delivery: Sep 20, 2017 admitted for 2nd episode of vaginal bleeding with labile hypertension with normal lab evaluation  S:  Denies bleeding h/a's or sx of concern at this time  Good FM    O: Vital signs:  Temp: 98.5  F (36.9  C) Temp src: Oral BP: 133/75     Resp: 18       Height: 167.6 cm (5' 6\") Weight: 126.3 kg (278 lb 8 oz)  Estimated body mass index is 44.95 kg/(m^2) as calculated from the following:    Height as of this encounter: 1.676 m (5' 6\").    Weight as of this encounter: 126.3 kg (278 lb 8 oz).        FHT's reassurring    Constitutional: healthy, alert and no distress  Gastrointestinal: Abdomen soft, non-tender. BS normal. No masses, organomegaly  Neurologic: . Reflexes normal and symmetric. Sensation grossly WNL.    A: IUP 33.5 weeks    AMA  Normal verify 46XX    Labile hypertension  Normal labs    Vaginal bleeding  None at this time  Good FM    P:  Recommend antepartum surveillance with BPP twice a week.          plan for delivery at 37 weeks unless evidence of preeclampsia with severe features develops.       Pelvic rest, fetal activity monitoring, BR with BRPs discussed with pt and her SO       Sx of concern discussed pt to call       RTC this week with Dr Padron    "

## 2017-08-07 NOTE — PROVIDER NOTIFICATION
MD called to notify that unable obtain evening NST. External monitors placed and held US piece at bedside for over an hour and baby active and moving. 2 different nurses tried during this time to obtain NST. Patient feels baby move and baby being active. No vaginal bleeding noted. VSS and afebrile.   Received orders to obtain another NST in am. Will relay to patient.

## 2017-08-07 NOTE — DISCHARGE INSTRUCTIONS
Discharge Instruction for Undelivered Patients      You were seen for: Bleeding Assessment, PIH Evaluation   We Consulted: Amado Rogers OB/Gyn & MFM  You had (Test or Medicine):Labs, NST's, Betamethasone & Observation of bleeding     Diet:   Drink 8 to 12 glasses of liquids (milk, juice, water) every day.  You may eat meals and snacks.     Activity:  Rest the pelvic area. No sex. Do not stimulate breasts or nipples.  Stay on bed rest or partial bed rest. This means activity as discussed with your doctor  Call your doctor or nurse midwife if your baby is moving less than usual.     Call your provider if you notice:  Swelling in your face or increased swelling in your hands or legs.  Headaches that are not relieved by Tylenol (acetaminophen).  Changes in your vision (blurring: seeing spots or stars.)  Nausea (sick to your stomach) and vomiting (throwing up).   Weight gain of 5 pounds or more per week.  Heartburn that doesn't go away.  Signs of bladder infection: pain when you urinate (use the toilet), need to go more often and more urgently.  The bag of moraes (rupture of membranes) breaks, or you notice leaking in your underwear.  Bright red blood in your underwear.  Abdominal (lower belly) or stomach pain.  Second (plus) baby: Contractions (tightening) less than 10 minutes apart and getting stronger.  *If less than 34 weeks: Contractions (tightenings) more than 6 times in one hour.  Increase or change in vaginal discharge (note the color and amount)  Other: MFM on 8/8/17 at 3:30 pm and 8/11/17 at 3:00 pm    Follow-up:  Make an appointment to be seen on this week with Dr Padron

## 2017-08-07 NOTE — IP AVS SNAPSHOT
MRN:[id number]                      After Visit Summary   8/7/2017    Ninoska Cottrell    MRN: [id number]           Thank you!     Thank you for choosing [facility] for your care. Our goal is always to provide you with excellent care. Hearing back from our patients is one way we can continue to improve our services. Please take a few minutes to complete the written survey that you may receive in the mail after you visit. If you would like to speak to someone directly about your visit please contact Patient Relations at [phone]. Thank you!          Patient Information     Date Of Birth          1980        About your hospital stay     You were admitted on:  August 7, 2017 You last received care in the:  [facility] Labor and Delivery    You were discharged on:  August 7, 2017       Who to Call     For medical emergencies, please call 911.  For non-urgent questions about your medical care, please call your primary care provider or clinic, [phone]          Attending Provider     Provider Specialty    Galindo Kwong MD OB/Gyn       Primary Care Provider Office Phone # Fax #    Traci Padron [phone]      Your next 10 appointments already scheduled     Aug 08, 2017  3:30 PM CDT   KOJO RANGEL SINGLE with [id number]   [facility] ([facility])    [address]  [facility] [address] [phone]            Aug 08, 2017  4:00 PM CDT   Radiology MD with  KOJO MCCANN   [facility])    [address]  [facility] [id number] [phone]           Please arrive at the time given for your first appointment. This visit is used internally to schedule the physician's time during your ultrasound.            Aug 11, 2017  3:00 PM CDT   KOJO BPABDULLAHI SINGLE with [email or web address]   [facility] ([place]  [facility])    [address] [facility] [address]  [facility] [address]   [phone]            Aug 11, 2017  3:30 PM CDT   Radiology MD with  MFM MD   [facility] ([facility])    [address] [facility] [address]  [facility] [address]   [phone]           Please arrive at the time given for your first appointment. This visit is used internally to schedule the physician's time during your ultrasound.              Further instructions from your care team       Discharge Instruction for Undelivered Patients      You were seen for: for high BP.  We Consulted: Dr kwong   You had (Test or Medicine):EFM, serial of BP, and lab work.     Diet:   Drink 8 to 12 glasses of liquids (milk, juice, water) every day.  You may eat meals and snacks.     Activity:  Call your doctor or nurse midwife if your baby is moving less than usual.     Call your provider if you notice:  Swelling in your face or increased swelling in your hands or legs.  Headaches that are not relieved by Tylenol (acetaminophen).  Changes in your vision (blurring: seeing spots or stars.)  Nausea (sick to your stomach) and vomiting (throwing up).   Weight gain of 5 pounds or more per week.  Heartburn that doesn't go away.  Signs of bladder infection: pain when you urinate (use the toilet), need to go more often and more urgently.  The bag of moraes (rupture of membranes) breaks, or you notice leaking in your underwear.  Bright red blood in your underwear.  Abdominal (lower belly) or stomach pain.  Second (plus) baby: Contractions (tightening) less than 10 minutes apart and getting stronger.  *If less than 34 weeks: Contractions (tightenings) more than 6 times in one hour.  Increase or change in vaginal discharge (note the color and amount)  Other: Dr Kwong would like you to be seen by Dr Padron in clinic tomorrow.    Follow-up:  Make an appointment to be seen on August 8th with Dr Padron.          Pending  "Results     No orders found from 2017 to 2017.            Admission Information     Date & Time Provider Department Dept. Phone    2017 Galindo Kwong MD [facility] Labor and Delivery [phone]      Your Vitals Were     Blood Pressure Temperature Last Period             118/67 97.5  F (36.4  C) (Oral) 2016 (Approximate)         MyChart Information     [employer] lets you send messages to your doctor, view your test results, renew your prescriptions, schedule appointments and more. To sign up, go to [email or web address] . Click on \"Log in\" on the left side of the screen, which will take you to the Welcome page. Then click on \"Sign up Now\" on the right side of the page.     You will be asked to enter the access code listed below, as well as some personal information. Please follow the directions to create your username and password.     Your access code is: [email or web address]  Expires: 2017  2:18 PM     Your access code will  in 90 days. If you need help or a new code, please call your [place] clinic or [phone].        Care EveryWhere ID     This is your Care EveryWhere ID. This could be used by other organizations to access your [place] medical records  [id number]        Equal Access to Services     IVAN CALDWELL AH: Hadii yasmine wolfo Sobennettali, waaxda luqadaha, qaybta kaalmada adeegyada, melida augustin. So [facility] [phone].    ATENCIÓN: Si habla español, tiene a irvin disposición servicios gratuitos de asistencia lingüística. Llame al [phone].    We comply with applicable federal civil rights laws and [place] laws. We do not discriminate on the basis of race, color, national origin, age, disability sex, sexual orientation or gender identity.               Review of your medicines      UNREVIEWED medicines. Ask your doctor about these medicines        Dose / Directions    albuterol 108 (90 BASE) MCG/ACT Inhaler   Commonly known as:  VENTOLIN HFA "   Used for:  Upper respiratory tract infection, unspecified type, Intermittent asthma, uncomplicated        Dose:  1-2 puff   Inhale 1-2 puffs into the lungs every 4 hours as needed for shortness of breath / dyspnea or wheezing Reported on 2/16/2017   Quantity:  1 Inhaler   Refills:  3       calcium carbonate 500 MG chewable tablet   Commonly known as:  TUMS   Used for:  Supervision of high-risk pregnancy of elderly multigravida        Dose:  2 chew tab   Take 2 tablets (1,000 mg) by mouth 3 times daily   Quantity:  150 tablet   Refills:  0       prenatal multivitamin plus iron 27-0.8 MG Tabs per tablet   Used for:  Supervision of high-risk pregnancy of elderly multigravida        Dose:  1 tablet   Take 1 tablet by mouth   Refills:  0       TYLENOL PO        Dose:  1000 mg   Take 1,000 mg by mouth every 8 hours as needed for mild pain or fever   Refills:  0       vitamin B complex with vitamin C Tabs tablet        Dose:  1 tablet   Take 1 tablet by mouth daily   Refills:  0       VITAMIN D PO        Dose:  2000 Units   Take 2,000 Units by mouth daily   Refills:  0         CONTINUE these medicines which have NOT CHANGED        Dose / Directions    order for DME   Used for:  Low back pain during pregnancy        Lumbar Maternity Corset   Quantity:  1 Device   Refills:  0                Protect others around you: Learn how to safely use, store and throw away your medicines at www.disposemymeds.org.             Medication List: This is a list of all your medications and when to take them. Check marks below indicate your daily home schedule. Keep this list as a reference.      Medications           Morning Afternoon Evening Bedtime As Needed    albuterol 108 (90 BASE) MCG/ACT Inhaler   Commonly known as:  VENTOLIN HFA   Inhale 1-2 puffs into the lungs every 4 hours as needed for shortness of breath / dyspnea or wheezing Reported on 2/16/2017                                calcium carbonate 500 MG chewable tablet    Commonly known as:  TUMS   Take 2 tablets (1,000 mg) by mouth 3 times daily                                order for DME   Lumbar Maternity Corset                                prenatal multivitamin plus iron 27-0.8 MG Tabs per tablet   Take 1 tablet by mouth                                TYLENOL PO   Take 1,000 mg by mouth every 8 hours as needed for mild pain or fever                                vitamin B complex with vitamin C Tabs tablet   Take 1 tablet by mouth daily                                VITAMIN D PO   Take 2,000 Units by mouth daily

## 2017-08-08 ENCOUNTER — OFFICE VISIT (OUTPATIENT)
Dept: OBGYN | Facility: CLINIC | Age: 37
End: 2017-08-08
Payer: COMMERCIAL

## 2017-08-08 ENCOUNTER — OFFICE VISIT (OUTPATIENT)
Dept: MATERNAL FETAL MEDICINE | Facility: CLINIC | Age: 37
End: 2017-08-08
Attending: OBSTETRICS & GYNECOLOGY
Payer: COMMERCIAL

## 2017-08-08 ENCOUNTER — HOSPITAL ENCOUNTER (OUTPATIENT)
Dept: ULTRASOUND IMAGING | Facility: CLINIC | Age: 37
Discharge: HOME | End: 2017-08-08
Attending: OBSTETRICS & GYNECOLOGY | Admitting: OBSTETRICS & GYNECOLOGY
Payer: COMMERCIAL

## 2017-08-08 VITALS
SYSTOLIC BLOOD PRESSURE: 135 MMHG | WEIGHT: 287.2 LBS | HEART RATE: 80 BPM | DIASTOLIC BLOOD PRESSURE: 70 MMHG | BODY MASS INDEX: 46.36 KG/M2

## 2017-08-08 DIAGNOSIS — O09.522 AMA (ADVANCED MATERNAL AGE) MULTIGRAVIDA 35+, SECOND TRIMESTER: Primary | ICD-10-CM

## 2017-08-08 DIAGNOSIS — O09.529 SUPERVISION OF HIGH-RISK PREGNANCY OF ELDERLY MULTIGRAVIDA: Primary | ICD-10-CM

## 2017-08-08 DIAGNOSIS — O26.90 PREGNANCY RELATED CONDITION, UNSPECIFIED TRIMESTER: ICD-10-CM

## 2017-08-08 DIAGNOSIS — O13.9 GESTATIONAL HYPERTENSION AFFECTING FOURTH PREGNANCY: ICD-10-CM

## 2017-08-08 DIAGNOSIS — O46.90 VAGINAL BLEEDING IN PREGNANCY, UNSPECIFIED TRIMESTER: ICD-10-CM

## 2017-08-08 DIAGNOSIS — O26.893 HEADACHE IN PREGNANCY, THIRD TRIMESTER: ICD-10-CM

## 2017-08-08 DIAGNOSIS — R51.9 HEADACHE IN PREGNANCY, THIRD TRIMESTER: ICD-10-CM

## 2017-08-08 PROCEDURE — 99207 ZZC PRENATAL VISIT: CPT | Performed by: OBSTETRICS & GYNECOLOGY

## 2017-08-08 PROCEDURE — 76818 FETAL BIOPHYS PROFILE W/NST: CPT | Performed by: OBSTETRICS & GYNECOLOGY

## 2017-08-08 PROCEDURE — 76819 FETAL BIOPHYS PROFIL W/O NST: CPT

## 2017-08-08 RX ORDER — METOCLOPRAMIDE 10 MG/1
10 TABLET ORAL
Qty: 20 TABLET | Refills: 1 | Status: ON HOLD | OUTPATIENT
Start: 2017-08-08 | End: 2017-09-03

## 2017-08-08 NOTE — PROGRESS NOTES
Ninoska is a  at 33w6d who presents for f/u of hospitalization for 2nd episode of vaginal bleeding in 3rd trimester in the setting of GHTN. Discharged on  after 1 week of monitoring and no further vaginal bleeding. S/p BMZ during admission. Returned to triage last night for evaluation of elevated BP at home to 150s systolic and HA. HA persists today despite tylenol and caffeine. HELLP labs within normal limits last night and P:C normal as well.     Patient has BPP scheduled at [facility] in [place] in 15 min and needs to leave for her next appointment. No acute concerns today for preeclampsia, no further vaginal bleeding.     /70  Pulse 80  Wt 287 lb 3.2 oz (130.3 kg)  LMP 2016 (Approximate)  BMI 46.36 kg/m2   Gen: appears mildly uncomfortable, sunglasses on  HEENT: no facial edema  Neuro: +1 DTRs throughout    A/P: Ninoska Cottrell is a 37 year old female  at 33w6d, pregnancy complicated by obesity, GHTN, and vaginal bleeding.   GHTN: no signs of preeclampsia.   - encouraged patient to calibrate her home BP cuff with clinic or at a local pharmacy to assure accurate readings.    - twice weekly BPPs throughout pregnancy  1. Headache in pregnancy, third trimester  - discussed hydration, rest, tylenol, and caffeine for HA. Does not currently appear to be a symptom of preeclampsia given normal BP, normal labs.  - metoclopramide (REGLAN) 10 MG tablet; Take 1 tablet (10 mg) by mouth once as needed For headache. Can be taken up to 4x daily  Dispense: 20 tablet; Refill: 1     Return to clinic weekly through pregnancy.

## 2017-08-08 NOTE — MR AVS SNAPSHOT
After Visit Summary   8/8/2017    Ninoska Cottrell    MRN: [id number]           Patient Information     Date Of Birth          1980        Visit Information        Provider Department      8/8/2017 3:00 PM Traci Padron MD [facility]        Today's Diagnoses     Supervision of high-risk pregnancy of elderly multigravida    -  1    Headache in pregnancy, third trimester           Follow-ups after your visit        Your next 10 appointments already scheduled     Aug 11, 2017  3:00 PM CDT   MFM BPP SINGLE with [id number]   [facility] Maternal Fetal Medicine Ultrasound - [facility])    [address]  [facility] [address]   [phone]            Aug 11, 2017  3:30 PM CDT   Radiology MD with RH MFRM MCCANN   [facility] Maternal Fetal Medicine - [facility] ([facility])    [address]  [facility] [address]   [phone]           Please arrive at the time given for your first appointment. This visit is used internally to schedule the physician's time during your ultrasound.              Future tests that were ordered for you today     Open Future Orders        Priority Expected Expires Ordered    MFM BPP Single Routine  6/7/2018 8/7/2017    MFM BPP Single Routine  6/7/2018 8/7/2017            Who to contact     If you have questions or need follow up information about today's clinic visit or your schedule please contact [facility] directly at [phone].  Normal or non-critical lab and imaging results will be communicated to you by MyChart, letter or phone within 4 business days after the clinic has received the results. If you do not hear from us within 7 days, please contact the clinic through [employer] or phone. If you have a critical or abnormal lab result, we will notify you by phone as soon as possible.  Submit refill requests through [employer] or call your pharmacy and they will forward  "the refill request to us. Please allow 3 business days for your refill to be completed.          Additional Information About Your Visit        [employer] Information     [employer] lets you send messages to your doctor, view your test results, renew your prescriptions, schedule appointments and more. To sign up, go to [email or web address] . Click on \"Log in\" on the left side of the screen, which will take you to the Welcome page. Then click on \"Sign up Now\" on the right side of the page.     You will be asked to enter the access code listed below, as well as some personal information. Please follow the directions to create your username and password.     Your access code is: [email or web address]  Expires: 2017  2:18 PM     Your access code will  in 90 days. If you need help or a new code, please call your [place] clinic or [phone].        Care EveryWhere ID     This is your Care EveryWhere ID. This could be used by other organizations to access your [place] medical records  [id number]        Your Vitals Were     Pulse Last Period BMI (Body Mass Index)             80 2016 (Approximate) 46.36 kg/m2          Blood Pressure from Last 3 Encounters:   17 135/70   17 118/67   17 133/75    Weight from Last 3 Encounters:   17 287 lb 3.2 oz (130.3 kg)   17 278 lb 8 oz (126.3 kg)   17 285 lb (129.3 kg)              Today, you had the following     No orders found for display         Today's Medication Changes          These changes are accurate as of: 17  4:00 PM.  If you have any questions, ask your nurse or doctor.               Start taking these medicines.        Dose/Directions    metoclopramide 10 MG tablet   Commonly known as:  REGLAN   Used for:  Supervision of high-risk pregnancy of elderly multigravida, Headache in pregnancy, third trimester   Started by:  Traci Padron MD        Dose:  10 mg   Take 1 tablet (10 mg) by mouth once as needed For headache. Can " be taken up to 4x daily   Quantity:  20 tablet   Refills:  1            Where to get your medicines      These medications were sent to [facility] [place], [place] - [facility] [address] [facility].  [facility] [address] [facility] [address]     Phone:  [phone]     metoclopramide 10 MG tablet                Primary Care Provider Office Phone # Fax #    Tracisue Padron [phone]       [place] [address]  [facility] [address]        Equal Access to Services     [facility]: Hadii aad ku hadasho Soomaali, waaxda luqadaha, qaybta kaalmada adeegyada, waxay idiin hayaan adeeg kharash lakendal . So [facility] [phone].    ATENCIÓN: Si habla español, tiene a irvin disposición servicios gratuitos de asistencia lingüística. [facility] [phone].    We comply with applicable federal civil rights laws and [place] laws. We do not discriminate on the basis of race, color, national origin, age, disability sex, sexual orientation or gender identity.            Thank you!     Thank you for choosing [facility]  for your care. Our goal is always to provide you with excellent care. Hearing back from our patients is one way we can continue to improve our services. Please take a few minutes to complete the written survey that you may receive in the mail after your visit with us. Thank you!             Your Updated Medication List - Protect others around you: Learn how to safely use, store and throw away your medicines at www.disposemymeds.org.          This list is accurate as of: 8/8/17  4:00 PM.  Always use your most recent med list.                   Brand Name Dispense Instructions for use Diagnosis    albuterol 108 (90 BASE) MCG/ACT Inhaler    VENTOLIN HFA    1 Inhaler    Inhale 1-2 puffs into the lungs every 4 hours as needed for shortness of breath / dyspnea or wheezing Reported on 2/16/2017    Upper respiratory tract infection, unspecified type,  Intermittent asthma, uncomplicated       calcium carbonate 500 MG chewable tablet    TUMS    150 tablet    Take 2 tablets (1,000 mg) by mouth 3 times daily    Supervision of high-risk pregnancy of elderly multigravida       metoclopramide 10 MG tablet    REGLAN    20 tablet    Take 1 tablet (10 mg) by mouth once as needed For headache. Can be taken up to 4x daily    Supervision of high-risk pregnancy of elderly multigravida, Headache in pregnancy, third trimester       order for DME     1 Device    Lumbar Maternity Corset    Low back pain during pregnancy       prenatal multivitamin plus iron 27-0.8 MG Tabs per tablet      Take 1 tablet by mouth    Supervision of high-risk pregnancy of elderly multigravida       TYLENOL PO      Take 1,000 mg by mouth every 8 hours as needed for mild pain or fever        vitamin B complex with vitamin C Tabs tablet      Take 1 tablet by mouth daily        VITAMIN D PO      Take 2,000 Units by mouth daily

## 2017-08-08 NOTE — TELEPHONE ENCOUNTER
Dr Kwong paged at 7:38pm  Ninoska Ronit, 1980, [id number] D/C'd today from hospital. Gestational htn, preeclampsia. BP is 155/98. [phone].  Glory CLAYTON RN FNA

## 2017-08-08 NOTE — LETTER
[facility]  [address], [address]  [place] [address]  [phone]        2017    Julio Youngblood  [address] [place] [address]            Dear MsNan Youngblood:      The results of your recent Ultra Sound/ Bio Physical Profile  was  NORMAL.   Results for orders placed or performed during the hospital encounter of 17   [facility] BPP Single    Narrative            BPP  ---------------------------------------------------------------------------------------------------------  Pat. Name: JULIO YOUNGBLOOD       Study Date:  2017 3:42pm  Pat. NO:  [id number]        Referring  MD: CHARLENE RYDER  Site:  [facility]       Sonographer: Usha Borrego RDMS  :  1980        Age:   37  ---------------------------------------------------------------------------------------------------------    INDICATION  ---------------------------------------------------------------------------------------------------------  Bleeding, gestational HTN.      HISTORY  ---------------------------------------------------------------------------------------------------------  General History                    Advanced Maternal Age--Multigravida, Normal NIPT.      METHOD  ---------------------------------------------------------------------------------------------------------  Transabdominal ultrasound examination. View: Sufficient.      PREGNANCY  ---------------------------------------------------------------------------------------------------------  Vazquez pregnancy. Number of fetuses: 1.      DATING  ---------------------------------------------------------------------------------------------------------                                           Date                                Details                                                                                      Gest. age                      ZARA  LMP                                  2016                                                                                                                        33 w + 6 d                     2017  Assigned dating                  Dating performed on 2017, based on the LMP                                                              33 w + 6 d                     2017      GENERAL EVALUATION  ---------------------------------------------------------------------------------------------------------  Cardiac activity: present.  bpm.  Fetal movements: visualized.  Presentation: cephalic.  Placenta: anterior.  Umbilical cord: previously studied.      AMNIOTIC FLUID ASSESSMENT  ---------------------------------------------------------------------------------------------------------  Amount of AF: normal  MVP 5.4 cm. JOHNY 16.1 cm. Q1 2.4 cm, Q2 5.4 cm, Q3 3.0 cm, Q4 5.0 cm      BIOPHYSICAL PROFILE  ---------------------------------------------------------------------------------------------------------  0: Fetal breathing movements  2: Gross body movements  2: Fetal tone  2: Amniotic fluid volume  2: NST  8/10: Biophysical profile score      NON STRESS TEST  ---------------------------------------------------------------------------------------------------------  NST interpretation: reactive      RECOMMENDATION  ---------------------------------------------------------------------------------------------------------    Continue  surveillance as previously recommended.    Thank you for the opportunity to participate in the care of this patient. If you have questions regarding today's evaluation or if we can be of further service, please contact the  [facility].    **Fetal anomalies may be present but not detected**  .        Impression    IMPRESSION  ---------------------------------------------------------------------------------------------------------    Patient is here for antepartum testing secondary to GHTN and history of VB.    1) Intrauterine  pregnancy at 33w6d gestational age.    2) The BPP is reassuring.    3) The amniotic fluid volume appears normal.            If you have any further questions or problems, please contact our office.    Sincerely,      Galindo Kwong M.D.

## 2017-08-08 NOTE — MR AVS SNAPSHOT
After Visit Summary   8/8/2017    Ninoska Cottrell    MRN: [id number]           Patient Information     Date Of Birth          1980        Visit Information        Provider Department      8/8/2017 4:00 PM Jose Aguiar MD [facility] Maternal Fetal Medicine [facility]        Today's Diagnoses     AMA (advanced maternal age) multigravida 35+, second trimester    -  1    Gestational hypertension affecting fourth pregnancy        Vaginal bleeding in pregnancy, unspecified trimester           Follow-ups after your visit        Your next 10 appointments already scheduled     Aug 11, 2017  3:00 PM CDT   [facility] BPP SINGLE with [id number]   [facility] Maternal Fetal Medicine Ultrasound - [facility] ([facility])    [address]  [facility] [address] [phone]            Aug 11, 2017  3:30 PM CDT   Radiology MD with RH [facility] MD   [facility] Maternal Fetal Medicine [facility] ([facility])    [address]  [facility] [address] [phone]           Please arrive at the time given for your first appointment. This visit is used internally to schedule the physician's time during your ultrasound.              Future tests that were ordered for you today     Open Future Orders        Priority Expected Expires Ordered    [facility] BPP Single Routine  6/7/2018 8/7/2017    [facility] BPP Single Routine  6/7/2018 8/7/2017            Who to contact     If you have questions or need follow up information about today's clinic visit or your schedule please contact [facility] directly at [phone].  Normal or non-critical lab and imaging results will be communicated to you by MyChart, letter or phone within 4 business days after the clinic has received the results. If you do not hear from us within 7 days, please contact the clinic through MyChart or phone. If you have a critical or abnormal lab result, we will notify you by phone as soon as  "possible.  Submit refill requests through [employer] or call your pharmacy and they will forward the refill request to us. Please allow 3 business days for your refill to be completed.          Additional Information About Your Visit        [employer] Information     [employer] lets you send messages to your doctor, view your test results, renew your prescriptions, schedule appointments and more. To sign up, go to [email or web address] . Click on \"Log in\" on the left side of the screen, which will take you to the Welcome page. Then click on \"Sign up Now\" on the right side of the page.     You will be asked to enter the access code listed below, as well as some personal information. Please follow the directions to create your username and password.     Your access code is: [email or web address]  Expires: 2017  2:18 PM     Your access code will  in 90 days. If you need help or a new code, please call your [place] clinic or [phone].        Care EveryWhere ID     This is your Care EveryWhere ID. This could be used by other organizations to access your [place] medical records  [id number]        Your Vitals Were     Last Period                   2016 (Approximate)            Blood Pressure from Last 3 Encounters:   17 135/70   17 118/67   17 133/75    Weight from Last 3 Encounters:   17 130.3 kg (287 lb 3.2 oz)   17 126.3 kg (278 lb 8 oz)   17 129.3 kg (285 lb)              Today, you had the following     No orders found for display         Today's Medication Changes          These changes are accurate as of: 17  5:04 PM.  If you have any questions, ask your nurse or doctor.               Start taking these medicines.        Dose/Directions    metoclopramide 10 MG tablet   Commonly known as:  REGLAN   Used for:  Supervision of high-risk pregnancy of elderly multigravida, Headache in pregnancy, third trimester   Started by:  Traci Padron MD        Dose:  10 mg   Take 1 " tablet (10 mg) by mouth once as needed For headache. Can be taken up to 4x daily   Quantity:  20 tablet   Refills:  1            Where to get your medicines      These medications were sent to [facility] [place], [place] - [facility] [address]  [facility] [address] [facility] [address]     Phone:  [phone]     metoclopramide 10 MG tablet                Primary Care Provider Office Phone # Fax #    Traci Padron [phone]       [place] [address]  [facility] [address]        Equal Access to Services     [facility] : Hadii aad ku hadasho [place]ali, waaxda luqadaha, qaybta kaalmada adeegyada, waxay idiin hayaan balta khrihcard elmore . So [facility] [phone].    ATENCIÓN: Si habla español, tiene a irvin disposición servicios gratuitos de asistencia lingüística. [facility] [phone].    We comply with applicable federal civil rights laws and [place] laws. We do not discriminate on the basis of race, color, national origin, age, disability sex, sexual orientation or gender identity.            Thank you!     Thank you for choosing [facility]  for your care. Our goal is always to provide you with excellent care. Hearing back from our patients is one way we can continue to improve our services. Please take a few minutes to complete the written survey that you may receive in the mail after your visit with us. Thank you!             Your Updated Medication List - Protect others around you: Learn how to safely use, store and throw away your medicines at www.disposemymeds.org.          This list is accurate as of: 8/8/17  5:04 PM.  Always use your most recent med list.                   Brand Name Dispense Instructions for use Diagnosis    albuterol 108 (90 BASE) MCG/ACT Inhaler    VENTOLIN HFA    1 Inhaler    Inhale 1-2 puffs into the lungs every 4 hours as needed for shortness of breath / dyspnea or wheezing Reported on  2/16/2017    Upper respiratory tract infection, unspecified type, Intermittent asthma, uncomplicated       calcium carbonate 500 MG chewable tablet    TUMS    150 tablet    Take 2 tablets (1,000 mg) by mouth 3 times daily    Supervision of high-risk pregnancy of elderly multigravida       metoclopramide 10 MG tablet    REGLAN    20 tablet    Take 1 tablet (10 mg) by mouth once as needed For headache. Can be taken up to 4x daily    Supervision of high-risk pregnancy of elderly multigravida, Headache in pregnancy, third trimester       order for DME     1 Device    Lumbar Maternity Corset    Low back pain during pregnancy       prenatal multivitamin plus iron 27-0.8 MG Tabs per tablet      Take 1 tablet by mouth    Supervision of high-risk pregnancy of elderly multigravida       TYLENOL PO      Take 1,000 mg by mouth every 8 hours as needed for mild pain or fever        vitamin B complex with vitamin C Tabs tablet      Take 1 tablet by mouth daily        VITAMIN D PO      Take 2,000 Units by mouth daily

## 2017-08-08 NOTE — PLAN OF CARE
Problem: Goal Outcome Summary  Goal: Goal Outcome Summary  Data: [facility].   Reason for maternal/fetal assessment per patient is Rule Out Pre-eclampsia  . Patient is a . Prenatal record reviewed.      Obstetric History       T1      L2     SAB0   TAB0   Ectopic0   Multiple0   Live Births2        # Outcome Date GA Lbr Anthony/2nd Weight Sex Delivery Anes PTL Lv   4 Current                     3 Term 13 40w0d / 00:51 2.855 kg (6 lb 4.7 oz) F Vag-Vacuum EPI N MILLER      Name: RYLIE,GISSEL KIM      Apgar1:  8                Apgar5: 9   2  12 36w6d 03:30 / 02:06 2.78 kg (6 lb 2.1 oz) F Vag-Spont     MILLER      Name: Melissa      Apgar1:  9                Apgar5: 9   1 SAB 2011                          Medical History:   Past Medical History:   Diagnosis Date     Anemia       Anxiety, generalized       chronic depression, anxiety     Asthma       Blood dyscrasia       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 Gestational Age 33w5d. VSS. Cervix: not examined.  Fetal movement present. Patient denies cramping, backache, vaginal discharge, pelvic pressure, UTI symptoms, GI problems, bloody show, vaginal bleeding, edema,visual disturbances, epigastric or URQ pain, abdominal pain, rupture of membranes.Pt reported dull HA over right temporal side. Support persons Adelfo present.  Action: Verbal consent for EFM. Triage assessment completed. EFM applied for fetal wellbeing. Uterine assessment done and abdomen soft no Conts noted.. Fetal assessment: Presumed adequate fetal oxygenation documented (see flow record).Seral of BP and lab work done (see results).Patient instructed to report change in fetal movement, vaginal leaking of fluid or bleeding, abdominal pain, or any concerns related to the pregnancy to her nurse/physician.   Response: Dr. Kwong  informed X 2 and updated re her VS and lab results/ status. Plan per provider is Dc  home and to be seen by Dr tang in clinic tomorrow.. Patient verbalized understanding of education and verbalized agreement with plan. Discharged  Via WC accompanied by SO  at 6257.     I

## 2017-08-11 ENCOUNTER — HOSPITAL ENCOUNTER (OUTPATIENT)
Dept: ULTRASOUND IMAGING | Facility: CLINIC | Age: 37
Discharge: HOME | End: 2017-08-11
Attending: OBSTETRICS & GYNECOLOGY | Admitting: OBSTETRICS & GYNECOLOGY
Payer: COMMERCIAL

## 2017-08-11 ENCOUNTER — OFFICE VISIT (OUTPATIENT)
Dept: MATERNAL FETAL MEDICINE | Facility: CLINIC | Age: 37
End: 2017-08-11
Attending: OBSTETRICS & GYNECOLOGY
Payer: COMMERCIAL

## 2017-08-11 DIAGNOSIS — O26.90 PREGNANCY RELATED CONDITION, UNSPECIFIED TRIMESTER: ICD-10-CM

## 2017-08-11 DIAGNOSIS — O13.9 GESTATIONAL HYPERTENSION AFFECTING FOURTH PREGNANCY: Primary | ICD-10-CM

## 2017-08-11 PROCEDURE — 76819 FETAL BIOPHYS PROFIL W/O NST: CPT

## 2017-08-11 NOTE — MR AVS SNAPSHOT
After Visit Summary   8/11/2017    Ninoska Cottrell    MRN: [id number]           Patient Information     Date Of Birth          1980        Visit Information        Provider Department      8/11/2017 3:30 PM Barbie Lyons MD [facility] Maternal Fetal Medicine [facility]        Today's Diagnoses     Gestational hypertension affecting fourth pregnancy    -  1       Follow-ups after your visit        Your next 10 appointments already scheduled     Aug 15, 2017 10:15 AM CDT   MFM BPP SINGLE with RHMFMUSR2   [facility] Maternal Fetal Medicine Ultrasound - [facility] ([facility])    [address]  [facility] [address]   [phone]            Aug 15, 2017 10:45 AM CDT   Radiology MD with LIANNA VARMA MD   [facility] Maternal Fetal Medicine [facility] ([facility])    [address]  [facility] [address]   [phone]           Please arrive at the time given for your first appointment. This visit is used internally to schedule the physician's time during your ultrasound.            Aug 18, 2017 10:15 AM CDT   ESTABLISHED PRENATAL with Traci Padron MD   [facility] ([facility])    [address] [place]  [facility] [address]   [phone]            Aug 18, 2017 11:00 AM CDT   MFM BPP SINGLE with RHMFMUSR2   [facility]al Maternal Fetal Medicine Ultrasound - [facility])    [address]  [facility] [address]   [phone]            Aug 18, 2017 11:30 AM CDT   Radiology MD with LIANNA VARMA MD   [facility] Maternal Fetal Medicine [facility] ([facility])    [address]  [facility] [address]   [phone]           Please arrive at the time given for your first appointment. This visit is used internally to schedule the physician's time during your ultrasound.            Aug 22, 2017  3:30 PM CDT   MFM BPP SINGLE with RHMFMUSR3   [facility] - [facility] ([facility])    [address] [facility] [address]  [facility] [address]   [phone]            Aug 22, 2017  4:00 PM CDT   Radiology MD with  KOJO MCCANN   [facility] Maternal Fetal Medicine [facility] ([facility])    [address]  [facility] [address]   [phone]           Please arrive at the time given for your first appointment. This visit is used internally to schedule the physician's time during your ultrasound.            Aug 25, 2017  9:30 AM CDT   M US COMPRE SINGLE F/U with RHMFMUSR1   [facility] Fetal Medicine Ultrasound - [facility])    [address]  [facility] [address] [phone]           Wear comfortable clothes and leave your valuables at home.            Aug 25, 2017 10:00 AM CDT   Radiology MD with  KOJO MCCANN   [facility] Maternal Fetal Medicine [facility] ([facility])    [address]  [facility] [address] [phone]           Please arrive at the time given for your first appointment. This visit is used internally to schedule the physician's time during your ultrasound.              Future tests that were ordered for you today     Open Future Orders        Priority Expected Expires Ordered    M US Comprehensive Single F/U Routine 8/22/2017 8/11/2018 8/11/2017    MFM BPP Single Routine 8/15/2017 6/11/2018 8/11/2017    MFM BPP Single Routine 8/18/2017 6/11/2018 8/11/2017    MFM BPP Single Routine 8/22/2017 6/11/2018 8/11/2017    MFM BPP Single Routine 8/25/2017 6/11/2018 8/11/2017            Who to contact     If you have questions or need follow up information about today's clinic visit or your schedule please contact [facility] MATERNAL FETAL MEDICINE [facility] directly at [phone].  Normal or non-critical lab and imaging results will be communicated to you by MyChart, letter or phone within 4 business days after the clinic has  "received the results. If you do not hear from us within 7 days, please contact the clinic through [employer] or phone. If you have a critical or abnormal lab result, we will notify you by phone as soon as possible.  Submit refill requests through [employer] or call your pharmacy and they will forward the refill request to us. Please allow 3 business days for your refill to be completed.          Additional Information About Your Visit        [employer] Information     [employer] lets you send messages to your doctor, view your test results, renew your prescriptions, schedule appointments and more. To sign up, go to [email or web address] . Click on \"Log in\" on the left side of the screen, which will take you to the Welcome page. Then click on \"Sign up Now\" on the right side of the page.     You will be asked to enter the access code listed below, as well as some personal information. Please follow the directions to create your username and password.     Your access code is: [email or web address]  Expires: 2017  2:18 PM     Your access code will  in 90 days. If you need help or a new code, please call your [place] clinic or [phone].        Care EveryWhere ID     This is your Care EveryWhere ID. This could be used by other organizations to access your [place] medical records  [id number]        Your Vitals Were     Last Period                   2016 (Approximate)            Blood Pressure from Last 3 Encounters:   17 135/70   17 118/67   17 133/75    Weight from Last 3 Encounters:   17 130.3 kg (287 lb 3.2 oz)   17 126.3 kg (278 lb 8 oz)   17 129.3 kg (285 lb)               Primary Care Provider Office Phone # Fax #    Traci Padron [phone]       [place] [address] [address] [facility] [address]        Equal Access to Services     [facility] JAVI AH: Hadii aad ku hadasho Soomaali, waaxda luqadaha, qaybta kaalmada kun, melida toney " charli brendaeric agnieszkarichard lamakennagolden demetria. So [facility] [phone].    ATENCIÓN: Si habla aldair, tiene a irvin disposición servicios gratuitos de asistencia lingüística. Jocelyn al [phone].    We comply with applicable federal civil rights laws and [place] laws. We do not discriminate on the basis of race, color, national origin, age, disability sex, sexual orientation or gender identity.            Thank you!     Thank you for choosing [facility]  for your care. Our goal is always to provide you with excellent care. Hearing back from our patients is one way we can continue to improve our services. Please take a few minutes to complete the written survey that you may receive in the mail after your visit with us. Thank you!             Your Updated Medication List - Protect others around you: Learn how to safely use, store and throw away your medicines at www.disposemymeds.org.          This list is accurate as of: 8/11/17  4:05 PM.  Always use your most recent med list.                   Brand Name Dispense Instructions for use Diagnosis    albuterol 108 (90 BASE) MCG/ACT Inhaler    VENTOLIN HFA    1 Inhaler    Inhale 1-2 puffs into the lungs every 4 hours as needed for shortness of breath / dyspnea or wheezing Reported on 2/16/2017    Upper respiratory tract infection, unspecified type, Intermittent asthma, uncomplicated       calcium carbonate 500 MG chewable tablet    TUMS    150 tablet    Take 2 tablets (1,000 mg) by mouth 3 times daily    Supervision of high-risk pregnancy of elderly multigravida       metoclopramide 10 MG tablet    REGLAN    20 tablet    Take 1 tablet (10 mg) by mouth once as needed For headache. Can be taken up to 4x daily    Supervision of high-risk pregnancy of elderly multigravida, Headache in pregnancy, third trimester       order for DME     1 Device    Lumbar Maternity Corset    Low back pain during pregnancy       prenatal multivitamin plus iron 27-0.8 MG Tabs per  tablet      Take 1 tablet by mouth    Supervision of high-risk pregnancy of elderly multigravida       TYLENOL PO      Take 1,000 mg by mouth every 8 hours as needed for mild pain or fever        vitamin B complex with vitamin C Tabs tablet      Take 1 tablet by mouth daily        VITAMIN D PO      Take 2,000 Units by mouth daily

## 2017-08-15 ENCOUNTER — HOSPITAL ENCOUNTER (OUTPATIENT)
Dept: ULTRASOUND IMAGING | Facility: CLINIC | Age: 37
Discharge: HOME | End: 2017-08-15
Attending: OBSTETRICS & GYNECOLOGY | Admitting: OBSTETRICS & GYNECOLOGY
Payer: COMMERCIAL

## 2017-08-15 ENCOUNTER — OFFICE VISIT (OUTPATIENT)
Dept: MATERNAL FETAL MEDICINE | Facility: CLINIC | Age: 37
End: 2017-08-15
Attending: OBSTETRICS & GYNECOLOGY
Payer: COMMERCIAL

## 2017-08-15 DIAGNOSIS — O13.3 GESTATIONAL HYPERTENSION W/O SIGNIFICANT PROTEINURIA IN 3RD TRIMESTER: Primary | ICD-10-CM

## 2017-08-15 DIAGNOSIS — O13.9 GESTATIONAL HYPERTENSION AFFECTING FOURTH PREGNANCY: ICD-10-CM

## 2017-08-15 PROCEDURE — 76819 FETAL BIOPHYS PROFIL W/O NST: CPT

## 2017-08-15 NOTE — MR AVS SNAPSHOT
After Visit Summary   8/15/2017    Ninoska Cottrell    MRN: [id number]           Patient Information     Date Of Birth          1980        Visit Information        Provider Department      8/15/2017 10:45 AM Zhang Chaidez MD [facility] Maternal Fetal Medicine [facility]        Today's Diagnoses     Gestational hypertension w/o significant proteinuria in 3rd trimester    -  1       Follow-ups after your visit        Your next 10 appointments already scheduled     Aug 18, 2017 10:15 AM CDT   ESTABLISHED PRENATAL with Traci Padron MD   [facility]    [address] [place]  [facility] [address]   [phone]            Aug 18, 2017 11:00 AM CDT   MFM BPP SINGLE with RHMFMUSR2   [facility] Maternal Fetal Medicine [facility])    [address]  [facility] [address]   [phone]            Aug 18, 2017 11:30 AM CDT   Radiology MD with RH KOJO MCCANN   [facility] Fetal [facility])    [address]  [facility] [address]   [phone]           Please arrive at the time given for your first appointment. This visit is used internally to schedule the physician's time during your ultrasound.            Aug 22, 2017  3:30 PM CDT   MFM BPP SINGLE with RHMFMUSR3   [facility] Maternal Fetal Medicine Ultrasound [facility])    [address]  [facility] [address]   [phone]            Aug 22, 2017  4:00 PM CDT   Radiology MD with RH KOJO MCCANN   [facility] Maternal Fetal Medicine [facility])    [address]  [facility] [address]   [phone]           Please arrive at the time given for your first appointment. This visit is used internally to schedule the physician's time during your ultrasound.            Aug 25, 2017  9:30 AM CDT   MFM US COMPRE SINGLE F/U with RHMFMUSR1  "  [facility] Maternal Fetal Medicine Ultrasound - [facility] ([facility])    [address] [facility] [address]  [facility] [address] [phone]           Wear comfortable clothes and leave your valuables at home.            Aug 25, 2017 10:00 AM CDT   Radiology MD with  KOJO MCCANN   [facility] Maternal Fetal Medicine - [facility] ([facility])    [address]  [facility] [address] [phone]           Please arrive at the time given for your first appointment. This visit is used internally to schedule the physician's time during your ultrasound.              Who to contact     If you have questions or need follow up information about today's clinic visit or your schedule please contact [facility] MATERNAL FETAL MEDICINE [facility] directly at [phone].  Normal or non-critical lab and imaging results will be communicated to you by bublhart, letter or phone within 4 business days after the clinic has received the results. If you do not hear from us within 7 days, please contact the clinic through bublhart or phone. If you have a critical or abnormal lab result, we will notify you by phone as soon as possible.  Submit refill requests through [employer] or call your pharmacy and they will forward the refill request to us. Please allow 3 business days for your refill to be completed.          Additional Information About Your Visit        MyChart Information     [employer] lets you send messages to your doctor, view your test results, renew your prescriptions, schedule appointments and more. To sign up, go to [email or web address] . Click on \"Log in\" on the left side of the screen, which will take you to the Welcome page. Then click on \"Sign up Now\" on the right side of the page.     You will be asked to enter the access code listed below, as well as some personal information. Please follow the directions to create your username and password.     Your access code is: " [email or web address]  Expires: 2017  2:18 PM     Your access code will  in 90 days. If you need help or a new code, please call your [place] clinic or [phone].        Care EveryWhere ID     This is your Care EveryWhere ID. This could be used by other organizations to access your [place] medical records  [id number]        Your Vitals Were     Last Period                   2016 (Approximate)            Blood Pressure from Last 3 Encounters:   17 135/70   17 118/67   17 133/75    Weight from Last 3 Encounters:   17 130.3 kg (287 lb 3.2 oz)   17 126.3 kg (278 lb 8 oz)   17 129.3 kg (285 lb)              Today, you had the following     No orders found for display       Primary Care Provider Office Phone # Fax #    Traci Padron [phone]       [place] [address] [place] [address]        Equal Access to Services     [facility]: Hadii aad ku hadasho Soomaali, waaxda luqadaha, qaybta kaalmada adeegyada, waxay dawna haytara elmore . So [facility] [phone].    ATENCIÓN: Si habla español, tiene a irvin disposición servicios gratuitos de asistencia lingüística. Llame al [phone].    We comply with applicable federal civil rights laws and [place] laws. We do not discriminate on the basis of race, color, national origin, age, disability sex, sexual orientation or gender identity.            Thank you!     Thank you for choosing [facility]  for your care. Our goal is always to provide you with excellent care. Hearing back from our patients is one way we can continue to improve our services. Please take a few minutes to complete the written survey that you may receive in the mail after your visit with us. Thank you!             Your Updated Medication List - Protect others around you: Learn how to safely use, store and throw away your medicines at www.disposemymeds.org.           This list is accurate as of: 8/15/17 12:04 PM.  Always use your most recent med list.                   Brand Name Dispense Instructions for use Diagnosis    albuterol 108 (90 BASE) MCG/ACT Inhaler    VENTOLIN HFA    1 Inhaler    Inhale 1-2 puffs into the lungs every 4 hours as needed for shortness of breath / dyspnea or wheezing Reported on 2/16/2017    Upper respiratory tract infection, unspecified type, Intermittent asthma, uncomplicated       calcium carbonate 500 MG chewable tablet    TUMS    150 tablet    Take 2 tablets (1,000 mg) by mouth 3 times daily    Supervision of high-risk pregnancy of elderly multigravida       metoclopramide 10 MG tablet    REGLAN    20 tablet    Take 1 tablet (10 mg) by mouth once as needed For headache. Can be taken up to 4x daily    Supervision of high-risk pregnancy of elderly multigravida, Headache in pregnancy, third trimester       order for DME     1 Device    Lumbar Maternity Corset    Low back pain during pregnancy       prenatal multivitamin plus iron 27-0.8 MG Tabs per tablet      Take 1 tablet by mouth    Supervision of high-risk pregnancy of elderly multigravida       TYLENOL PO      Take 1,000 mg by mouth every 8 hours as needed for mild pain or fever        vitamin B complex with vitamin C Tabs tablet      Take 1 tablet by mouth daily        VITAMIN D PO      Take 2,000 Units by mouth daily

## 2017-08-15 NOTE — PROGRESS NOTES
"Please see \"Imaging\" tab under \"Chart Review\" for details of today's ultrasound.    Zhang Chaidez M.D.  Specialist in Maternal-Fetal Medicine     "

## 2017-08-18 ENCOUNTER — HOSPITAL ENCOUNTER (OUTPATIENT)
Dept: ULTRASOUND IMAGING | Facility: CLINIC | Age: 37
Discharge: HOME | End: 2017-08-18
Attending: OBSTETRICS & GYNECOLOGY | Admitting: OBSTETRICS & GYNECOLOGY
Payer: COMMERCIAL

## 2017-08-18 ENCOUNTER — PRENATAL OFFICE VISIT (OUTPATIENT)
Dept: OBGYN | Facility: CLINIC | Age: 37
End: 2017-08-18
Payer: COMMERCIAL

## 2017-08-18 ENCOUNTER — OFFICE VISIT (OUTPATIENT)
Dept: MATERNAL FETAL MEDICINE | Facility: CLINIC | Age: 37
End: 2017-08-18
Attending: OBSTETRICS & GYNECOLOGY
Payer: COMMERCIAL

## 2017-08-18 VITALS
BODY MASS INDEX: 46.97 KG/M2 | HEART RATE: 88 BPM | DIASTOLIC BLOOD PRESSURE: 80 MMHG | SYSTOLIC BLOOD PRESSURE: 126 MMHG | WEIGHT: 291 LBS

## 2017-08-18 DIAGNOSIS — O09.529 SUPERVISION OF HIGH-RISK PREGNANCY OF ELDERLY MULTIGRAVIDA: ICD-10-CM

## 2017-08-18 DIAGNOSIS — O13.9 GESTATIONAL HYPERTENSION AFFECTING FOURTH PREGNANCY: ICD-10-CM

## 2017-08-18 DIAGNOSIS — O13.3 GESTATIONAL HYPERTENSION W/O SIGNIFICANT PROTEINURIA IN 3RD TRIMESTER: Primary | ICD-10-CM

## 2017-08-18 PROCEDURE — 87653 STREP B DNA AMP PROBE: CPT | Performed by: OBSTETRICS & GYNECOLOGY

## 2017-08-18 PROCEDURE — 76819 FETAL BIOPHYS PROFIL W/O NST: CPT

## 2017-08-18 PROCEDURE — 87186 SC STD MICRODIL/AGAR DIL: CPT | Performed by: OBSTETRICS & GYNECOLOGY

## 2017-08-18 PROCEDURE — 99207 ZZC COMPLICATED OB VISIT: CPT | Performed by: OBSTETRICS & GYNECOLOGY

## 2017-08-18 NOTE — MR AVS SNAPSHOT
After Visit Summary   8/18/2017    Ninoska Cottrell    MRN: [id number]           Patient Information     Date Of Birth          1980        Visit Information        Provider Department      8/18/2017 11:30 AM Barbie Lyons MD [facility] Maternal Fetal Medicine [facility]        Today's Diagnoses     Gestational hypertension w/o significant proteinuria in 3rd trimester    -  1       Follow-ups after your visit        Your next 10 appointments already scheduled     Aug 22, 2017  3:30 PM CDT   [facility] BPP SINGLE with [id number]   [facility] Maternal Fetal Medicine Ultrasound - [facility] ([facility])    [address]  [facility] [address]   [phone]            Aug 22, 2017  4:00 PM CDT   Radiology MD with RH KOJO MCCANN   [facility] Maternal Fetal Medicine [facility] ([facility])    [address]  [facility] [address]   [phone]           Please arrive at the time given for your first appointment. This visit is used internally to schedule the physician's time during your ultrasound.            Aug 24, 2017  4:15 PM CDT   ESTABLISHED PRENATAL with Traci Padron MD   [facility]    [address] [place]  [facility] [address]   [phone]            Aug 25, 2017  9:30 AM CDT   [facility] US COMPRE SINGLE F/U with [id number]   [facility] Maternal Fetal Medicine Ultrasound - [facility])    [address]  [facility] [address]   [phone]           Wear comfortable clothes and leave your valuables at home.            Aug 25, 2017 10:00 AM CDT   Radiology MD with RH KOJO MCCANN   [facility] Maternal Fetal Medicine [facility] ([facility])    [address]  [facility] [address]   [phone]           Please arrive at the time given for your first appointment. This visit is used internally to schedule the physician's time during your ultrasound.     "          Who to contact     If you have questions or need follow up information about today's clinic visit or your schedule please contact [facility] MATERNAL FETAL MEDICINE [facility] directly at [phone].  Normal or non-critical lab and imaging results will be communicated to you by MyChart, letter or phone within 4 business days after the clinic has received the results. If you do not hear from us within 7 days, please contact the clinic through MyChart or phone. If you have a critical or abnormal lab result, we will notify you by phone as soon as possible.  Submit refill requests through [employer] or call your pharmacy and they will forward the refill request to us. Please allow 3 business days for your refill to be completed.          Additional Information About Your Visit        [employer] Information     [employer] lets you send messages to your doctor, view your test results, renew your prescriptions, schedule appointments and more. To sign up, go to [email or web address] . Click on \"Log in\" on the left side of the screen, which will take you to the Welcome page. Then click on \"Sign up Now\" on the right side of the page.     You will be asked to enter the access code listed below, as well as some personal information. Please follow the directions to create your username and password.     Your access code is: [email or web address]  Expires: 2017  2:18 PM     Your access code will  in 90 days. If you need help or a new code, please call your [place] clinic or [phone].        Care EveryWhere ID     This is your Care EveryWhere ID. This could be used by other organizations to access your [place] medical records  [id number]        Your Vitals Were     Last Period                   2016 (Approximate)            Blood Pressure from Last 3 Encounters:   17 126/80   17 135/70   17 118/67    Weight from Last 3 Encounters:   17 132 kg (291 lb)   17 130.3 kg (287 lb 3.2 oz) "   08/05/17 126.3 kg (278 lb 8 oz)              Today, you had the following     No orders found for display       Primary Care Provider Office Phone # Fax #    Traci Padron [phone]       [place] [address] [facility] [address]        Equal Access to Services     [facility] : Hadii aad ku hadasho Soomaali, waaxda luqadaha, qaybta kaalmada adeegyada, waxay idiin hayaan adeeg khvanessagelacio lamakennan . So [facility] [phone].    ATENCIÓN: Si habla español, tiene a irvin disposición servicios gratuitos de asistencia lingüística. Jamesame al [phone].    We comply with applicable federal civil rights laws and [place] laws. We do not discriminate on the basis of race, color, national origin, age, disability sex, sexual orientation or gender identity.            Thank you!     Thank you for choosing [facility]  for your care. Our goal is always to provide you with excellent care. Hearing back from our patients is one way we can continue to improve our services. Please take a few minutes to complete the written survey that you may receive in the mail after your visit with us. Thank you!             Your Updated Medication List - Protect others around you: Learn how to safely use, store and throw away your medicines at www.disposemymeds.org.          This list is accurate as of: 8/18/17  2:00 PM.  Always use your most recent med list.                   Brand Name Dispense Instructions for use Diagnosis    albuterol 108 (90 BASE) MCG/ACT Inhaler    VENTOLIN HFA    1 Inhaler    Inhale 1-2 puffs into the lungs every 4 hours as needed for shortness of breath / dyspnea or wheezing Reported on 2/16/2017    Upper respiratory tract infection, unspecified type, Intermittent asthma, uncomplicated       calcium carbonate 500 MG chewable tablet    TUMS    150 tablet    Take 2 tablets (1,000 mg) by mouth 3 times daily    Supervision of high-risk pregnancy of  elderly multigravida       metoclopramide 10 MG tablet    REGLAN    20 tablet    Take 1 tablet (10 mg) by mouth once as needed For headache. Can be taken up to 4x daily    Supervision of high-risk pregnancy of elderly multigravida, Headache in pregnancy, third trimester       order for DME     1 Device    Lumbar Maternity Corset    Low back pain during pregnancy       prenatal multivitamin plus iron 27-0.8 MG Tabs per tablet      Take 1 tablet by mouth    Supervision of high-risk pregnancy of elderly multigravida       TYLENOL PO      Take 1,000 mg by mouth every 8 hours as needed for mild pain or fever        vitamin B complex with vitamin C Tabs tablet      Take 1 tablet by mouth daily        VITAMIN D PO      Take 2,000 Units by mouth daily

## 2017-08-18 NOTE — MR AVS SNAPSHOT
After Visit Summary   8/18/2017    Ninoska Cottrell    MRN: [id number]           Patient Information     Date Of Birth          1980        Visit Information        Provider Department      8/18/2017 9:45 AM Ralph Persaud MD [facility]        Today's Diagnoses     Supervision of high-risk pregnancy of elderly multigravida           Follow-ups after your visit        Follow-up notes from your care team     Return in about 1 week (around 8/25/2017).      Your next 10 appointments already scheduled     Aug 18, 2017 11:00 AM CDT   MFM BPP SINGLE with [id number]   [facility] ([facility])    [address]  [facility] [address]   [phone]            Aug 18, 2017 11:30 AM CDT   Radiology MD with  KOJO MCCANN   [facility] Maternal Fetal Medicine [facility] ([facility])    [address]  [facility] [address]   [phone]           Please arrive at the time given for your first appointment. This visit is used internally to schedule the physician's time during your ultrasound.            Aug 22, 2017  3:30 PM CDT   M BPP SINGLE with RHMFMUSR3   [facility] ([facility])    [address]  [facility] [address]   [phone]            Aug 22, 2017  4:00 PM CDT   [facility]   [facility] Maternal Fetal Medicine [facility] ([facility])    [address]  [facility] [address]   [phone]           Please arrive at the time given for your first appointment. This visit is used internally to schedule the physician's time during your ultrasound.            Aug 24, 2017  4:15 PM CDT   ESTABLISHED PRENATAL with Traci Padron MD   [facility] ([facility])    [address]  [facility] [address]   [phone]            Aug 25,  "2017  9:30 AM CDT   [facility] US COMPRE SINGLE F/U with [id number]   [facility] Maternal Fetal Medicine Ultrasound - [facility] ([facility])    [address] [facility] [address]  [facility] [address] [phone]           Wear comfortable clothes and leave your valuables at home.            Aug 25, 2017 10:00 AM CDT   Radiology MD with RH KOJO MCCANN   [facility] Maternal Fetal Medicine - [facility])    [address]  [facility] [address] [phone]           Please arrive at the time given for your first appointment. This visit is used internally to schedule the physician's time during your ultrasound.              Who to contact     If you have questions or need follow up information about today's clinic visit or your schedule please contact [facility] directly at [phone].  Normal or non-critical lab and imaging results will be communicated to you by StoryBlenderhart, letter or phone within 4 business days after the clinic has received the results. If you do not hear from us within 7 days, please contact the clinic through [employer] or phone. If you have a critical or abnormal lab result, we will notify you by phone as soon as possible.  Submit refill requests through FindTheBest or call your pharmacy and they will forward the refill request to us. Please allow 3 business days for your refill to be completed.          Additional Information About Your Visit        FindTheBest Information     FindTheBest lets you send messages to your doctor, view your test results, renew your prescriptions, schedule appointments and more. To sign up, go to [email or web address] . Click on \"Log in\" on the left side of the screen, which will take you to the Welcome page. Then click on \"Sign up Now\" on the right side of the page.     You will be asked to enter the access code listed below, as well as some personal information. Please follow the directions to create your username and " password.     Your access code is: [email or web address]  Expires: 2017  2:18 PM     Your access code will  in 90 days. If you need help or a new code, please call your [place] clinic or [phone].        Care EveryWhere ID     This is your Care EveryWhere ID. This could be used by other organizations to access your [place] medical records  [id number]        Your Vitals Were     Pulse Last Period BMI (Body Mass Index)             88 2016 (Approximate) 46.97 kg/m2          Blood Pressure from Last 3 Encounters:   17 126/80   17 135/70   17 118/67    Weight from Last 3 Encounters:   17 291 lb (132 kg)   17 287 lb 3.2 oz (130.3 kg)   17 278 lb 8 oz (126.3 kg)              We Performed the Following     Strep, Group B by [facility]        Primary Care Provider Office Phone # Fax #    Traci Padron [phone]       [facility] [address] [place] [address]        Equal Access to Services     [facility]: Hadii aad ku hadasho Soomaali, waaxda luqadaha, qaybta kaalmada adeegyada, waxay alicein hayaan adeeric elmore . So [facility] [phone].    ATENCIÓN: Si habla español, tiene a irvin disposición servicios gratuitos de asistencia lingüística. Llame al [phone].    We comply with applicable federal civil rights laws and [place] laws. We do not discriminate on the basis of race, color, national origin, age, disability sex, sexual orientation or gender identity.            Thank you!     Thank you for choosing [facility]  for your care. Our goal is always to provide you with excellent care. Hearing back from our patients is one way we can continue to improve our services. Please take a few minutes to complete the written survey that you may receive in the mail after your visit with us. Thank you!             Your Updated Medication List - Protect others around you: Learn how to safely use, store and throw away  your medicines at www.disposemymeds.org.          This list is accurate as of: 8/18/17 10:03 AM.  Always use your most recent med list.                   Brand Name Dispense Instructions for use Diagnosis    albuterol 108 (90 BASE) MCG/ACT Inhaler    VENTOLIN HFA    1 Inhaler    Inhale 1-2 puffs into the lungs every 4 hours as needed for shortness of breath / dyspnea or wheezing Reported on 2/16/2017    Upper respiratory tract infection, unspecified type, Intermittent asthma, uncomplicated       calcium carbonate 500 MG chewable tablet    TUMS    150 tablet    Take 2 tablets (1,000 mg) by mouth 3 times daily    Supervision of high-risk pregnancy of elderly multigravida       metoclopramide 10 MG tablet    REGLAN    20 tablet    Take 1 tablet (10 mg) by mouth once as needed For headache. Can be taken up to 4x daily    Supervision of high-risk pregnancy of elderly multigravida, Headache in pregnancy, third trimester       order for DME     1 Device    Lumbar Maternity Corset    Low back pain during pregnancy       prenatal multivitamin plus iron 27-0.8 MG Tabs per tablet      Take 1 tablet by mouth    Supervision of high-risk pregnancy of elderly multigravida       TYLENOL PO      Take 1,000 mg by mouth every 8 hours as needed for mild pain or fever        vitamin B complex with vitamin C Tabs tablet      Take 1 tablet by mouth daily        VITAMIN D PO      Take 2,000 Units by mouth daily

## 2017-08-19 LAB
GP B STREP DNA SPEC QL NAA+PROBE: POSITIVE
SPECIMEN SOURCE: ABNORMAL

## 2017-08-20 PROBLEM — O99.820 STREPTOCOCCUS B CARRIER STATE COMPLICATING PREGNANCY: Status: ACTIVE | Noted: 2017-08-20

## 2017-08-22 ENCOUNTER — OFFICE VISIT (OUTPATIENT)
Dept: MATERNAL FETAL MEDICINE | Facility: CLINIC | Age: 37
End: 2017-08-22
Attending: OBSTETRICS & GYNECOLOGY
Payer: COMMERCIAL

## 2017-08-22 ENCOUNTER — HOSPITAL ENCOUNTER (OUTPATIENT)
Dept: ULTRASOUND IMAGING | Facility: CLINIC | Age: 37
Discharge: HOME | End: 2017-08-22
Attending: OBSTETRICS & GYNECOLOGY | Admitting: OBSTETRICS & GYNECOLOGY
Payer: COMMERCIAL

## 2017-08-22 DIAGNOSIS — O13.3 GESTATIONAL HYPERTENSION, THIRD TRIMESTER: Primary | ICD-10-CM

## 2017-08-22 DIAGNOSIS — O13.9 GESTATIONAL HYPERTENSION AFFECTING FOURTH PREGNANCY: ICD-10-CM

## 2017-08-22 LAB
BACTERIA SPEC CULT: ABNORMAL
SPECIMEN SOURCE: ABNORMAL

## 2017-08-22 PROCEDURE — 76819 FETAL BIOPHYS PROFIL W/O NST: CPT

## 2017-08-22 NOTE — PROGRESS NOTES
Please see ultrasound report under imaging tab for details on ultrasound performed today.    Lo Hendricks MD  , OB/GYN  Maternal-Fetal Medicine  [email or web address]  [phone] (Academic office)  [phone] (Pager)

## 2017-08-22 NOTE — MR AVS SNAPSHOT
After Visit Summary   8/22/2017    Ninoska Cottrell    MRN: [id number]           Patient Information     Date Of Birth          1980        Visit Information        Provider Department      8/22/2017 4:00 PM Lo Hendricks MD [facility] Maternal Fetal Medicine [facility]        Today's Diagnoses     Gestational hypertension, third trimester    -  1       Follow-ups after your visit        Your next 10 appointments already scheduled     Aug 24, 2017  4:15 PM CDT   ESTABLISHED PRENATAL with Traci Padron MD   [facility]    [address] [place]  [facility] [address]   [phone]            Aug 25, 2017  9:30 AM CDT   MFM US COMPRE SINGLE F/U with [id number]   [facility] Maternal Fetal Medicine Ultrasound - [facility])    [address]  [facility] [address] [phone]           Wear comfortable clothes and leave your valuables at home.            Aug 25, 2017 10:00 AM CDT   Radiology MD with  KOJO MCCANN   [facility] Maternal Fetal Medicine [facility] ([facility])    [address]  [facility] [address] [phone]           Please arrive at the time given for your first appointment. This visit is used internally to schedule the physician's time during your ultrasound.              Who to contact     If you have questions or need follow up information about today's clinic visit or your schedule please contact [facility] MATERNAL FETAL MEDICINE [facility] directly at [phone].  Normal or non-critical lab and imaging results will be communicated to you by MyChart, letter or phone within 4 business days after the clinic has received the results. If you do not hear from us within 7 days, please contact the clinic through Robert Applebaum MDhart or phone. If you have a critical or abnormal lab result, we will notify you by phone as soon as possible.  Submit refill requests through Partschannel or call  "your pharmacy and they will forward the refill request to us. Please allow 3 business days for your refill to be completed.          Additional Information About Your Visit        MyChart Information     FarmBot lets you send messages to your doctor, view your test results, renew your prescriptions, schedule appointments and more. To sign up, go to [email or web address] . Click on \"Log in\" on the left side of the screen, which will take you to the Welcome page. Then click on \"Sign up Now\" on the right side of the page.     You will be asked to enter the access code listed below, as well as some personal information. Please follow the directions to create your username and password.     Your access code is: [email or web address]  Expires: 2017  2:18 PM     Your access code will  in 90 days. If you need help or a new code, please call your [place] clinic or [phone].        Care EveryWhere ID     This is your Care EveryWhere ID. This could be used by other organizations to access your [place] medical records  [id number]        Your Vitals Were     Last Period                   2016 (Approximate)            Blood Pressure from Last 3 Encounters:   17 126/80   17 135/70   17 118/67    Weight from Last 3 Encounters:   17 132 kg (291 lb)   17 130.3 kg (287 lb 3.2 oz)   17 126.3 kg (278 lb 8 oz)              Today, you had the following     No orders found for display       Primary Care Provider Office Phone # Fax #    Traci Padron [phone]       [facility] [address] [place] [address]        Equal Access to Services     [facility]: Hadii yasmine Gregorio, waaxda luqadaha, qaybta kaalmada kun, melida augustin. So [facility] [phone].    ATENCIÓN: Si habla español, tiene a irvin disposición servicios gratuitos de asistencia lingüística. Llame al [phone].    We comply with " applicable federal civil rights laws and [place] laws. We do not discriminate on the basis of race, color, national origin, age, disability sex, sexual orientation or gender identity.            Thank you!     Thank you for choosing [facility]  for your care. Our goal is always to provide you with excellent care. Hearing back from our patients is one way we can continue to improve our services. Please take a few minutes to complete the written survey that you may receive in the mail after your visit with us. Thank you!             Your Updated Medication List - Protect others around you: Learn how to safely use, store and throw away your medicines at www.disposemymeds.org.          This list is accurate as of: 8/22/17  4:19 PM.  Always use your most recent med list.                   Brand Name Dispense Instructions for use Diagnosis    albuterol 108 (90 BASE) MCG/ACT Inhaler    VENTOLIN HFA    1 Inhaler    Inhale 1-2 puffs into the lungs every 4 hours as needed for shortness of breath / dyspnea or wheezing Reported on 2/16/2017    Upper respiratory tract infection, unspecified type, Intermittent asthma, uncomplicated       calcium carbonate 500 MG chewable tablet    TUMS    150 tablet    Take 2 tablets (1,000 mg) by mouth 3 times daily    Supervision of high-risk pregnancy of elderly multigravida       metoclopramide 10 MG tablet    REGLAN    20 tablet    Take 1 tablet (10 mg) by mouth once as needed For headache. Can be taken up to 4x daily    Supervision of high-risk pregnancy of elderly multigravida, Headache in pregnancy, third trimester       order for DME     1 Device    Lumbar Maternity Corset    Low back pain during pregnancy       prenatal multivitamin plus iron 27-0.8 MG Tabs per tablet      Take 1 tablet by mouth    Supervision of high-risk pregnancy of elderly multigravida       TYLENOL PO      Take 1,000 mg by mouth every 8 hours as needed for mild pain or fever         vitamin B complex with vitamin C Tabs tablet      Take 1 tablet by mouth daily        VITAMIN D PO      Take 2,000 Units by mouth daily

## 2017-08-24 ENCOUNTER — PRENATAL OFFICE VISIT (OUTPATIENT)
Dept: OBGYN | Facility: CLINIC | Age: 37
End: 2017-08-24
Payer: COMMERCIAL

## 2017-08-24 VITALS — BODY MASS INDEX: 47.61 KG/M2 | WEIGHT: 293 LBS | DIASTOLIC BLOOD PRESSURE: 72 MMHG | SYSTOLIC BLOOD PRESSURE: 128 MMHG

## 2017-08-24 DIAGNOSIS — O09.529 SUPERVISION OF HIGH-RISK PREGNANCY OF ELDERLY MULTIGRAVIDA: Primary | ICD-10-CM

## 2017-08-24 PROCEDURE — 99207 ZZC PRENATAL VISIT: CPT | Performed by: OBSTETRICS & GYNECOLOGY

## 2017-08-24 NOTE — MR AVS SNAPSHOT
After Visit Summary   8/24/2017    Ninoska Cottrell    MRN: [id number]           Patient Information     Date Of Birth          1980        Visit Information        Provider Department      8/24/2017 4:15 PM Traci Padron MD [facility]        Today's Diagnoses     Supervision of high-risk pregnancy of elderly multigravida    -  1       Follow-ups after your visit        Your next 10 appointments already scheduled     Aug 25, 2017  9:30 AM CDT   MFM US COMPRE SINGLE F/U with [id number]   [facility] Maternal Fetal Medicine Ultrasound - [facility])    [address]  [facility] [address] [phone]           Wear comfortable clothes and leave your valuables at home.            Aug 25, 2017 10:00 AM CDT   Radiology MD with  KOJO MCCANN   [facility] Maternal Fetal Medicine - [facility])    [address]  [facility] [address] [phone]           Please arrive at the time given for your first appointment. This visit is used internally to schedule the physician's time during your ultrasound.              Who to contact     If you have questions or need follow up information about today's clinic visit or your schedule please contact [facility] directly at [phone].  Normal or non-critical lab and imaging results will be communicated to you by MyChart, letter or phone within 4 business days after the clinic has received the results. If you do not hear from us within 7 days, please contact the clinic through MyChart or phone. If you have a critical or abnormal lab result, we will notify you by phone as soon as possible.  Submit refill requests through DoughMain or call your pharmacy and they will forward the refill request to us. Please allow 3 business days for your refill to be completed.          Additional Information About Your Visit        [employer] Information     DoughMain lets you send  "messages to your doctor, view your test results, renew your prescriptions, schedule appointments and more. To sign up, go to [email or web address] . Click on \"Log in\" on the left side of the screen, which will take you to the Welcome page. Then click on \"Sign up Now\" on the right side of the page.     You will be asked to enter the access code listed below, as well as some personal information. Please follow the directions to create your username and password.     Your access code is: [email or web address]  Expires: 2017  2:18 PM     Your access code will  in 90 days. If you need help or a new code, please call your [place] clinic or [phone].        Care EveryWhere ID     This is your Care EveryWhere ID. This could be used by other organizations to access your [place] medical records  [id number]        Your Vitals Were     Last Period BMI (Body Mass Index)                2016 (Approximate) 47.61 kg/m2           Blood Pressure from Last 3 Encounters:   17 128/72   17 126/80   17 135/70    Weight from Last 3 Encounters:   17 295 lb (133.8 kg)   17 291 lb (132 kg)   17 287 lb 3.2 oz (130.3 kg)              Today, you had the following     No orders found for display       Primary Care Provider Office Phone # Fax #    Traci Padron [phone]       [place] [address] [address] [facility] [address]        Equal Access to Services     [facility]: Hadii aad ku hadasho Soomaali, waaxda luqadaha, qaybta kaalmada adeegyahardeep, melida elmore . So [facility] [phone].    ATENCIÓN: Si habla español, tiene a irvin disposición servicios gratuitos de asistencia lingüística. Llame al [phone].    We comply with applicable federal civil rights laws and [place] laws. We do not discriminate on the basis of race, color, national origin, age, disability sex, sexual orientation or gender identity.            Thank you! "     Thank you for choosing [facility]  for your care. Our goal is always to provide you with excellent care. Hearing back from our patients is one way we can continue to improve our services. Please take a few minutes to complete the written survey that you may receive in the mail after your visit with us. Thank you!             Your Updated Medication List - Protect others around you: Learn how to safely use, store and throw away your medicines at www.disposemymeds.org.          This list is accurate as of: 8/24/17  5:02 PM.  Always use your most recent med list.                   Brand Name Dispense Instructions for use Diagnosis    albuterol 108 (90 BASE) MCG/ACT Inhaler    VENTOLIN HFA    1 Inhaler    Inhale 1-2 puffs into the lungs every 4 hours as needed for shortness of breath / dyspnea or wheezing Reported on 2/16/2017    Upper respiratory tract infection, unspecified type, Intermittent asthma, uncomplicated       calcium carbonate 500 MG chewable tablet    TUMS    150 tablet    Take 2 tablets (1,000 mg) by mouth 3 times daily    Supervision of high-risk pregnancy of elderly multigravida       metoclopramide 10 MG tablet    REGLAN    20 tablet    Take 1 tablet (10 mg) by mouth once as needed For headache. Can be taken up to 4x daily    Supervision of high-risk pregnancy of elderly multigravida, Headache in pregnancy, third trimester       order for DME     1 Device    Lumbar Maternity Corset    Low back pain during pregnancy       prenatal multivitamin plus iron 27-0.8 MG Tabs per tablet      Take 1 tablet by mouth    Supervision of high-risk pregnancy of elderly multigravida       TYLENOL PO      Take 1,000 mg by mouth every 8 hours as needed for mild pain or fever        vitamin B complex with vitamin C Tabs tablet      Take 1 tablet by mouth daily        VITAMIN D PO      Take 2,000 Units by mouth daily

## 2017-08-24 NOTE — PROGRESS NOTES
Routine OB Visit:    S: 2 HA this week, resolved with Reglan. Ongoing intermittent mild range BPs at home. No contractions. Good fetal movement. No LoF, VB.    O: /72 (BP Location: Right arm, Patient Position: Chair, Cuff Size: Adult Large)  Wt 295 lb (133.8 kg)  LMP 2016 (Approximate)  BMI 47.61 kg/m2   Gen: resting comfortably, in NAD  See Prenatal flowsheet    A: Ninoska Cottrell is a 37 year old female  at 36w1d, here for routine OB care. Pregnancy c/b AMA, GHTN, obesity, and vaginal bleeding    P:   1) O+/RNI, needs MMR PP. GBS+, PCN in labor  2) GHTN: plan 37w IOL per MFM recommendation. Reviewed warning signs of preeclampsia  3) Vaginal  Bleeding in 3rd trimester: s/p BMZ  & . Deferred cervical exam today to avoid risking further vaginal bleeding prior to admission.  4) AMA: continue twice weekly BPPs  5) PP: desires Mirena IUD  6) IOL on , reviewed return to care precautions.       Traci Padron MD  Obstetrics and Gynecology  2017

## 2017-08-24 NOTE — NURSING NOTE
"Chief Complaint   Patient presents with     Prenatal Care     36 weeks 1 day,     Induction Of Labor     discuss MFM recommendations for induction at 37 weeks for gestational hypertension       Initial /72 (BP Location: Right arm, Patient Position: Chair, Cuff Size: Adult Large)  Wt 295 lb (133.8 kg)  LMP 12/14/2016 (Approximate)  BMI 47.61 kg/m2 Estimated body mass index is 47.61 kg/(m^2) as calculated from the following:    Height as of 7/30/17: 5' 6\" (1.676 m).    Weight as of this encounter: 295 lb (133.8 kg).  Medication Reconciliation: complete     Dara Howe CMA      "

## 2017-08-25 ENCOUNTER — HOSPITAL ENCOUNTER (OUTPATIENT)
Dept: ULTRASOUND IMAGING | Facility: CLINIC | Age: 37
Discharge: HOME | End: 2017-08-25
Attending: OBSTETRICS & GYNECOLOGY | Admitting: OBSTETRICS & GYNECOLOGY
Payer: COMMERCIAL

## 2017-08-25 ENCOUNTER — OFFICE VISIT (OUTPATIENT)
Dept: MATERNAL FETAL MEDICINE | Facility: CLINIC | Age: 37
End: 2017-08-25
Attending: OBSTETRICS & GYNECOLOGY
Payer: COMMERCIAL

## 2017-08-25 DIAGNOSIS — O13.9 GESTATIONAL HYPERTENSION AFFECTING FOURTH PREGNANCY: ICD-10-CM

## 2017-08-25 DIAGNOSIS — O13.3 GESTATIONAL HYPERTENSION, THIRD TRIMESTER: Primary | ICD-10-CM

## 2017-08-25 PROCEDURE — 76816 OB US FOLLOW-UP PER FETUS: CPT

## 2017-08-25 PROCEDURE — 76819 FETAL BIOPHYS PROFIL W/O NST: CPT | Performed by: OBSTETRICS & GYNECOLOGY

## 2017-08-25 NOTE — PROGRESS NOTES
Please see ultrasound report under imaging tab for details on ultrasound performed today.    Lo Hendricks MD  , OB/GYN  Maternal-Fetal Medicine  [email or web address]  [phone] (Academic office)  [phone] (Pager)

## 2017-08-25 NOTE — MR AVS SNAPSHOT
After Visit Summary   8/25/2017    Ninoska Cottrell    MRN: [id number]           Patient Information     Date Of Birth          1980        Visit Information        Provider Department      8/25/2017 10:00 AM Lo Hendricks MD [facility] Maternal [facility]etal [facility]        Today's Diagnoses     Gestational hypertension, third trimester    -  1       Follow-ups after your visit        Your next 10 appointments already scheduled     Aug 29, 2017  3:30 PM CDT   MFM BPP SINGLE with [id number]   [facility] Fetal Medicine Ultrasound - [facility])    [address]  [facility] [id number] [phone]            Aug 29, 2017  4:00 PM CDT   Radiology MD with RH KOJO MCCANN   [facility] Fetal Medicine [facility])    [address]  [facility] [address] [phone]           Please arrive at the time given for your first appointment. This visit is used internally to schedule the physician's time during your ultrasound.              Who to contact     If you have questions or need follow up information about today's clinic visit or your schedule please contact [facility] F[facility] directly at [phone].  Normal or non-critical lab and imaging results will be communicated to you by ThermoCeramixhart, letter or phone within 4 business days after the clinic has received the results. If you do not hear from us within 7 days, please contact the clinic through ThermoCeramixhart or phone. If you have a critical or abnormal lab result, we will notify you by phone as soon as possible.  Submit refill requests through [employer] or call your pharmacy and they will forward the refill request to us. Please allow 3 business days for your refill to be completed.          Additional Information About Your Visit        Nud[employer] Mobile Payment Information     [employer] lets you send messages to your doctor, view your test results, renew your  "prescriptions, schedule appointments and more. To sign up, go to [email or web address] . Click on \"Log in\" on the left side of the screen, which will take you to the Welcome page. Then click on \"Sign up Now\" on the right side of the page.     You will be asked to enter the access code listed below, as well as some personal information. Please follow the directions to create your username and password.     Your access code is: [email or web address]  Expires: 2017  2:18 PM     Your access code will  in 90 days. If you need help or a new code, please call your [place] clinic or [phone].        Care EveryWhere ID     This is your Care EveryWhere ID. This could be used by other organizations to access your [place] medical records  [id number]        Your Vitals Were     Last Period                   2016 (Approximate)            Blood Pressure from Last 3 Encounters:   17 128/72   17 126/80   17 135/70    Weight from Last 3 Encounters:   17 133.8 kg (295 lb)   17 132 kg (291 lb)   17 130.3 kg (287 lb 3.2 oz)              Today, you had the following     No orders found for display       Primary Care Provider Office Phone # Fax #    Traci Padron [phone]       [place] [address] [place] [address]        Equal Access to Services     IVAN CALDWELL : Hadii yasmine ku hadasho Soomaali, waaxda luqadaha, qaybta kaalmada baltayahardeep, melida elmore . So [facility] [phone].    ATENCIÓN: Si habla aldair, tiene a irvin disposición servicios gratuitos de asistencia lingüística. Llame al [phone].    We comply with applicable federal civil rights laws and [place] laws. We do not discriminate on the basis of race, color, national origin, age, disability sex, sexual orientation or gender identity.            Thank you!     Thank you for choosing [facility]  for your care. Our " goal is always to provide you with excellent care. Hearing back from our patients is one way we can continue to improve our services. Please take a few minutes to complete the written survey that you may receive in the mail after your visit with us. Thank you!             Your Updated Medication List - Protect others around you: Learn how to safely use, store and throw away your medicines at www.disposemymeds.org.          This list is accurate as of: 8/25/17 10:27 AM.  Always use your most recent med list.                   Brand Name Dispense Instructions for use Diagnosis    albuterol 108 (90 BASE) MCG/ACT Inhaler    VENTOLIN HFA    1 Inhaler    Inhale 1-2 puffs into the lungs every 4 hours as needed for shortness of breath / dyspnea or wheezing Reported on 2/16/2017    Upper respiratory tract infection, unspecified type, Intermittent asthma, uncomplicated       calcium carbonate 500 MG chewable tablet    TUMS    150 tablet    Take 2 tablets (1,000 mg) by mouth 3 times daily    Supervision of high-risk pregnancy of elderly multigravida       metoclopramide 10 MG tablet    REGLAN    20 tablet    Take 1 tablet (10 mg) by mouth once as needed For headache. Can be taken up to 4x daily    Supervision of high-risk pregnancy of elderly multigravida, Headache in pregnancy, third trimester       order for DME     1 Device    Lumbar Maternity Corset    Low back pain during pregnancy       prenatal multivitamin plus iron 27-0.8 MG Tabs per tablet      Take 1 tablet by mouth    Supervision of high-risk pregnancy of elderly multigravida       TYLENOL PO      Take 1,000 mg by mouth every 8 hours as needed for mild pain or fever        vitamin B complex with vitamin C Tabs tablet      Take 1 tablet by mouth daily        VITAMIN D PO      Take 2,000 Units by mouth daily

## 2017-08-29 ENCOUNTER — OFFICE VISIT (OUTPATIENT)
Dept: MATERNAL FETAL MEDICINE | Facility: CLINIC | Age: 37
End: 2017-08-29
Attending: OBSTETRICS & GYNECOLOGY
Payer: COMMERCIAL

## 2017-08-29 ENCOUNTER — HOSPITAL ENCOUNTER (OUTPATIENT)
Dept: ULTRASOUND IMAGING | Facility: CLINIC | Age: 37
Discharge: HOME | End: 2017-08-29
Attending: OBSTETRICS & GYNECOLOGY | Admitting: OBSTETRICS & GYNECOLOGY
Payer: COMMERCIAL

## 2017-08-29 DIAGNOSIS — O13.3 GESTATIONAL HYPERTENSION, THIRD TRIMESTER: Primary | ICD-10-CM

## 2017-08-29 DIAGNOSIS — O13.9 GESTATIONAL HYPERTENSION AFFECTING FOURTH PREGNANCY: ICD-10-CM

## 2017-08-29 PROCEDURE — 76819 FETAL BIOPHYS PROFIL W/O NST: CPT

## 2017-08-29 NOTE — PROGRESS NOTES
"Please see \"Imaging\" tab under \"Chart Review\" for details of today's US at the [facility].    Travis Pinedo MD  Maternal-Fetal Medicine    "

## 2017-08-29 NOTE — MR AVS SNAPSHOT
"              After Visit Summary   8/29/2017    Ninoska Cottrell    MRN: [id number]           Patient Information     Date Of Birth          1980        Visit Information        Provider Department      8/29/2017 4:00 PM Travis Pinedo MD [facility] Maternal Fetal [facility]        Today's Diagnoses     Gestational hypertension, third trimester    -  1       Follow-ups after your visit        Your next 10 appointments already scheduled     Aug 29, 2017  4:00 PM CDT   Radiology MD with Travis Pinedo MD   [facility] Fetal [facility] ([facility])    [address]  [facility] [id number] [phone]           Please arrive at the time given for your first appointment. This visit is used internally to schedule the physician's time during your ultrasound.              Who to contact     If you have questions or need follow up information about today's clinic visit or your schedule please contact [facility] directly at [phone].  Normal or non-critical lab and imaging results will be communicated to you by Showkickerhart, letter or phone within 4 business days after the clinic has received the results. If you do not hear from us within 7 days, please contact the clinic through [facility] or phone. If you have a critical or abnormal lab result, we will notify you by phone as soon as possible.  Submit refill requests through [employer] or call your pharmacy and they will forward the refill request to us. Please allow 3 business days for your refill to be completed.          Additional Information About Your Visit        ShowkickerharSynergy Hub Information     [employer] lets you send messages to your doctor, view your test results, renew your prescriptions, schedule appointments and more. To sign up, go to [email or web address] . Click on \"Log in\" on the left side of the screen, which will take you to the Welcome page. Then click on \"Sign up Now\" on the " right side of the page.     You will be asked to enter the access code listed below, as well as some personal information. Please follow the directions to create your username and password.     Your access code is: [email or web address]  Expires: 2017  3:45 PM     Your access code will  in 90 days. If you need help or a new code, please call your [place] clinic or [phone].        Care EveryWhere ID     This is your Care EveryWhere ID. This could be used by other organizations to access your [place] medical records  [id number]        Your Vitals Were     Last Period                   2016 (Approximate)            Blood Pressure from Last 3 Encounters:   17 128/72   17 126/80   17 135/70    Weight from Last 3 Encounters:   17 133.8 kg (295 lb)   17 132 kg (291 lb)   17 130.3 kg (287 lb 3.2 oz)              Today, you had the following     No orders found for display       Primary Care Provider Office Phone # Fax #    Traci Padron [phone]       [place] [address] [place] [address]        Equal Access to Services     LAUREN CALDWELL : Hadii aad ku hadasho Soomaali, waaxda luqadaha, qaybta kaalmada adeegyada, melida jenkinsin hayyulianan balta elmore . So [facility] [phone].    ATENCIÓN: Si habla español, tiene a irvin disposición servicios gratuitos de asistencia lingüística. Llame al [phone].    We comply with applicable federal civil rights laws and [place] laws. We do not discriminate on the basis of race, color, national origin, age, disability sex, sexual orientation or gender identity.            Thank you!     Thank you for choosing [facility]  for your care. Our goal is always to provide you with excellent care. Hearing back from our patients is one way we can continue to improve our services. Please take a few minutes to complete the written survey that you may receive in the  mail after your visit with us. Thank you!             Your Updated Medication List - Protect others around you: Learn how to safely use, store and throw away your medicines at www.disposemymeds.org.          This list is accurate as of: 8/29/17  3:45 PM.  Always use your most recent med list.                   Brand Name Dispense Instructions for use Diagnosis    albuterol 108 (90 BASE) MCG/ACT Inhaler    VENTOLIN HFA    1 Inhaler    Inhale 1-2 puffs into the lungs every 4 hours as needed for shortness of breath / dyspnea or wheezing Reported on 2/16/2017    Upper respiratory tract infection, unspecified type, Intermittent asthma, uncomplicated       calcium carbonate 500 MG chewable tablet    TUMS    150 tablet    Take 2 tablets (1,000 mg) by mouth 3 times daily    Supervision of high-risk pregnancy of elderly multigravida       metoclopramide 10 MG tablet    REGLAN    20 tablet    Take 1 tablet (10 mg) by mouth once as needed For headache. Can be taken up to 4x daily    Supervision of high-risk pregnancy of elderly multigravida, Headache in pregnancy, third trimester       order for DME     1 Device    Lumbar Maternity Corset    Low back pain during pregnancy       prenatal multivitamin plus iron 27-0.8 MG Tabs per tablet      Take 1 tablet by mouth    Supervision of high-risk pregnancy of elderly multigravida       TYLENOL PO      Take 1,000 mg by mouth every 8 hours as needed for mild pain or fever        vitamin B complex with vitamin C Tabs tablet      Take 1 tablet by mouth daily        VITAMIN D PO      Take 2,000 Units by mouth daily

## 2017-08-31 ENCOUNTER — ANESTHESIA EVENT (OUTPATIENT)
Dept: OBGYN | Facility: CLINIC | Age: 37
End: 2017-08-31
Payer: COMMERCIAL

## 2017-08-31 ENCOUNTER — ANESTHESIA (OUTPATIENT)
Dept: OBGYN | Facility: CLINIC | Age: 37
End: 2017-08-31
Payer: COMMERCIAL

## 2017-08-31 ENCOUNTER — HOSPITAL ENCOUNTER (INPATIENT)
Facility: CLINIC | Age: 37
LOS: 3 days | Discharge: HOME | End: 2017-09-03
Attending: OBSTETRICS & GYNECOLOGY | Admitting: OBSTETRICS & GYNECOLOGY
Payer: COMMERCIAL

## 2017-08-31 PROBLEM — O13.9 GESTATIONAL HTN: Status: ACTIVE | Noted: 2017-08-31

## 2017-08-31 LAB
ABO + RH BLD: NORMAL
ABO + RH BLD: NORMAL
ALT SERPL W P-5'-P-CCNC: 11 U/L (ref 0–50)
AST SERPL W P-5'-P-CCNC: 9 U/L (ref 0–45)
BLD GP AB SCN SERPL QL: NORMAL
BLOOD BANK CMNT PATIENT-IMP: NORMAL
CREAT SERPL-MCNC: 0.6 MG/DL (ref 0.52–1.04)
CREAT UR-MCNC: 160 MG/DL
ERYTHROCYTE [DISTWIDTH] IN BLOOD BY AUTOMATED COUNT: 15 % (ref 10–15)
GFR SERPL CREATININE-BSD FRML MDRD: >90 ML/MIN/1.7M2
HCT VFR BLD AUTO: 32.2 % (ref 35–47)
HGB BLD-MCNC: 10.8 G/DL (ref 11.7–15.7)
MCH RBC QN AUTO: 29.3 PG (ref 26.5–33)
MCHC RBC AUTO-ENTMCNC: 33.5 G/DL (ref 31.5–36.5)
MCV RBC AUTO: 87 FL (ref 78–100)
PLATELET # BLD AUTO: 168 10E9/L (ref 150–450)
PROT UR-MCNC: 0.2 G/L
PROT/CREAT 24H UR: 0.12 G/G CR (ref 0–0.2)
RBC # BLD AUTO: 3.69 10E12/L (ref 3.8–5.2)
SPECIMEN EXP DATE BLD: NORMAL
WBC # BLD AUTO: 9.4 10E9/L (ref 4–11)

## 2017-08-31 PROCEDURE — 36415 COLL VENOUS BLD VENIPUNCTURE: CPT | Performed by: OBSTETRICS & GYNECOLOGY

## 2017-08-31 PROCEDURE — 25000128 H RX IP 250 OP 636: Performed by: OBSTETRICS & GYNECOLOGY

## 2017-08-31 PROCEDURE — 85027 COMPLETE CBC AUTOMATED: CPT | Performed by: OBSTETRICS & GYNECOLOGY

## 2017-08-31 PROCEDURE — 86780 TREPONEMA PALLIDUM: CPT | Performed by: OBSTETRICS & GYNECOLOGY

## 2017-08-31 PROCEDURE — 40000671 ZZH STATISTIC ANESTHESIA CASE

## 2017-08-31 PROCEDURE — 84460 ALANINE AMINO (ALT) (SGPT): CPT | Performed by: OBSTETRICS & GYNECOLOGY

## 2017-08-31 PROCEDURE — 12000029 ZZH R&B OB INTERMEDIATE

## 2017-08-31 PROCEDURE — 86850 RBC ANTIBODY SCREEN: CPT | Performed by: OBSTETRICS & GYNECOLOGY

## 2017-08-31 PROCEDURE — 86901 BLOOD TYPING SEROLOGIC RH(D): CPT | Performed by: OBSTETRICS & GYNECOLOGY

## 2017-08-31 PROCEDURE — 86900 BLOOD TYPING SEROLOGIC ABO: CPT | Performed by: OBSTETRICS & GYNECOLOGY

## 2017-08-31 PROCEDURE — 84156 ASSAY OF PROTEIN URINE: CPT | Performed by: OBSTETRICS & GYNECOLOGY

## 2017-08-31 PROCEDURE — 82565 ASSAY OF CREATININE: CPT | Performed by: OBSTETRICS & GYNECOLOGY

## 2017-08-31 PROCEDURE — 84450 TRANSFERASE (AST) (SGOT): CPT | Performed by: OBSTETRICS & GYNECOLOGY

## 2017-08-31 PROCEDURE — 25000132 ZZH RX MED GY IP 250 OP 250 PS 637: Performed by: OBSTETRICS & GYNECOLOGY

## 2017-08-31 PROCEDURE — 37000011 ZZH ANESTHESIA WARD SERVICE

## 2017-08-31 RX ORDER — MAGNESIUM SULFATE HEPTAHYDRATE 40 MG/ML
4 INJECTION, SOLUTION INTRAVENOUS
Status: DISCONTINUED | OUTPATIENT
Start: 2017-08-31 | End: 2017-09-03 | Stop reason: HOSPADM

## 2017-08-31 RX ORDER — LABETALOL HYDROCHLORIDE 5 MG/ML
80 INJECTION, SOLUTION INTRAVENOUS EVERY 10 MIN PRN
Status: DISCONTINUED | OUTPATIENT
Start: 2017-08-31 | End: 2017-09-03 | Stop reason: HOSPADM

## 2017-08-31 RX ORDER — CALCIUM CARBONATE 500 MG/1
500-1000 TABLET, CHEWABLE ORAL
Status: DISCONTINUED | OUTPATIENT
Start: 2017-08-31 | End: 2017-09-03 | Stop reason: HOSPADM

## 2017-08-31 RX ORDER — LIDOCAINE 40 MG/G
CREAM TOPICAL
Status: DISCONTINUED | OUTPATIENT
Start: 2017-08-31 | End: 2017-09-01 | Stop reason: CLARIF

## 2017-08-31 RX ORDER — LORAZEPAM 2 MG/ML
2 INJECTION INTRAMUSCULAR
Status: DISCONTINUED | OUTPATIENT
Start: 2017-08-31 | End: 2017-09-03 | Stop reason: HOSPADM

## 2017-08-31 RX ORDER — LABETALOL HYDROCHLORIDE 5 MG/ML
40 INJECTION, SOLUTION INTRAVENOUS EVERY 10 MIN PRN
Status: DISCONTINUED | OUTPATIENT
Start: 2017-08-31 | End: 2017-09-03 | Stop reason: HOSPADM

## 2017-08-31 RX ORDER — MAGNESIUM SULFATE HEPTAHYDRATE 500 MG/ML
4 INJECTION, SOLUTION INTRAMUSCULAR; INTRAVENOUS
Status: DISCONTINUED | OUTPATIENT
Start: 2017-08-31 | End: 2017-09-03 | Stop reason: HOSPADM

## 2017-08-31 RX ORDER — LABETALOL HYDROCHLORIDE 5 MG/ML
20 INJECTION, SOLUTION INTRAVENOUS EVERY 10 MIN PRN
Status: DISCONTINUED | OUTPATIENT
Start: 2017-08-31 | End: 2017-09-03 | Stop reason: HOSPADM

## 2017-08-31 RX ADMIN — LABETALOL HYDROCHLORIDE 20 MG: 5 INJECTION, SOLUTION INTRAVENOUS at 23:53

## 2017-08-31 RX ADMIN — CALCIUM CARBONATE (ANTACID) CHEW TAB 500 MG 1000 MG: 500 CHEW TAB at 23:55

## 2017-08-31 RX ADMIN — DINOPROSTONE 10 MG: 10 INSERT VAGINAL at 21:02

## 2017-08-31 NOTE — IP AVS SNAPSHOT
MRN:[id number]                      After Visit Summary   8/31/2017    Ninoska Cottrell    MRN: [id number]           Thank you!     Thank you for choosing [facility] for your care. Our goal is always to provide you with excellent care. Hearing back from our patients is one way we can continue to improve our services. Please take a few minutes to complete the written survey that you may receive in the mail after you visit. If you would like to speak to someone directly about your visit please contact Patient Relations at [phone]. Thank you!          Patient Information     Date Of Birth          1980        About your hospital stay     You were admitted on:  August 31, 2017 You last received care in the:  [facility] Postpartum    You were discharged on:  September 3, 2017       Who to Call     For medical emergencies, please call 911.  For non-urgent questions about your medical care, please call your primary care provider or clinic, [phone]          Attending Provider     Provider Specialty    Silvestre Albright MD OB/Gyn    Vane Ferrara MD OB/Gyn    Galindo Kwong MD OB/Gyn       Primary Care Provider Office Phone # Fax #    Tracisue Padron [phone]      After Care Instructions     Activity       Review discharge instructions            Diet       Resume previous diet            Discharge Instructions - Postpartum visit       Schedule postpartum visit with your provider and return to clinic within 1 weeks for BP check.                  Further instructions from your care team       Postpartum Vaginal Delivery Instructions  Please make an appointment with your primary OB in clinic this next week for a blood pressure check.    [facility] Lactation Consultant:  [phone]    Activity       Ask family and friends for help when you need it.    Do not place anything in your vagina for 6 weeks.    You are not restricted on other activities,  but take it easy for a few weeks to allow your body to recover from delivery.  You are able to do any activities you feel up to that point.    No driving until you have stopped taking your pain medications (usually two weeks after delivery).     Call your health care provider if you have any of these symptoms:       Increased pain, swelling, redness, or fluid around your stiches from an episiotomy or perineal tear.    A fever above 100.4 F (38 C) with or without chills when placing a thermometer under your tongue.    You soak a sanitary pad with blood within 1 hour, or you see blood clots larger than a golf ball.    Bleeding that lasts more than 6 weeks.    Vaginal discharge that smells bad.    Severe pain, cramping or tenderness in your lower belly area.    A need to urinate more frequently (use the toilet more often), more urgently (use the toilet very quickly), or it burns when you urinate.    Nausea and vomiting.    Redness, swelling or pain around a vein in your leg.    Problems breastfeeding or a red or painful area on your breast.    Chest pain and cough or are gasping for air.    Problems coping with sadness, anxiety, or depression.  If you have any concerns about hurting yourself or the baby, call your provider immediately.     You have questions or concerns after you return home.     Keep your hands clean:  Always wash your hands before touching your perineal area and stitches.  This helps reduce your risk of infection.  If your hands aren't dirty, you may use an alcohol hand-rub to clean your hands. Keep your nails clean and short.      Preeclampsia   Call your doctor right away if you have any of the following:  - Edema (swelling) in your face or hands  - Rapid weight gain-about 1 pound or more in a day  - Headache  - Abdominal pain on your right side  - Vision problems (flashes or spots)  - You have questions or concerns once you return home.      Pending Results     No orders found from 8/29/2017 to  "2017.            Statement of Approval     Ordered          17 1145  I have reviewed and agree with all the recommendations and orders detailed in this document.  EFFECTIVE NOW     Approved and electronically signed by:  Toshia Ibanez MD             Admission Information     Date & Time Provider Department Dept. Phone    2017 Galindo Kwong MD [place] [facility] [phone]      Your Vitals Were     Blood Pressure Pulse Temperature Respirations Height Weight    132/76 95 98.1  F (36.7  C) (Oral) 18 1.676 m (5' 6\") 133.8 kg (295 lb)    Last Period BMI (Body Mass Index)                2016 (Approximate) 47.61 kg/m2          MyChart Information     [employer] lets you send messages to your doctor, view your test results, renew your prescriptions, schedule appointments and more. To sign up, go to [email or web address] . Click on \"Log in\" on the left side of the screen, which will take you to the Welcome page. Then click on \"Sign up Now\" on the right side of the page.     You will be asked to enter the access code listed below, as well as some personal information. Please follow the directions to create your username and password.     Your access code is: [email or web address]  Expires: 2017  3:45 PM     Your access code will  in 90 days. If you need help or a new code, please call your [place] clinic or [phone].        Care EveryWhere ID     This is your Care EveryWhere ID. This could be used by other organizations to access your [place] medical records  [id number]        Equal Access to Services     [facility]: Hadii yasmine damico hadasho Solisette, waaxda luqadaha, qaybta kaalmada melida tristan. So [facility] [phone].    ATENCIÓN: Si habla español, tiene a irvin disposición servicios gratuitos de asistencia lingüística. Llame al [phone].    We comply with applicable federal civil rights laws and [place] laws. We do not discriminate " on the basis of race, color, national origin, age, disability sex, sexual orientation or gender identity.               Review of your medicines      START taking        Dose / Directions    ibuprofen 600 MG tablet   Commonly known as:  ADVIL/MOTRIN   Notes to Patient:  Take with food        Dose:  600 mg   Take 1 tablet (600 mg) by mouth every 6 hours as needed for other (cramping)   Quantity:  100 tablet   Refills:  0         CONTINUE these medicines which have NOT CHANGED        Dose / Directions    albuterol 108 (90 BASE) MCG/ACT Inhaler   Commonly known as:  VENTOLIN HFA   Used for:  Upper respiratory tract infection, unspecified type, Intermittent asthma, uncomplicated        Dose:  1-2 puff   Inhale 1-2 puffs into the lungs every 4 hours as needed for shortness of breath / dyspnea or wheezing Reported on 2/16/2017   Quantity:  1 Inhaler   Refills:  3       calcium carbonate 500 MG chewable tablet   Commonly known as:  TUMS   Used for:  Supervision of high-risk pregnancy of elderly multigravida        Dose:  2 chew tab   Take 2 tablets (1,000 mg) by mouth 3 times daily   Quantity:  150 tablet   Refills:  0       order for DME   Used for:  Low back pain during pregnancy        Lumbar Maternity Corset   Quantity:  1 Device   Refills:  0       prenatal multivitamin plus iron 27-0.8 MG Tabs per tablet   Used for:  Supervision of high-risk pregnancy of elderly multigravida        Dose:  1 tablet   Take 1 tablet by mouth   Refills:  0       TYLENOL PO        Dose:  1000 mg   Take 1,000 mg by mouth every 8 hours as needed for mild pain or fever   Refills:  0       * vitamin B complex with vitamin C Tabs tablet        Dose:  1 tablet   Take 1 tablet by mouth daily   Refills:  0       * vitamin B complex with vitamin C Tabs tablet        Dose:  1 tablet   Take 1 tablet by mouth daily   Refills:  0       VITAMIN D PO        Dose:  2000 Units   Take 2,000 Units by mouth daily   Refills:  0       * Notice:  This list has  2 medication(s) that are the same as other medications prescribed for you. Read the directions carefully, and ask your doctor or other care provider to review them with you.      STOP taking     metoclopramide 10 MG tablet   Commonly known as:  REGLAN                Where to get your medicines      These medications were sent to [place] [id number] [place] [facility] [address]  [address] [facility] [address]     Phone:  [phone]     ibuprofen 600 MG tablet                Protect others around you: Learn how to safely use, store and throw away your medicines at www.disposemymeds.org.             Medication List: This is a list of all your medications and when to take them. Check marks below indicate your daily home schedule. Keep this list as a reference.      Medications           Morning Afternoon Evening Bedtime As Needed    albuterol 108 (90 BASE) MCG/ACT Inhaler   Commonly known as:  VENTOLIN HFA   Inhale 1-2 puffs into the lungs every 4 hours as needed for shortness of breath / dyspnea or wheezing Reported on 2/16/2017                                   calcium carbonate 500 MG chewable tablet   Commonly known as:  TUMS   Take 2 tablets (1,000 mg) by mouth 3 times daily   Last time this was given:  1,000 mg on 9/1/2017  6:17 PM                                ibuprofen 600 MG tablet   Commonly known as:  ADVIL/MOTRIN   Take 1 tablet (600 mg) by mouth every 6 hours as needed for other (cramping)   Last time this was given:  800 mg on 9/3/2017  4:59 AM   Notes to Patient:  Take with food                                   order for DME   Lumbar Maternity Corset                                prenatal multivitamin plus iron 27-0.8 MG Tabs per tablet   Take 1 tablet by mouth                                   TYLENOL PO   Take 1,000 mg by mouth every 8 hours as needed for mild pain or fever   Last time this was given:  975 mg on 9/1/2017 12:32 AM                                   * vitamin B  complex with vitamin C Tabs tablet   Take 1 tablet by mouth daily                                   * vitamin B complex with vitamin C Tabs tablet   Take 1 tablet by mouth daily                                   VITAMIN D PO   Take 2,000 Units by mouth daily                                   * Notice:  This list has 2 medication(s) that are the same as other medications prescribed for you. Read the directions carefully, and ask your doctor or other care provider to review them with you.

## 2017-08-31 NOTE — IP AVS SNAPSHOT
[facility]    [address]    [facility] [address]    Phone:  [phone]    Fax:  [phone]                                       After Visit Summary   8/31/2017    Ninoska Cottrell    MRN: [id number]           After Visit Summary Signature Page     I have received my discharge instructions, and my questions have been answered. I have discussed any challenges I see with this plan with the nurse or doctor.    ..........................................................................................................................................  Patient/Patient Representative Signature      ..........................................................................................................................................  Patient Representative Print Name and Relationship to Patient    ..................................................               ................................................  Date                                            Time    ..........................................................................................................................................  Reviewed by Signature/Title    ...................................................              ..............................................  Date                                                            Time

## 2017-09-01 ENCOUNTER — ANESTHESIA EVENT (OUTPATIENT)
Dept: OBGYN | Facility: CLINIC | Age: 37
End: 2017-09-01
Payer: COMMERCIAL

## 2017-09-01 ENCOUNTER — ANESTHESIA (OUTPATIENT)
Dept: OBGYN | Facility: CLINIC | Age: 37
End: 2017-09-01
Payer: COMMERCIAL

## 2017-09-01 LAB — T PALLIDUM IGG+IGM SER QL: NEGATIVE

## 2017-09-01 PROCEDURE — 25000132 ZZH RX MED GY IP 250 OP 250 PS 637: Performed by: OBSTETRICS & GYNECOLOGY

## 2017-09-01 PROCEDURE — 72200001 ZZH LABOR CARE VAGINAL DELIVERY SINGLE

## 2017-09-01 PROCEDURE — 25000128 H RX IP 250 OP 636: Performed by: ANESTHESIOLOGY

## 2017-09-01 PROCEDURE — 40000671 ZZH STATISTIC ANESTHESIA CASE

## 2017-09-01 PROCEDURE — 3E0R3CZ INTRODUCTION OF REGIONAL ANESTHETIC INTO SPINAL CANAL, PERCUTANEOUS APPROACH: ICD-10-PCS | Performed by: ANESTHESIOLOGY

## 2017-09-01 PROCEDURE — 25000125 ZZHC RX 250: Performed by: OBSTETRICS & GYNECOLOGY

## 2017-09-01 PROCEDURE — 59400 OBSTETRICAL CARE: CPT | Performed by: OBSTETRICS & GYNECOLOGY

## 2017-09-01 PROCEDURE — 00HU33Z INSERTION OF INFUSION DEVICE INTO SPINAL CANAL, PERCUTANEOUS APPROACH: ICD-10-PCS | Performed by: ANESTHESIOLOGY

## 2017-09-01 PROCEDURE — 0UQMXZZ REPAIR VULVA, EXTERNAL APPROACH: ICD-10-PCS | Performed by: OBSTETRICS & GYNECOLOGY

## 2017-09-01 PROCEDURE — 37000011 ZZH ANESTHESIA WARD SERVICE

## 2017-09-01 PROCEDURE — 10907ZC DRAINAGE OF AMNIOTIC FLUID, THERAPEUTIC FROM PRODUCTS OF CONCEPTION, VIA NATURAL OR ARTIFICIAL OPENING: ICD-10-PCS | Performed by: OBSTETRICS & GYNECOLOGY

## 2017-09-01 PROCEDURE — 0KQM0ZZ REPAIR PERINEUM MUSCLE, OPEN APPROACH: ICD-10-PCS | Performed by: OBSTETRICS & GYNECOLOGY

## 2017-09-01 PROCEDURE — 12000031 ZZH R&B OB CRITICAL

## 2017-09-01 PROCEDURE — 25000128 H RX IP 250 OP 636: Performed by: OBSTETRICS & GYNECOLOGY

## 2017-09-01 RX ORDER — NALOXONE HYDROCHLORIDE 0.4 MG/ML
.1-.4 INJECTION, SOLUTION INTRAMUSCULAR; INTRAVENOUS; SUBCUTANEOUS
Status: DISCONTINUED | OUTPATIENT
Start: 2017-09-01 | End: 2017-09-03 | Stop reason: HOSPADM

## 2017-09-01 RX ORDER — OXYTOCIN 10 [USP'U]/ML
10 INJECTION, SOLUTION INTRAMUSCULAR; INTRAVENOUS
Status: DISCONTINUED | OUTPATIENT
Start: 2017-09-01 | End: 2017-09-03 | Stop reason: HOSPADM

## 2017-09-01 RX ORDER — NALBUPHINE HYDROCHLORIDE 10 MG/ML
2.5-5 INJECTION, SOLUTION INTRAMUSCULAR; INTRAVENOUS; SUBCUTANEOUS EVERY 6 HOURS PRN
Status: DISCONTINUED | OUTPATIENT
Start: 2017-09-01 | End: 2017-09-03 | Stop reason: HOSPADM

## 2017-09-01 RX ORDER — LIDOCAINE 40 MG/G
CREAM TOPICAL
Status: DISCONTINUED | OUTPATIENT
Start: 2017-09-01 | End: 2017-09-03 | Stop reason: HOSPADM

## 2017-09-01 RX ORDER — LANOLIN 100 %
OINTMENT (GRAM) TOPICAL
Status: DISCONTINUED | OUTPATIENT
Start: 2017-09-01 | End: 2017-09-03 | Stop reason: HOSPADM

## 2017-09-01 RX ORDER — EPHEDRINE SULFATE 50 MG/ML
5 INJECTION, SOLUTION INTRAMUSCULAR; INTRAVENOUS; SUBCUTANEOUS
Status: DISCONTINUED | OUTPATIENT
Start: 2017-09-01 | End: 2017-09-03 | Stop reason: HOSPADM

## 2017-09-01 RX ORDER — ACETAMINOPHEN 325 MG/1
650 TABLET ORAL EVERY 4 HOURS PRN
Status: DISCONTINUED | OUTPATIENT
Start: 2017-09-01 | End: 2017-09-03 | Stop reason: HOSPADM

## 2017-09-01 RX ORDER — OXYCODONE HYDROCHLORIDE 5 MG/1
5-10 TABLET ORAL
Status: DISCONTINUED | OUTPATIENT
Start: 2017-09-01 | End: 2017-09-01

## 2017-09-01 RX ORDER — SODIUM CHLORIDE, SODIUM LACTATE, POTASSIUM CHLORIDE, CALCIUM CHLORIDE 600; 310; 30; 20 MG/100ML; MG/100ML; MG/100ML; MG/100ML
INJECTION, SOLUTION INTRAVENOUS CONTINUOUS
Status: DISCONTINUED | OUTPATIENT
Start: 2017-09-01 | End: 2017-09-03 | Stop reason: HOSPADM

## 2017-09-01 RX ORDER — METHYLERGONOVINE MALEATE 0.2 MG/ML
200 INJECTION INTRAVENOUS
Status: DISCONTINUED | OUTPATIENT
Start: 2017-09-01 | End: 2017-09-03 | Stop reason: HOSPADM

## 2017-09-01 RX ORDER — OXYTOCIN/0.9 % SODIUM CHLORIDE 30/500 ML
340 PLASTIC BAG, INJECTION (ML) INTRAVENOUS CONTINUOUS PRN
Status: DISCONTINUED | OUTPATIENT
Start: 2017-09-01 | End: 2017-09-03 | Stop reason: HOSPADM

## 2017-09-01 RX ORDER — BISACODYL 10 MG
10 SUPPOSITORY, RECTAL RECTAL DAILY PRN
Status: DISCONTINUED | OUTPATIENT
Start: 2017-09-03 | End: 2017-09-03 | Stop reason: HOSPADM

## 2017-09-01 RX ORDER — OXYTOCIN/0.9 % SODIUM CHLORIDE 30/500 ML
1-24 PLASTIC BAG, INJECTION (ML) INTRAVENOUS CONTINUOUS
Status: DISCONTINUED | OUTPATIENT
Start: 2017-09-01 | End: 2017-09-03 | Stop reason: HOSPADM

## 2017-09-01 RX ORDER — HYDROMORPHONE HYDROCHLORIDE 1 MG/ML
0.5 INJECTION, SOLUTION INTRAMUSCULAR; INTRAVENOUS; SUBCUTANEOUS ONCE
Status: COMPLETED | OUTPATIENT
Start: 2017-09-01 | End: 2017-09-01

## 2017-09-01 RX ORDER — ONDANSETRON 4 MG/1
4 TABLET, ORALLY DISINTEGRATING ORAL EVERY 6 HOURS PRN
Status: DISCONTINUED | OUTPATIENT
Start: 2017-09-01 | End: 2017-09-03 | Stop reason: HOSPADM

## 2017-09-01 RX ORDER — HYDROCORTISONE 2.5 %
CREAM (GRAM) TOPICAL 3 TIMES DAILY PRN
Status: DISCONTINUED | OUTPATIENT
Start: 2017-09-01 | End: 2017-09-03 | Stop reason: HOSPADM

## 2017-09-01 RX ORDER — ONDANSETRON 2 MG/ML
4 INJECTION INTRAMUSCULAR; INTRAVENOUS EVERY 6 HOURS PRN
Status: DISCONTINUED | OUTPATIENT
Start: 2017-09-01 | End: 2017-09-03 | Stop reason: HOSPADM

## 2017-09-01 RX ORDER — AMOXICILLIN 250 MG
1-2 CAPSULE ORAL 2 TIMES DAILY
Status: DISCONTINUED | OUTPATIENT
Start: 2017-09-01 | End: 2017-09-03 | Stop reason: HOSPADM

## 2017-09-01 RX ORDER — IBUPROFEN 800 MG/1
800 TABLET, FILM COATED ORAL
Status: COMPLETED | OUTPATIENT
Start: 2017-09-01 | End: 2017-09-01

## 2017-09-01 RX ORDER — FENTANYL CITRATE 50 UG/ML
50-100 INJECTION, SOLUTION INTRAMUSCULAR; INTRAVENOUS
Status: DISCONTINUED | OUTPATIENT
Start: 2017-09-01 | End: 2017-09-03 | Stop reason: HOSPADM

## 2017-09-01 RX ORDER — OXYTOCIN/0.9 % SODIUM CHLORIDE 30/500 ML
100 PLASTIC BAG, INJECTION (ML) INTRAVENOUS CONTINUOUS
Status: DISCONTINUED | OUTPATIENT
Start: 2017-09-01 | End: 2017-09-03 | Stop reason: HOSPADM

## 2017-09-01 RX ORDER — PENICILLIN G POTASSIUM 5000000 [IU]/1
5 INJECTION, POWDER, FOR SOLUTION INTRAMUSCULAR; INTRAVENOUS ONCE
Status: COMPLETED | OUTPATIENT
Start: 2017-09-01 | End: 2017-09-01

## 2017-09-01 RX ORDER — IBUPROFEN 400 MG/1
400-800 TABLET, FILM COATED ORAL EVERY 6 HOURS PRN
Status: DISCONTINUED | OUTPATIENT
Start: 2017-09-01 | End: 2017-09-03 | Stop reason: HOSPADM

## 2017-09-01 RX ORDER — OXYTOCIN/0.9 % SODIUM CHLORIDE 30/500 ML
100-340 PLASTIC BAG, INJECTION (ML) INTRAVENOUS CONTINUOUS PRN
Status: COMPLETED | OUTPATIENT
Start: 2017-09-01 | End: 2017-09-01

## 2017-09-01 RX ORDER — ACETAMINOPHEN 325 MG/1
975 TABLET ORAL ONCE
Status: COMPLETED | OUTPATIENT
Start: 2017-09-01 | End: 2017-09-01

## 2017-09-01 RX ORDER — MISOPROSTOL 200 UG/1
400 TABLET ORAL
Status: DISCONTINUED | OUTPATIENT
Start: 2017-09-01 | End: 2017-09-03 | Stop reason: HOSPADM

## 2017-09-01 RX ORDER — CARBOPROST TROMETHAMINE 250 UG/ML
250 INJECTION, SOLUTION INTRAMUSCULAR
Status: DISCONTINUED | OUTPATIENT
Start: 2017-09-01 | End: 2017-09-03 | Stop reason: HOSPADM

## 2017-09-01 RX ADMIN — CALCIUM CARBONATE (ANTACID) CHEW TAB 500 MG 1000 MG: 500 CHEW TAB at 18:17

## 2017-09-01 RX ADMIN — IBUPROFEN 800 MG: 800 TABLET, FILM COATED ORAL at 20:00

## 2017-09-01 RX ADMIN — SODIUM CHLORIDE 2.5 MILLION UNITS: 9 INJECTION, SOLUTION INTRAVENOUS at 15:10

## 2017-09-01 RX ADMIN — HYDROMORPHONE HYDROCHLORIDE 0.5 MG: 10 INJECTION, SOLUTION INTRAMUSCULAR; INTRAVENOUS; SUBCUTANEOUS at 12:49

## 2017-09-01 RX ADMIN — LIDOCAINE HYDROCHLORIDE 20 ML: 10 INJECTION, SOLUTION INFILTRATION; PERINEURAL at 18:44

## 2017-09-01 RX ADMIN — OXYTOCIN-SODIUM CHLORIDE 0.9% IV SOLN 30 UNIT/500ML 1 MILLI-UNITS/MIN: 30-0.9/5 SOLUTION at 10:56

## 2017-09-01 RX ADMIN — Medication: at 12:56

## 2017-09-01 RX ADMIN — PENICILLIN G POTASSIUM 5 MILLION UNITS: 5000000 POWDER, FOR SOLUTION INTRAMUSCULAR; INTRAPLEURAL; INTRATHECAL; INTRAVENOUS at 10:55

## 2017-09-01 RX ADMIN — SODIUM CHLORIDE, POTASSIUM CHLORIDE, SODIUM LACTATE AND CALCIUM CHLORIDE: 600; 310; 30; 20 INJECTION, SOLUTION INTRAVENOUS at 10:55

## 2017-09-01 RX ADMIN — ACETAMINOPHEN 975 MG: 325 TABLET, FILM COATED ORAL at 00:32

## 2017-09-01 RX ADMIN — OXYTOCIN-SODIUM CHLORIDE 0.9% IV SOLN 30 UNIT/500ML 340 ML/HR: 30-0.9/5 SOLUTION at 18:45

## 2017-09-01 RX ADMIN — SODIUM CHLORIDE, POTASSIUM CHLORIDE, SODIUM LACTATE AND CALCIUM CHLORIDE: 600; 310; 30; 20 INJECTION, SOLUTION INTRAVENOUS at 17:14

## 2017-09-01 NOTE — ANESTHESIA PREPROCEDURE EVALUATION
PAC NOTE:       ANESTHESIA PRE EVALUATION:  Anesthesia Evaluation     .             ROS/MED HX    ENT/Pulmonary:       Neurologic:       Cardiovascular:         METS/Exercise Tolerance:     Hematologic:         Musculoskeletal:         GI/Hepatic:         Renal/Genitourinary:         Endo:     (+) Obesity, .      Psychiatric:         Infectious Disease:         Malignancy:         Other:    (+) Possibly pregnant                    Physical Exam  Normal systems: cardiovascular and pulmonary    Airway   Mallampati: II  TM distance: >3 FB  Neck ROM: full    Dental     Cardiovascular       Pulmonary              Anesthesia Plan      History & Physical Review      ASA Status:  2 .    NPO Status:  > 6 hours    Plan for Other     Request to start IV in labor pt.      Postoperative Care      Consents                            .

## 2017-09-01 NOTE — PROGRESS NOTES
"Cx 3 cm  75% effaced  -1 station vtx   AROM clear  Fht's Cat 1  Vital signs:  Temp: 98.5  F (36.9  C) Temp src: Oral BP: 134/65 Pulse: 102   Resp: 18       Height: 5' 6\" (167.6 cm) Weight: 295 lb (133.8 kg)  Estimated body mass index is 47.61 kg/(m^2) as calculated from the following:    Height as of this encounter: 5' 6\" (1.676 m).    Weight as of this encounter: 295 lb (133.8 kg).      Epidural in place with good results    Continue with pit  anticipate vag del  "

## 2017-09-01 NOTE — PROVIDER NOTIFICATION
09/01/17 0841   Provider Notification   Provider Name/Title Dr. Kwong   Method of Notification At Bedside   Dr. Kwong here to discuss POC with patient. SVE 1.5/75/-3 and cervidil removed by Dr. Kwong. Orders to start pitocin. MD quevedo with patient eating breakfast before pitocin is started around 1000.

## 2017-09-01 NOTE — PROVIDER NOTIFICATION
09/01/17 1557   Membranes   Membrane Status Ruptured    Rupture Type AROM   Rupture Date 09/01/17   Rupture Time 1558   Amniotic Fluid Color Clear   Amniotic Fluid Odor Normal   Amniotic Fluid Amount Moderate   Dr. Kwong at bedside

## 2017-09-01 NOTE — H&P
[facility] Labor and Delivery History and Physical    Ninoska Cottrell MRN# [id number]   Age: 37 year old YOB: 1980     Date of Admission:  2017    Primary care provider: Traci Padron           Chief Complaint:   Ninoska Cottrell is a 37 year old female  Estimated Date of Delivery: Sep 20, 2017  who is 37w2d pregnant and being admitted for induction of labor, indication pregnancy-induced hypertension and hx of vaginal bleeding during this pregnancy.  MFM U/S  revealed a lindquist infant vtx with BPP  with a normal JOHNY.  Past workup for fam hx of hemophilia and normal 46XX Verify and fetal echo noted.  Risks, benefits, and alternative modes of therapy discussed at length. Pathophysiology of the disease process reviewed, issues of del at 37.2 weeks discussed  all of the patients questions answered and informed consent obtained.              Pregnancy history:     OBSTETRIC HISTORY:    Obstetric History       T1      L2     SAB0   TAB0   Ectopic0   Multiple0   Live Births2       # Outcome Date GA Lbr Anthony/2nd Weight Sex Delivery Anes PTL Lv   4 Current            3 Term 13 40w0d / 00:51 2.855 kg (6 lb 4.7 oz) F Vag-Vacuum EPI N MILLER      Name: RYLIE,GISSEL KIM      Apgar1:  8                Apgar5: 9   2  12 36w6d 03:30 / 02:06 2.78 kg (6 lb 2.1 oz) F Vag-Spont   MILLER      Name: Melissa      Apgar1:  9                Apgar5: 9   1 SAB 2011                  EDC: Estimated Date of Delivery: 17    Prenatal Labs:   Lab Results   Component Value Date    ABO O 2017    RH Pos 2017    AS Neg 2017    HEPBANG Nonreactive 2017    CHPCRT  2017     Negative   Negative for C. trachomatis rRNA by transcription mediated amplification.   A negative result by transcription mediated amplification does not preclude the   presence of C. trachomatis infection because results are dependent on proper   and adequate collection, absence of  inhibitors, and sufficient rRNA to be   detected.      GCPCRT  02/24/2017     Negative   Negative for N. gonorrhoeae rRNA by transcription mediated amplification.   A negative result by transcription mediated amplification does not preclude the   presence of N. gonorrhoeae infection because results are dependent on proper   and adequate collection, absence of inhibitors, and sufficient rRNA to be   detected.      TREPAB Negative 02/03/2017    RUBELLAABIGG 23 12/28/2012    HGB 10.8 (L) 08/31/2017    HIV Negative 12/28/2012       GBS Status:   Lab Results   Component Value Date    GBS Positive (A) 08/18/2017       Active Problem List  Patient Active Problem List   Diagnosis     Intermittent asthma     Migraine headache     Supervision of high-risk pregnancy of elderly multigravida     Streptococcus B carrier state complicating pregnancy     Gestational HTN       Medication Prior to Admission  Prescriptions Prior to Admission   Medication Sig Dispense Refill Last Dose     vitamin B complex with vitamin C (VITAMIN  B COMPLEX) TABS tablet Take 1 tablet by mouth daily   Past Week at Unknown time     metoclopramide (REGLAN) 10 MG tablet Take 1 tablet (10 mg) by mouth once as needed For headache. Can be taken up to 4x daily 20 tablet 1 Past Month at Unknown time     calcium carbonate (TUMS) 500 MG chewable tablet Take 2 tablets (1,000 mg) by mouth 3 times daily 150 tablet  Past Week at Unknown time     Acetaminophen (TYLENOL PO) Take 1,000 mg by mouth every 8 hours as needed for mild pain or fever   Past Month at Unknown time     Cholecalciferol (VITAMIN D PO) Take 2,000 Units by mouth daily   Past Week at Unknown time     Prenatal Vit-Fe Fumarate-FA (PRENATAL MULTIVITAMIN  PLUS IRON) 27-0.8 MG TABS per tablet Take 1 tablet by mouth   Past Week at Unknown time     order for DME Lumbar Maternity Corset 1 Device 0 Taking     vitamin B complex with vitamin C (VITAMIN  B COMPLEX) TABS tablet Take 1 tablet by mouth daily    Unknown at Unknown time     albuterol (VENTOLIN HFA) 108 (90 BASE) MCG/ACT Inhaler Inhale 1-2 puffs into the lungs every 4 hours as needed for shortness of breath / dyspnea or wheezing Reported on 2/16/2017 1 Inhaler 3 More than a month at Unknown time   .        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     Fibromyalgia      Hyperlipidemia      Migraine      Murmur, cardiac      Obesity      Preeclampsia 2012                       Family History:   I have reviewed this patient's family history            Social History:   I have reviewed this patient's social history         Review of Systems:   C: NEGATIVE for fever, chills, change in weight  I: NEGATIVE for worrisome rashes, moles or lesions  E: NEGATIVE for vision changes or irritation  E/M: NEGATIVE for ear, mouth and throat problems  R: NEGATIVE for significant cough or SOB  B: NEGATIVE for masses, tenderness or discharge  CV: NEGATIVE for chest pain, palpitations or peripheral edema  GI: NEGATIVE for nausea, abdominal pain, heartburn, or change in bowel habits  : NEGATIVE for frequency, dysuria, or hematuria  M: NEGATIVE for significant arthralgias or myalgia  N: NEGATIVE for weakness, dizziness or paresthesias  E: NEGATIVE for temperature intolerance, skin/hair changes  H: NEGATIVE for bleeding problems  P: NEGATIVE for changes in mood or affect          Physical Exam:   Vitals were reviewed  All vitals stable  Temp: 98.7  F (37.1  C) Temp src: Oral BP: 152/76     Resp: 18          Constitutional:   awake, alert, cooperative, no apparent distress, and appears stated age     Eyes:   Lids and lashes normal, pupils equal, round and reactive to light, extra ocular muscles intact, sclera clear, conjunctiva normal     ENT:   Normocephalic, without obvious abnormality, atraumatic, sinuses nontender on  palpation, external ears without lesions, oral pharynx with moist mucous membranes, tonsils without erythema or exudates, gums normal and good dentition.     Neck:   Supple, symmetrical, trachea midline, no adenopathy, thyroid symmetric, not enlarged and no tenderness, skin normal     Back:   Symmetric, no curvature, spinous processes are non-tender on palpation, paraspinous muscles are non-tender on palpation, no costal vertebral tenderness     Lungs:   No increased work of breathing, good air exchange, clear to auscultation bilaterally, no crackles or wheezing     Cardiovascular:   Normal apical impulse, regular rate and rhythm, normal S1 and S2, no S3 or S4, and no murmur noted     Abdomen:   No scars, normal bowel sounds, soft, lindquist infant vtx EFW 7.5#, no masses palpated, no hepatosplenomegally     Genitounirinary:   External Genitalia:  General appearance; normal, Hair distribution; normal     Musculoskeletal:   There is no redness, warmth, or swelling of the joints.  Full range of motion noted.  Motor strength is 5 out of 5 all extremities bilaterally.  Tone is normal.  motor strength is 5 out of 5 all extremities bilaterally     Neurologic:   Awake, alert, oriented to name, place and time.  Cranial nerves II-XII are grossly intact.  Motor is 5 out of 5 bilaterally.  Cerebellar finger to nose, heel to shin intact.  Sensory is intact.  Babinski down going, Romberg negative, and gait is normal.     Skin:   no bruising or bleeding                              Cervix:   Membranes: intact   Dilation: 1   Effacement: 70%   Station:-3   Consistency: average   Position: Posterior  Presentation:Cephalic  Fetal Heart Rate Tracing: reactive and reassuring, Tier 1 (normal)  Tocometer: external monitor and inadequate                       Assessment:   Ninoska Cottrell is a 37w2d pregnant female admitted with induction of labor, indication pregnancy-induced hypertension.          Plan:   Admit - see IP orders  cervical  ripening with cervidil (cervidil pulled this am)  Monitor BP closely with appropriate Rx  Labor induction with Pitocin and AROM when appropriate  Galindo Kwong MD

## 2017-09-01 NOTE — PROVIDER NOTIFICATION
09/01/17 0020   Provider Notification   Provider Name/Title Dr. Ferrara   Method of Notification Phone   Request Evaluate - Remote   Notification Reason Status Update   Updated MD kellogg BP elevation of 178-173/83-84, 20mg labetalol administered and -152/80-84. Pt reports HA 3/10  And heartburn both of which are not new occurrences for pt. No clonus, reflexes +1, +2-+3 edema in LE. Labs on mary shift WDL. Fetal tracing has been 145 with moderate variability and accels no decels but recently 2 VD with sarah beth in 70's. Orders received not to continue labetalol unless tx parameters are met. MD okay with target BP in 150's (current status). Orders for tylenol 975mg x1 for HA. MD would like to be notified it VD become recurrent.

## 2017-09-01 NOTE — PLAN OF CARE
At 1900,  at 37 1/7 weeks presented to L&D for scheduled cervical ripening in preparation for AM induction of labor d/t gestational HTN.  Pt denies contractions, leaking of fluid, or vaginal bleeding, and reports her fetus is active.  EFM applied and history reviewed.      Initially unable to obtain continuous EFM tracing even while holding monitor in place d/t fetal activity and maternal habitus.  Broken tracing that was obtained displayed moderate variability and accelerations.  Elevated BPs noted; pt denies unusual HA (pt has history of migraines), visual disturbance, or epigastric pain.  Reflexes WNL, no clonus.  SVE /-3, Ruelas 4.    Signed and held orders released, including cervidil and PIH labs.  Latasha monitor applied, which successfully traced EFM and revealed Category 1 tracing.  Cervidil placed at 2100.     Plan to update MD of BPs and lab results.

## 2017-09-01 NOTE — ANESTHESIA PROCEDURE NOTES
Peripheral nerve/Neuraxial procedure note : epidural catheter  Pre-Procedure  Performed by PUNEET HECTOR  Location: OB, floor    Procedure Times:9/1/2017 12:35 PM  Pre-Anesthestic Checklist: patient identified, IV checked, risks and benefits discussed, informed consent, monitors and equipment checked, pre-op evaluation and at physician/surgeon's request    Timeout  Correct Patient: Yes   Correct Procedure: Yes   Correct Site: Yes   Correct Laterality: N/A   Correct Position: Yes   Site Marked: No   .   Procedure Documentation    .    Procedure:    Epidural catheter.  Insertion Site:L3-4  (midline approach) Injection technique: LORT saline   Local skin infiltrated with 5 mL of 1% lidocaine.  ROSENDO at 6 cm     Patient Prep;mask, sterile gloves, povidone-iodine 7.5% surgical scrub, patient draped.  .  Needle: Touhy needle, Sandra Needle Gauge: 17.    Needle Length (Inches) 3.5  # of attempts: 2 and # of redirects:  .   Catheter: 19 G . .  Catheter threaded easily  5 cm epidural space.  11 cm at skin.   .    Assessment/Narrative  Paresthesias: No.  .  .  Aspiration negative for heme or CSF  . Test dose of 5 mL at 12:49. Test dose negative for signs of intravascular, subdural or intrathecal injection. Comments:  I or my partner am immediately available. I or my partner will monitor the patient and supervise nursing care at necessary intervals.    Given 10 ml 0.125% bupivicaine infusion with 0.5 mg hydromorphone.

## 2017-09-01 NOTE — PROVIDER NOTIFICATION
08/31/17 2129   Provider Notification   Provider Name/Title Dr. Ferrara   Method of Notification Phone   Request Evaluate - Remote   Notification Reason Patient Arrived;Maternal Vital Sign Change;Lab/Diagnostic Study   Notified MD of pt arrival, BPs since arrival, lab results.  Orders received to give labetalol per protocol as ordered if BP meets ordered parameters.

## 2017-09-01 NOTE — PROVIDER NOTIFICATION
09/01/17 1420   Provider Notification   Provider Name/Title Dr. Kwong   Method of Notification Electronic Page   Request Evaluate - Remote   Notification Reason SVE;Status Update   Updated Dr. Kwong and he is planning to come to do AROM.

## 2017-09-01 NOTE — ANESTHESIA POSTPROCEDURE EVALUATION
Patient: Ninoska Cottrell    * No procedures listed *    Diagnosis:* No pre-op diagnosis entered *  Diagnosis Additional Information: Obesity, difficult IV  Dr Ferrara    Anesthesia Type:  Other    Note:  Anesthesia Post Evaluation    Last vitals:  Vitals:    08/31/17 2020 08/31/17 2048 08/31/17 2219   BP: 150/78 146/74 147/82   Resp:      Temp:        Difficult IV, started 20 g Rt PIV in hand,     Electronically Signed By: Gerardo Menjivar MD  August 31, 2017  10:47 PM

## 2017-09-01 NOTE — PROVIDER NOTIFICATION
09/01/17 0307   Provider Notification   Provider Name/Title Dr. Ferrara   Method of Notification In Department   Request Evaluate - Remote   Notification Reason Status Update   MD on unit. Updated re: pt cx, BP and pt report of increased pain with cx. Pt reports coping now. MD okay with pt receiving nitrous or fentanyl for pain relief if needed overnight.

## 2017-09-01 NOTE — ANESTHESIA CARE TRANSFER NOTE
Patient: Ninoska Cottrell    * No procedures listed *    Diagnosis: * No pre-op diagnosis entered *  Diagnosis Additional Information: No value filed.    Anesthesia Type:   No value filed.     Note:  Airway :Room Air  Patient transferred to:Labor and Delivery  Comments: VSS.      Vitals: (Last set prior to Anesthesia Care Transfer)              Electronically Signed By: JANAE Rg CRNA  August 31, 2017  10:42 PM

## 2017-09-02 PROCEDURE — 25000132 ZZH RX MED GY IP 250 OP 250 PS 637: Performed by: OBSTETRICS & GYNECOLOGY

## 2017-09-02 PROCEDURE — 12000031 ZZH R&B OB CRITICAL

## 2017-09-02 RX ADMIN — SENNOSIDES AND DOCUSATE SODIUM 1 TABLET: 8.6; 5 TABLET ORAL at 20:11

## 2017-09-02 RX ADMIN — IBUPROFEN 800 MG: 400 TABLET ORAL at 23:06

## 2017-09-02 RX ADMIN — IBUPROFEN 800 MG: 400 TABLET ORAL at 11:00

## 2017-09-02 RX ADMIN — IBUPROFEN 800 MG: 400 TABLET ORAL at 17:23

## 2017-09-02 NOTE — L&D DELIVERY NOTE
OB Vaginal Delivery Note    Ninoska Cottrell MRN# [id number]   Age: 37 year old YOB: 1980       GA: 37w2d  GP:   Labor Complications: None;GBS   EBL:    mL  QBL: 131 mL  Delivery Type: Vaginal, Spontaneous Delivery   ROM to Delivery Time: rupture date, rupture time, delivery date, or delivery time have not been documented  [place] Weight:      1 Minute 5 Minute 10 Minute   Apgar Totals: 8    9                Delivery Details:  Ninoska Cottrell, a 37 year old  female delivered a viable infant with apgars of 8   and 9  . Patient was fully dilated and pushing after    hours    minutes in active labor. Delivery was via vaginal, spontaneous delivery  to a sterile field under epidural  anesthesia. Infant delivered in vertex     occiput  anterior  position. Anterior and posterior shoulders delivered without difficulty. The cord was clamped, cut twice and 3 vessels  were noted. Cord blood was obtained in routine fashion with the following disposition: lab .      Cord complications: nuchal   Placenta delivered at   . Placental disposition was Hospital disposal . Fundal massage performed and fundus found to be firm.     Episiotomy: none    Perineum, vagina, cervix were inspected, and the following lacerations were noted:   Perineal lacerations: 2nd   periurethral laceration: left                 Any lacerations were repaired in the usual fashion using 3-0 chromic in layers.    Excellent hemostasis was noted. Needle count correct. Infant and patient in delivery room in good and stable condition.         Labor Event Times    Labor onset date:  17 Onset time:   5:52 PM   Dilation complete date:  17             Labor Events     labor?:  No    steroids:  Full Course   Labor Type:  Induction   Predominate monitoring during 1st stage:  continuous electronic fetal monitoring      Antibiotics received during labor?:  Yes   Reason for Antibiotics:  GBS   Antibiotics received for GBS:   Penicillin   Antibiotics Given (GBS):  Greater than 4 hours prior to delivery      Rupture identifier:  Rupture 1   Rupture date/time: 17    Rupture type:  Artificial Rupture of Membranes   Fluid color:  Clear   Fluid odor:  Normal      Induction:  Cervidil   Induction date/time:     Cervical ripening date/time:     Indications for induction:  Hypertension      1:1 continuous labor support provided by?:  RN Labor partogram used?:  no         Delivery/Placenta Date and Time    Delivery Date:  17 Delivery Time:   6:39 PM      Vaginal Counts    Initial count performed by 2 team members:   Two Team Members   NABIL Fabian Dr          Needles Suture [employer]   Initial counts 2  5    Added to count  1     Final counts 2 1 5       Placed during labor Accounted for at the end of labor   No NA   No NA   Yes Yes      Final count performed by 2 team members:   Two Team Members   NABIL Villalobos Dr         Final count correct?:  Yes         Apgars    Living status:  Living    1 Minute 5 Minute 10 Minute 15 Minute 20 Minute   Skin color: 0  1       Heart rate: 2  2       Reflex irritability: 2  2       Muscle tone: 2  2       Respiratory effort: 2  2       Total: 8  9          Apgars assigned by:  JOSE NIETO RN      Cord    Vessels:  3 Vessels Complications:  Nuchal   Cord Blood Disposition:  Lab Gases Sent?:  No         [place] Resuscitation    Methods:  None      Output in Delivery Room:  Voided      Skin to Skin and Feeding Plan    Skin to skin initiation date/time: 17 1840   Skin to skin with:  Mother   Skin to skin end date/time:     How do you plan to feed your baby:  Expressed Breast Milk      Labor Events and Shoulder Dystocia    Fetal Tracing Prior to Delivery:  Category 1   Shoulder dystocia present?:  Neg            Delivery (Maternal) (Provider to Complete) (463200)    Episiotomy:  None   Perineal lacerations:  2nd Repaired?:  Yes   Periurethral laceration:  left    Vaginal  laceration?:  No    Cervical laceration?:  No          Mother's Information  Mother: Ninoska Cottrell #[id number]    Start of Mother's Information     IO Blood Loss  09/01/17 1752 - 09/01/17 1915    Mom's I/O Activity            End of Mother's Information  Mother: Ninoska Cottrell #[id number]            Delivery - Provider to Complete (109434)    Delivering clinician:  GALINDO KWONG   Attempted Delivery Types (Choose all that apply):  Spontaneous Vaginal Delivery   Delivery Type (Choose the 1 that will go to the Birth History):  Vaginal, Spontaneous Delivery                           Placenta    Delayed Cord Clamping:  Done   Removal:  Spontaneous   Disposition:  Hospital disposal      Anesthesia    Method:  Epidural   Cervical dilation at placement:  4-7         Presentation and Position    Presentation:  Vertex    Occiput Anterior                    Galindo Kwong MD

## 2017-09-02 NOTE — PLAN OF CARE
Problem: Goal Outcome Summary  Goal: Goal Outcome Summary  Outcome: Improving  Q4 VSS, Bonding well with baby. Denies preeclamptic symptoms. Pain is well controlled with Ibuprofen. Patient is voiding without difficulty and ambulating independently. Tolerating regular diet well. Independent in self and baby cares. Mom is pumping and feeding the infant the EBM. States she fed her other two this way as well. Will continue to monitor.

## 2017-09-02 NOTE — ANESTHESIA POSTPROCEDURE EVALUATION
Patient: Ninoska Cottrell    * No procedures listed *    Diagnosis:* No pre-op diagnosis entered *  Diagnosis Additional Information: labor    Anesthesia Type:  Epidural    Note:  Anesthesia Post Evaluation         Comments:     S/P epidural for labor.   I or my partner was immediately available for management of this patient during epidural analgesia infusion.  VSS.  Doing well. Block resolved.  Neuro at baseline. Denies positional headache. Minimal side effects easily managed w/ PRN meds. No apparent anesthetic complications. No follow-up required.    Ankit Moss MD        Last vitals:  Vitals:    09/01/17 2056 09/02/17 0045 09/02/17 0500   BP: 143/75 116/54 136/66   Pulse:  86 96   Resp:  16 16   Temp:  97.9  F (36.6  C) 97.9  F (36.6  C)         Electronically Signed By: Ankit Moss MD  September 2, 2017  7:44 AM

## 2017-09-02 NOTE — PROGRESS NOTES
[facility] Obstetrics Post-Partum Progress Note          Assessment and Plan:    Assessment:   Post-partum day #1  Induced vaginal delivery secondary to pregnancy-induced hypertension  L&D complications: None      Doing well.  Clean wound without signs of infection.  Normal healing wound.  No excessive bleeding  Pain well-controlled.  BP values reviewed with pt      Plan:   Ambulation encouraged  Breast feeding strategies discussed  Monitor wound for signs of infection  Pain control measures as needed  Reportable signs and symptoms dicussed with the patient  Anticipate discharge tomorrow           Interval History:   Doing well.  Pain is well-controlled.  No fevers.  No history of foul-smelling vaginal discharge.  Good appetite.  Denies chest pain, shortness of breath, nausea or vomiting.  Vaginal bleeding is similar to a heavy menstrual flow.  Ambulatory.  Breastfeeding well.            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          Medications:     All medications related to the patient's surgery have been reviewed  Current Facility-Administered Medications   Medication     fentaNYL (PF) (SUBLIMAZE) injection  mcg     nitrous oxide/oxygen 50/50 blend     lidocaine 1 % 1 mL     lidocaine (LMX4) kit     sodium chloride (PF) 0.9% PF flush 3 mL     sodium chloride (PF) 0.9% PF flush 3 mL     oxytocin (PITOCIN) 30 units in 500 mL 0.9% NaCl infusion     lactated ringers infusion     lactated ringers BOLUS 500 mL     lactated ringers BOLUS 1,000 mL    Or     lactated ringers  "BOLUS 500 mL     acetaminophen (TYLENOL) tablet 650 mg     naloxone (NARCAN) injection 0.1-0.4 mg     ondansetron (ZOFRAN) injection 4 mg     oxytocin (PITOCIN) injection 10 Units     Medication Instructions: misoprostol (CYTOTEC)- Nurse to discuss ordering with provider, if needed. Ordered via \"OB misoprostol (CYTOTEC) Postpartum Hemorrhage PANEL\"     methylergonovine (METHERGINE) injection 200 mcg     carboprost (HEMABATE) injection 250 mcg     penicillin G potassium injection 2.5 Million Units     medication instruction     lactated ringers BOLUS 250 mL     ePHEDrine injection 5 mg     nalbuphine (NUBAIN) injection 2.5-5 mg     naloxone (NARCAN) injection 0.1-0.4 mg     medication instruction     Opioid plan postpartum - medication instruction     BUPivacaine (MARCAINE) 0.125% in NaCl 0.9% 250 mL EPIDURAL infusion     ondansetron (ZOFRAN-ODT) ODT tab 4 mg    Or     ondansetron (ZOFRAN) injection 4 mg     oxytocin (PITOCIN) 30 units in 500 mL 0.9% NaCl infusion     ibuprofen (ADVIL/MOTRIN) tablet 400-800 mg     acetaminophen (TYLENOL) tablet 650 mg     naloxone (NARCAN) injection 0.1-0.4 mg     senna-docusate (SENOKOT-S;PERICOLACE) 8.6-50 MG per tablet 1-2 tablet     [START ON 9/3/2017] bisacodyl (DULCOLAX) Suppository 10 mg     [START ON 9/3/2017] sodium phosphate (FLEET ENEMA) 1 enema     hydrocortisone 2.5 % cream     lanolin ointment     lactated ringers BOLUS 1,000 mL     oxytocin (PITOCIN) 30 units in 500 mL 0.9% NaCl infusion     oxytocin (PITOCIN) injection 10 Units     misoprostol (CYTOTEC) tablet 400 mcg     NO Rho (D) immune globulin (RhoGam) needed - mother Rh POSITIVE     No MMR Needed - Assessment: Patient does not need MMR vaccine     Tdap (tetanus-diphtheria-acell pertussis) (ADACEL) injection 0.5 mL     magnesium sulfate 2 g in NS intermittent infusion (PharMEDium or FV Cmpd)     magnesium sulfate 4 g in 100 mL sterile water (premade)     magnesium sulfate injection 4 g     LORazepam (ATIVAN) " injection 2 mg     labetalol (NORMODYNE/TRANDATE) injection 20 mg     labetalol (NORMODYNE/TRANDATE) injection 40 mg     labetalol (NORMODYNE/TRANDATE) injection 80 mg     labetalol (NORMODYNE/TRANDATE) algorithm-medication instruction     calcium carbonate (TUMS) chewable tablet 500-1,000 mg             Physical Exam:   Vitals were reviewed  All vitals stable  Temp: 97.9  F (36.6  C) Temp src: Oral BP: 136/66 Pulse: 96   Resp: 16          Uterine fundus is firm, non-tender and at the level of the umbilicus          Data:     All laboratory data related to this surgery reviewed  Hemoglobin   Date Value Ref Range Status   08/31/2017 10.8 (L) 11.7 - 15.7 g/dL Final   08/07/2017 10.3 (L) 11.7 - 15.7 g/dL Final   08/05/2017 10.7 (L) 11.7 - 15.7 g/dL Final   07/31/2017 10.9 (L) 11.7 - 15.7 g/dL Final   07/30/2017 11.5 (L) 11.7 - 15.7 g/dL Final     No imaging studies have been orderedGalindo Kwong MD

## 2017-09-02 NOTE — PLAN OF CARE
Data: Ninoska Cottrell transferred to [id number] via wheelchair at 2200. Baby transferred via parent's arms.  Action: Receiving unit notified of transfer: Yes. Patient and family notified of room change. Report given to Glory FERRER at 2200. Belongings sent to receiving unit. Accompanied by Registered Nurse. Oriented patient to surroundings. Call light within reach. ID bands double-checked with receiving RN.  Response: Patient tolerated transfer and is stable.

## 2017-09-02 NOTE — PLAN OF CARE
Problem: Goal Outcome Summary  Goal: Goal Outcome Summary  Outcome: Improving  Patient is stable, gaining strength and independence and breast pumping at regular intervals. Goal outcomes met thus far.

## 2017-09-03 VITALS
RESPIRATION RATE: 18 BRPM | TEMPERATURE: 98.1 F | DIASTOLIC BLOOD PRESSURE: 76 MMHG | SYSTOLIC BLOOD PRESSURE: 132 MMHG | HEART RATE: 95 BPM | BODY MASS INDEX: 47.09 KG/M2 | HEIGHT: 66 IN | WEIGHT: 293 LBS

## 2017-09-03 LAB
ALT SERPL W P-5'-P-CCNC: 11 U/L (ref 0–50)
AST SERPL W P-5'-P-CCNC: 14 U/L (ref 0–45)
CREAT SERPL-MCNC: 0.52 MG/DL (ref 0.52–1.04)
ERYTHROCYTE [DISTWIDTH] IN BLOOD BY AUTOMATED COUNT: 15.4 % (ref 10–15)
GFR SERPL CREATININE-BSD FRML MDRD: >90 ML/MIN/1.7M2
HCT VFR BLD AUTO: 28.5 % (ref 35–47)
HGB BLD-MCNC: 9.2 G/DL (ref 11.7–15.7)
MCH RBC QN AUTO: 28.7 PG (ref 26.5–33)
MCHC RBC AUTO-ENTMCNC: 32.3 G/DL (ref 31.5–36.5)
MCV RBC AUTO: 89 FL (ref 78–100)
PLATELET # BLD AUTO: 146 10E9/L (ref 150–450)
RBC # BLD AUTO: 3.21 10E12/L (ref 3.8–5.2)
URATE SERPL-MCNC: 3.8 MG/DL (ref 2.6–6)
WBC # BLD AUTO: 7.6 10E9/L (ref 4–11)

## 2017-09-03 PROCEDURE — 90707 MMR VACCINE SC: CPT | Performed by: OBSTETRICS & GYNECOLOGY

## 2017-09-03 PROCEDURE — 84450 TRANSFERASE (AST) (SGOT): CPT | Performed by: OBSTETRICS & GYNECOLOGY

## 2017-09-03 PROCEDURE — 84550 ASSAY OF BLOOD/URIC ACID: CPT | Performed by: OBSTETRICS & GYNECOLOGY

## 2017-09-03 PROCEDURE — 84460 ALANINE AMINO (ALT) (SGPT): CPT | Performed by: OBSTETRICS & GYNECOLOGY

## 2017-09-03 PROCEDURE — 36415 COLL VENOUS BLD VENIPUNCTURE: CPT | Performed by: OBSTETRICS & GYNECOLOGY

## 2017-09-03 PROCEDURE — 25000128 H RX IP 250 OP 636: Performed by: OBSTETRICS & GYNECOLOGY

## 2017-09-03 PROCEDURE — 82565 ASSAY OF CREATININE: CPT | Performed by: OBSTETRICS & GYNECOLOGY

## 2017-09-03 PROCEDURE — 85027 COMPLETE CBC AUTOMATED: CPT | Performed by: OBSTETRICS & GYNECOLOGY

## 2017-09-03 PROCEDURE — 25000132 ZZH RX MED GY IP 250 OP 250 PS 637: Performed by: OBSTETRICS & GYNECOLOGY

## 2017-09-03 RX ORDER — IBUPROFEN 600 MG/1
600 TABLET, FILM COATED ORAL EVERY 6 HOURS PRN
Qty: 100 TABLET | Refills: 0 | Status: SHIPPED | OUTPATIENT
Start: 2017-09-03 | End: 2020-11-06

## 2017-09-03 RX ADMIN — SENNOSIDES AND DOCUSATE SODIUM 1 TABLET: 8.6; 5 TABLET ORAL at 09:08

## 2017-09-03 RX ADMIN — IBUPROFEN 800 MG: 400 TABLET ORAL at 04:59

## 2017-09-03 RX ADMIN — MEASLES, MUMPS, AND RUBELLA VIRUS VACCINE LIVE 0.5 ML: 1000; 12500; 1000 INJECTION, POWDER, LYOPHILIZED, FOR SUSPENSION SUBCUTANEOUS at 13:48

## 2017-09-03 NOTE — PROGRESS NOTES
[facility]   Post-Partum Progress Note          Assessment and Plan:    Assessment:   Post-partum day #2  Normal spontaneous vaginal delivery  L&D complications: Gestational HTN      No excessive bleeding  Pain well-controlled.  Mildly elevated BPs noted overnight, this AM, 140s/70-80; mild tachycardia, now resolved  States occasional mild headache, states may be consistent with history of migraines; denies severe headache, visual changes, shortness of breath, right upper quadrant/epigastic pain.        Plan:   Recheck pre-eclampsia labs, VS.   Pain control measures as needed  Discharge later today if stable           Interval History:   Doing well.  Pain is well-controlled.  No fevers.  No history of foul-smelling vaginal discharge.  Good appetite.  Denies chest pain, shortness of breath, nausea or vomiting.  Vaginal bleeding is similar to a heavy menstrual flow.  Ambulatory.  Breastfeeding well.           Significant Problems:      Patient Active Problem List   Diagnosis     Intermittent asthma     Migraine headache     Supervision of high-risk pregnancy of elderly multigravida     Streptococcus B carrier state complicating pregnancy     Gestational HTN     Indication for care in labor or delivery     Postpartum state             Review of Systems:    The Review of Systems is negative other than noted in the HPI          Medications:   All medications related to the patient's surgery have been reviewed  Current Facility-Administered Medications   Medication     fentaNYL (PF) (SUBLIMAZE) injection  mcg     nitrous oxide/oxygen 50/50 blend     lidocaine 1 % 1 mL     lidocaine (LMX4) kit     sodium chloride (PF) 0.9% PF flush 3 mL     sodium chloride (PF) 0.9% PF flush 3 mL     oxytocin (PITOCIN) 30 units in 500 mL 0.9% NaCl infusion     lactated ringers infusion     lactated ringers BOLUS 500 mL     lactated ringers BOLUS 1,000 mL    Or     lactated ringers BOLUS 500 mL     acetaminophen  "(TYLENOL) tablet 650 mg     naloxone (NARCAN) injection 0.1-0.4 mg     ondansetron (ZOFRAN) injection 4 mg     oxytocin (PITOCIN) injection 10 Units     Medication Instructions: misoprostol (CYTOTEC)- Nurse to discuss ordering with provider, if needed. Ordered via \"OB misoprostol (CYTOTEC) Postpartum Hemorrhage PANEL\"     methylergonovine (METHERGINE) injection 200 mcg     carboprost (HEMABATE) injection 250 mcg     medication instruction     lactated ringers BOLUS 250 mL     ePHEDrine injection 5 mg     nalbuphine (NUBAIN) injection 2.5-5 mg     naloxone (NARCAN) injection 0.1-0.4 mg     medication instruction     Opioid plan postpartum - medication instruction     BUPivacaine (MARCAINE) 0.125% in NaCl 0.9% 250 mL EPIDURAL infusion     ondansetron (ZOFRAN-ODT) ODT tab 4 mg    Or     ondansetron (ZOFRAN) injection 4 mg     oxytocin (PITOCIN) 30 units in 500 mL 0.9% NaCl infusion     ibuprofen (ADVIL/MOTRIN) tablet 400-800 mg     acetaminophen (TYLENOL) tablet 650 mg     naloxone (NARCAN) injection 0.1-0.4 mg     senna-docusate (SENOKOT-S;PERICOLACE) 8.6-50 MG per tablet 1-2 tablet     bisacodyl (DULCOLAX) Suppository 10 mg     sodium phosphate (FLEET ENEMA) 1 enema     hydrocortisone 2.5 % cream     lanolin ointment     lactated ringers BOLUS 1,000 mL     oxytocin (PITOCIN) 30 units in 500 mL 0.9% NaCl infusion     oxytocin (PITOCIN) injection 10 Units     misoprostol (CYTOTEC) tablet 400 mcg     NO Rho (D) immune globulin (RhoGam) needed - mother Rh POSITIVE     No MMR Needed - Assessment: Patient does not need MMR vaccine     Tdap (tetanus-diphtheria-acell pertussis) (ADACEL) injection 0.5 mL     magnesium sulfate 2 g in NS intermittent infusion (PharMEDium or FV Cmpd)     magnesium sulfate 4 g in 100 mL sterile water (premade)     magnesium sulfate injection 4 g     LORazepam (ATIVAN) injection 2 mg     labetalol (NORMODYNE/TRANDATE) injection 20 mg     labetalol (NORMODYNE/TRANDATE) injection 40 mg     labetalol " "(NORMODYNE/TRANDATE) injection 80 mg     labetalol (NORMODYNE/TRANDATE) algorithm-medication instruction     calcium carbonate (TUMS) chewable tablet 500-1,000 mg             Physical Exam:   All vitals stable    Vital signs:  Temp: 98.1  F (36.7  C) Temp src: Oral BP: 132/76 Pulse: 95   Resp: 18       Height: 5' 6\" (167.6 cm) Weight: 295 lb (133.8 kg)  Estimated body mass index is 47.61 kg/(m^2) as calculated from the following:    Height as of this encounter: 5' 6\" (1.676 m).    Weight as of this encounter: 295 lb (133.8 kg).    Physical exam:  GENERAL APPEARANCE: healthy, alert and no distress  RESP: lungs clear to auscultation - no rales, rhonchi or wheezes  CV: regular rates and rhythm, normal S1 S2, no S3 or S4 and no murmur, click or rub -  ABDOMEN:  Soft, obese, bowel sounds normal  Uterine fundus is firm, non-tender and at the level of the umbilicus  Extremities: +2 edema bilaterally          Data:     All laboratory data related to this surgery reviewed  Hemoglobin   Date Value Ref Range Status   08/31/2017 10.8 (L) 11.7 - 15.7 g/dL Final   08/07/2017 10.3 (L) 11.7 - 15.7 g/dL Final   08/05/2017 10.7 (L) 11.7 - 15.7 g/dL Final   07/31/2017 10.9 (L) 11.7 - 15.7 g/dL Final   07/30/2017 11.5 (L) 11.7 - 15.7 g/dL Final     No imaging studies have been ordered    Toshia Ibanez MD    "

## 2017-09-03 NOTE — PLAN OF CARE
Problem: Goal Outcome Summary  Goal: Goal Outcome Summary  Outcome: Improving  VSS, denies symptoms of preeclampsia. Bonding well with baby. Pain is well controlled with ibuprofen. Patient is voiding without difficulty and ambulating independently. Tolerating regular diet well. Independent in self and baby cares. Mom is pumping and feeding EBM and formula. Will continue to monitor.

## 2017-09-03 NOTE — PLAN OF CARE
Problem: Goal Outcome Summary  Goal: Goal Outcome Summary  Outcome: Improving  Stable this shift. Motrin for discomfort. Discharge teaching done. Pumping for baby

## 2017-09-03 NOTE — PLAN OF CARE
Patient is discharging in a stable condition to home with baby and .  Discharge instructions given and reviewed.  Medications were discussed.  She already has a breast pump at home.  Plan is to follow up in a week with her OB in clinic for a blood pressure check.  She has no further questions at this time.  ID bands were verified.

## 2017-09-03 NOTE — DISCHARGE INSTRUCTIONS
Postpartum Vaginal Delivery Instructions  Please make an appointment with your primary OB in clinic this next week for a blood pressure check.    [facility] Lactation Consultant:  [phone]    Activity       Ask family and friends for help when you need it.    Do not place anything in your vagina for 6 weeks.    You are not restricted on other activities, but take it easy for a few weeks to allow your body to recover from delivery.  You are able to do any activities you feel up to that point.    No driving until you have stopped taking your pain medications (usually two weeks after delivery).     Call your health care provider if you have any of these symptoms:       Increased pain, swelling, redness, or fluid around your stiches from an episiotomy or perineal tear.    A fever above 100.4 F (38 C) with or without chills when placing a thermometer under your tongue.    You soak a sanitary pad with blood within 1 hour, or you see blood clots larger than a golf ball.    Bleeding that lasts more than 6 weeks.    Vaginal discharge that smells bad.    Severe pain, cramping or tenderness in your lower belly area.    A need to urinate more frequently (use the toilet more often), more urgently (use the toilet very quickly), or it burns when you urinate.    Nausea and vomiting.    Redness, swelling or pain around a vein in your leg.    Problems breastfeeding or a red or painful area on your breast.    Chest pain and cough or are gasping for air.    Problems coping with sadness, anxiety, or depression.  If you have any concerns about hurting yourself or the baby, call your provider immediately.     You have questions or concerns after you return home.     Keep your hands clean:  Always wash your hands before touching your perineal area and stitches.  This helps reduce your risk of infection.  If your hands aren't dirty, you may use an alcohol hand-rub to clean your hands. Keep your nails clean and short.       Preeclampsia   Call your doctor right away if you have any of the following:  - Edema (swelling) in your face or hands  - Rapid weight gain-about 1 pound or more in a day  - Headache  - Abdominal pain on your right side  - Vision problems (flashes or spots)  - You have questions or concerns once you return home.

## 2017-09-03 NOTE — PLAN OF CARE
"Problem: Goal Outcome Summary  Goal: Goal Outcome Summary  Outcome: Adequate for Discharge Date Met:  09/03/17  Pt is mostly feeding with formula this shift, stated her pain at \"comfort,\" up and moving well, lab results and MMR vaccine are discussed with dr Ibanez and pt, no new orders, pt has declined Rx for iron; Kenyetta HESS is finishing discharge education with pt, anticipating going home with infant this afternoon.      "

## 2017-09-06 ENCOUNTER — OFFICE VISIT (OUTPATIENT)
Dept: OBGYN | Facility: CLINIC | Age: 37
End: 2017-09-06
Payer: COMMERCIAL

## 2017-09-06 VITALS
BODY MASS INDEX: 45.96 KG/M2 | WEIGHT: 286 LBS | DIASTOLIC BLOOD PRESSURE: 92 MMHG | HEIGHT: 66 IN | SYSTOLIC BLOOD PRESSURE: 150 MMHG

## 2017-09-06 PROCEDURE — 99213 OFFICE O/P EST LOW 20 MIN: CPT | Mod: 24 | Performed by: ADVANCED PRACTICE MIDWIFE

## 2017-09-06 RX ORDER — NIFEDIPINE 30 MG/1
30 TABLET, EXTENDED RELEASE ORAL DAILY
Qty: 30 TABLET | Refills: 1 | Status: SHIPPED | OUTPATIENT
Start: 2017-09-06 | End: 2017-10-25

## 2017-09-06 NOTE — PROGRESS NOTES
SUBJECTIVE:                                                   Ninoska Cottrell is a 37 year old who presents to clinic today for the following health issue(s):  Patient presents with:  Hospital F/U: blood pressure check, patient had vagnial delivery on 17. Patient has previous history of hypertension with past 2 pregnancies. No meds were started.    Additional information: also having significant back pain, could not be evaluated with MRI during the pregnancy. Went to [facility] for physical therapy.    HPI:  Had labile blood pressures in hospital, some normal some elvated. Has not checked one at home since delivery. Overall feeling well, denies HA, visual changes, and epigastric pain. Just tired. Stated she never took any meds with either diagnosis of gestation hypertension. Here with .     Patient's last menstrual period was 2016 (approximate).  Patient is not currently sexually active, recent deliver,  .  Using nothing for contraception.   Health maintenance updated:  yes  STI infx testing offered:  Declined    Problem list and histories reviewed & adjusted, as indicated.  Additional history: as documented.    Patient Active Problem List   Diagnosis     Intermittent asthma     Migraine headache     Supervision of high-risk pregnancy of elderly multigravida     Streptococcus B carrier state complicating pregnancy     Gestational HTN     Indication for care in labor or delivery     Postpartum state     Past Surgical History:   Procedure Laterality Date     APPENDECTOMY       C NONSPECIFIC PROCEDURE      right ankle fracture      Social History   Substance Use Topics     Smoking status: Former Smoker     Quit date: 2000     Smokeless tobacco: Never Used     Alcohol use No      Problem (# of Occurrences) Relation (Name,Age of Onset)    Blood Disease (4) Paternal Grandmother, Paternal Grandfather, Maternal Uncle, Father: Hemophilia            Current Outpatient Prescriptions   Medication Sig      "NIFEdipine ER osmotic (PROCARDIA XL) 30 MG 24 hr tablet Take 1 tablet (30 mg) by mouth daily     ibuprofen (ADVIL/MOTRIN) 600 MG tablet Take 1 tablet (600 mg) by mouth every 6 hours as needed for other (cramping)     Cholecalciferol (VITAMIN D PO) Take 2,000 Units by mouth daily     vitamin B complex with vitamin C (VITAMIN  B COMPLEX) TABS tablet Take 1 tablet by mouth daily     Prenatal Vit-Fe Fumarate-FA (PRENATAL MULTIVITAMIN  PLUS IRON) 27-0.8 MG TABS per tablet Take 1 tablet by mouth     albuterol (VENTOLIN HFA) 108 (90 BASE) MCG/ACT Inhaler Inhale 1-2 puffs into the lungs every 4 hours as needed for shortness of breath / dyspnea or wheezing Reported on 2/16/2017     Acetaminophen (TYLENOL PO) Take 1,000 mg by mouth every 8 hours as needed for mild pain or fever     No current facility-administered medications for this visit.      Allergies   Allergen Reactions     Blueberry Swelling     Codeine Hives     Latex Swelling and Rash       ROS:  12 point review of systems negative other than symptoms noted below.  Constitutional: Fatigue    OBJECTIVE:     BP (!) 150/92  Ht 5' 6\" (1.676 m)  Wt 286 lb (129.7 kg)  LMP 12/14/2016 (Approximate)  BMI 46.16 kg/m2  Body mass index is 46.16 kg/(m^2).   Initial /96  Recheck 150/92    PHYSICAL EXAM:  Constitutional:  Appearance: Well nourished, obese, well developed alert, in no acute distress  Neurologic/Psychiatric:  Mental Status:  Oriented X3    DTR +2  No clonus    In-Clinic Test Results:  No results found for this or any previous visit (from the past 24 hour(s)).    ASSESSMENT/PLAN:                                                        ICD-10-CM    1. Postpartum hypertension O16.5 NIFEdipine ER osmotic (PROCARDIA XL) 30 MG 24 hr tablet       COUNSELING:  Consulted Dr. Padrno about blood pressures; recommend starting medication, rx sent to pharmacy  Reviewed s/sx of preeclampsia with Ninoska Padron recommends f/u with her Monday and to call sooner if " symptoms arise    15  minutes was spent face to face with the patient today discussing her history, diagnosis, and follow-up plan as noted above. Over 50% of the visit was spent in counseling and coordination of care.    Total Visit Time: 15 minutes.     JANAE Coulter, RAMANAM

## 2017-09-06 NOTE — MR AVS SNAPSHOT
"              After Visit Summary   9/6/2017    Ninoska Cottrell    MRN: [id number]           Patient Information     Date Of Birth          1980        Visit Information        Provider Department      9/6/2017 9:00 AM Amber Bolivar APRN CNM [facility]        Today's Diagnoses     Postpartum hypertension    -  1       Follow-ups after your visit        Follow-up notes from your care team     Return in about 5 days (around 9/11/2017).      Your next 10 appointments already scheduled     Sep 11, 2017  8:30 AM CDT   SHORT with Traci Padron MD   [facility] ([facility]    [address] [address] [phone]              Who to contact     If you have questions or need follow up information about today's clinic visit or your schedule please contact [facility] directly at [phone].  Normal or non-critical lab and imaging results will be communicated to you by MyChart, letter or phone within 4 business days after the clinic has received the results. If you do not hear from us within 7 days, please contact the clinic through wumohart or phone. If you have a critical or abnormal lab result, we will notify you by phone as soon as possible.  Submit refill requests through [employer]ize or call your pharmacy and they will forward the refill request to us. Please allow 3 business days for your refill to be completed.          Additional Information About Your Visit        wumohart Information     Cardize lets you send messages to your doctor, view your test results, renew your prescriptions, schedule appointments and more. To sign up, go to [email or web address] . Click on \"Log in\" on the left side of the screen, which will take you to the Welcome page. Then click on \"Sign up Now\" on the right side of the page.     You will be asked to enter the access code listed below, as well as some " "personal information. Please follow the directions to create your username and password.     Your access code is: [email or web address]  Expires: 2017  3:45 PM     Your access code will  in 90 days. If you need help or a new code, please call your [place] clinic or [phone].        Care EveryWhere ID     This is your Care EveryWhere ID. This could be used by other organizations to access your [place] medical records  [id number]        Your Vitals Were     Height Last Period BMI (Body Mass Index)             5' 6\" (1.676 m) 2016 (Approximate) 46.16 kg/m2          Blood Pressure from Last 3 Encounters:   17 (!) 150/92   17 132/76   17 128/72    Weight from Last 3 Encounters:   17 286 lb (129.7 kg)   17 295 lb (133.8 kg)   17 295 lb (133.8 kg)              Today, you had the following     No orders found for display         Today's Medication Changes          These changes are accurate as of: 17 10:27 AM.  If you have any questions, ask your nurse or doctor.               Start taking these medicines.        Dose/Directions    NIFEdipine ER osmotic 30 MG 24 hr tablet   Commonly known as:  PROCARDIA XL   Used for:  Postpartum hypertension   Started by:  Abmer Bolivar APRN CNM        Dose:  30 mg   Take 1 tablet (30 mg) by mouth daily   Quantity:  30 tablet   Refills:  1            Where to get your medicines      These medications were sent to [place] [address] [place] [address] [address]     Phone:  [phone]     NIFEdipine ER osmotic 30 MG 24 hr tablet                Primary Care Provider Office Phone # Fax #    Traci Padron [phone]       [place] [address] [address] [facility] [address]        Equal Access to Services     LAUREN CALDWELL AH: Hadii aad ku hadasho Soomaali, waaxda luqadaha, qaybta kaalmada kun, melida augustin. " So [facility] [phone].    ATENCIÓN: Si luz quinteros, tiene a irvin disposición servicios gratuitos de asistencia lingüística. Jocelyn cunningham [phone].    We comply with applicable federal civil rights laws and [place] laws. We do not discriminate on the basis of race, color, national origin, age, disability sex, sexual orientation or gender identity.            Thank you!     Thank you for choosing [facility]  for your care. Our goal is always to provide you with excellent care. Hearing back from our patients is one way we can continue to improve our services. Please take a few minutes to complete the written survey that you may receive in the mail after your visit with us. Thank you!             Your Updated Medication List - Protect others around you: Learn how to safely use, store and throw away your medicines at www.disposemymeds.org.          This list is accurate as of: 9/6/17 10:27 AM.  Always use your most recent med list.                   Brand Name Dispense Instructions for use Diagnosis    albuterol 108 (90 BASE) MCG/ACT Inhaler    VENTOLIN HFA    1 Inhaler    Inhale 1-2 puffs into the lungs every 4 hours as needed for shortness of breath / dyspnea or wheezing Reported on 2/16/2017    Upper respiratory tract infection, unspecified type, Intermittent asthma, uncomplicated       ibuprofen 600 MG tablet    ADVIL/MOTRIN    100 tablet    Take 1 tablet (600 mg) by mouth every 6 hours as needed for other (cramping)    Postpartum state       NIFEdipine ER osmotic 30 MG 24 hr tablet    PROCARDIA XL    30 tablet    Take 1 tablet (30 mg) by mouth daily    Postpartum hypertension       prenatal multivitamin plus iron 27-0.8 MG Tabs per tablet      Take 1 tablet by mouth    Supervision of high-risk pregnancy of elderly multigravida       TYLENOL PO      Take 1,000 mg by mouth every 8 hours as needed for mild pain or fever        vitamin B complex with vitamin C Tabs tablet      Take 1 tablet by  mouth daily        VITAMIN D PO      Take 2,000 Units by mouth daily

## 2017-09-11 ENCOUNTER — OFFICE VISIT (OUTPATIENT)
Dept: OBGYN | Facility: CLINIC | Age: 37
End: 2017-09-11
Payer: COMMERCIAL

## 2017-09-11 VITALS — DIASTOLIC BLOOD PRESSURE: 76 MMHG | WEIGHT: 280 LBS | BODY MASS INDEX: 45.19 KG/M2 | SYSTOLIC BLOOD PRESSURE: 124 MMHG

## 2017-09-11 DIAGNOSIS — O13.9 GESTATIONAL HYPERTENSION, UNSPECIFIED TRIMESTER: Primary | ICD-10-CM

## 2017-09-11 DIAGNOSIS — O09.529 SUPERVISION OF HIGH-RISK PREGNANCY OF ELDERLY MULTIGRAVIDA: ICD-10-CM

## 2017-09-11 DIAGNOSIS — N64.4 BREAST TENDERNESS: ICD-10-CM

## 2017-09-11 PROCEDURE — 99213 OFFICE O/P EST LOW 20 MIN: CPT | Performed by: OBSTETRICS & GYNECOLOGY

## 2017-09-11 NOTE — MR AVS SNAPSHOT
"              After Visit Summary   2017    Ninoska Cottrell    MRN: [id number]           Patient Information     Date Of Birth          1980        Visit Information        Provider Department      2017 8:30 AM Traci Padron MD [facility]        Today's Diagnoses     Gestational hypertension, unspecified trimester    -  1    Supervision of high-risk pregnancy of elderly multigravida        Breast tenderness           Follow-ups after your visit        Who to contact     If you have questions or need follow up information about today's clinic visit or your schedule please contact [facility] directly at [phone].  Normal or non-critical lab and imaging results will be communicated to you by MyChart, letter or phone within 4 business days after the clinic has received the results. If you do not hear from us within 7 days, please contact the clinic through [employer] or phone. If you have a critical or abnormal lab result, we will notify you by phone as soon as possible.  Submit refill requests through Healthonomy or call your pharmacy and they will forward the refill request to us. Please allow 3 business days for your refill to be completed.          Additional Information About Your Visit        MyChart Information     Healthonomy lets you send messages to your doctor, view your test results, renew your prescriptions, schedule appointments and more. To sign up, go to [email or web address] . Click on \"Log in\" on the left side of the screen, which will take you to the Welcome page. Then click on \"Sign up Now\" on the right side of the page.     You will be asked to enter the access code listed below, as well as some personal information. Please follow the directions to create your username and password.     Your access code is: [email or web address]  Expires: 2017  3:45 PM     Your access code will  in 90 days. If you need help or a new code, please call your " [facility] or [phone].        Care EveryWhere ID     This is your Care EveryWhere ID. This could be used by other organizations to access your [place] medical records  [id number]        Your Vitals Were     Last Period BMI (Body Mass Index)                12/14/2016 (Approximate) 45.19 kg/m2           Blood Pressure from Last 3 Encounters:   09/11/17 124/76   09/06/17 (!) 150/92   09/03/17 132/76    Weight from Last 3 Encounters:   09/11/17 280 lb (127 kg)   09/06/17 286 lb (129.7 kg)   08/31/17 295 lb (133.8 kg)              Today, you had the following     No orders found for display       Primary Care Provider Office Phone # Fax #    Traci Padron [phone]       [place] [address] [address] [facility] [address]        Equal Access to Services     [facility] : Hadii aad ku hadasho Soomaali, waaxda luqadaha, qaybta kaalmada adeegyada, waxay idiin hayaan balta elmore . So [facility] [phone].    ATENCIÓN: Si habla español, tiene a irvin disposición servicios gratuitos de asistencia lingüística. Llame al [phone].    We comply with applicable federal civil rights laws and [place] laws. We do not discriminate on the basis of race, color, national origin, age, disability sex, sexual orientation or gender identity.            Thank you!     Thank you for choosing [facility]  for your care. Our goal is always to provide you with excellent care. Hearing back from our patients is one way we can continue to improve our services. Please take a few minutes to complete the written survey that you may receive in the mail after your visit with us. Thank you!             Your Updated Medication List - Protect others around you: Learn how to safely use, store and throw away your medicines at www.disposemymeds.org.          This list is accurate as of: 9/11/17  9:03 AM.  Always use your most recent med list.                   Brand Name  Dispense Instructions for use Diagnosis    albuterol 108 (90 BASE) MCG/ACT Inhaler    VENTOLIN HFA    1 Inhaler    Inhale 1-2 puffs into the lungs every 4 hours as needed for shortness of breath / dyspnea or wheezing Reported on 2/16/2017    Upper respiratory tract infection, unspecified type, Intermittent asthma, uncomplicated       ibuprofen 600 MG tablet    ADVIL/MOTRIN    100 tablet    Take 1 tablet (600 mg) by mouth every 6 hours as needed for other (cramping)    Postpartum state       NIFEdipine ER osmotic 30 MG 24 hr tablet    PROCARDIA XL    30 tablet    Take 1 tablet (30 mg) by mouth daily    Postpartum hypertension       prenatal multivitamin plus iron 27-0.8 MG Tabs per tablet      Take 1 tablet by mouth    Supervision of high-risk pregnancy of elderly multigravida       TYLENOL PO      Take 1,000 mg by mouth every 8 hours as needed for mild pain or fever        vitamin B complex with vitamin C Tabs tablet      Take 1 tablet by mouth daily        VITAMIN D PO      Take 2,000 Units by mouth daily

## 2017-09-11 NOTE — NURSING NOTE
"Chief Complaint   Patient presents with     Hypertension     patient here for follow-up on gestational hypertension, patient has been checking her blood pressure at home- last night it was WNL. She has been taking her medication daily     Pain     both breasts and back pain from where she had epidural        Initial /76 (BP Location: Right arm, Patient Position: Chair, Cuff Size: Adult Large)  Wt 280 lb (127 kg)  LMP 12/14/2016 (Approximate)  BMI 45.19 kg/m2 Estimated body mass index is 45.19 kg/(m^2) as calculated from the following:    Height as of 9/6/17: 5' 6\" (1.676 m).    Weight as of this encounter: 280 lb (127 kg).  Medication Reconciliation: complete     Dara Howe CMA      "

## 2017-09-14 NOTE — ANESTHESIA PREPROCEDURE EVALUATION
PAC NOTE:       ANESTHESIA PRE EVALUATION:  Anesthesia Evaluation       history and physical reviewed .             ROS/MED HX    ENT/Pulmonary:     (+)asthma , . .    Neurologic:  - neg neurologic ROS     Cardiovascular:  - neg cardiovascular ROS       METS/Exercise Tolerance:     Hematologic:         Musculoskeletal:         GI/Hepatic:  - neg GI/hepatic ROS       Renal/Genitourinary:         Endo:         Psychiatric:         Infectious Disease:         Malignancy:         Other:                     Physical Exam      Airway     Dental     Cardiovascular       Pulmonary     Other findings:  at term, in labor, requesting pain  management          Super morbid obesity      Anesthesia Plan      History & Physical Review      ASA Status:  .  OB Epidural Asa: 3       Plan for     Discussed risks of epidural catheter placement, including, but not limited to, bruising/bleeding, infection, pain, failure of epidural medications to relieve pain, dural puncture with subsequent headache/ need for epidural blood patch and nerve damage.  All questions answered, understanding voiced and she wishes to proceed.    Evaluation performed prior to e(idural catheter placement on 2017, entered late. ISABEL      Postoperative Care      Consents  Anesthetic plan, risks, benefits and alternatives discussed with:  Patient..                            .

## 2017-10-04 ENCOUNTER — OFFICE VISIT (OUTPATIENT)
Dept: OBGYN | Facility: CLINIC | Age: 37
End: 2017-10-04
Payer: COMMERCIAL

## 2017-10-04 ENCOUNTER — TELEPHONE (OUTPATIENT)
Dept: OBGYN | Facility: CLINIC | Age: 37
End: 2017-10-04

## 2017-10-04 VITALS
BODY MASS INDEX: 43.55 KG/M2 | WEIGHT: 271 LBS | HEIGHT: 66 IN | SYSTOLIC BLOOD PRESSURE: 118 MMHG | DIASTOLIC BLOOD PRESSURE: 72 MMHG

## 2017-10-04 DIAGNOSIS — B37.89 YEAST INFECTION OF NIPPLE, POSTPARTUM: Primary | ICD-10-CM

## 2017-10-04 PROCEDURE — 99214 OFFICE O/P EST MOD 30 MIN: CPT | Mod: 24 | Performed by: ADVANCED PRACTICE MIDWIFE

## 2017-10-04 RX ORDER — FLUCONAZOLE 200 MG/1
200 TABLET ORAL DAILY
Qty: 1 TABLET | Refills: 0 | Status: SHIPPED | OUTPATIENT
Start: 2017-10-05 | End: 2017-10-18

## 2017-10-04 RX ORDER — FLUCONAZOLE 200 MG/1
400 TABLET ORAL ONCE
Qty: 2 TABLET | Refills: 0 | Status: SHIPPED | OUTPATIENT
Start: 2017-10-04 | End: 2017-10-04

## 2017-10-04 NOTE — PROGRESS NOTES
SUBJECTIVE:                                                   Ninoska Cottrell is a 37 year old who presents to clinic today for the following health issue(s):  Patient presents with:  Medication Follow-up: Procardia 1 month follow-up. Having some dizziness shortly after taking med, takes at bedtime and subsides in a few hours.  Breast Pain: Patient is also having breast pain, currently breastfeeding. No fever, had prior h/o of mastitis in previous pregnancies.    HPI:  Reports sharp shooting breast pain when pumping, bilaterally. Currently pumping exclusively and bottle feeding daughter, Renae. Has pain in nipples and notes baby has some white film inside her mouth. No concerns about blood pressure medication, will call     Patient's last menstrual period was 2016 (approximate).  Patient is currently not sexually active  .  Using nothing for contraception, recent pregnancy.  Health maintenance updated:  yes  STI infx testing offered:  Declined    Problem list and histories reviewed & adjusted, as indicated.  Additional history: as documented.    Patient Active Problem List   Diagnosis     Intermittent asthma     Migraine headache     Supervision of high-risk pregnancy of elderly multigravida     Streptococcus B carrier state complicating pregnancy     Gestational HTN     Indication for care in labor or delivery     Postpartum state     Past Surgical History:   Procedure Laterality Date     APPENDECTOMY       C NONSPECIFIC PROCEDURE      right ankle fracture      Social History   Substance Use Topics     Smoking status: Former Smoker     Quit date: 2000     Smokeless tobacco: Never Used     Alcohol use No      Problem (# of Occurrences) Relation (Name,Age of Onset)    Blood Disease (4) Paternal Grandmother, Paternal Grandfather, Maternal Uncle, Father: Hemophilia            Current Outpatient Prescriptions   Medication Sig     APNO ointment CREA cream Apply 10 g topically every hour as needed for skin  "care (apply to nipple immediately after feeding and wipe off access before next feed)     [START ON 10/5/2017] fluconazole (DIFLUCAN) 200 MG tablet Take 1 tablet (200 mg) by mouth daily for 13 days     fluconazole (DIFLUCAN) 200 MG tablet Take 2 tablets (400 mg) by mouth once for 1 dose     NIFEdipine ER osmotic (PROCARDIA XL) 30 MG 24 hr tablet Take 1 tablet (30 mg) by mouth daily     ibuprofen (ADVIL/MOTRIN) 600 MG tablet Take 1 tablet (600 mg) by mouth every 6 hours as needed for other (cramping)     Acetaminophen (TYLENOL PO) Take 1,000 mg by mouth every 8 hours as needed for mild pain or fever     Cholecalciferol (VITAMIN D PO) Take 2,000 Units by mouth daily     vitamin B complex with vitamin C (VITAMIN  B COMPLEX) TABS tablet Take 1 tablet by mouth daily     Prenatal Vit-Fe Fumarate-FA (PRENATAL MULTIVITAMIN  PLUS IRON) 27-0.8 MG TABS per tablet Take 1 tablet by mouth     albuterol (VENTOLIN HFA) 108 (90 BASE) MCG/ACT Inhaler Inhale 1-2 puffs into the lungs every 4 hours as needed for shortness of breath / dyspnea or wheezing Reported on 2/16/2017     No current facility-administered medications for this visit.      Allergies   Allergen Reactions     Blueberry Swelling     Codeine Hives     Latex Swelling and Rash       ROS:  12 point review of systems negative other than symptoms noted below.  Breast: Tenderness, nipple pain, worst when pumping and experiencing sharp shooting pains throughout breasts.     OBJECTIVE:     /72  Ht 5' 6\" (1.676 m)  Wt 271 lb (122.9 kg)  LMP 12/14/2016 (Approximate)  Breastfeeding? Yes  BMI 43.74 kg/m2  Body mass index is 43.74 kg/(m^2).    PHYSICAL EXAM:  Constitutional:  Appearance: Well nourished, well developed alert, in no acute distress  Breasts:  Inspection of Breasts:  No lymphadenopathy present; Palpation of Breasts and Axillae:  No masses present on palpation, no breast tenderness Axillary Lymph Nodes:  No lymphadenopathy present. Nipples appear cracked and " reddened, bilaterally.   Neurologic/Psychiatric:  Mental Status:  Oriented X3      In-Clinic Test Results:  No results found for this or any previous visit (from the past 24 hour(s)).    ASSESSMENT/PLAN:                                                        ICD-10-CM    1. Yeast infection of nipple, postpartum O91.02 APNO ointment CREA cream    B37.89 fluconazole (DIFLUCAN) 200 MG tablet     fluconazole (DIFLUCAN) 200 MG tablet       COUNSELING:    Because patient in exclusively pumping, encouraged to wash pumping equipment with 2 parts vinegar, 1 part water after each pumping session for several days until yeast is resolved.     Encouraged to take daughter into her peds provider to assess for oral thrush and treat concurrently.     If symptoms not improved in a 2-3 days, call back. Complete full course of Diflucan.     Ensure pump is on a low setting to avoid further damage to nipple tissue.    25 minutes was spent face to face with the patient today discussing her history, diagnosis, and follow-up plan as noted above. Over 50% of the visit was spent in counseling and coordination of care.    Total Visit Time: 25 minutes.         BETINA Hester  [facility]    I, Kayla Gonzales, am serving as a scribe; to document services personally performed by JANAE Benavides CNM- based on data collection and the provider's statements to me.    Provider Disclosure:  I agree with above History, Review of Systems, Physical exam and Plan. I have reviewed the content of the documentation and have edited it as needed. I have personally performed the services documented here and the documentation accurately represents those services and the decisions I have made.    JANAE Coulter, SHAYNA

## 2017-10-04 NOTE — MR AVS SNAPSHOT
"              After Visit Summary   10/4/2017    Ninoska Cottrell    MRN: [id number]           Patient Information     Date Of Birth          1980        Visit Information        Provider Department      10/4/2017 10:00 AM Amber Bolivar APRN CNM [facility]        Today's Diagnoses     Yeast infection of nipple, postpartum    -  1       Follow-ups after your visit        Follow-up notes from your care team     Return in about 2 weeks (around 10/18/2017) for Postpartum.      Your next 10 appointments already scheduled     Oct 18, 2017 10:00 AM CDT   Post Partum with JANAE Goodwin CNM   [facility] ([facility])    [address] [place] [address] [phone]              Who to contact     If you have questions or need follow up information about today's clinic visit or your schedule please contact [facility] directly at [phone].  Normal or non-critical lab and imaging results will be communicated to you by Digital Trowelhart, letter or phone within 4 business days after the clinic has received the results. If you do not hear from us within 7 days, please contact the clinic through Digital Trowelhart or phone. If you have a critical or abnormal lab result, we will notify you by phone as soon as possible.  Submit refill requests through Fonmatch or call your pharmacy and they will forward the refill request to us. Please allow 3 business days for your refill to be completed.          Additional Information About Your Visit        Digital Trowelhart Information     Fonmatch lets you send messages to your doctor, view your test results, renew your prescriptions, schedule appointments and more. To sign up, go to [email or web address] . Click on \"Log in\" on the left side of the screen, which will take you to the Welcome page. Then click on \"Sign up Now\" on the right side of the page.     You will be asked to " "enter the access code listed below, as well as some personal information. Please follow the directions to create your username and password.     Your access code is: [email or web address]  Expires: 2017  3:45 PM     Your access code will  in 90 days. If you need help or a new code, please call your [place] clinic or [phone].        Care EveryWhere ID     This is your Care EveryWhere ID. This could be used by other organizations to access your [place] medical records  [id number]        Your Vitals Were     Height Last Period Breastfeeding? BMI (Body Mass Index)          5' 6\" (1.676 m) 2016 (Approximate) Yes 43.74 kg/m2         Blood Pressure from Last 3 Encounters:   10/04/17 118/72   17 124/76   17 (!) 150/92    Weight from Last 3 Encounters:   10/04/17 271 lb (122.9 kg)   17 280 lb (127 kg)   17 286 lb (129.7 kg)              Today, you had the following     No orders found for display         Today's Medication Changes          These changes are accurate as of: 10/4/17 12:52 PM.  If you have any questions, ask your nurse or doctor.               Start taking these medicines.        Dose/Directions    APNO ointment Crea cream   Used for:  Yeast infection of nipple, postpartum   Started by:  Amber Bolivar APRN CNM        Dose:  10 g   Apply 10 g topically every hour as needed for skin care (apply to nipple immediately after feeding and wipe off access before next feed)   Quantity:  40 g   Refills:  1       * fluconazole 200 MG tablet   Commonly known as:  DIFLUCAN   Used for:  Yeast infection of nipple, postpartum   Started by:  Amber Bolivar APRN CNM        Dose:  400 mg   Take 2 tablets (400 mg) by mouth once for 1 dose   Quantity:  2 tablet   Refills:  0       * fluconazole 200 MG tablet   Commonly known as:  DIFLUCAN   Used for:  Yeast infection of nipple, postpartum   Started by:  Amber Bolivar APRN CNM        Dose:  200 mg   Start " taking on:  10/5/2017   Take 1 tablet (200 mg) by mouth daily for 13 days   Quantity:  1 tablet   Refills:  0       * Notice:  This list has 2 medication(s) that are the same as other medications prescribed for you. Read the directions carefully, and ask your doctor or other care provider to review them with you.         Where to get your medicines      These medications were sent to [place] [address] [place] [facility] [address] [place] [address]  [address] [facility], [facility] [address]     Phone:  [phone]     fluconazole 200 MG tablet    fluconazole 200 MG tablet         Some of these will need a paper prescription and others can be bought over the counter.  Ask your nurse if you have questions.     Bring a paper prescription for each of these medications     APNO ointment Crea cream                Primary Care Provider Office Phone # Fax #    Traci Padron [phone]       [place] [address] [place] [address]        Equal Access to Services     LAUREN CALDWELL AH: Hadii aad ku hadasho Soomaali, waaxda luqadaha, qaybta kaalmada adeegyada, waxay idiin hayaan balta elmore . So [facility] [phone].    ATENCIÓN: Si habla español, tiene a irvin disposición servicios gratuitos de asistencia lingüística. Llame al [phone].    We comply with applicable federal civil rights laws and [place] laws. We do not discriminate on the basis of race, color, national origin, age, disability, sex, sexual orientation, or gender identity.            Thank you!     Thank you for choosing [facility]  for your care. Our goal is always to provide you with excellent care. Hearing back from our patients is one way we can continue to improve our services. Please take a few minutes to complete the written survey that you may receive in the mail after your visit with us. Thank you!             Your Updated Medication List - Protect others around you: Learn how to  safely use, store and throw away your medicines at www.disposemymeds.org.          This list is accurate as of: 10/4/17 12:52 PM.  Always use your most recent med list.                   Brand Name Dispense Instructions for use Diagnosis    albuterol 108 (90 BASE) MCG/ACT Inhaler    VENTOLIN HFA    1 Inhaler    Inhale 1-2 puffs into the lungs every 4 hours as needed for shortness of breath / dyspnea or wheezing Reported on 2/16/2017    Upper respiratory tract infection, unspecified type, Intermittent asthma, uncomplicated       APNO ointment Crea cream     40 g    Apply 10 g topically every hour as needed for skin care (apply to nipple immediately after feeding and wipe off access before next feed)    Yeast infection of nipple, postpartum       * fluconazole 200 MG tablet    DIFLUCAN    2 tablet    Take 2 tablets (400 mg) by mouth once for 1 dose    Yeast infection of nipple, postpartum       * fluconazole 200 MG tablet   Start taking on:  10/5/2017    DIFLUCAN    1 tablet    Take 1 tablet (200 mg) by mouth daily for 13 days    Yeast infection of nipple, postpartum       ibuprofen 600 MG tablet    ADVIL/MOTRIN    100 tablet    Take 1 tablet (600 mg) by mouth every 6 hours as needed for other (cramping)    Postpartum state       NIFEdipine ER osmotic 30 MG 24 hr tablet    PROCARDIA XL    30 tablet    Take 1 tablet (30 mg) by mouth daily    Postpartum hypertension       prenatal multivitamin plus iron 27-0.8 MG Tabs per tablet      Take 1 tablet by mouth    Supervision of high-risk pregnancy of elderly multigravida       TYLENOL PO      Take 1,000 mg by mouth every 8 hours as needed for mild pain or fever        vitamin B complex with vitamin C Tabs tablet      Take 1 tablet by mouth daily        VITAMIN D PO      Take 2,000 Units by mouth daily        * Notice:  This list has 2 medication(s) that are the same as other medications prescribed for you. Read the directions carefully, and ask your doctor or other care  provider to review them with you.

## 2017-10-04 NOTE — TELEPHONE ENCOUNTER
Reason for Call:  Medication or medication refill:    Do you use a [facility]?  Name of the pharmacy and phone number for the current request:  [facility]    Name of the medication requested: APNO nipple ointment/cream    Other request: The pharmacist needs the recipe for the ointment.    Can we leave a detailed message on this number? YES    Phone number patient can be reached at: Other phone number:  [phone]    Best Time: anytime    Call taken on 10/4/2017 at 12:25 PM by CALIXTO VARGAS

## 2017-10-18 ENCOUNTER — PRENATAL OFFICE VISIT (OUTPATIENT)
Dept: OBGYN | Facility: CLINIC | Age: 37
End: 2017-10-18
Payer: COMMERCIAL

## 2017-10-18 VITALS
WEIGHT: 267 LBS | DIASTOLIC BLOOD PRESSURE: 70 MMHG | SYSTOLIC BLOOD PRESSURE: 118 MMHG | HEIGHT: 66 IN | BODY MASS INDEX: 42.91 KG/M2

## 2017-10-18 DIAGNOSIS — R42 LIGHTHEADEDNESS: ICD-10-CM

## 2017-10-18 DIAGNOSIS — R60.0 BILATERAL EDEMA OF LOWER EXTREMITY: ICD-10-CM

## 2017-10-18 DIAGNOSIS — Z13.29 SCREENING FOR THYROID DISORDER: ICD-10-CM

## 2017-10-18 DIAGNOSIS — O92.29 POSTPARTUM NIPPLE PAIN: ICD-10-CM

## 2017-10-18 DIAGNOSIS — N63.0 LUMP OR MASS IN BREAST: ICD-10-CM

## 2017-10-18 DIAGNOSIS — B37.0 THRUSH: ICD-10-CM

## 2017-10-18 DIAGNOSIS — Z13.21 ENCOUNTER FOR VITAMIN DEFICIENCY SCREENING: ICD-10-CM

## 2017-10-18 PROBLEM — O09.529 SUPERVISION OF HIGH-RISK PREGNANCY OF ELDERLY MULTIGRAVIDA: Status: RESOLVED | Noted: 2017-02-24 | Resolved: 2017-10-18

## 2017-10-18 LAB
ALBUMIN SERPL-MCNC: 3.8 G/DL (ref 3.4–5)
ALP SERPL-CCNC: 162 U/L (ref 40–150)
ALT SERPL W P-5'-P-CCNC: 27 U/L (ref 0–50)
ANION GAP SERPL CALCULATED.3IONS-SCNC: 8 MMOL/L (ref 3–14)
AST SERPL W P-5'-P-CCNC: 19 U/L (ref 0–45)
BILIRUB SERPL-MCNC: 0.4 MG/DL (ref 0.2–1.3)
BUN SERPL-MCNC: 11 MG/DL (ref 7–30)
CALCIUM SERPL-MCNC: 8.9 MG/DL (ref 8.5–10.1)
CHLORIDE SERPL-SCNC: 104 MMOL/L (ref 94–109)
CO2 SERPL-SCNC: 28 MMOL/L (ref 20–32)
CREAT SERPL-MCNC: 0.8 MG/DL (ref 0.52–1.04)
ERYTHROCYTE [DISTWIDTH] IN BLOOD BY AUTOMATED COUNT: 15.5 % (ref 10–15)
GFR SERPL CREATININE-BSD FRML MDRD: 80 ML/MIN/1.7M2
GLUCOSE SERPL-MCNC: 86 MG/DL (ref 70–99)
HCT VFR BLD AUTO: 39.6 % (ref 35–47)
HGB BLD-MCNC: 12.6 G/DL (ref 11.7–15.7)
MCH RBC QN AUTO: 26.8 PG (ref 26.5–33)
MCHC RBC AUTO-ENTMCNC: 31.8 G/DL (ref 31.5–36.5)
MCV RBC AUTO: 84 FL (ref 78–100)
PLATELET # BLD AUTO: 291 10E9/L (ref 150–450)
POTASSIUM SERPL-SCNC: 3.6 MMOL/L (ref 3.4–5.3)
PROT SERPL-MCNC: 7.8 G/DL (ref 6.8–8.8)
RBC # BLD AUTO: 4.71 10E12/L (ref 3.8–5.2)
SODIUM SERPL-SCNC: 140 MMOL/L (ref 133–144)
TSH SERPL DL<=0.005 MIU/L-ACNC: 1.17 MU/L (ref 0.4–4)
WBC # BLD AUTO: 6.3 10E9/L (ref 4–11)

## 2017-10-18 PROCEDURE — 99214 OFFICE O/P EST MOD 30 MIN: CPT | Performed by: ADVANCED PRACTICE MIDWIFE

## 2017-10-18 PROCEDURE — 80053 COMPREHEN METABOLIC PANEL: CPT | Performed by: ADVANCED PRACTICE MIDWIFE

## 2017-10-18 PROCEDURE — 84443 ASSAY THYROID STIM HORMONE: CPT | Performed by: ADVANCED PRACTICE MIDWIFE

## 2017-10-18 PROCEDURE — 82306 VITAMIN D 25 HYDROXY: CPT | Performed by: ADVANCED PRACTICE MIDWIFE

## 2017-10-18 PROCEDURE — 85027 COMPLETE CBC AUTOMATED: CPT | Performed by: ADVANCED PRACTICE MIDWIFE

## 2017-10-18 PROCEDURE — 36415 COLL VENOUS BLD VENIPUNCTURE: CPT | Performed by: ADVANCED PRACTICE MIDWIFE

## 2017-10-18 PROCEDURE — 99207 ZZC POST PARTUM EXAM: CPT | Performed by: ADVANCED PRACTICE MIDWIFE

## 2017-10-18 RX ORDER — NYSTATIN 100000 U/G
CREAM TOPICAL 3 TIMES DAILY
Qty: 30 G | Refills: 1 | Status: SHIPPED | OUTPATIENT
Start: 2017-10-18 | End: 2017-11-01

## 2017-10-18 RX ORDER — FLUCONAZOLE 100 MG/1
TABLET ORAL
Qty: 15 TABLET | Refills: 0 | Status: SHIPPED | OUTPATIENT
Start: 2017-10-18 | End: 2017-10-18

## 2017-10-18 RX ORDER — DICLOXACILLIN SODIUM 500 MG
500 CAPSULE ORAL 4 TIMES DAILY
Qty: 40 CAPSULE | Refills: 0 | Status: SHIPPED | OUTPATIENT
Start: 2017-10-18 | End: 2017-10-28

## 2017-10-18 RX ORDER — FLUCONAZOLE 150 MG/1
150 TABLET ORAL ONCE
Qty: 1 TABLET | Refills: 0 | Status: SHIPPED | OUTPATIENT
Start: 2017-10-18 | End: 2017-10-18

## 2017-10-18 ASSESSMENT — ANXIETY QUESTIONNAIRES
3. WORRYING TOO MUCH ABOUT DIFFERENT THINGS: NOT AT ALL
2. NOT BEING ABLE TO STOP OR CONTROL WORRYING: NOT AT ALL
IF YOU CHECKED OFF ANY PROBLEMS ON THIS QUESTIONNAIRE, HOW DIFFICULT HAVE THESE PROBLEMS MADE IT FOR YOU TO DO YOUR WORK, TAKE CARE OF THINGS AT HOME, OR GET ALONG WITH OTHER PEOPLE: SOMEWHAT DIFFICULT
GAD7 TOTAL SCORE: 2
7. FEELING AFRAID AS IF SOMETHING AWFUL MIGHT HAPPEN: NOT AT ALL
1. FEELING NERVOUS, ANXIOUS, OR ON EDGE: SEVERAL DAYS
5. BEING SO RESTLESS THAT IT IS HARD TO SIT STILL: NOT AT ALL
6. BECOMING EASILY ANNOYED OR IRRITABLE: NOT AT ALL

## 2017-10-18 ASSESSMENT — PATIENT HEALTH QUESTIONNAIRE - PHQ9
5. POOR APPETITE OR OVEREATING: SEVERAL DAYS
SUM OF ALL RESPONSES TO PHQ QUESTIONS 1-9: 10

## 2017-10-18 NOTE — PATIENT INSTRUCTIONS
COMMON BREASTFEEDING PROBLEMS    Women can have several different problems when they breastfeed.  Women often quit breastfeeding when these problems occur, but most problems can be treated and women can continue to breastfeed their babies.    Engorgement:    This is when the breasts are too full of milk.  Breasts can be swollen, hard, warm and painful. This can also make latch more difficult for your baby.  The only proven way to reduce this is to hand express or pump to let some milk out between feedings. Letting out too much milk will exacerbate the problem by producing even more milk.    You can also try:    Cold packs    Taking pain relieving medicine such as acetaminophen (Tylenol) or ibuprofen (Advil, Motrin)    Take a warm shower    Massage your breasts to start milk flow.     Breastfeed your baby on demand, make sure baby is emptying breast completely    Prevent engorgement by limiting pumping if you are breastfeeding on demand    Sore/Painful Nipples:    Some nipple soreness is normal during the first minute of breast feeding. Pain that is lasting longer is not normal after breast feeding is established.  Pain can be caused by nipple cracks and blisters.  This is usually due to an incorrect latch.    The best way to reduce nipple soreness is to make sure your baby is latching correctly on your breast.    You can also try:    Lanolin ointment    APNO (All purpose nipple ointment) which you need a prescription for    Apply a cold or warm cloth to your nipples    Wear breast pads to protect your nipples    Let your nipples air dry after feedings    Cleanse nipple with unscented soap      Blocked Milk Ducts:    A blocked milk duct can cause a red, painful lump in your breast.  It can also cause a white plug at the end of your nipple. If you have a blocked milk duct, breastfeed more often.  Make sure your baby empties your breast after each feeding.  Start your feedings with the breast with the plugged duct and  try various positions to empty the breast as much as you can. Massage your plugged duct in a warm shower to try to unplug it. If these methods do not work you may be at risk for a breast infection.     Breast Infection:    A breast infection is called Mastitis.  In addition to having a hard, red, swollen area of your breast you will most likely will also have fever and chills and not feel well.  Engorgement and incomplete breast emptying can contribute to the problem and make your symptoms worse.    DO NOT STOP breastfeeding when you have mastitis!    Make an appointment with your midwife to be seen as soon as possible.  You will be started immediately on an antibiotic.    You may also:    Use pain medication such as  such as acetaminophen (Tylenol) or ibuprofen (Advil, Motrin)    Massage your breasts during feedings    Use warm compress to encourage milk let down prior to feeding     Use a breast pump to empty your breasts more completely after feedings    Rest for the next day or so and increase your fluids   -Take Chamomile tea, Hops infusion or lemon balm infusion     Watch for increased pain, increasing size of the breast, increasing firmness or a developing mass to suggest an abscess formation.    You should improve quickly on antibiotics.  If you are not improving with in the next 1-2 days or your fever increases > 101 F call the clinic.      For reliable information on breast feeding visit:    [facility]Adwanted  Http://www.lllofmndas.org/ ([facility] and the [place])      If you are struggling with breastfeeding and need extra support you may also consider seeking the advice of a lactation consultation.    Please call with further concerns:    [facility]  [phone]

## 2017-10-18 NOTE — MR AVS SNAPSHOT
After Visit Summary   10/18/2017    Ninoska Cottrell    MRN: [id number]           Patient Information     Date Of Birth          1980        Visit Information        Provider Department      10/18/2017 10:00 AM Amber Bolivar APRN CNM [facility]        Today's Diagnoses     Routine postpartum follow-up    -  1    Thrush        Screening for thyroid disorder        Postpartum nipple pain        Encounter for vitamin deficiency screening        Lump or mass in breast        Bilateral edema of lower extremity        Lightheadedness          Care Instructions    COMMON BREASTFEEDING PROBLEMS    Women can have several different problems when they breastfeed.  Women often quit breastfeeding when these problems occur, but most problems can be treated and women can continue to breastfeed their babies.    Engorgement:    This is when the breasts are too full of milk.  Breasts can be swollen, hard, warm and painful. This can also make latch more difficult for your baby.  The only proven way to reduce this is to hand express or pump to let some milk out between feedings. Letting out too much milk will exacerbate the problem by producing even more milk.    You can also try:    Cold packs    Taking pain relieving medicine such as acetaminophen (Tylenol) or ibuprofen (Advil, Motrin)    Take a warm shower    Massage your breasts to start milk flow.     Breastfeed your baby on demand, make sure baby is emptying breast completely    Prevent engorgement by limiting pumping if you are breastfeeding on demand    Sore/Painful Nipples:    Some nipple soreness is normal during the first minute of breast feeding. Pain that is lasting longer is not normal after breast feeding is established.  Pain can be caused by nipple cracks and blisters.  This is usually due to an incorrect latch.    The best way to reduce nipple soreness is to make sure your baby is latching correctly on your  breast.    You can also try:    Lanolin ointment    APNO (All purpose nipple ointment) which you need a prescription for    Apply a cold or warm cloth to your nipples    Wear breast pads to protect your nipples    Let your nipples air dry after feedings    Cleanse nipple with unscented soap      Blocked Milk Ducts:    A blocked milk duct can cause a red, painful lump in your breast.  It can also cause a white plug at the end of your nipple. If you have a blocked milk duct, breastfeed more often.  Make sure your baby empties your breast after each feeding.  Start your feedings with the breast with the plugged duct and try various positions to empty the breast as much as you can. Massage your plugged duct in a warm shower to try to unplug it. If these methods do not work you may be at risk for a breast infection.     Breast Infection:    A breast infection is called Mastitis.  In addition to having a hard, red, swollen area of your breast you will most likely will also have fever and chills and not feel well.  Engorgement and incomplete breast emptying can contribute to the problem and make your symptoms worse.    DO NOT STOP breastfeeding when you have mastitis!    Make an appointment with your midwife to be seen as soon as possible.  You will be started immediately on an antibiotic.    You may also:    Use pain medication such as  such as acetaminophen (Tylenol) or ibuprofen (Advil, Motrin)    Massage your breasts during feedings    Use warm compress to encourage milk let down prior to feeding     Use a breast pump to empty your breasts more completely after feedings    Rest for the next day or so and increase your fluids   -Take Chamomile tea, Hops infusion or lemon balm infusion     Watch for increased pain, increasing size of the breast, increasing firmness or a developing mass to suggest an abscess formation.    You should improve quickly on antibiotics.  If you are not improving with in the next 1-2 days or  "your fever increases > 101 F call the clinic.      For reliable information on breast feeding visit:    Breastfeedingonline.com  Http://www.lllofmndas.org/ ([facility] and the [place])      If you are struggling with breastfeeding and need extra support you may also consider seeking the advice of a lactation consultation.    Please call with further concerns:    [facility]  [phone]            Follow-ups after your visit        Follow-up notes from your care team     Return in about 1 year (around 10/18/2018) for Physical Exam.      Your next 10 appointments already scheduled     Oct 25, 2017 10:00 AM CDT   IUD INSERTION AND REMOVAL with JANAE Bettencourt CNM   [facility]    [address] [place] [address] [phone]              Who to contact     If you have questions or need follow up information about today's clinic visit or your schedule please contact [facility] directly at [phone].  Normal or non-critical lab and imaging results will be communicated to you by MyChart, letter or phone within 4 business days after the clinic has received the results. If you do not hear from us within 7 days, please contact the clinic through MyChart or phone. If you have a critical or abnormal lab result, we will notify you by phone as soon as possible.  Submit refill requests through Pro Stream + or call your pharmacy and they will forward the refill request to us. Please allow 3 business days for your refill to be completed.          Additional Information About Your Visit        [facility] [employer] [place] Information     Pro Stream + lets you send messages to your doctor, view your test results, renew your prescriptions, schedule appointments and more. To sign up, go to [email or web address] . Click on \"Log in\" on the left side of the screen, which will take you to the Welcome page. Then click " "on \"Sign up Now\" on the right side of the page.     You will be asked to enter the access code listed below, as well as some personal information. Please follow the directions to create your username and password.     Your access code is: [email or web address]  Expires: 2017  3:45 PM     Your access code will  in 90 days. If you need help or a new code, please call your [place] clinic or [phone].        Care EveryWhere ID     This is your Care EveryWhere ID. This could be used by other organizations to access your [place] medical records  [id number]        Your Vitals Were     Height Last Period BMI (Body Mass Index)             5' 6\" (1.676 m) 2016 (Approximate) 43.09 kg/m2          Blood Pressure from Last 3 Encounters:   10/18/17 118/70   10/04/17 118/72   17 124/76    Weight from Last 3 Encounters:   10/18/17 267 lb (121.1 kg)   10/04/17 271 lb (122.9 kg)   17 280 lb (127 kg)              We Performed the Following     CBC with platelets     Comprehensive metabolic panel     TSH with free T4 reflex     Vitamin D Deficiency          Today's Medication Changes          These changes are accurate as of: 10/18/17 11:41 AM.  If you have any questions, ask your nurse or doctor.               Start taking these medicines.        Dose/Directions    dicloxacillin 500 MG capsule   Commonly known as:  DYNAPEN   Used for:  Postpartum nipple pain   Started by:  Amber Bolivar APRN CNM        Dose:  500 mg   Take 1 capsule (500 mg) by mouth 4 times daily for 10 days   Quantity:  40 capsule   Refills:  0       fluconazole 150 MG tablet   Commonly known as:  DIFLUCAN   Used for:  Thrush   Started by:  Amber Bolivar APRN CNM        Dose:  150 mg   Take 1 tablet (150 mg) by mouth once for 1 dose   Quantity:  1 tablet   Refills:  0       nystatin cream   Commonly known as:  MYCOSTATIN   Used for:  Thrush   Started by:  Amber Bolivar APRN CNM        Apply topically 3 " times daily for 14 days   Quantity:  30 g   Refills:  1            Where to get your medicines      These medications were sent to [place] [address] [place] [facility] [address] [facility]  [address] [facility], [facility] [address]     Phone:  [phone]     dicloxacillin 500 MG capsule    fluconazole 150 MG tablet    nystatin cream                Primary Care Provider Office Phone # Fax #    Traci Padron [phone]       [place] [address] [address] [facility] [address]        Equal Access to Services     [facility]: Hadii aad ku hadasho Soomaali, waaxda luqadaha, qaybta kaalmada adeegyada, waxay idiin hayaan adeeg kharagelacio elmore . So [facility] [phone].    ATENCIÓN: Si habla español, tiene a irvin disposición servicios gratuitos de asistencia lingüística. [facility] [phone].    We comply with applicable federal civil rights laws and [place] laws. We do not discriminate on the basis of race, color, national origin, age, disability, sex, sexual orientation, or gender identity.            Thank you!     Thank you for choosing [facility]  for your care. Our goal is always to provide you with excellent care. Hearing back from our patients is one way we can continue to improve our services. Please take a few minutes to complete the written survey that you may receive in the mail after your visit with us. Thank you!             Your Updated Medication List - Protect others around you: Learn how to safely use, store and throw away your medicines at www.disposemymeds.org.          This list is accurate as of: 10/18/17 11:41 AM.  Always use your most recent med list.                   Brand Name Dispense Instructions for use Diagnosis    albuterol 108 (90 BASE) MCG/ACT Inhaler    VENTOLIN HFA    1 Inhaler    Inhale 1-2 puffs into the lungs every 4 hours as needed for shortness of breath / dyspnea or wheezing Reported on 2/16/2017    Upper respiratory  tract infection, unspecified type, Intermittent asthma, uncomplicated       APNO ointment Crea cream     40 g    Apply 10 g topically every hour as needed for skin care (apply to nipple immediately after feeding and wipe off access before next feed)    Yeast infection of nipple, postpartum       dicloxacillin 500 MG capsule    DYNAPEN    40 capsule    Take 1 capsule (500 mg) by mouth 4 times daily for 10 days    Postpartum nipple pain       fluconazole 150 MG tablet    DIFLUCAN    1 tablet    Take 1 tablet (150 mg) by mouth once for 1 dose    Thrush       ibuprofen 600 MG tablet    ADVIL/MOTRIN    100 tablet    Take 1 tablet (600 mg) by mouth every 6 hours as needed for other (cramping)    Postpartum state       NIFEdipine ER osmotic 30 MG 24 hr tablet    PROCARDIA XL    30 tablet    Take 1 tablet (30 mg) by mouth daily    Postpartum hypertension       nystatin cream    MYCOSTATIN    30 g    Apply topically 3 times daily for 14 days    Thrush       prenatal multivitamin plus iron 27-0.8 MG Tabs per tablet      Take 1 tablet by mouth    Supervision of high-risk pregnancy of elderly multigravida       TYLENOL PO      Take 1,000 mg by mouth every 8 hours as needed for mild pain or fever        vitamin B complex with vitamin C Tabs tablet      Take 1 tablet by mouth daily        VITAMIN D PO      Take 2,000 Units by mouth daily

## 2017-10-18 NOTE — PROGRESS NOTES
"Midwife Postpartum 6 Week Visit    Ninoska Cottrell is a 37 year old here for a postpartum checkup. Stopped using APNO because it burned when she used it, still very tender and itchy. Pumping exclusively, but feels production has gone down on left side, about 2oz a session. Formula feeding in addition for baby.     Delivery date was 2017. She had a  of a viable girl, weight 6 pounds 8 oz. Named: Roseline, with Group B Strep and gestational hypertension, started on meds 1 week PP.    Routine PP: Since delivery, she has been breast feeding/pumping.  She has not had any signs of infection, her lochia stopped after 4 weeks.  She has had other complications including nipple yeast and hypertension. First period started 10/10 lasting one week and light. She is voiding and having bowel movements without difficulty. Reports significant back pain for which she is seeing PCP soon.     BP: Is still taking anti-HTN medication, recently refilled. Dr. Padron who prescribed the med is on maternity leave. Patient's BP is WNL today. Notes significant edema of both lower extremities that has not resolved since delivery.     Diet/exercise: Is making changes to her diet including less fast food, more vegetables, watching starches, more home cooked foods. Plans to start walking more and wants to weigh 240lb.     Sexual health/family planning: Contraception was discussed and patient desires Mirena IUD.   She  has not had intercourse since delivery. She complains of No perineal discomfort.     Mental Health: Mood is Stable, although reports feeling \"numb\" and very tired. Hx of MDD but has not been on medications recently and not desiring of medications today. Feels her mood is well-controlled at this time. Not currently in counseling. No thoughts of harming herself or child.  Patient screened for postpartum depression.   Depression Rating was:   Last PHQ-9 score on record = 10  Discussed mood    Last GAD7 score on record = 2  AMY-7 SCORE " 10/18/2017   Total Score 2     Alcohol Score = 0    Declines repeat pap smear today with HPV    ROS:  12 point review of systems negative other than symptoms noted below.  Breast: Tenderness       Current Outpatient Prescriptions:      dicloxacillin (DYNAPEN) 500 MG capsule, Take 1 capsule (500 mg) by mouth 4 times daily for 10 days, Disp: 40 capsule, Rfl: 0     nystatin (MYCOSTATIN) cream, Apply topically 3 times daily for 14 days, Disp: 30 g, Rfl: 1     fluconazole (DIFLUCAN) 150 MG tablet, Take 1 tablet (150 mg) by mouth once for 1 dose, Disp: 1 tablet, Rfl: 0     NIFEdipine ER osmotic (PROCARDIA XL) 30 MG 24 hr tablet, Take 1 tablet (30 mg) by mouth daily, Disp: 30 tablet, Rfl: 1     ibuprofen (ADVIL/MOTRIN) 600 MG tablet, Take 1 tablet (600 mg) by mouth every 6 hours as needed for other (cramping), Disp: 100 tablet, Rfl: 0     Acetaminophen (TYLENOL PO), Take 1,000 mg by mouth every 8 hours as needed for mild pain or fever, Disp: , Rfl:      Cholecalciferol (VITAMIN D PO), Take 2,000 Units by mouth daily, Disp: , Rfl:      vitamin B complex with vitamin C (VITAMIN  B COMPLEX) TABS tablet, Take 1 tablet by mouth daily, Disp: , Rfl:      Prenatal Vit-Fe Fumarate-FA (PRENATAL MULTIVITAMIN  PLUS IRON) 27-0.8 MG TABS per tablet, Take 1 tablet by mouth, Disp: , Rfl:      albuterol (VENTOLIN HFA) 108 (90 BASE) MCG/ACT Inhaler, Inhale 1-2 puffs into the lungs every 4 hours as needed for shortness of breath / dyspnea or wheezing Reported on 2017, Disp: 1 Inhaler, Rfl: 3     APNO ointment CREA cream, Apply 10 g topically every hour as needed for skin care (apply to nipple immediately after feeding and wipe off access before next feed) (Patient not taking: Reported on 10/18/2017), Disp: 40 g, Rfl: 1.   Obstetric History       T2      L3     SAB0   TAB0   Ectopic0   Multiple0   Live Births3       # Outcome Date GA Lbr Anthony/2nd Weight Sex Delivery Anes PTL Lv   4 Term 17 37w2d 00:30 / 00:16 6 lb 8.4  "oz (2.96 kg) F Vag-Spont EPI N MILLER      Name: Roseline      Complications: GBS      Apgar1:  8                Apgar5: 9   3 Term 13 40w0d / 00:51 6 lb 4.7 oz (2.855 kg) F Vag-Vacuum EPI N MILLER      Name: GISSEL YOUNGBLOOD      Apgar1:  8                Apgar5: 9   2  12 36w6d 03:30 / 02:06 6 lb 2.1 oz (2.78 kg) F Vag-Spont   MILLER      Name: Melissa      Apgar1:  9                Apgar5: 9   1 SAB 2011                Last pap:    Lab Results   Component Value Date    PAP NIL 2016     Hgb in hospital was 9.2    EXAM:  /70  Ht 5' 6\" (1.676 m)  Wt 267 lb (121.1 kg)  LMP 2016 (Approximate)  BMI 43.09 kg/m2  BMI: Body mass index is 43.09 kg/(m^2).  Constitutional: healthy, alert and no distress  Neck: symmetrical, thyroid normal size, no masses present, no lymphadenopathy present.   Breast:no palpable axillary masses or adenopathy, mass right breast 8cm in diameter at 2 o'clock with discoloration on skin and tenderness to palpation, patient lactating. Nipples have significant cracking and dryness bilaterally with mild erythema.   Abdomen: soft, tenderness reported in RUQ with palpation    PELVIC EXAM:  Vulva: No lesions, well healed, BUS WNL, no tenderness, appears reddened bilaterally  Vagina: Moist, pink, discharge normal  well rugated, no lesions  Cervix: not visualized  Uterus: Involuted to normal size, non-tender, no masses palpated  Ovaries: No masses palpated, slight tenderness on left side upon abdominal palpation  Rectal exam: deferred    ASSESSMENT:   Normal postpartum exam after .    ICD-10-CM    1. Routine postpartum follow-up Z39.2 Comprehensive metabolic panel     CBC with platelets   2. Thrush B37.0 nystatin (MYCOSTATIN) cream     fluconazole (DIFLUCAN) 150 MG tablet     DISCONTINUED: fluconazole (DIFLUCAN) 100 MG tablet   3. Screening for thyroid disorder Z13.29 TSH with free T4 reflex   4. Postpartum nipple pain O92.29 dicloxacillin (DYNAPEN) 500 MG capsule     " DISCONTINUED: fluconazole (DIFLUCAN) 100 MG tablet   5. Encounter for vitamin deficiency screening Z13.21 Vitamin D Deficiency   6. Lump or mass in breast N63.0    7. Bilateral edema of lower extremity R60.0    8. Lightheadedness R42        PLAN:  Plan to stop BP medications and check BP at home (they do have a cuff). Edema likely unrelated since BP today was WNL.  Take antibiotics for mastitis  Plan to f/u with breast ultrasound to evaluate for abscess  Begin miconazole immediately for nipple relief and treatment of thrush.   Continue applying lanolin and breastmilk to nipples. If pain in right breast does not resolve in 48 hours, call clinic and schedule breast ultrasound to rule out abscess.   Return in 1-2 weeks for Mirena IUD insertion and BP check.   Teaching: exercise, birth control, mental health and weight/diet  Family Planning: Desires Mirena IUD  Call clinic if mood worsens or begins to feel it is negatively impacting her daily activities or ability to care for children.   Encourage abdominal exercise and walking. Discussed benefits of weight loss for lowering BP.  May resume intercourse as patient feels ready, use liberal amounts of lubricant and encouraged foreplay.   Continue a multivitamin/prenatal supplement, especially if breastfeeding.  Pap smear was not obtained today, last Pap 6/2016 NILM.  Labs ordered for CMP, TSH, Vitamin D,  And CBC  Plan to f/u with PCP ASAP to evaluate for swelling as this is not normal 6 weeks postpartum and most likely not related to postpartum state  Reviewed pap guidelines; plan for pap in 2019 with HPV      60 minutes was spent face to face with the patient today discussing her history, diagnosis, and follow-up plan as noted above. Over 50% of the visit was spent in counseling and coordination of care.    Total Visit Time: 60 minutes.       BETINA Hester  [facility] WomenKayla Joy, am serving as a scribe; to document services personally  performed by Anamika CARDOSO CNM- based on data collection and the provider's statements to me.    Provider Disclosure:  I agree with above History, Review of Systems, Physical exam and Plan. I have reviewed the content of the documentation and have edited it as needed. I have personally performed the services documented here and the documentation accurately represents those services and the decisions I have made.    Amber Bolivar, JANAE, CNM

## 2017-10-19 LAB — DEPRECATED CALCIDIOL+CALCIFEROL SERPL-MC: 41 UG/L (ref 20–75)

## 2017-10-19 ASSESSMENT — ANXIETY QUESTIONNAIRES: GAD7 TOTAL SCORE: 2

## 2017-10-25 ENCOUNTER — OFFICE VISIT (OUTPATIENT)
Dept: OBGYN | Facility: CLINIC | Age: 37
End: 2017-10-25
Payer: COMMERCIAL

## 2017-10-25 VITALS
WEIGHT: 269 LBS | SYSTOLIC BLOOD PRESSURE: 122 MMHG | BODY MASS INDEX: 43.23 KG/M2 | HEIGHT: 66 IN | DIASTOLIC BLOOD PRESSURE: 72 MMHG

## 2017-10-25 DIAGNOSIS — O92.79 CLOGGED DUCT, POSTPARTUM: ICD-10-CM

## 2017-10-25 DIAGNOSIS — Z30.430 ENCOUNTER FOR IUD INSERTION: Primary | ICD-10-CM

## 2017-10-25 DIAGNOSIS — G43.909 MIGRAINE WITHOUT STATUS MIGRAINOSUS, NOT INTRACTABLE, UNSPECIFIED MIGRAINE TYPE: ICD-10-CM

## 2017-10-25 PROBLEM — O13.9 GESTATIONAL HTN: Status: RESOLVED | Noted: 2017-08-31 | Resolved: 2017-10-25

## 2017-10-25 PROBLEM — O99.820 STREPTOCOCCUS B CARRIER STATE COMPLICATING PREGNANCY: Status: RESOLVED | Noted: 2017-08-20 | Resolved: 2017-10-25

## 2017-10-25 PROCEDURE — 99213 OFFICE O/P EST LOW 20 MIN: CPT | Mod: 25 | Performed by: NURSE PRACTITIONER

## 2017-10-25 PROCEDURE — 58300 INSERT INTRAUTERINE DEVICE: CPT | Performed by: NURSE PRACTITIONER

## 2017-10-25 NOTE — MR AVS SNAPSHOT
After Visit Summary   10/25/2017    Ninoska Cottrell    MRN: [id number]           Patient Information     Date Of Birth          1980        Visit Information        Provider Department      10/25/2017 10:00 AM Samra Martinez APRN [facility]        Today's Diagnoses     Encounter for IUD insertion    -  1      Care Instructions    Your Intrauterine Device (IUD)     What to watch for right after IUD placement:      Some women may experience uterine cramps, bleeding, and/or dizziness during and right after IUD placement       To help minimize the cramps, you may take ibuprofen 600 mg with food prior to your appointment. These symptoms should improve over the next 24 hours.  Mild cramping may be present for a few days after IUD placement. You may continue taking ibuprofen as directed if needed      You may experience spotting or bleeding for the first few weeks after IUD placement      Use condoms or abstain from sex for 7 days after the insertion of your Mirena or July IUD      If you experience fever, abdominal pain, worsening pelvic pain, dizziness, unusually heavy vaginal bleeding, suspected expulsion of device or four smelling vaginal discharge please come to the clinic for evaluation      Please schedule an appointment at the clinic for a string check 4-6 weeks after IUD placement    Your periods may change (July/Mirena):      For the first 3 to 6 months, your monthly period may become irregular. You may also have frequent spotting or light bleeding. A few women have heavy bleeding during this time. After your body adjusts, the number of bleeding days is likely to decrease (but may remain irregular), and you may even find that your periods stop altogether for as long as July/Mirena is in place.  Your periods will return rapidly once the IUD is removed.      ParaGard IUD users:      ParaGard IUD users may experience heavier than normal cycles while their IUD is  in place, this is considered normal     Back-up contraception is not needed                Checking for your strings:      We encourage everyone with an IUD in place to check for their strings monthly      You may check your own IUD strings by inserting a finger into the vagina and feeling the strings as they exit the cervix.  The strings will initially feel firm, like fishing line, but will soften over a few weeks.  After the strings have softened, you or your partner should not be able to feel the strings during intercourse.       If the string length greatly changes or if you cannot feel your strings at all please make an appointment to see you midwife and use a backup method of contraception like a condom.      If you can feel something hard/plastic like the IUD may not be in the correct place. You should then see your healthcare provider to have the position confirmed with ultrasound.       Remember:    IUD's do not protect against HIV or STIs.  IUD's do not prevent the formation of ovarian cysts.  IUD's do not typically reduce acne or cause weight gain or mood changes.    For more information:  [email or web address]      If you have questions or concerns please call:    [facility]   [phone]            Follow-ups after your visit        Who to contact     If you have questions or need follow up information about today's clinic visit or your schedule please contact [facility] directly at [phone].  Normal or non-critical lab and imaging results will be communicated to you by MyChart, letter or phone within 4 business days after the clinic has received the results. If you do not hear from us within 7 days, please contact the clinic through MyChart or phone. If you have a critical or abnormal lab result, we will notify you by phone as soon as possible.  Submit refill requests through [employer] or call your pharmacy and they will forward the refill request to us.  "Please allow 3 business days for your refill to be completed.          Additional Information About Your Visit        [employer] Information     [employer] lets you send messages to your doctor, view your test results, renew your prescriptions, schedule appointments and more. To sign up, go to [email or web address] . Click on \"Log in\" on the left side of the screen, which will take you to the Welcome page. Then click on \"Sign up Now\" on the right side of the page.     You will be asked to enter the access code listed below, as well as some personal information. Please follow the directions to create your username and password.     Your access code is: [email or web address]  Expires: 2017  3:45 PM     Your access code will  in 90 days. If you need help or a new code, please call your [facility] clinic or [phone].        Care EveryWhere ID     This is your Care EveryWhere ID. This could be used by other organizations to access your [facility] medical records  [id number]        Your Vitals Were     Height Last Period Breastfeeding? BMI (Body Mass Index)          5' 6\" (1.676 m) 10/03/2017 Yes 43.42 kg/m2         Blood Pressure from Last 3 Encounters:   10/25/17 122/72   10/18/17 118/70   10/04/17 118/72    Weight from Last 3 Encounters:   10/25/17 269 lb (122 kg)   10/18/17 267 lb (121.1 kg)   10/04/17 271 lb (122.9 kg)              We Performed the Following     HC LEVONORGESTREL IU 52MG 5 YR     INSERTION INTRAUTERINE DEVICE          Today's Medication Changes          These changes are accurate as of: 10/25/17 10:52 AM.  If you have any questions, ask your nurse or doctor.               Start taking these medicines.        Dose/Directions    levonorgestrel 20 MCG/24HR IUD   Commonly known as:  MIRENA (52 MG)   Used for:  Encounter for IUD insertion   Started by:  Samra Martinez APRN CNM        Dose:  1 each   1 each (20 mcg) by Intrauterine route once for 1 dose Lot #: [id number]    NDC#: 60353-620-49 "   Quantity:  1 each   Refills:  0         Stop taking these medicines if you haven't already. Please contact your care team if you have questions.     APNO ointment Crea cream   Stopped by:  Samra Martinez APRN CNM                Where to get your medicines      Some of these will need a paper prescription and others can be bought over the counter.  Ask your nurse if you have questions.     You don't need a prescription for these medications     levonorgestrel 20 MCG/24HR IUD                Primary Care Provider Office Phone # Fax #    Traci Padron [phone]       [place] [address] [address] [facility] [address]        Equal Access to Services     [facility] : Hadii aad mookie hadasho Solisette, waaxda luqadaha, qaybta kaalmada adeegyada, melida elmore . So [facility] [place] [phone].    ATENCIÓN: Si habla español, tiene a irvin disposición servicios gratuitos de asistencia lingüística. Llame al [phone].    We comply with applicable federal civil rights laws and [place] laws. We do not discriminate on the basis of race, color, national origin, age, disability, sex, sexual orientation, or gender identity.            Thank you!     Thank you for choosing [facility]  for your care. Our goal is always to provide you with excellent care. Hearing back from our patients is one way we can continue to improve our services. Please take a few minutes to complete the written survey that you may receive in the mail after your visit with us. Thank you!             Your Updated Medication List - Protect others around you: Learn how to safely use, store and throw away your medicines at www.disposemymeds.org.          This list is accurate as of: 10/25/17 10:52 AM.  Always use your most recent med list.                   Brand Name Dispense Instructions for use Diagnosis    albuterol 108 (90 BASE) MCG/ACT Inhaler    VENTOLIN HFA    1  Inhaler    Inhale 1-2 puffs into the lungs every 4 hours as needed for shortness of breath / dyspnea or wheezing Reported on 2/16/2017    Upper respiratory tract infection, unspecified type, Intermittent asthma, uncomplicated       dicloxacillin 500 MG capsule    DYNAPEN    40 capsule    Take 1 capsule (500 mg) by mouth 4 times daily for 10 days    Postpartum nipple pain       ibuprofen 600 MG tablet    ADVIL/MOTRIN    100 tablet    Take 1 tablet (600 mg) by mouth every 6 hours as needed for other (cramping)    Postpartum state       levonorgestrel 20 MCG/24HR IUD    MIRENA (52 MG)    1 each    1 each (20 mcg) by Intrauterine route once for 1 dose Lot #: [id number]    NDC#: 06198-479-30    Encounter for IUD insertion       nystatin cream    MYCOSTATIN    30 g    Apply topically 3 times daily for 14 days    Thrush       prenatal multivitamin plus iron 27-0.8 MG Tabs per tablet      Take 1 tablet by mouth    Supervision of high-risk pregnancy of elderly multigravida       TYLENOL PO      Take 1,000 mg by mouth every 8 hours as needed for mild pain or fever        vitamin B complex with vitamin C Tabs tablet      Take 1 tablet by mouth daily        VITAMIN D PO      Take 2,000 Units by mouth daily

## 2017-10-25 NOTE — PATIENT INSTRUCTIONS
Your Intrauterine Device (IUD)     What to watch for right after IUD placement:      Some women may experience uterine cramps, bleeding, and/or dizziness during and right after IUD placement       To help minimize the cramps, you may take ibuprofen 600 mg with food prior to your appointment. These symptoms should improve over the next 24 hours.  Mild cramping may be present for a few days after IUD placement. You may continue taking ibuprofen as directed if needed      You may experience spotting or bleeding for the first few weeks after IUD placement      Use condoms or abstain from sex for 7 days after the insertion of your Mirena or July IUD      If you experience fever, abdominal pain, worsening pelvic pain, dizziness, unusually heavy vaginal bleeding, suspected expulsion of device or four smelling vaginal discharge please come to the clinic for evaluation      Please schedule an appointment at the clinic for a string check 4-6 weeks after IUD placement    Your periods may change (July/Mirena):      For the first 3 to 6 months, your monthly period may become irregular. You may also have frequent spotting or light bleeding. A few women have heavy bleeding during this time. After your body adjusts, the number of bleeding days is likely to decrease (but may remain irregular), and you may even find that your periods stop altogether for as long as July/Mirena is in place.  Your periods will return rapidly once the IUD is removed.      ParaGard IUD users:      ParaGard IUD users may experience heavier than normal cycles while their IUD is in place, this is considered normal     Back-up contraception is not needed                Checking for your strings:      We encourage everyone with an IUD in place to check for their strings monthly      You may check your own IUD strings by inserting a finger into the vagina and feeling the strings as they exit the cervix.  The strings will initially feel firm, like fishing  line, but will soften over a few weeks.  After the strings have softened, you or your partner should not be able to feel the strings during intercourse.       If the string length greatly changes or if you cannot feel your strings at all please make an appointment to see you midwife and use a backup method of contraception like a condom.      If you can feel something hard/plastic like the IUD may not be in the correct place. You should then see your healthcare provider to have the position confirmed with ultrasound.       Remember:    IUD's do not protect against HIV or STIs.  IUD's do not prevent the formation of ovarian cysts.  IUD's do not typically reduce acne or cause weight gain or mood changes.    For more information:  [email or web address]      If you have questions or concerns please call:    [facility]   [phone]

## 2017-10-25 NOTE — PROGRESS NOTES
SUBJECTIVE:                                                   Ninoska Cottrell is a 37 year old who presents to clinic today for the following health issue(s):  Patient presents with:  Hypertension: follow-up  IUD: Mirena insertion      HPI:  Patient was seen last week for postpartum visit.  At that visit she was having swelling and was still on antihypertensives for gestational HTN.  Since last week, she has been off of antihypertensives and denies any s/s HTN; she was also tracking BPs at home and did not have any elevated BPs.  She also was having problems with clogged milk duct last week.  She was started on dicloxacillin and has been pumping every 4-6 hours and has had improvement in clogged duct.      Patient's last menstrual period was 10/03/2017.  Menstrual History: frequency: every 28 days; has had 1 cycle since having her baby  Patient is currently not sexually active  .  Using nothing for contraception, recent pregnancy.  Health maintenance updated:  yes      Problem list and histories reviewed & adjusted, as indicated.  Additional history: as documented.    Patient Active Problem List   Diagnosis     Intermittent asthma     Migraine headache     Encounter for IUD insertion     Lactating mother     Clogged duct, postpartum     Past Surgical History:   Procedure Laterality Date     APPENDECTOMY       C NONSPECIFIC PROCEDURE      right ankle fracture      Social History   Substance Use Topics     Smoking status: Former Smoker     Quit date: 2000     Smokeless tobacco: Never Used     Alcohol use No      Problem (# of Occurrences) Relation (Name,Age of Onset)    Blood Disease (4) Paternal Grandmother, Paternal Grandfather, Maternal Uncle, Father: Hemophilia            Current Outpatient Prescriptions   Medication Sig     levonorgestrel (MIRENA, 52 MG,) 20 MCG/24HR IUD 1 each (20 mcg) by Intrauterine route once for 1 dose Lot #: [id number]    NDC#: 82723-067-59     dicloxacillin (DYNAPEN) 500 MG capsule  "Take 1 capsule (500 mg) by mouth 4 times daily for 10 days     nystatin (MYCOSTATIN) cream Apply topically 3 times daily for 14 days     ibuprofen (ADVIL/MOTRIN) 600 MG tablet Take 1 tablet (600 mg) by mouth every 6 hours as needed for other (cramping)     Acetaminophen (TYLENOL PO) Take 1,000 mg by mouth every 8 hours as needed for mild pain or fever     Cholecalciferol (VITAMIN D PO) Take 2,000 Units by mouth daily     vitamin B complex with vitamin C (VITAMIN  B COMPLEX) TABS tablet Take 1 tablet by mouth daily     Prenatal Vit-Fe Fumarate-FA (PRENATAL MULTIVITAMIN  PLUS IRON) 27-0.8 MG TABS per tablet Take 1 tablet by mouth     albuterol (VENTOLIN HFA) 108 (90 BASE) MCG/ACT Inhaler Inhale 1-2 puffs into the lungs every 4 hours as needed for shortness of breath / dyspnea or wheezing Reported on 2/16/2017     No current facility-administered medications for this visit.      Allergies   Allergen Reactions     Blueberry Swelling     Codeine Hives     Latex Swelling and Rash       ROS:  12 point review of systems negative other than symptoms noted below.    OBJECTIVE:     /72  Ht 5' 6\" (1.676 m)  Wt 269 lb (122 kg)  LMP 10/03/2017  Breastfeeding? Yes  BMI 43.42 kg/m2  Body mass index is 43.42 kg/(m^2).    PHYSICAL EXAM:  Constitutional:  Appearance: Well nourished, well developed alert, in no acute distress  Neurologic/Psychiatric:  Mental Status:  Oriented X3      In-Clinic Test Results:  No results found for this or any previous visit (from the past 24 hour(s)).    ASSESSMENT/PLAN:                                                        ICD-10-CM    1. Encounter for IUD insertion Z30.430 levonorgestrel (MIRENA, 52 MG,) 20 MCG/24HR IUD     INSERTION INTRAUTERINE DEVICE     HC LEVONORGESTREL IU 52MG 5 YR   2. Migraine without status migrainosus, not intractable, unspecified migraine type G43.909    3. Lactating mother Z39.1    4. Clogged duct, postpartum O92.79        COUNSELING:  Continue to pump breasts; " increase frequency of pumping to every 2-3 hours as well as using warm moist heat.  Contact the office if no relief/improvement of symptoms in 1 week.  No need to monitor BP at home; counseled on s/s HTN, when to contact office and/or seek ER treatment      MIDWIFE IUD PLACEMENT NOTE    IUD type: Mirena  Lot #: [id number]  NDC#: 42810-085-55    HPI:   Ninoska Cottrell is a 37 year old female here today for IUD insertion.  Patient's last menstrual period was 10/03/2017..  Today's pregnancy test - was not obtained, not sexually active since delivery on   Patient did not use Cytotec prior to IUD insertion  STD testing offered? declined  Patient does not have any infections or cervicitis  Patient does not have history of liver problems or cancer.     Patient has been given written information.  I have reviewed the risks of the IUD including pregnancy, PID, life threatening infection, perforation, expulsion, cramping, changes in bleeding and ovarian cysts. Benefits of the IUD and alternative family planning methods have been discussed.  The probable mechanisms of action were covered, failure rates, spontaneous expulsion, the importance of checking the string monthly, as well as minor or nuisance side effects such as ovarian cysts, migraines, skin changes irregular and unpredictable bleeding in the first 6 months of use and bleeding patterns after the first 6 months.    The 's printed material was provided for her review.  Patients questions are answered.  Patient has verbalized understanding of risks and benefits and has signed the consent form.      PELVIC EXAM:  Vulva: No external lesions, BUS WNL  Vagina: Moist, pink, discharge normal  well rugated, no lesions  Cervix: smooth, pink, no visible lesions, neg CMT  Uterus: Normal size, retroverted, non-tender, mobile  Ovaries: No mass, non-tender  Rectal exam: deferred      ASSESSMENT/ PLAN:    ICD-10-CM    1. Encounter for IUD insertion Z30.430 levonorgestrel  (MIRENA, 52 MG,) 20 MCG/24HR IUD     INSERTION INTRAUTERINE DEVICE     HC LEVONORGESTREL IU 52MG 5 YR   2. Migraine without status migrainosus, not intractable, unspecified migraine type G43.909    3. Lactating mother Z39.1    4. Clogged duct, postpartum O92.79      Procedure:  Uterus assessed for position and is retroverted.  Sterile speculum inserted.  Betadine prep of cervix done.  Tenaculum applied at 10 and 2 o'clock.  Uterine sounded to 8 cm's.  Cervical dilators not used.   IUD inserted in the usual fashion without difficulty.  Tenaculum removed with scant bleeding from the cervix.  Strings trimmed to 3 cm's.  Patient tolerated the procedure well    COUNSELING:  Verbal and written instructions given to patient regarding checking IUD strings.    Reviewed warning signs of fever, sharp/severe abdominal pain, reassured it can be normal to have menstrual like cramping after placement and spotting/light bleeding may last a few weeks after placement.    Reviewed with patient that the IUD needs to be replaced in 5 years, nothing in vagina for 2 days following placement of Mirena or July IUD's.  Use b/u method for one week following IUD placement.    Follow up appointment in 6 to 12 weeks for IUD/string check    Samra CARDOSO CNM

## 2017-12-19 ENCOUNTER — OFFICE VISIT (OUTPATIENT)
Dept: URGENT CARE | Facility: URGENT CARE | Age: 37
End: 2017-12-19
Payer: COMMERCIAL

## 2017-12-19 ENCOUNTER — RADIANT APPOINTMENT (OUTPATIENT)
Dept: GENERAL RADIOLOGY | Facility: CLINIC | Age: 37
End: 2017-12-19
Attending: PHYSICIAN ASSISTANT
Payer: COMMERCIAL

## 2017-12-19 VITALS
TEMPERATURE: 97.1 F | OXYGEN SATURATION: 98 % | HEART RATE: 82 BPM | DIASTOLIC BLOOD PRESSURE: 80 MMHG | BODY MASS INDEX: 43.26 KG/M2 | WEIGHT: 268 LBS | SYSTOLIC BLOOD PRESSURE: 130 MMHG

## 2017-12-19 DIAGNOSIS — S89.92XA KNEE INJURY, LEFT, INITIAL ENCOUNTER: ICD-10-CM

## 2017-12-19 DIAGNOSIS — S89.92XA KNEE INJURY, LEFT, INITIAL ENCOUNTER: Primary | ICD-10-CM

## 2017-12-19 PROCEDURE — 73562 X-RAY EXAM OF KNEE 3: CPT | Mod: LT

## 2017-12-19 PROCEDURE — 99214 OFFICE O/P EST MOD 30 MIN: CPT | Performed by: PHYSICIAN ASSISTANT

## 2017-12-19 RX ORDER — HYDROCODONE BITARTRATE AND ACETAMINOPHEN 5; 325 MG/1; MG/1
1 TABLET ORAL EVERY 6 HOURS PRN
Qty: 10 TABLET | Refills: 0 | Status: SHIPPED | OUTPATIENT
Start: 2017-12-19 | End: 2018-02-01

## 2017-12-19 NOTE — MR AVS SNAPSHOT
"              After Visit Summary   12/19/2017    Ninoska Cottrell    MRN: [id number]           Patient Information     Date Of Birth          1980        Visit Information        Provider Department      12/19/2017 6:45 PM Lou Sterling PA-C [facility]        Today's Diagnoses     Knee injury, left, initial encounter    -  1      Care Instructions    (S89.92XA) Knee injury, left, initial encounter  (primary encounter diagnosis)  Comment:   Plan: HYDROcodone-acetaminophen (NORCO) 5-325 MG per         tablet, order for DME, order for DME, CANCELED:        XR Knee Left 1/2 Views          You must pump your breast milk if you take the vicodin.     Rest, Ice, Compression and Elevation.     Preliminary xray reading is negative.      Follow up with orthopedics within one week for re-check, sooner should symptoms persist              Follow-ups after your visit        Who to contact     If you have questions or need follow up information about today's clinic visit or your schedule please contact [facility] directly at [phone].  Normal or non-critical lab and imaging results will be communicated to you by Netvibeshart, letter or phone within 4 business days after the clinic has received the results. If you do not hear from us within 7 days, please contact the clinic through AuditFilet or phone. If you have a critical or abnormal lab result, we will notify you by phone as soon as possible.  Submit refill requests through Lecere or call your pharmacy and they will forward the refill request to us. Please allow 3 business days for your refill to be completed.          Additional Information About Your Visit        MyChart Information     Lecere lets you send messages to your doctor, view your test results, renew your prescriptions, schedule appointments and more. To sign up, go to [email or web address] . Click on \"Log in\" on the left side of the screen, " "which will take you to the Welcome page. Then click on \"Sign up Now\" on the right side of the page.     You will be asked to enter the access code listed below, as well as some personal information. Please follow the directions to create your username and password.     Your access code is: [email or web address]  Expires: 3/19/2018  8:52 PM     Your access code will  in 90 days. If you need help or a new code, please call your [facility] or [phone].        Care EveryWhere ID     This is your Care EveryWhere ID. This could be used by other organizations to access your [place] medical records  [id number]        Your Vitals Were     Pulse Temperature Pulse Oximetry BMI (Body Mass Index)          82 97.1  F (36.2  C) (Oral) 98% 43.26 kg/m2         Blood Pressure from Last 3 Encounters:   17 130/80   10/25/17 122/72   10/18/17 118/70    Weight from Last 3 Encounters:   17 268 lb (121.6 kg)   10/25/17 269 lb (122 kg)   10/18/17 267 lb (121.1 kg)              We Performed the Following     Nursing Communication 1          Today's Medication Changes          These changes are accurate as of: 17  8:53 PM.  If you have any questions, ask your nurse or doctor.               Start taking these medicines.        Dose/Directions    HYDROcodone-acetaminophen 5-325 MG per tablet   Commonly known as:  NORCO   Used for:  Knee injury, left, initial encounter   Started by:  Lou Sterling PA-C        Dose:  1 tablet   Take 1 tablet by mouth every 6 hours as needed   Quantity:  10 tablet   Refills:  0       * order for DME   Used for:  Knee injury, left, initial encounter   Started by:  Lou Sterling PA-C        Equipment being ordered: knee immobilizer   Quantity:  1 Device   Refills:  0       * order for DME   Used for:  Knee injury, left, initial encounter   Started by:  Lou Sterling PA-C        Equipment being ordered: crutches   Quantity:  1 Device   Refills:  0       " * Notice:  This list has 2 medication(s) that are the same as other medications prescribed for you. Read the directions carefully, and ask your doctor or other care provider to review them with you.         Where to get your medicines      Some of these will need a paper prescription and others can be bought over the counter.  Ask your nurse if you have questions.     Bring a paper prescription for each of these medications     HYDROcodone-acetaminophen 5-325 MG per tablet    order for DME    order for DME                Primary Care Provider Office Phone # Fax #    Traci Padron [phone]       [address] [facility] [address] [facility] [address]        Equal Access to Services     [facility]: Hadii yasmine damico hadeveo Solisette, waaxda luqadaha, qaybta kaalmada adeericyahardeep, melida elmore . So [facility] [phone].    ATENCIÓN: Si habla español, tiene a irvin disposición servicios gratuitos de asistencia lingüística. [facility] [phone].    We comply with applicable federal civil rights laws and [place] laws. We do not discriminate on the basis of race, color, national origin, age, disability, sex, sexual orientation, or gender identity.            Thank you!     Thank you for choosing [facility]  for your care. Our goal is always to provide you with excellent care. Hearing back from our patients is one way we can continue to improve our services. Please take a few minutes to complete the written survey that you may receive in the mail after your visit with us. Thank you!             Your Updated Medication List - Protect others around you: Learn how to safely use, store and throw away your medicines at www.disposemymeds.org.          This list is accurate as of: 12/19/17  8:53 PM.  Always use your most recent med list.                   Brand Name Dispense Instructions for use Diagnosis    albuterol 108 (90 BASE) MCG/ACT Inhaler     VENTOLIN HFA    1 Inhaler    Inhale 1-2 puffs into the lungs every 4 hours as needed for shortness of breath / dyspnea or wheezing Reported on 2/16/2017    Upper respiratory tract infection, unspecified type, Intermittent asthma, uncomplicated       HYDROcodone-acetaminophen 5-325 MG per tablet    NORCO    10 tablet    Take 1 tablet by mouth every 6 hours as needed    Knee injury, left, initial encounter       ibuprofen 600 MG tablet    ADVIL/MOTRIN    100 tablet    Take 1 tablet (600 mg) by mouth every 6 hours as needed for other (cramping)    Postpartum state       levonorgestrel 20 MCG/24HR IUD    MIRENA (52 MG)    1 each    1 each (20 mcg) by Intrauterine route once for 1 dose Lot #: [id number]    NDC#: 38392-338-23    Encounter for IUD insertion       * order for DME     1 Device    Equipment being ordered: knee immobilizer    Knee injury, left, initial encounter       * order for DME     1 Device    Equipment being ordered: crutches    Knee injury, left, initial encounter       prenatal multivitamin plus iron 27-0.8 MG Tabs per tablet      Take 1 tablet by mouth    Supervision of high-risk pregnancy of elderly multigravida       TYLENOL PO      Take 1,000 mg by mouth every 8 hours as needed for mild pain or fever        vitamin B complex with vitamin C Tabs tablet      Take 1 tablet by mouth daily        VITAMIN D PO      Take 2,000 Units by mouth daily        * Notice:  This list has 2 medication(s) that are the same as other medications prescribed for you. Read the directions carefully, and ask your doctor or other care provider to review them with you.

## 2017-12-20 NOTE — PATIENT INSTRUCTIONS
(S89.92XA) Knee injury, left, initial encounter  (primary encounter diagnosis)  Comment:   Plan: HYDROcodone-acetaminophen (NORCO) 5-325 MG per         tablet, order for DME, order for DME, CANCELED:        XR Knee Left 1/2 Views          You must pump your breast milk if you take the vicodin.     Rest, Ice, Compression and Elevation.     Preliminary xray reading is negative.      Follow up with orthopedics within one week for re-check, sooner should symptoms persist

## 2017-12-20 NOTE — NURSING NOTE
"Chief Complaint   Patient presents with     Urgent Care     Pt states fell while ice skating , hurt left knee, instant swelling 7x hrs        Initial /80  Pulse 82  Temp 97.1  F (36.2  C) (Oral)  Wt 268 lb (121.6 kg)  SpO2 98%  BMI 43.26 kg/m2 Estimated body mass index is 43.26 kg/(m^2) as calculated from the following:    Height as of 10/25/17: 5' 6\" (1.676 m).    Weight as of this encounter: 268 lb (121.6 kg).  Medication Reconciliation: complete      "

## 2018-02-01 ENCOUNTER — RADIANT APPOINTMENT (OUTPATIENT)
Dept: GENERAL RADIOLOGY | Facility: CLINIC | Age: 38
End: 2018-02-01
Attending: PHYSICIAN ASSISTANT
Payer: COMMERCIAL

## 2018-02-01 ENCOUNTER — OFFICE VISIT (OUTPATIENT)
Dept: URGENT CARE | Facility: URGENT CARE | Age: 38
End: 2018-02-01
Payer: COMMERCIAL

## 2018-02-01 VITALS
WEIGHT: 268.7 LBS | DIASTOLIC BLOOD PRESSURE: 70 MMHG | HEART RATE: 67 BPM | BODY MASS INDEX: 43.37 KG/M2 | SYSTOLIC BLOOD PRESSURE: 120 MMHG | RESPIRATION RATE: 22 BRPM | TEMPERATURE: 98 F

## 2018-02-01 DIAGNOSIS — M25.532 LEFT WRIST PAIN: ICD-10-CM

## 2018-02-01 DIAGNOSIS — M89.8X1 PAIN OF LEFT CLAVICLE: ICD-10-CM

## 2018-02-01 DIAGNOSIS — Z71.89 ENCOUNTER FOR BREAST FEEDING COUNSELING: ICD-10-CM
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
## 2018-02-01 PROCEDURE — 73110 X-RAY EXAM OF WRIST: CPT | Mod: LT

## 2018-02-01 PROCEDURE — 73000 X-RAY EXAM OF COLLAR BONE: CPT | Mod: LT

## 2018-02-01 PROCEDURE — 99214 OFFICE O/P EST MOD 30 MIN: CPT | Performed by: PHYSICIAN ASSISTANT

## 2018-02-01 NOTE — MR AVS SNAPSHOT
After Visit Summary   2/1/2018    Ninoska Cottrell    MRN: [id number]           Patient Information     Date Of Birth          1980        Visit Information        Provider Department      2/1/2018 9:05 AM Mehrdad Faustin, RENAN [facility]        Today's Diagnoses     Fall due to slipping on ice or snow, initial encounter    -  1    Pain of left clavicle        Left wrist pain        Encounter for breast feeding counseling           Follow-ups after your visit        Your next 10 appointments already scheduled     Feb 06, 2018  9:15 AM CST   Office Visit with Jaci Sharma MD   [facility]    [address] [address] [phone]           Bring a current list of meds and any records pertaining to this visit. For Physicals, please bring immunization records and any forms needing to be filled out. Please arrive 10 minutes early to complete paperwork.              Who to contact     If you have questions or need follow up information about today's clinic visit or your schedule please contact [facility] directly at [phone].  Normal or non-critical lab and imaging results will be communicated to you by MyChart, letter or phone within 4 business days after the clinic has received the results. If you do not hear from us within 7 days, please contact the clinic through Merchant Atlashart or phone. If you have a critical or abnormal lab result, we will notify you by phone as soon as possible.  Submit refill requests through Curtume ErÃª or call your pharmacy and they will forward the refill request to us. Please allow 3 business days for your refill to be completed.          Additional Information About Your Visit        MyChart Information     Curtume ErÃª lets you send messages to your doctor, view your test results, renew your prescriptions, schedule appointments and more. To  "sign up, go to [email or web address] . Click on \"Log in\" on the left side of the screen, which will take you to the Welcome page. Then click on \"Sign up Now\" on the right side of the page.     You will be asked to enter the access code listed below, as well as some personal information. Please follow the directions to create your username and password.     Your access code is: [email or web address]  Expires: 3/19/2018  8:52 PM     Your access code will  in 90 days. If you need help or a new code, please call your [place] clinic or [phone].        Care EveryWhere ID     This is your Care EveryWhere ID. This could be used by other organizations to access your [place] medical records  [id number]        Your Vitals Were     Pulse Temperature Respirations BMI (Body Mass Index)          67 98  F (36.7  C) 22 43.37 kg/m2         Blood Pressure from Last 3 Encounters:   18 120/70   17 130/80   10/25/17 122/72    Weight from Last 3 Encounters:   18 268 lb 11.2 oz (121.9 kg)   17 268 lb (121.6 kg)   10/25/17 269 lb (122 kg)                 Today's Medication Changes          These changes are accurate as of 18 10:45 AM.  If you have any questions, ask your nurse or doctor.               Start taking these medicines.        Dose/Directions    order for DME   Used for:  Left wrist pain   Started by:  Mehrdad Faustin PA-C        Equipment being ordered: left wrist brace   Quantity:  1 Device   Refills:  0            Where to get your medicines      Some of these will need a paper prescription and others can be bought over the counter.  Ask your nurse if you have questions.     Bring a paper prescription for each of these medications     order for DME                Primary Care Provider Office Phone # Fax #    Traci Padron [phone]       [address]  [facility] [address]        Equal Access to Services     [facility]UR[facility] CALDWE[facility] AH: Mami sandoval " Crissylisette, nloanda luqadaha, qakiata kaalkimberly tristan, melida augustin. So [facility] [phone].    ATENCIÓN: Si luz quinteros, tiene a irvin disposición servicios gratuitos de asistencia lingüística. Jocelyn al [phone].    We comply with applicable federal civil rights laws and [place] laws. We do not discriminate on the basis of race, color, national origin, age, disability, sex, sexual orientation, or gender identity.            Thank you!     Thank you for choosing [facility]  for your care. Our goal is always to provide you with excellent care. Hearing back from our patients is one way we can continue to improve our services. Please take a few minutes to complete the written survey that you may receive in the mail after your visit with us. Thank you!             Your Updated Medication List - Protect others around you: Learn how to safely use, store and throw away your medicines at www.disposemymeds.org.          This list is accurate as of 2/1/18 10:45 AM.  Always use your most recent med list.                   Brand Name Dispense Instructions for use Diagnosis    albuterol 108 (90 BASE) MCG/ACT Inhaler    VENTOLIN HFA    1 Inhaler    Inhale 1-2 puffs into the lungs every 4 hours as needed for shortness of breath / dyspnea or wheezing Reported on 2/16/2017    Upper respiratory tract infection, unspecified type, Intermittent asthma, uncomplicated       ibuprofen 600 MG tablet    ADVIL/MOTRIN    100 tablet    Take 1 tablet (600 mg) by mouth every 6 hours as needed for other (cramping)    Postpartum state       levonorgestrel 20 MCG/24HR IUD    MIRENA (52 MG)    1 each    1 each (20 mcg) by Intrauterine route once for 1 dose Lot #: [id number]    NDC#: 64794-234-07    Encounter for IUD insertion       order for DME     1 Device    Equipment being ordered: left wrist brace    Left wrist pain       prenatal multivitamin plus iron 27-0.8 MG Tabs per tablet      Take  1 tablet by mouth    Supervision of high-risk pregnancy of elderly multigravida       TYLENOL PO      Take 1,000 mg by mouth every 8 hours as needed for mild pain or fever        vitamin B complex with vitamin C Tabs tablet      Take 1 tablet by mouth daily        VITAMIN D PO      Take 2,000 Units by mouth daily

## 2018-02-01 NOTE — PROGRESS NOTES
SUBJECTIVE:  Chief Complaint   Patient presents with     Fall     Pt states that she slipped and fell on the ice yesterday and injured her left arm,left wrist and left shoulder.     Urgent Care     Pt is nursing a child.     Ninoska Cottrell is a 37 year old female presents with a chief complaint of left wrist and left clavicle injury.  The injury occurred yesterday.   The injury happened while walking yesterday. How: fall on ice.  The patient complained of mild and moderate pain  and has had decreased ROM.  Pain exacerbated by movement.  Relieved by rest.  She treated it initially with ice. This is the first time this type of injury has occurred to this patient.     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     Fibromyalgia      Hyperlipidemia      Migraine      Murmur, cardiac      Obesity      Preeclampsia 2012     Allergies   Allergen Reactions     Blueberry Swelling     Codeine Hives     Latex Swelling and Rash     Social History   Substance Use Topics     Smoking status: Former Smoker     Quit date: 1/1/2000     Smokeless tobacco: Never Used     Alcohol use No       ROS:  CONSTITUTIONAL:NEGATIVE for fever, chills, change in weight  INTEGUMENTARY/SKIN: NEGATIVE for worrisome rashes, moles or lesions  ENT/MOUTH: NEGATIVE for ear, mouth and throat problems  RESP:NEGATIVE for significant cough or SOB  CV: NEGATIVE for chest pain, palpitations or peripheral edema  GI: NEGATIVE for nausea, abdominal pain, heartburn, or change in bowel habits  MUSCULOSKELETAL: POSITIVE  for left wrist and left shoulder injury, pain of left wrist and clavicle  NEURO: NEGATIVE for weakness, dizziness or paresthesias    EXAM:   /70  Pulse 67  Temp 98  F (36.7  C)  Resp 22  Wt 268 lb 11.2 oz (121.9 kg)  BMI 43.37 kg/m2  Gen: healthy,alert,no distress  Extremity: decreased ROM of left shoulder and wrist .    There is not compromise to the distal circulation.  Pulses are +2 and CRT is brisk  CHEST: clear to auscultation  CV: regular rate and rhythm  EXTREMITIES: peripheral pulses normal  MS:  Positive for left clavicle and left wrist tenderness, localized  SKIN: no suspicious lesions or rashes  NEURO: Normal strength and tone, sensory exam grossly normal, mentation intact and speech normal    X-RAY was done and negative for acute changes including fracture Xray read by Mehrdad Faustin at time of visit    ASSESSMENT/PLAN:    ICD-10-CM    1. Fall due to slipping on ice or snow, initial encounter W00.9XXA    2. Pain of left clavicle M89.8X1 XR Clavicle Left   3. Left wrist pain M25.532 XR Wrist Left G/E 3 Views     order for DME   4. Encounter for breast feeding counseling Z71.89        RICE treatment: Rest, Ice, compression, elevation   Wrist brace for comfort  Sling for comfort  Advised to follow up with PCP as needed  Patient is breast feeding so no pain medications at this time other than motrin and ice

## 2018-02-06 ENCOUNTER — OFFICE VISIT (OUTPATIENT)
Dept: INTERNAL MEDICINE | Facility: CLINIC | Age: 38
End: 2018-02-06
Payer: COMMERCIAL

## 2018-02-06 ENCOUNTER — RADIANT APPOINTMENT (OUTPATIENT)
Dept: GENERAL RADIOLOGY | Facility: CLINIC | Age: 38
End: 2018-02-06
Attending: INTERNAL MEDICINE
Payer: COMMERCIAL

## 2018-02-06 VITALS
SYSTOLIC BLOOD PRESSURE: 122 MMHG | HEART RATE: 80 BPM | WEIGHT: 269.8 LBS | OXYGEN SATURATION: 98 % | HEIGHT: 65 IN | BODY MASS INDEX: 44.95 KG/M2 | DIASTOLIC BLOOD PRESSURE: 80 MMHG | TEMPERATURE: 97.7 F

## 2018-02-06 DIAGNOSIS — E66.01 MORBID OBESITY (H): ICD-10-CM

## 2018-02-06 DIAGNOSIS — M79.674 GREAT TOE PAIN, RIGHT: ICD-10-CM

## 2018-02-06 DIAGNOSIS — M79.674 GREAT TOE PAIN, RIGHT: Primary | ICD-10-CM

## 2018-02-06 PROBLEM — O92.79 CLOGGED DUCT, POSTPARTUM: Status: RESOLVED | Noted: 2017-10-25 | Resolved: 2018-02-06

## 2018-02-06 PROBLEM — Z30.430 ENCOUNTER FOR IUD INSERTION: Status: RESOLVED | Noted: 2017-10-25 | Resolved: 2018-02-06

## 2018-02-06 PROCEDURE — 73660 X-RAY EXAM OF TOE(S): CPT | Mod: RT

## 2018-02-06 PROCEDURE — 99214 OFFICE O/P EST MOD 30 MIN: CPT | Performed by: INTERNAL MEDICINE

## 2018-02-06 NOTE — PATIENT INSTRUCTIONS
Xrays downstairs.    Recommendations to follow.     Podiatry referral already placed - numbers below.    ---    I have placed a weight loss clinic referral for you.    Please schedule an appointment at your earliest convenience - phone number below.

## 2018-02-06 NOTE — PROGRESS NOTES
"  SUBJECTIVE:                                                      HPI: Ninoska Cottrell is a pleasant 37 year old female who presents with right first toe pain and request for referral:    Accompanied by .     - right first toe pain started ~2-3 weeks ago  - indicates plantar aspect of 1st MTP  - no precipitating trauma or injury, but patient has been walking more than usual as of late  - pain is always present, but significantly worse with weightbearing  - associated swelling, but no redness or warmth    PMH significant for breaking same toe in 3 spots in 2011 (wore a boot - no surgical intervention performed).    PMH also significant for morbid obesity.    Patient requests referral to the weight loss clinic to help manage her morbid obesity.    The medication, allergy, and problem lists have been reviewed and updated as appropriate.       OBJECTIVE:                                                      /80 (BP Location: Left arm, Patient Position: Chair, Cuff Size: Adult Large)  Pulse 80  Temp 97.7  F (36.5  C) (Oral)  Ht 5' 5\" (1.651 m)  Wt 269 lb 12.8 oz (122.4 kg)  LMP 01/13/2018 (Exact Date)  SpO2 98%  Breastfeeding? Yes  BMI 44.9 kg/m2  Constitutional: well-appearing  Psych: normal judgment and insight; normal mood and affect; recent and remote memory intact  Right foot/toes: normal appearance -no deformity, redness, swelling, or skin changes; normal range of motion; mild tenderness to palpation along dorsal and lateral aspects of first MTP; more tender along plantar aspect of 1st MTP      ASSESSMENT/PLAN:                                                      (M79.384) Great toe pain, right  (primary encounter diagnosis)  Comment:    - suspect stress fracture or re-break of toe at old fracture site(s).   - MO and increased walking as of late likely contributors.  Plan:    - xrays of right 1st toe today - recommendations to follow.   - have already placed referral to podiatry.     (E66.01) " Morbid obesity (H)  Plan: referred to bariatric clinic for weight loss management - patient to schedule.     The instructions on the AVS were discussed and explained to the patient. Patient expressed understanding of instructions.    (Chart documentation was completed, in part, with Inofile voice-recognition software. Even though reviewed, some grammatical, spelling, and word errors may remain.)    Jaci Sharma MD   [address] [address]  T: [phone], F: [phone]

## 2018-02-06 NOTE — MR AVS SNAPSHOT
After Visit Summary   2/6/2018    Ninoska Cottrell    MRN: [id number]           Patient Information     Date Of Birth          1980        Visit Information        Provider Department      2/6/2018 9:15 AM Jaci Sharma MD [facility]        Today's Diagnoses     Great toe pain, right    -  1    Morbid obesity (H)          Care Instructions    Xrays downstairs.    Recommendations to follow.     Podiatry referral already placed - numbers below.    ---    I have placed a weight loss clinic referral for you.    Please schedule an appointment at your earliest convenience - phone number below.               Follow-ups after your visit        Additional Services     BARIATRIC ADULT REFERRAL       Your provider has referred you to: [facility]: [facility] [phone]  [email or web address]    Please be aware that coverage of these services is subject to the terms and limitations of your health insurance plan.  Call member services at your health plan with any benefit or coverage questions.      Please bring the following with you to your appointment:      (1) List of current medications   (2) This referral request   (3) Any documents/labs given to you for this referral            PODIATRY/FOOT & ANKLE SURGERY REFERRAL       Your provider has referred you to: [facility]: [facility] [phone]   [email or web address]  FM[facility]: [facility] [phone]   [email or web address]    Please be aware that coverage of these services is subject to the terms and limitations of your health insurance plan.  Call member services at your health plan with any benefit or coverage questions.      Please bring the following to your appointment:  >>   Any x-rays, CTs or MRIs which have been  "performed.  Contact the facility where they were done to arrange for  prior to your scheduled appointment.    >>   List of current medications   >>   This referral request   >>   Any documents/labs given to you for this referral                  Future tests that were ordered for you today     Open Future Orders        Priority Expected Expires Ordered    XR Toe Right G/E 2 Views Routine 2018            Who to contact     If you have questions or need follow up information about today's clinic visit or your schedule please contact [facility] directly at [phone].  Normal or non-critical lab and imaging results will be communicated to you by Biocontrolhart, letter or phone within 4 business days after the clinic has received the results. If you do not hear from us within 7 days, please contact the clinic through Biocontrolhart or phone. If you have a critical or abnormal lab result, we will notify you by phone as soon as possible.  Submit refill requests through Boloco or call your pharmacy and they will forward the refill request to us. Please allow 3 business days for your refill to be completed.          Additional Information About Your Visit        MyChart Information     Boloco lets you send messages to your doctor, view your test results, renew your prescriptions, schedule appointments and more. To sign up, go to [email or web address] . Click on \"Log in\" on the left side of the screen, which will take you to the Welcome page. Then click on \"Sign up Now\" on the right side of the page.     You will be asked to enter the access code listed below, as well as some personal information. Please follow the directions to create your username and password.     Your access code is: [email or web address]  Expires: 3/19/2018  8:52 PM     Your access code will  in 90 days. If you need help or a new code, please call your [facility] or [phone].        Care EveryWhere ID  " "   This is your Care EveryWhere ID. This could be used by other organizations to access your [facility] medical records  [id number]        Your Vitals Were     Pulse Temperature Height Last Period Pulse Oximetry Breastfeeding?    80 97.7  F (36.5  C) (Oral) 5' 5\" (1.651 m) 01/13/2018 (Exact Date) 98% Yes    BMI (Body Mass Index)                   44.9 kg/m2            Blood Pressure from Last 3 Encounters:   02/06/18 122/80   02/01/18 120/70   12/19/17 130/80    Weight from Last 3 Encounters:   02/06/18 269 lb 12.8 oz (122.4 kg)   02/01/18 268 lb 11.2 oz (121.9 kg)   12/19/17 268 lb (121.6 kg)              We Performed the Following     BARIATRIC ADULT REFERRAL     PODIATRY/FOOT & ANKLE SURGERY REFERRAL        Primary Care Provider Office Phone # Fax #    Traci Padron [phone]       [address]  [facility] [address]        Equal Access to Services     [facility]: Hadii aad ku hadasho Soomaali, waaxda luqadaha, qaybta kaalmada adeegyada, waxay idiin haytara elmore . So [facility] [phone].    ATENCIÓN: Si habla español, tiene a irvin disposición servicios gratuitos de asistencia lingüística. Jocelyn al [phone].    We comply with applicable federal civil rights laws and [place] laws. We do not discriminate on the basis of race, color, national origin, age, disability, sex, sexual orientation, or gender identity.            Thank you!     Thank you for choosing [facility]  for your care. Our goal is always to provide you with excellent care. Hearing back from our patients is one way we can continue to improve our services. Please take a few minutes to complete the written survey that you may receive in the mail after your visit with us. Thank you!             Your Updated Medication List - Protect others around you: Learn how to safely use, store and throw away your medicines at www.disposemymeds.org.          This list is " accurate as of 2/6/18  9:33 AM.  Always use your most recent med list.                   Brand Name Dispense Instructions for use Diagnosis    albuterol 108 (90 BASE) MCG/ACT Inhaler    VENTOLIN HFA    1 Inhaler    Inhale 1-2 puffs into the lungs every 4 hours as needed for shortness of breath / dyspnea or wheezing Reported on 2/16/2017    Upper respiratory tract infection, unspecified type, Intermittent asthma, uncomplicated       ibuprofen 600 MG tablet    ADVIL/MOTRIN    100 tablet    Take 1 tablet (600 mg) by mouth every 6 hours as needed for other (cramping)    Postpartum state       levonorgestrel 20 MCG/24HR IUD    MIRENA (52 MG)    1 each    1 each (20 mcg) by Intrauterine route once for 1 dose Lot #: [id number]    NDC#: 92720-292-46    Encounter for IUD insertion       order for DME     1 Device    Equipment being ordered: left wrist brace    Left wrist pain       prenatal multivitamin plus iron 27-0.8 MG Tabs per tablet      Take 1 tablet by mouth    Supervision of high-risk pregnancy of elderly multigravida       TYLENOL PO      Take 1,000 mg by mouth every 8 hours as needed for mild pain or fever        vitamin B complex with vitamin C Tabs tablet      Take 1 tablet by mouth daily        VITAMIN D PO      Take 2,000 Units by mouth daily

## 2018-02-13 ENCOUNTER — OFFICE VISIT (OUTPATIENT)
Dept: PODIATRY | Facility: CLINIC | Age: 38
End: 2018-02-13
Payer: COMMERCIAL

## 2018-02-13 VITALS — BODY MASS INDEX: 44.95 KG/M2 | HEIGHT: 65 IN | WEIGHT: 269.8 LBS | RESPIRATION RATE: 20 BRPM

## 2018-02-13 DIAGNOSIS — M25.80 SESAMOIDITIS: Primary | ICD-10-CM

## 2018-02-13 PROCEDURE — 99203 OFFICE O/P NEW LOW 30 MIN: CPT | Performed by: PODIATRIST

## 2018-02-13 NOTE — PROGRESS NOTES
"Foot & Ankle Surgery  February 13, 2018    CC: R foot pain    I was asked to see Ninoska Cottrell regarding the chief complaint by:  Dr. KIMBERLY Sharma    HPI:  Pt is a 37 year old female who presents with above complaint.  Right foot pain x approximately 1 month.  No specific injury noted.  She had a baby 5 months ago and wants to loose some of the \"baby weight\", so she started walking more.  Pain develops when she gets over 3000 steps/day.  \"Persistant pain and occasional sharp shooting\".  Worse with walking or standing too long.  xrays with Dr Sharma of the 1st MPJ/hallux neg for acute pathology.      ROS:   Pos for CC.  The patient denies current nausea, vomiting, chills, fevers, belly pain, calf pain, chest pain or SOB.  Complete remainder of ROS is otherwise neg.    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     Fibromyalgia      Hyperlipidemia      Migraine      Murmur, cardiac      Obesity      Preeclampsia 2012       SXHX:    Past Surgical History:   Procedure Laterality Date     APPENDECTOMY       C NONSPECIFIC PROCEDURE      right ankle fracture        MEDS:    Current Outpatient Prescriptions   Medication     order for DME     ibuprofen (ADVIL/MOTRIN) 600 MG tablet     Acetaminophen (TYLENOL PO)     Cholecalciferol (VITAMIN D PO)     vitamin B complex with vitamin C (VITAMIN  B COMPLEX) TABS tablet     Prenatal Vit-Fe Fumarate-FA (PRENATAL MULTIVITAMIN  PLUS IRON) 27-0.8 MG TABS per tablet     albuterol (VENTOLIN HFA) 108 (90 BASE) MCG/ACT Inhaler     levonorgestrel (MIRENA, 52 MG,) 20 MCG/24HR IUD     No current facility-administered medications for this visit.        ALL:     Allergies   Allergen Reactions     Blueberry Swelling     Codeine Hives     Latex Swelling and " "Rash       FMH:    Family History   Problem Relation Age of Onset     Blood Disease Paternal Grandmother      Blood Disease Paternal Grandfather      Blood Disease Maternal Uncle      Blood Disease Father      Hemophilia       SocHx:    Social History     Social History     Marital status:      Spouse name: Deandre     Number of children: 2     Years of education: N/A     Occupational History      [employer]     Social History Main Topics     Smoking status: Former Smoker     Quit date: 1/1/2000     Smokeless tobacco: Never Used     Alcohol use No     Drug use: No     Sexual activity: Yes     Partners: Male     Birth control/ protection: IUD     Other Topics Concern      Service No     Blood Transfusions No     Caffeine Concern No     Occupational Exposure No     Hobby Hazards No     Sleep Concern No     Stress Concern No     Weight Concern No     Special Diet No     Back Care No     Exercise Yes     1 time a week for 2 hours     Bike Helmet Yes     Seat Belt Yes     Self-Exams No     Social History Narrative           EXAMINATION:  Gen:   No apparent distress  Neuro:   A&Ox3, no deficits  Psych:    Answering questions appropriately for age and situation with normal affect  Head:    NCAT  Eye:    Visual scanning without deficit  Ear:    Response to auditory stimuli wnl  Lung:    Non-labored breathing on RA noted  Abd:    NTND per patient report  Lymph:    Neg for pitting/non-pitting edema BLE  Vasc:    Pulses palpable, CFT minimally delayed  Neuro:    Light touch sensation intact to all sensory nerve distributions without paresthesias  Derm:    Neg for nodules, lesions or ulcerations  MSK:    R 1st MPJ - no pain/crepitus with PROM of the joint, > 60 degrees noted.  Tenderness at tibial > fibular sesamoids, and a \"pop\" sensation with sesamoid pressure and concurrent 1st MPJ PROM.  No flexor pain noted.  No phalanx or metatarsal pain appreciated today.   Calf:    Neg for redness, swelling or " tenderness      Imaging:  IMPRESSION: Previously noted proximal phalanx fracture of the great  toe has completely healed. No acute fracture of the distal phalanx  great toe is evident. No surrounding soft tissue swelling or  radiopaque foreign body is appreciated.    Assessment:  37 year old female with tibial > fibular sesamoiditis right lower extremity       Plan:  Discussed etiologies, anatomy and options  1.  Tibial > fibular sesamoiditis right lower extremity   -personally reviewed imaging   -comfortable supportive shoes, minimize shoeless ambulation  -OTC insert handout dispensed  -RICE/NSAID prn  -discussed orthotics, boot and PO steroid options; deferred    Follow up:  3 weeks or sooner with acute issues      Patient's medical history was reviewed today    Body mass index is 44.9 kg/(m^2).  Weight management plan: Patient was referred to their PCP to discuss a diet and exercise plan.        Terence Rodriguez DPM   Podiatric Foot & Ankle Surgeon  [facility]  [phone]

## 2018-02-13 NOTE — MR AVS SNAPSHOT
After Visit Summary   2/13/2018    Ninoska Cottrell    MRN: [id number]           Patient Information     Date Of Birth          1980        Visit Information        Provider Department      2/13/2018 10:15 AM Terence Hernandez DPM [facility]        Care Instructions    Thank you for choosing [facility]!    DR. HERNANDEZ'S CLINIC LOCATIONS:   MONDAY - [place] TUESDAY - [place]   [address] [facility]  [address]   [place] [address] [place] [address]   [phone]       THURSDAY AM - [place] THURSDAY PM - [place]   [address]   [place] [address] [place] [address]   [phone]       FRIDAY AM - [place] SET UP SURGERY: [phone] [address] APPOINTMENTS: [phone]   [place] [address] BILLING QUESTIONS: [phone] FAX NUMBER: [phone]     Follow Up: 3 wk    Over the Counter Inserts    Super Feet are the most common and easiest to find.    Locations include any Diamond Mind Store, Likelii in [place] on [address] [place] [address] and in [place] on [address], Biovest International in [facility] on [facility], [facility] Running Room in [facility] on [facility], [facility] Running Room in [place] on [address] [facility] [address], GAP Miners in [place] on [address] and Cerenis Therapeutics Sport Shop in [facility] on [place] and in [place] on [facility].    72xuansole Fit can be found at Likelii in [place].  You can also find them online at Sofsole.com    Spenco can be found online and at Shot & Shop Shoe Shop in [facility] on [address], Run N' Fun in [facility] on [place], Gear Running Store in [place] on [facility], Biovest International in [place] on [address] and South [employer] Sports in [place] on [address].    Power Step can be a little harder to find.  Locations include Run N' Fun in [place] on [place], Marathon in [facility],  Stop-over Store in [place] on [address] [address] and online    Walk-Fit - Target     Arch Cradles - [facility]    **  A good high quality over the counter insert can cost around $40-$60.            Over the Counter Inserts  Super Feet are the most common and easiest to find.    Locations include any North Shoes Store, Novel SuperTVing [employer] in [place] on [address] and in [place] on [address], Growth Oriented Development Software in [facility] on [address], UpBainbridge Islandn Running Room in [facility] on [facility], [facility] Running Room in [place] on [address], Recreational Equipment Inc in [place] on [address] and Attractive [employer] Sport Shop in [facility] on [address] and in [place] on [facility].    Sofsole Fit can be found at MyWerx in [place].  You can also find them online at Sofsole.com    Spenco can be found online and at Trovali Shoe Shop in [facility] on [address], Run N' Fun in [facility] on [place], Gear Running Store in [place] on [facility], [employer] in [place] on [address] and South Metro Sports in [place] on [address].    Power Step can be a little harder to find.  Locations include Run N' Fun in [place] on [place], [place] in [facility], Stop-over Store in [place] on [facility] and online    Walk-Fit - Target     Arch Cradles - [facility]    **  A good high quality over the counter insert can cost around $40-$60.          SESAMOID INJURIES IN THE FOOT   A sesamoid is a bone embedded in a tendon. Sesamoids are found in several joints in the body. In the normal foot, the sesamoids are two pea-shaped bones located in the ball of the foot, beneath the big toe joint.   Acting as a pulley for tendons, the sesamoids help the big toe move normally and provide leverage when the big toe  pushes off  during walking and running. The sesamoids also serve as a weight-bearing surface for the first metatarsal bone (the long bone connected to the big toe),  absorbing the weight placed on the ball of the foot when walking, running, and jumping.   Sesamoid injuries can involve the bones, tendons, and/or surrounding tissue in the joint. They are often associated with activities requiring increased pressure on the ball of the foot, such as running, basketball, football, golf, tennis, and ballet. In addition, people with high arches are at risk for developing sesamoid problems. Frequent wearing of high-heeled shoes can also be a contributing factor.  TYPES OF INJURIES  There are three types of sesamoid injuries in the foot:  Turf Toe: This is an injury of the soft tissue surrounding the big toe joint. It usually occurs when the big toe joint is extended beyond its normal range. Turf toe causes immediate, sharp pain and swelling. It usually affects the entire big toe joint and limits the motion of the toe. Turf toe may result in an injury to the soft tissue attached to the sesamoid or a fracture of the sesamoid. Sometimes a  pop  is felt at the moment of injury.   Fracture: A fracture (break) in a sesamoid bone can be either acute or chronic.   An acute fracture is caused by trauma - a direct blow or impact to the bone. An acute sesamoid fracture produces immediate pain and swelling at the site of the break, but usually does not affect the entire big toe joint.   A chronic fracture is a stress fracture (a hairline break usually caused by repetitive stress or overuse). A chronic sesamoid fracture produces longstanding pain in the ball of the foot beneath the big toe joint. The pain, which tends to come and go, generally is aggravated with activity and relieved with rest.  Sesamoiditis: This is an overuse injury involving chronic inflammation of the sesamoid bones and the tendons involved with those bones. Sesamoiditis is caused by increased pressure to the sesamoids. Often, sesamoiditis is associated with a dull, longstanding pain beneath the big toe joint. The pain comes and  goes, usually occurring with certain shoes or certain activities.  DIAGNOSIS  In diagnosing a sesamoid injury, the foot and ankle surgeon will examine the foot, focusing on the big toe joint. The surgeon will press on the big toe, move it up and down, and may assess the patient s walking and evaluate the wear pattern on the patient s shoes. X-rays are ordered, and in some cases, advanced imaging studies may be ordered.  NON-SURGICAL TREATMENT  Non-surgical treatment for sesamoid injuries of the foot may include one or more of the following options, depending on the type of injury and degree of severity:  Padding, strapping, or taping. A pad may be placed in the shoe to cushion the inflamed sesamoid area, or the toe may be taped or strapped to relieve that area of tension.   Immobilization. The foot may be placed in a cast or removable walking cast. Crutches may be used to prevent placing weight on the foot.   Oral medications. Nonsteroidal anti-inflammatory drugs (NSAIDs), such as ibuprofen, are often helpful in reducing the pain and inflammation.   Physical therapy. The rehabilitation period following immobilization sometimes includes physical therapy, such as exercises (range-of-motion, strengthening, and conditioning) and ultrasound therapy.   Steroid injections. In some cases, cortisone is injected in the joint to reduce pain and inflammation.   Orthotic devices. Custom orthotic devices that fit into the shoe may be prescribed for long-term treatment of sesamoiditis to balance the pressure placed on the ball of the foot.  SURGICAL TREATMENT  When sesamoid injuries fail to respond to non-surgical treatment, surgery may be required. The foot and ankle surgeon will determine the type of procedure that is best suited to the individual patient.        Body Mass Index (BMI)  Many things can cause foot and ankle problems. Foot structure, activity level, foot mechanics and injuries are common causes of pain. One very  "important issue that often goes unmentioned, is body weight. Extra weight can cause increased stress on muscles, ligaments, bones and tendons. Sometimes just a few extra pounds is all it takes to put one over her/his threshold. Without reducing that stress, it can be difficult to alleviate pain. Some people are uncomfortable addressing this issue, but we feel it is important for you to think about it. As Foot &  Ankle specialists, our job is addressing the lower extremity problem and possible causes. Regarding extra body weight, we encourage patients to discuss diet and weight management plans with their primary care doctors. It is this team approach that gives you the best opportunity for pain relief and getting you back on your feet.            Follow-ups after your visit        Who to contact     If you have questions or need follow up information about today's clinic visit or your schedule please contact [facility] directly at [phone].  Normal or non-critical lab and imaging results will be communicated to you by Beagle [employer]oproductshart, letter or phone within 4 business days after the clinic has received the results. If you do not hear from us within 7 days, please contact the clinic through [employer]art or phone. If you have a critical or abnormal lab result, we will notify you by phone as soon as possible.  Submit refill requests through Lemopt[employer] or call your pharmacy and they will forward the refill request to us. Please allow 3 business days for your refill to be completed.          Additional Information About Your Visit        Lem[employer]ix Information     Lem[employer]ix lets you send messages to your doctor, view your test results, renew your prescriptions, schedule appointments and more. To sign up, go to [email or web address] . Click on \"Log in\" on the left side of the screen, which will take you to the Welcome page. Then click on \"Sign up Now\" on the right side of the page.     You will be asked to enter the " "access code listed below, as well as some personal information. Please follow the directions to create your username and password.     Your access code is: [email or web address]  Expires: 3/19/2018  8:52 PM     Your access code will  in 90 days. If you need help or a new code, please call your [facility] clinic or [phone].        Care EveryWhere ID     This is your Care EveryWhere ID. This could be used by other organizations to access your [facility] medical records  [id number]        Your Vitals Were     Respirations Height BMI (Body Mass Index)             20 5' 5\" (1.651 m) 44.9 kg/m2          Blood Pressure from Last 3 Encounters:   18 122/80   18 120/70   17 130/80    Weight from Last 3 Encounters:   18 269 lb 12.8 oz (122.4 kg)   18 269 lb 12.8 oz (122.4 kg)   18 268 lb 11.2 oz (121.9 kg)              Today, you had the following     No orders found for display       Primary Care Provider Office Phone # Fax #    Traci Padron [phone]       [address] [facility] [address] [facility] [address]        Equal Access to Services     [facility]: Hadii aad ku hadasho Soomaali, waaxda luqadaha, qaybta kaalmada adeegyada, waxay idiin hayaan balta elmore . So [facility] [phone].    ATENCIÓN: Si habla español, tiene a irvin disposición servicios gratuitos de asistencia lingüística. Llame al [phone].    We comply with applicable federal civil rights laws and [place] laws. We do not discriminate on the basis of race, color, national origin, age, disability, sex, sexual orientation, or gender identity.            Thank you!     Thank you for choosing [facility]  for your care. Our goal is always to provide you with excellent care. Hearing back from our patients is one way we can continue to improve our services. Please take a few minutes to complete the written survey that you may receive in the mail after your visit with us. " Thank you!             Your Updated Medication List - Protect others around you: Learn how to safely use, store and throw away your medicines at www.disposemymeds.org.          This list is accurate as of 2/13/18 10:33 AM.  Always use your most recent med list.                   Brand Name Dispense Instructions for use Diagnosis    albuterol 108 (90 BASE) MCG/ACT Inhaler    VENTOLIN HFA    1 Inhaler    Inhale 1-2 puffs into the lungs every 4 hours as needed for shortness of breath / dyspnea or wheezing Reported on 2/16/2017    Upper respiratory tract infection, unspecified type, Intermittent asthma, uncomplicated       ibuprofen 600 MG tablet    ADVIL/MOTRIN    100 tablet    Take 1 tablet (600 mg) by mouth every 6 hours as needed for other (cramping)    Postpartum state       levonorgestrel 20 MCG/24HR IUD    MIRENA (52 MG)    1 each    1 each (20 mcg) by Intrauterine route once for 1 dose Lot #: [id number]    NDC#: 55308-968-72    Encounter for IUD insertion       order for DME     1 Device    Equipment being ordered: left wrist brace    Left wrist pain       prenatal multivitamin plus iron 27-0.8 MG Tabs per tablet      Take 1 tablet by mouth    Supervision of high-risk pregnancy of elderly multigravida       TYLENOL PO      Take 1,000 mg by mouth every 8 hours as needed for mild pain or fever        vitamin B complex with vitamin C Tabs tablet      Take 1 tablet by mouth daily        VITAMIN D PO      Take 2,000 Units by mouth daily

## 2018-02-13 NOTE — Clinical Note
Good morning  I saw Ninoska today for sesamoiditis right lower extremity.  We reviewed her xrays.  We discussed RICE/NSAID and shoe gear/insert recommendations.  She'll follow up in 3 weeks for a recheck.  Thanks  Terence

## 2018-02-13 NOTE — PATIENT INSTRUCTIONS
Thank you for choosing [facility]!    DR. HERNANDEZ'S CLINIC LOCATIONS:   MONDAY - [place] TUESDAY - [place]   [address] [facility]  [address]   [place] [address] [place] [address]   [phone]       THURSDAY AM - [place] THURSDAY PM - [place]   [address]   [place] [address] [place], [place] [address] [phone]       FRIDAY AM - [place] SET UP SURGERY: [phone] [address] APPOINTMENTS: [phone]   [place] [address] BILLING QUESTIONS: [phone] FAX NUMBER: [phone]     Follow Up: 3 wk    Over the Counter Inserts    Super Feet are the most common and easiest to find.    Locations include any Point.io, eBuddy in [place] on [address]  and in [place] on [address], The Etailers in \[facility]"" on [facility], [facility] Running Room in \[facility]\"" on [address], [facility] Running Room in [place] on [address], Diagnostic Hybrids in [place] on [address] and Handy Sport Shop in \[facility]\"" on [place] and in [place] on [facility].    Sofsole Fit can be found at eBuddy in [place].  You can also find them online at Sofsole.com    Spenco can be found online and at Spinnaker Coating Shoe Shop in \[facility]\"" on [address], Run N' Fun in [facility] on [place], Gear Running Store in [place] on [facility], The Etailers in [place] on [address] and tuul Sports in [place] on [address].    Power Step can be a little harder to find.  Locations include Run N' Fun in [place] on [place], [place] in \[facility]\"", Stop-over Store in [place] on [place] and online    Walk-Fit - Target     [facility]    **  A good high quality over the counter insert can cost around $40-$60.            Over the Counter Inserts  Super Feet are the most common and easiest to find.    Locations  include any North Shoes Store, Novia CareClinics Sporting Goods in [place] on [address] and in [place] on [address], GanWebXiom in [facility] on [address], [place] Running Room in [facility] on [facility], [facility] Running Room in [place] on [address], Recreational Equipment Inc in [place] on [address] and [employer]cal [employer] Sport Shop in [facility] on [place] and in [place] on [address].    Sofsole Fit can be found at Novia CareClinics Sporting [employer] Software in [place].  You can also find them online at Sofsole.com    Spenco can be found online and at Trevi [employer] Shoe Shop in [facility] on [address], Run N' Fun in [facility] on [address], Gear Running Store in [place] on [facility], Healthpointz in [place] on [address] [facility] [address] and South [employer] Sports in [place] on [address].    Power Step can be a little harder to find.  Locations include Run N' Fun in [place] on [place], [place] in [facility], Stop-over Store in [place] on [address] and online    Walk-Fit - Target     [facility]    **  A good high quality over the counter insert can cost around $40-$60.          SESAMOID INJURIES IN THE FOOT   A sesamoid is a bone embedded in a tendon. Sesamoids are found in several joints in the body. In the normal foot, the sesamoids are two pea-shaped bones located in the ball of the foot, beneath the big toe joint.   Acting as a pulley for tendons, the sesamoids help the big toe move normally and provide leverage when the big toe  pushes off  during walking and running. The sesamoids also serve as a weight-bearing surface for the first metatarsal bone (the long bone connected to the big toe), absorbing the weight placed on the ball of the foot when walking, running, and jumping.   Sesamoid injuries can involve the bones, tendons, and/or surrounding tissue in the joint. They are often associated with activities requiring increased pressure on the ball of the foot, such as running, basketball,  football, golf, tennis, and ballet. In addition, people with high arches are at risk for developing sesamoid problems. Frequent wearing of high-heeled shoes can also be a contributing factor.  TYPES OF INJURIES  There are three types of sesamoid injuries in the foot:  Turf Toe: This is an injury of the soft tissue surrounding the big toe joint. It usually occurs when the big toe joint is extended beyond its normal range. Turf toe causes immediate, sharp pain and swelling. It usually affects the entire big toe joint and limits the motion of the toe. Turf toe may result in an injury to the soft tissue attached to the sesamoid or a fracture of the sesamoid. Sometimes a  pop  is felt at the moment of injury.   Fracture: A fracture (break) in a sesamoid bone can be either acute or chronic.   An acute fracture is caused by trauma   a direct blow or impact to the bone. An acute sesamoid fracture produces immediate pain and swelling at the site of the break, but usually does not affect the entire big toe joint.   A chronic fracture is a stress fracture (a hairline break usually caused by repetitive stress or overuse). A chronic sesamoid fracture produces longstanding pain in the ball of the foot beneath the big toe joint. The pain, which tends to come and go, generally is aggravated with activity and relieved with rest.  Sesamoiditis: This is an overuse injury involving chronic inflammation of the sesamoid bones and the tendons involved with those bones. Sesamoiditis is caused by increased pressure to the sesamoids. Often, sesamoiditis is associated with a dull, longstanding pain beneath the big toe joint. The pain comes and goes, usually occurring with certain shoes or certain activities.  DIAGNOSIS  In diagnosing a sesamoid injury, the foot and ankle surgeon will examine the foot, focusing on the big toe joint. The surgeon will press on the big toe, move it up and down, and may assess the patient s walking and evaluate  the wear pattern on the patient s shoes. X-rays are ordered, and in some cases, advanced imaging studies may be ordered.  NON-SURGICAL TREATMENT  Non-surgical treatment for sesamoid injuries of the foot may include one or more of the following options, depending on the type of injury and degree of severity:  Padding, strapping, or taping. A pad may be placed in the shoe to cushion the inflamed sesamoid area, or the toe may be taped or strapped to relieve that area of tension.   Immobilization. The foot may be placed in a cast or removable walking cast. Crutches may be used to prevent placing weight on the foot.   Oral medications. Nonsteroidal anti-inflammatory drugs (NSAIDs), such as ibuprofen, are often helpful in reducing the pain and inflammation.   Physical therapy. The rehabilitation period following immobilization sometimes includes physical therapy, such as exercises (range-of-motion, strengthening, and conditioning) and ultrasound therapy.   Steroid injections. In some cases, cortisone is injected in the joint to reduce pain and inflammation.   Orthotic devices. Custom orthotic devices that fit into the shoe may be prescribed for long-term treatment of sesamoiditis to balance the pressure placed on the ball of the foot.  SURGICAL TREATMENT  When sesamoid injuries fail to respond to non-surgical treatment, surgery may be required. The foot and ankle surgeon will determine the type of procedure that is best suited to the individual patient.        Body Mass Index (BMI)  Many things can cause foot and ankle problems. Foot structure, activity level, foot mechanics and injuries are common causes of pain. One very important issue that often goes unmentioned, is body weight. Extra weight can cause increased stress on muscles, ligaments, bones and tendons. Sometimes just a few extra pounds is all it takes to put one over her/his threshold. Without reducing that stress, it can be difficult to alleviate pain. Some  people are uncomfortable addressing this issue, but we feel it is important for you to think about it. As Foot &  Ankle specialists, our job is addressing the lower extremity problem and possible causes. Regarding extra body weight, we encourage patients to discuss diet and weight management plans with their primary care doctors. It is this team approach that gives you the best opportunity for pain relief and getting you back on your feet.

## 2018-02-13 NOTE — NURSING NOTE
"Chief Complaint   Patient presents with     Foot Problems     R foot pain, no new fracture, has old healed ones       Initial Resp 20  Ht 5' 5\" (1.651 m)  Wt 269 lb 12.8 oz (122.4 kg)  BMI 44.9 kg/m2 Estimated body mass index is 44.9 kg/(m^2) as calculated from the following:    Height as of this encounter: 5' 5\" (1.651 m).    Weight as of this encounter: 269 lb 12.8 oz (122.4 kg).  Medication Reconciliation: complete    Surendra Riley CMA ([facility])  Podiatry / Foot & Ankle Surgery  [facility]    "

## 2018-03-06 ENCOUNTER — OFFICE VISIT (OUTPATIENT)
Dept: PODIATRY | Facility: CLINIC | Age: 38
End: 2018-03-06
Payer: COMMERCIAL

## 2018-03-06 VITALS
WEIGHT: 269 LBS | SYSTOLIC BLOOD PRESSURE: 124 MMHG | DIASTOLIC BLOOD PRESSURE: 78 MMHG | BODY MASS INDEX: 44.82 KG/M2 | HEIGHT: 65 IN

## 2018-03-06 DIAGNOSIS — M25.80 SESAMOIDITIS: Primary | ICD-10-CM

## 2018-03-06 PROCEDURE — 99213 OFFICE O/P EST LOW 20 MIN: CPT | Performed by: PODIATRIST

## 2018-03-06 NOTE — MR AVS SNAPSHOT
"              After Visit Summary   3/6/2018    Ninoska Cottrell    MRN: [id number]           Patient Information     Date Of Birth          1980        Visit Information        Provider Department      3/6/2018 10:45 AM Terence Rodriguez DPM [facility]        Today's Diagnoses     Sesamoiditis    -  1       Follow-ups after your visit        Follow-up notes from your care team     Return in about 3 weeks (around 3/27/2018).      Your next 10 appointments already scheduled     Mar 27, 2018 10:45 AM CDT   Return Visit with Terence Rodriguez DPM   [facility] ([facility])    [address]  [address]  [facility] [address]   [phone]              Who to contact     If you have questions or need follow up information about today's clinic visit or your schedule please contact [facility] directly at [phone].  Normal or non-critical lab and imaging results will be communicated to you by MyChart, letter or phone within 4 business days after the clinic has received the results. If you do not hear from us within 7 days, please contact the clinic through Strevushart or phone. If you have a critical or abnormal lab result, we will notify you by phone as soon as possible.  Submit refill requests through Recorded Future or call your pharmacy and they will forward the refill request to us. Please allow 3 business days for your refill to be completed.          Additional Information About Your Visit        Strevushart Information     Recorded Future lets you send messages to your doctor, view your test results, renew your prescriptions, schedule appointments and more. To sign up, go to [email or web address] . Click on \"Log in\" on the left side of the screen, which will take you to the Welcome page. Then click on \"Sign up Now\" on the right side of the page.     You will be asked to enter the access code listed below, as well as some personal information. Please follow the " "directions to create your username and password.     Your access code is: [email or web address]  Expires: 3/19/2018  8:52 PM     Your access code will  in 90 days. If you need help or a new code, please call your [place] clinic or [phone].        Care EveryWhere ID     This is your Care EveryWhere ID. This could be used by other organizations to access your [place] medical records  [id number]        Your Vitals Were     Height BMI (Body Mass Index)                5' 5\" (1.651 m) 44.76 kg/m2           Blood Pressure from Last 3 Encounters:   18 124/78   18 122/80   18 120/70    Weight from Last 3 Encounters:   18 269 lb (122 kg)   18 269 lb 12.8 oz (122.4 kg)   18 269 lb 12.8 oz (122.4 kg)              Today, you had the following     No orders found for display       Primary Care Provider Office Phone # Fax #    Traci Padron [phone]       [address] [facility] [address] [address]        Equal Access to Services     [facility] : Hadii yasmine damico hadasho Soomaali, waaxda luqadaha, qaybta kaalmada adeegyada, melida elmore . So [facility] [phone].    ATENCIÓN: Si habla español, tiene a irvin disposición servicios gratuitos de asistencia lingüística. Llame al [phone].    We comply with applicable federal civil rights laws and [place] laws. We do not discriminate on the basis of race, color, national origin, age, disability, sex, sexual orientation, or gender identity.            Thank you!     Thank you for choosing [facility]  for your care. Our goal is always to provide you with excellent care. Hearing back from our patients is one way we can continue to improve our services. Please take a few minutes to complete the written survey that you may receive in the mail after your visit with us. Thank you!             Your Updated Medication List - Protect others around you: Learn how to safely use, " store and throw away your medicines at www.disposemymeds.org.          This list is accurate as of 3/6/18 11:14 AM.  Always use your most recent med list.                   Brand Name Dispense Instructions for use Diagnosis    albuterol 108 (90 BASE) MCG/ACT Inhaler    VENTOLIN HFA    1 Inhaler    Inhale 1-2 puffs into the lungs every 4 hours as needed for shortness of breath / dyspnea or wheezing Reported on 2/16/2017    Upper respiratory tract infection, unspecified type, Intermittent asthma, uncomplicated       ibuprofen 600 MG tablet    ADVIL/MOTRIN    100 tablet    Take 1 tablet (600 mg) by mouth every 6 hours as needed for other (cramping)    Postpartum state       levonorgestrel 20 MCG/24HR IUD    MIRENA (52 MG)    1 each    1 each (20 mcg) by Intrauterine route once for 1 dose Lot #: [id number]    NDC#: 42685-928-84    Encounter for IUD insertion       order for DME     1 Device    Equipment being ordered: left wrist brace    Left wrist pain       prenatal multivitamin plus iron 27-0.8 MG Tabs per tablet      Take 1 tablet by mouth    Supervision of high-risk pregnancy of elderly multigravida       TYLENOL PO      Take 1,000 mg by mouth every 8 hours as needed for mild pain or fever        vitamin B complex with vitamin C Tabs tablet      Take 1 tablet by mouth daily        VITAMIN D PO      Take 2,000 Units by mouth daily

## 2018-03-06 NOTE — LETTER
"    3/6/2018         RE: Ninoska Cottrell  [address]  [facility] [address]        Dear Colleague,    Thank you for referring your patient, Ninoska Cottrell, to the [facility]. Please see a copy of my visit note below.    Foot & Ankle Surgery   March 6, 2018    S:  Pt is seen today for evaluation of right tibial > fibular sesamoid pain.  She got her inserts, has been wearing her shoes and doing RICE/NSAID, and until a day or two ago, was doing better.  But she has to walk her kids to the bus stop and aggravated her foot.  She's noticing some discomfort same spot left foot.    Vitals:    03/06/18 1036   BP: 124/78   Weight: 269 lb (122 kg)   Height: 5' 5\" (1.651 m)   '      ROS - Pos for CC.  Patient denies current nausea, vomiting, chills, fevers, belly pain, calf pain, chest pain or SOB.  Complete remainder of ROS it otherwise neg.      PE:  Gen:   No apparent distress  Eye:    Visual scanning without deficit  Ear:    Response to auditory stimuli wnl  Lung:    Non-labored breathing on RA noted  Abd:    NTND per patient report  Lymph:    Neg for pitting/non-pitting edema BLE  Vasc:    Pulses palpable, CFT minimally delayed  Neuro:    Light touch sensation intact to all sensory nerve distributions without paresthesias  Derm:    Neg for nodules, lesions or ulcerations  MSK:    R tibial > fibular sesamoid pain, slight crepitus with sesamoid pressure/1st MPJ PROM. Similar but less prominent symptoms L foot  Calf:    Neg for redness, swelling or tenderness      Assessment:  38 year old female with bilateral sesamoiditis R>L      Plan:  Discussed etiologies, anatomy and options  1.  Bilateral sesamoiditis R>L  -continue comfortable shoe gear and inserts  -RICE/NSAID prn  -activity modifications  -we again discussed boot, PO vs injectible steroid;however, she was doing well with current plan and simply aggravated her foot, and continuing with today's plan seems reasonable    Follow up:  3 weeks or sooner with " acute issues      Body mass index is 44.76 kg/(m^2).  Weight management plan: Patient was referred to their PCP to discuss a diet and exercise plan.         Terence Rodriguez DPM   Podiatric Foot & Ankle Surgeon  [facility]  [phone]    Again, thank you for allowing me to participate in the care of your patient.        Sincerely,        Terence Rodriguez DPM, LU

## 2018-03-06 NOTE — PROGRESS NOTES
"Foot & Ankle Surgery   March 6, 2018    S:  Pt is seen today for evaluation of right tibial > fibular sesamoid pain.  She got her inserts, has been wearing her shoes and doing RICE/NSAID, and until a day or two ago, was doing better.  But she has to walk her kids to the bus stop and aggravated her foot.  She's noticing some discomfort same spot left foot.    Vitals:    03/06/18 1036   BP: 124/78   Weight: 269 lb (122 kg)   Height: 5' 5\" (1.651 m)   '      ROS - Pos for CC.  Patient denies current nausea, vomiting, chills, fevers, belly pain, calf pain, chest pain or SOB.  Complete remainder of ROS it otherwise neg.      PE:  Gen:   No apparent distress  Eye:    Visual scanning without deficit  Ear:    Response to auditory stimuli wnl  Lung:    Non-labored breathing on RA noted  Abd:    NTND per patient report  Lymph:    Neg for pitting/non-pitting edema BLE  Vasc:    Pulses palpable, CFT minimally delayed  Neuro:    Light touch sensation intact to all sensory nerve distributions without paresthesias  Derm:    Neg for nodules, lesions or ulcerations  MSK:    R tibial > fibular sesamoid pain, slight crepitus with sesamoid pressure/1st MPJ PROM. Similar but less prominent symptoms L foot  Calf:    Neg for redness, swelling or tenderness      Assessment:  38 year old female with bilateral sesamoiditis R>L      Plan:  Discussed etiologies, anatomy and options  1.  Bilateral sesamoiditis R>L  -continue comfortable shoe gear and inserts  -RICE/NSAID prn  -activity modifications  -we again discussed boot, PO vs injectible steroid;however, she was doing well with current plan and simply aggravated her foot, and continuing with today's plan seems reasonable    Follow up:  3 weeks or sooner with acute issues      Body mass index is 44.76 kg/(m^2).  Weight management plan: Patient was referred to their PCP to discuss a diet and exercise plan.         Terence Rodriguez DPM   Podiatric Foot & Ankle Surgeon  [facility]  [phone]

## 2018-03-06 NOTE — NURSING NOTE
"Chief Complaint   Patient presents with     RECHECK     feet were feeling much better up until yesterday/today, got superfeet and shoes for indoors       Initial /78  Ht 5' 5\" (1.651 m)  Wt 269 lb (122 kg)  BMI 44.76 kg/m2 Estimated body mass index is 44.76 kg/(m^2) as calculated from the following:    Height as of this encounter: 5' 5\" (1.651 m).    Weight as of this encounter: 269 lb (122 kg).  Medication Reconciliation: complete    Surendra Riley CMA ([facility])  Podiatry / Foot & Ankle Surgery  [facility]    "

## 2018-03-27 ENCOUNTER — OFFICE VISIT (OUTPATIENT)
Dept: PODIATRY | Facility: CLINIC | Age: 38
End: 2018-03-27
Payer: COMMERCIAL

## 2018-03-27 VITALS
SYSTOLIC BLOOD PRESSURE: 104 MMHG | DIASTOLIC BLOOD PRESSURE: 78 MMHG | HEIGHT: 65 IN | BODY MASS INDEX: 44.82 KG/M2 | WEIGHT: 269 LBS

## 2018-03-27 DIAGNOSIS — M25.80 SESAMOIDITIS: Primary | ICD-10-CM

## 2018-03-27 PROCEDURE — 99212 OFFICE O/P EST SF 10 MIN: CPT | Performed by: PODIATRIST

## 2018-03-27 NOTE — NURSING NOTE
"Chief Complaint   Patient presents with     Follow Up For     right foot, sesamoiditis        Initial /78 (BP Location: Right arm, Patient Position: Sitting, Cuff Size: Adult Large)  Ht 5' 5\" (1.651 m)  Wt 269 lb (122 kg)  BMI 44.76 kg/m2 Estimated body mass index is 44.76 kg/(m^2) as calculated from the following:    Height as of this encounter: 5' 5\" (1.651 m).    Weight as of this encounter: 269 lb (122 kg).  Medication Reconciliation: complete    "

## 2018-03-27 NOTE — Clinical Note
Good morning  I saw Ninoska today for her next follow up on sesamoid pain right foot.  She has continued to show improvement and is happy with her progress. She'll continue with the current plan and follow up prn.  Thanks  Terence

## 2018-03-27 NOTE — MR AVS SNAPSHOT
After Visit Summary   3/27/2018    Ninoska Cottrell    MRN: [id number]           Patient Information     Date Of Birth          1980        Visit Information        Provider Department      3/27/2018 10:45 AM Terence Hernandez DPM [facility]        Care Instructions    Thank you for choosing [facility] [place] [facility] / [facility] & Ankle Surgery!    DR. HERNANDEZ'S CLINIC LOCATIONS:   MONDAY - [place] TUESDAY - [place]   [address] [facility] [address] [facility]  [address] [place] [address]   [place] [address] [place] [address]   [phone]       THURSDAY AM - [place] THURSDAY PM - [place]   [address]   [place] [address] [place] [address]   [phone]       FRIDAY AM - [place] SET UP SURGERY: [phone] [address] APPOINTMENTS: [phone]   [place] [address] BILLING QUESTIONS: [phone] FAX NUMBER: [phone]     Follow Up: as needed    Body Mass Index (BMI)  Many things can cause foot and ankle problems. Foot structure, activity level, foot mechanics and injuries are common causes of pain. One very important issue that often goes unmentioned, is body weight. Extra weight can cause increased stress on muscles, ligaments, bones and tendons. Sometimes just a few extra pounds is all it takes to put one over her/his threshold. Without reducing that stress, it can be difficult to alleviate pain. Some people are uncomfortable addressing this issue, but we feel it is important for you to think about it. As Foot &  Ankle specialists, our job is addressing the lower extremity problem and possible causes. Regarding extra body weight, we encourage patients to discuss diet and weight management plans with their primary care doctors. It is this team approach that gives you the best opportunity for pain relief and getting you back on your feet.            Follow-ups after your visit        Who to contact     If you have  "questions or need follow up information about today's clinic visit or your schedule please contact [facility] directly at [phone].  Normal or non-critical lab and imaging results will be communicated to you by K2 Therapeuticshart, letter or phone within 4 business days after the clinic has received the results. If you do not hear from us within 7 days, please contact the clinic through K[employer] The[employer]hart or phone. If you have a critical or abnormal lab result, we will notify you by phone as soon as possible.  Submit refill requests through [email or web address] or call your pharmacy and they will forward the refill request to us. Please allow 3 business days for your refill to be completed.          Additional Information About Your Visit        K2 Therapeutics[employer]RoughHands Information     [email or web address] lets you send messages to your doctor, view your test results, renew your prescriptions, schedule appointments and more. To sign up, go to [email or web address] . Click on \"Log in\" on the left side of the screen, which will take you to the Welcome page. Then click on \"Sign up Now\" on the right side of the page.     You will be asked to enter the access code listed below, as well as some personal information. Please follow the directions to create your username and password.     Your access code is: [email or web address]  Expires: 2018 11:01 AM     Your access code will  in 90 days. If you need help or a new code, please call your [place] clinic or [phone].        Care EveryWhere ID     This is your Care EveryWhere ID. This could be used by other organizations to access your [place] medical records  [id number]        Your Vitals Were     Height BMI (Body Mass Index)                5' 5\" (1.651 m) 44.76 kg/m2           Blood Pressure from Last 3 Encounters:   18 104/78   18 124/78   18 122/80    Weight from Last 3 Encounters:   18 269 lb (122 kg)   18 269 lb (122 kg)   18 269 lb 12.8 oz (122.4 kg)            "   Today, you had the following     No orders found for display       Primary Care Provider Office Phone # Fax #    Traci Padron [phone]       [address]  [facility] [address]        Equal Access to Services     IVAN JAVI : Hadii aad ku hadeveo Soomaali, waaxda luqadaha, qaybta kaalmada adeegyada, waxay idiin hayyulianan adeeric khrichard lamakennagolden . So [facility] [phone].    ATENCIÓN: Si habla español, tiene a irvin disposición servicios gratuitos de asistencia lingüística. Llame al [phone].    We comply with applicable federal civil rights laws and [place] laws. We do not discriminate on the basis of race, color, national origin, age, disability, sex, sexual orientation, or gender identity.            Thank you!     Thank you for choosing [facility]  for your care. Our goal is always to provide you with excellent care. Hearing back from our patients is one way we can continue to improve our services. Please take a few minutes to complete the written survey that you may receive in the mail after your visit with us. Thank you!             Your Updated Medication List - Protect others around you: Learn how to safely use, store and throw away your medicines at www.disposemymeds.org.          This list is accurate as of 3/27/18 11:01 AM.  Always use your most recent med list.                   Brand Name Dispense Instructions for use Diagnosis    albuterol 108 (90 BASE) MCG/ACT Inhaler    VENTOLIN HFA    1 Inhaler    Inhale 1-2 puffs into the lungs every 4 hours as needed for shortness of breath / dyspnea or wheezing Reported on 2/16/2017    Upper respiratory tract infection, unspecified type, Intermittent asthma, uncomplicated       ibuprofen 600 MG tablet    ADVIL/MOTRIN    100 tablet    Take 1 tablet (600 mg) by mouth every 6 hours as needed for other (cramping)    Postpartum state       levonorgestrel 20 MCG/24HR IUD    MIRENA (52 MG)    1 each    1 each  (20 mcg) by Intrauterine route once for 1 dose Lot #: [id number]    NDC#: 48271-293-97    Encounter for IUD insertion       order for DME     1 Device    Equipment being ordered: left wrist brace    Left wrist pain       prenatal multivitamin plus iron 27-0.8 MG Tabs per tablet      Take 1 tablet by mouth    Supervision of high-risk pregnancy of elderly multigravida       TYLENOL PO      Take 1,000 mg by mouth every 8 hours as needed for mild pain or fever        vitamin B complex with vitamin C Tabs tablet      Take 1 tablet by mouth daily        VITAMIN D PO      Take 2,000 Units by mouth daily

## 2018-03-27 NOTE — PROGRESS NOTES
"Foot & Ankle Surgery   March 27, 2018    S:  Pt is seen today for evaluation of right tibial > fibular sesamoid pain.  She's wearing comfortable shoes and has Superfeet inserts, doing RICE/NSAID intermittently.  Symptoms as of last visit were showing improvement, although she did have a small setback.  She was recently on a family trip to [place] and states her foot has continued to improve and she's happy with her progress.    Vitals:    03/27/18 1052   BP: 104/78   BP Location: Right arm   Patient Position: Sitting   Cuff Size: Adult Large   Weight: 269 lb (122 kg)   Height: 5' 5\" (1.651 m)   '      ROS - Pos for CC.  Patient denies current nausea, vomiting, chills, fevers, belly pain, calf pain, chest pain or SOB.  Complete remainder of ROS it otherwise neg.      PE:  Gen:   No apparent distress  Eye:    Visual scanning without deficit  Ear:    Response to auditory stimuli wnl  Lung:    Non-labored breathing on RA noted  Abd:    NTND per patient report  Lymph:    Neg for pitting/non-pitting edema BLE  Vasc:    Pulses palpable, CFT minimally delayed  Neuro:    Light touch sensation intact to all sensory nerve distributions without paresthesias  Derm:    Neg for nodules, lesions or ulcerations  MSK:    R 1st MPJ - unable to elicit any sesamoid pain, and no pain/crepitus noted today with PROM of the joint and sesamoid pressure  Calf:    Neg for redness, swelling or tenderness    Assessment:  38 year old female with right foot sesamoiditis, improved      Plan:  Discussed etiologies, anatomy and options  1.  Right lower extremity sesamoiditis, improving  -continue with current plan - comfortable shoes, minimize shoeless ambulation; OTC inserts; RICE/NSAID prn based on symptoms  -we can consider orthotics, CAM, imaging, steroid options, but she's doing well and is happy with her progress    Follow up:  prn or sooner with acute issues      Body mass index is 44.76 kg/(m^2).  Weight management plan: Patient was " referred to their PCP to discuss a diet and exercise plan.         Terence Rodriguez DPM   Podiatric Foot & Ankle Surgeon  [facility]  [phone]

## 2018-03-27 NOTE — PATIENT INSTRUCTIONS
Thank you for choosing [facility] [facility] / [facility] & Ankle Surgery!    DR. HERNANDEZ'S CLINIC LOCATIONS:   MONDAY - [place] TUESDAY - [place]   [address] [facility]  [address] [place]   [place] [address] [place] [address]   [phone]       THURSDAY AM - [place] THURSDAY PM - [facility]   [address] [facility] [address]   [place] [address] [place], [place] [address] [phone]       FRIDAY AM - [place] SET UP SURGERY: [phone] [address] APPOINTMENTS: [phone]   [place] [address] BILLING QUESTIONS: [phone] FAX NUMBER: [phone]     Follow Up: as needed    Body Mass Index (BMI)  Many things can cause foot and ankle problems. Foot structure, activity level, foot mechanics and injuries are common causes of pain. One very important issue that often goes unmentioned, is body weight. Extra weight can cause increased stress on muscles, ligaments, bones and tendons. Sometimes just a few extra pounds is all it takes to put one over her/his threshold. Without reducing that stress, it can be difficult to alleviate pain. Some people are uncomfortable addressing this issue, but we feel it is important for you to think about it. As Foot &  Ankle specialists, our job is addressing the lower extremity problem and possible causes. Regarding extra body weight, we encourage patients to discuss diet and weight management plans with their primary care doctors. It is this team approach that gives you the best opportunity for pain relief and getting you back on your feet.

## 2018-05-30 ENCOUNTER — OFFICE VISIT (OUTPATIENT)
Dept: URGENT CARE | Facility: URGENT CARE | Age: 38
End: 2018-05-30
Payer: COMMERCIAL

## 2018-05-30 VITALS
WEIGHT: 252.31 LBS | RESPIRATION RATE: 20 BRPM | OXYGEN SATURATION: 100 % | TEMPERATURE: 98.7 F | SYSTOLIC BLOOD PRESSURE: 138 MMHG | BODY MASS INDEX: 41.99 KG/M2 | HEART RATE: 83 BPM | DIASTOLIC BLOOD PRESSURE: 89 MMHG

## 2018-05-30 DIAGNOSIS — R05.8 PRODUCTIVE COUGH: Primary | ICD-10-CM

## 2018-05-30 PROCEDURE — 99213 OFFICE O/P EST LOW 20 MIN: CPT | Performed by: PHYSICIAN ASSISTANT

## 2018-05-30 RX ORDER — AZITHROMYCIN 250 MG/1
TABLET, FILM COATED ORAL
Qty: 6 TABLET | Refills: 0 | Status: SHIPPED | OUTPATIENT
Start: 2018-05-30 | End: 2018-10-31

## 2018-05-30 NOTE — MR AVS SNAPSHOT
"              After Visit Summary   2018    Ninoska Cottrell    MRN: [id number]           Patient Information     Date Of Birth          1980        Visit Information        Provider Department      2018 7:30 PM Lou Sterling PA-C [facility]        Today's Diagnoses     Productive cough    -  1       Follow-ups after your visit        Who to contact     If you have questions or need follow up information about today's clinic visit or your schedule please contact [facility] directly at [phone].  Normal or non-critical lab and imaging results will be communicated to you by Birsthart, letter or phone within 4 business days after the clinic has received the results. If you do not hear from us within 7 days, please contact the clinic through Birsthart or phone. If you have a critical or abnormal lab result, we will notify you by phone as soon as possible.  Submit refill requests through Catbird or call your pharmacy and they will forward the refill request to us. Please allow 3 business days for your refill to be completed.          Additional Information About Your Visit        MyChart Information     Catbird lets you send messages to your doctor, view your test results, renew your prescriptions, schedule appointments and more. To sign up, go to [email or web address] . Click on \"Log in\" on the left side of the screen, which will take you to the Welcome page. Then click on \"Sign up Now\" on the right side of the page.     You will be asked to enter the access code listed below, as well as some personal information. Please follow the directions to create your username and password.     Your access code is: [email or web address]  Expires: 2018 11:01 AM     Your access code will  in 90 days. If you need help or a new code, please call your [place] clinic or [phone].        Care EveryWhere ID     This is your Care EveryWhere ID. This " could be used by other organizations to access your [facility] medical records  [id number]        Your Vitals Were     Pulse Temperature Respirations Pulse Oximetry Breastfeeding? BMI (Body Mass Index)    83 98.7  F (37.1  C) (Tympanic) 20 100% No 41.99 kg/m2       Blood Pressure from Last 3 Encounters:   05/30/18 138/89   03/27/18 104/78   03/06/18 124/78    Weight from Last 3 Encounters:   05/30/18 252 lb 5 oz (114.4 kg)   03/27/18 269 lb (122 kg)   03/06/18 269 lb (122 kg)              Today, you had the following     No orders found for display         Today's Medication Changes          These changes are accurate as of 5/30/18  8:24 PM.  If you have any questions, ask your nurse or doctor.               Start taking these medicines.        Dose/Directions    azithromycin 250 MG tablet   Commonly known as:  ZITHROMAX   Used for:  Productive cough   Started by:  Lou Sterling PA-C        Two tablets first day, then one tablet daily for four days.   Quantity:  6 tablet   Refills:  0         Stop taking these medicines if you haven't already. Please contact your care team if you have questions.     albuterol 108 (90 Base) MCG/ACT Inhaler   Commonly known as:  VENTOLIN HFA   Stopped by:  Lou Sterling PA-C           order for DME   Stopped by:  Lou Sterling PA-C                Where to get your medicines      These medications were sent to [facility] [address] [place] [address] [address] [facility] [address], [facility] [address]     Phone:  [phone]     azithromycin 250 MG tablet                Primary Care Provider Office Phone # Fax #    Traci Padron [phone]       [address]  [facility] [address]        Equal Access to Services     IVAN CALDWELL AH: Mami sandoval Solisette, waaxda luqadaha, qaybta kaalmamelida pelaez. So [facility] [phone].    ATENCIÓN:  Si habla aldair, tiene a irvin disposición servicios gratuitos de asistencia lingüística. Jocelyn cunningham [phone].    We comply with applicable federal civil rights laws and [place] laws. We do not discriminate on the basis of race, color, national origin, age, disability, sex, sexual orientation, or gender identity.            Thank you!     Thank you for choosing [facility]  for your care. Our goal is always to provide you with excellent care. Hearing back from our patients is one way we can continue to improve our services. Please take a few minutes to complete the written survey that you may receive in the mail after your visit with us. Thank you!             Your Updated Medication List - Protect others around you: Learn how to safely use, store and throw away your medicines at www.disposemymeds.org.          This list is accurate as of 5/30/18  8:24 PM.  Always use your most recent med list.                   Brand Name Dispense Instructions for use Diagnosis    azithromycin 250 MG tablet    ZITHROMAX    6 tablet    Two tablets first day, then one tablet daily for four days.    Productive cough       FISH OIL PO      Take 1 capsule by mouth 2 times daily        ibuprofen 600 MG tablet    ADVIL/MOTRIN    100 tablet    Take 1 tablet (600 mg) by mouth every 6 hours as needed for other (cramping)    Postpartum state       levonorgestrel 20 MCG/24HR IUD    MIRENA (52 MG)    1 each    1 each (20 mcg) by Intrauterine route once for 1 dose Lot #: [id number]    NDC#: 82932-109-04    Encounter for IUD insertion       prenatal multivitamin plus iron 27-0.8 MG Tabs per tablet      Take 1 tablet by mouth    Supervision of high-risk pregnancy of elderly multigravida       TYLENOL PO      Take 1,000 mg by mouth every 8 hours as needed for mild pain or fever        vitamin B complex with vitamin C Tabs tablet      Take 1 tablet by mouth daily        VITAMIN D PO      Take 2,000 Units by mouth daily

## 2018-05-31 NOTE — PROGRESS NOTES
SUBJECTIVE:   Ninoska Cottrell is a 38 year old female presenting with a chief complaint of   1) productive cough for the past week  2) low grade fevers  3) exposure to pneumonia ( 9 month old baby and grandmother at home).  Onset of symptoms was as above.  Course of illness is worsening.    Severity moderate  Current and Associated symptoms: as above  Treatment measures tried include Tylenol/Ibuprofen.  Predisposing factors include pneumonia exposure.    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     Fibromyalgia      Hyperlipidemia      Migraine      Murmur, cardiac      Obesity      Preeclampsia 2012     Patient Active Problem List   Diagnosis     Intermittent asthma     Migraine headache     Morbid obesity (H)     Social History   Substance Use Topics     Smoking status: Former Smoker     Quit date: 1/1/2000     Smokeless tobacco: Never Used     Alcohol use No       ROS:  CONSTITUTIONAL:as per HPI  INTEGUMENTARY/SKIN: NEGATIVE for worrisome rashes, moles or lesions  EYES: NEGATIVE for vision changes or irritation  ENT/MOUTH: as per HPI  RESP:as per HPI  CV: NEGATIVE for chest pain, palpitations or peripheral edema  GI: NEGATIVE for nausea, abdominal pain, heartburn, or change in bowel habits  MUSCULOSKELETAL: NEGATIVE for significant arthralgias or myalgia  NEURO: NEGATIVE for weakness, dizziness or paresthesias  Review of systems negative except as stated above.    OBJECTIVE  :/89  Pulse 83  Temp 98.7  F (37.1  C) (Tympanic)  Resp 20  Wt 252 lb 5 oz (114.4 kg)  SpO2 100%  BMI 41.99 kg/m2  GENERAL APPEARANCE: healthy, alert and no distress  EYES: EOMI,  PERRL, conjunctiva clear  HENT: ear canals and TM's normal.  Nose and mouth without ulcers, erythema or lesions  NECK: supple, nontender, no lymphadenopathy  RESP: as few rhonchi  CV: regular rates and rhythm, normal S1 S2, no murmur  noted  ABDOMEN:  soft, nontender, no HSM or masses and bowel sounds normal  NEURO: Normal strength and tone, sensory exam grossly normal,  normal speech and mentation  SKIN: no suspicious lesions or rashes    (R05) Productive cough  (primary encounter diagnosis)  Comment: pneumonia exposure.    Plan: azithromycin (ZITHROMAX) 250 MG tablet        Follow up with primary clinic should symptoms persist or worsen.    Patient expresses understanding and agreement with the assessment and plan as above.

## 2018-06-01 ENCOUNTER — OFFICE VISIT (OUTPATIENT)
Dept: INTERNAL MEDICINE | Facility: CLINIC | Age: 38
End: 2018-06-01
Payer: COMMERCIAL

## 2018-06-01 ENCOUNTER — RADIANT APPOINTMENT (OUTPATIENT)
Dept: GENERAL RADIOLOGY | Facility: CLINIC | Age: 38
End: 2018-06-01
Attending: PHYSICIAN ASSISTANT
Payer: COMMERCIAL

## 2018-06-01 VITALS
OXYGEN SATURATION: 100 % | TEMPERATURE: 98.4 F | BODY MASS INDEX: 42.22 KG/M2 | SYSTOLIC BLOOD PRESSURE: 128 MMHG | WEIGHT: 253.7 LBS | HEART RATE: 87 BPM | DIASTOLIC BLOOD PRESSURE: 88 MMHG

## 2018-06-01 DIAGNOSIS — R05.9 COUGH: Primary | ICD-10-CM

## 2018-06-01 DIAGNOSIS — J45.20 INTERMITTENT ASTHMA WITHOUT COMPLICATION, UNSPECIFIED ASTHMA SEVERITY: ICD-10-CM

## 2018-06-01 LAB
ANION GAP SERPL CALCULATED.3IONS-SCNC: 5 MMOL/L (ref 3–14)
BUN SERPL-MCNC: 4 MG/DL (ref 7–30)
CALCIUM SERPL-MCNC: 9.1 MG/DL (ref 8.5–10.1)
CHLORIDE SERPL-SCNC: 105 MMOL/L (ref 94–109)
CO2 SERPL-SCNC: 29 MMOL/L (ref 20–32)
CREAT SERPL-MCNC: 0.79 MG/DL (ref 0.52–1.04)
ERYTHROCYTE [DISTWIDTH] IN BLOOD BY AUTOMATED COUNT: 13.8 % (ref 10–15)
GFR SERPL CREATININE-BSD FRML MDRD: 82 ML/MIN/1.7M2
GLUCOSE SERPL-MCNC: 93 MG/DL (ref 70–99)
HCT VFR BLD AUTO: 42 % (ref 35–47)
HGB BLD-MCNC: 13.3 G/DL (ref 11.7–15.7)
MCH RBC QN AUTO: 26.2 PG (ref 26.5–33)
MCHC RBC AUTO-ENTMCNC: 31.7 G/DL (ref 31.5–36.5)
MCV RBC AUTO: 83 FL (ref 78–100)
PLATELET # BLD AUTO: 349 10E9/L (ref 150–450)
POTASSIUM SERPL-SCNC: 3.7 MMOL/L (ref 3.4–5.3)
RBC # BLD AUTO: 5.07 10E12/L (ref 3.8–5.2)
SODIUM SERPL-SCNC: 139 MMOL/L (ref 133–144)
WBC # BLD AUTO: 6.7 10E9/L (ref 4–11)

## 2018-06-01 PROCEDURE — 85027 COMPLETE CBC AUTOMATED: CPT | Performed by: PHYSICIAN ASSISTANT

## 2018-06-01 PROCEDURE — 99214 OFFICE O/P EST MOD 30 MIN: CPT | Performed by: PHYSICIAN ASSISTANT

## 2018-06-01 PROCEDURE — 71046 X-RAY EXAM CHEST 2 VIEWS: CPT | Mod: FY

## 2018-06-01 PROCEDURE — 36415 COLL VENOUS BLD VENIPUNCTURE: CPT | Performed by: PHYSICIAN ASSISTANT

## 2018-06-01 PROCEDURE — 80048 BASIC METABOLIC PNL TOTAL CA: CPT | Performed by: PHYSICIAN ASSISTANT

## 2018-06-01 RX ORDER — ALBUTEROL SULFATE 90 UG/1
2 AEROSOL, METERED RESPIRATORY (INHALATION) EVERY 6 HOURS PRN
Qty: 1 INHALER | Refills: 1 | Status: SHIPPED | OUTPATIENT
Start: 2018-06-01 | End: 2020-11-05

## 2018-06-01 RX ORDER — PREDNISONE 20 MG/1
40 TABLET ORAL DAILY
Qty: 10 TABLET | Refills: 0 | Status: SHIPPED | OUTPATIENT
Start: 2018-06-01 | End: 2018-06-06

## 2018-06-01 NOTE — PATIENT INSTRUCTIONS
Your symptoms should slowly improve in the next 5-7 days  As long as your symptoms are improving, even if it is slow this is a good sign that your body is beating the infection.   If symptoms worsen or change (fever, chest pain, shortness of breath or weakness), I would recommend urgent follow up. I would also recommend follow up if your symptoms do not improve.

## 2018-06-01 NOTE — PROGRESS NOTES
SUBJECTIVE:   Ninoska Cottrell is a 38 year old female who presents to clinic today for the following health issues:    Patient presents with:   Follow-Up: Seen at [facility] on 5/30/18  - pt was prescribed azithromycin at   - Pt notes she is stilling coughing, but is having some minor improvements overall   - pt reports she is getting lightheaded with coughing   - pt reports she is coughing so hard she is feeling like she could throw up    - pt has a history of asthma, but is not on any medication   - pt has not needed prednisone in the past   - pt denies fever since antibiotics   - accompanying symptoms: pt notes hard to breath - not quite shortness of breath    - denies runny nose       Problem list and histories reviewed & adjusted, as indicated.  Additional history: as documented      Reviewed and updated as needed this visit by clinical staff  Tobacco  Allergies  Meds  Problems  Med Hx  Surg Hx  Fam Hx  Soc Hx        Reviewed and updated as needed this visit by Provider  Meds  Problems           OBJECTIVE:     /88 (BP Location: Right arm, Patient Position: Chair, Cuff Size: Adult Large)  Pulse 87  Temp 98.4  F (36.9  C) (Oral)  Wt 253 lb 11.2 oz (115.1 kg)  SpO2 100%  BMI 42.22 kg/m2  Body mass index is 42.22 kg/(m^2).  GENERAL: healthy, alert and no distress  EYES: Eyes grossly normal to inspection, PERRL and conjunctivae and sclerae normal  HENT: ear canals and TM's normal, nose and mouth without ulcers or lesions  NECK: no adenopathy, no asymmetry, masses, or scars and thyroid normal to palpation  RESP: lungs clear to auscultation - no rales, rhonchi or wheezes  CV: regular rates and rhythm, normal S1 S2, no S3 or S4 and no murmur, click or rub    Diagnostic Test Results:  Results for orders placed or performed in visit on 06/01/18 (from the past 24 hour(s))   CBC with platelets   Result Value Ref Range    WBC 6.7 4.0 - 11.0 10e9/L    RBC Count 5.07 3.8 - 5.2 10e12/L    Hemoglobin 13.3  11.7 - 15.7 g/dL    Hematocrit 42.0 35.0 - 47.0 %    MCV 83 78 - 100 fl    MCH 26.2 (L) 26.5 - 33.0 pg    MCHC 31.7 31.5 - 36.5 g/dL    RDW 13.8 10.0 - 15.0 %    Platelet Count 349 150 - 450 10e9/L   Basic metabolic panel  (Ca, Cl, CO2, Creat, Gluc, K, Na, BUN)   Result Value Ref Range    Sodium 139 133 - 144 mmol/L    Potassium 3.7 3.4 - 5.3 mmol/L    Chloride 105 94 - 109 mmol/L    Carbon Dioxide 29 20 - 32 mmol/L    Anion Gap 5 3 - 14 mmol/L    Glucose 93 70 - 99 mg/dL    Urea Nitrogen 4 (L) 7 - 30 mg/dL    Creatinine 0.79 0.52 - 1.04 mg/dL    GFR Estimate 82 >60 mL/min/1.7m2    GFR Estimate If Black >90 >60 mL/min/1.7m2    Calcium 9.1 8.5 - 10.1 mg/dL       ASSESSMENT/PLAN:     1. Cough  2. Intermittent asthma without complication, unspecified asthma severity  - continue on Azithromycin   - based on feeling slight SOB, further work up  - will treat for asthma excerebration  - will consider change in abx based on results    - XR Chest 2 Views  - CBC with platelets  - Basic metabolic panel  (Ca, Cl, CO2, Creat, Gluc, K, Na, BUN)  - predniSONE (DELTASONE) 20 MG tablet; Take 2 tablets (40 mg) by mouth daily for 5 days  Dispense: 10 tablet; Refill: 0  - albuterol (PROAIR HFA/PROVENTIL HFA/VENTOLIN HFA) 108 (90 Base) MCG/ACT Inhaler; Inhale 2 puffs into the lungs every 6 hours as needed for shortness of breath / dyspnea or wheezing  Dispense: 1 Inhaler; Refill: 1  - reviewed close follow up if symptoms worsen or fail to improve   - pt agrees to above plan and all questions are answered     Елена Rothman PA-C  [facility]

## 2018-06-01 NOTE — MR AVS SNAPSHOT
"              After Visit Summary   6/1/2018    Ninoska Cottrell    MRN: [id number]           Patient Information     Date Of Birth          1980        Visit Information        Provider Department      6/1/2018 9:20 AM Елена Rothman PA-C [facility]        Today's Diagnoses     Cough    -  1    Intermittent asthma without complication, unspecified asthma severity          Care Instructions      Your symptoms should slowly improve in the next 5-7 days  As long as your symptoms are improving, even if it is slow this is a good sign that your body is beating the infection.   If symptoms worsen or change (fever, chest pain, shortness of breath or weakness), I would recommend urgent follow up. I would also recommend follow up if your symptoms do not improve.             Follow-ups after your visit        Who to contact     If you have questions or need follow up information about today's clinic visit or your schedule please contact [facility] directly at [phone].  Normal or non-critical lab and imaging results will be communicated to you by ClearCarehart, letter or phone within 4 business days after the clinic has received the results. If you do not hear from us within 7 days, please contact the clinic through ClearCarehart or phone. If you have a critical or abnormal lab result, we will notify you by phone as soon as possible.  Submit refill requests through [employer] or call your pharmacy and they will forward the refill request to us. Please allow 3 business days for your refill to be completed.          Additional Information About Your Visit        ClearCarehart Information     [employer] lets you send messages to your doctor, view your test results, renew your prescriptions, schedule appointments and more. To sign up, go to [email or web address] . Click on \"Log in\" on the left side of the screen, which will take you to the Welcome page. Then click on \"Sign up Now\" on the right " side of the page.     You will be asked to enter the access code listed below, as well as some personal information. Please follow the directions to create your username and password.     Your access code is: [email or web address]  Expires: 2018 11:01 AM     Your access code will  in 90 days. If you need help or a new code, please call your [place] clinic or [phone].        Care EveryWhere ID     This is your Care EveryWhere ID. This could be used by other organizations to access your [place] medical records  [id number]        Your Vitals Were     Pulse Temperature Pulse Oximetry BMI (Body Mass Index)          87 98.4  F (36.9  C) (Oral) 100% 42.22 kg/m2         Blood Pressure from Last 3 Encounters:   18 128/88   18 138/89   18 104/78    Weight from Last 3 Encounters:   18 253 lb 11.2 oz (115.1 kg)   18 252 lb 5 oz (114.4 kg)   18 269 lb (122 kg)              We Performed the Following     Basic metabolic panel  (Ca, Cl, CO2, Creat, Gluc, K, Na, BUN)     CBC with platelets     XR Chest 2 Views          Today's Medication Changes          These changes are accurate as of 18  9:59 AM.  If you have any questions, ask your nurse or doctor.               Start taking these medicines.        Dose/Directions    albuterol 108 (90 Base) MCG/ACT Inhaler   Commonly known as:  PROAIR HFA/PROVENTIL HFA/VENTOLIN HFA   Used for:  Intermittent asthma without complication, unspecified asthma severity   Started by:  Елена Rothman PA-C        Dose:  2 puff   Inhale 2 puffs into the lungs every 6 hours as needed for shortness of breath / dyspnea or wheezing   Quantity:  1 Inhaler   Refills:  1       predniSONE 20 MG tablet   Commonly known as:  DELTASONE   Used for:  Cough, Intermittent asthma without complication, unspecified asthma severity   Started by:  Елена Rothman PA-C        Dose:  40 mg   Take 2 tablets (40 mg) by mouth daily for 5 days   Quantity:  10 tablet    Refills:  0            Where to get your medicines      These medications were sent to [place] [id number] - [place], [place] - [address]  [address] [facility], [facility] [address]     Phone:  [phone]     albuterol 108 (90 Base) MCG/ACT Inhaler    predniSONE 20 MG tablet                Primary Care Provider Office Phone # Fax #    Traci Padron [phone]       [address]  [facility] [address]        Equal Access to Services     [facility] Center[facility] : Hadii aad ku hadasho Soomaali, waaxda luqadaha, qaybta kaalmada adeegyada, waxay idiin hayaan adeeg kharash lakendal . So [facility] [phone].    ATENCIÓN: Si habla español, tiene a irvin disposición servicios gratuitos de asistencia lingüística. [facility] [phone].    We comply with applicable federal civil rights laws and [place] laws. We do not discriminate on the basis of race, color, national origin, age, disability, sex, sexual orientation, or gender identity.            Thank you!     Thank you for choosing [facility]  for your care. Our goal is always to provide you with excellent care. Hearing back from our patients is one way we can continue to improve our services. Please take a few minutes to complete the written survey that you may receive in the mail after your visit with us. Thank you!             Your Updated Medication List - Protect others around you: Learn how to safely use, store and throw away your medicines at www.disposemymeds.org.          This list is accurate as of 6/1/18  9:59 AM.  Always use your most recent med list.                   Brand Name Dispense Instructions for use Diagnosis    albuterol 108 (90 Base) MCG/ACT Inhaler    PROAIR HFA/PROVENTIL HFA/VENTOLIN HFA    1 Inhaler    Inhale 2 puffs into the lungs every 6 hours as needed for shortness of breath / dyspnea or wheezing    Intermittent asthma without complication, unspecified asthma severity       azithromycin 250 MG tablet     ZITHROMAX    6 tablet    Two tablets first day, then one tablet daily for four days.    Productive cough       FISH OIL PO      Take 1 capsule by mouth 2 times daily        ibuprofen 600 MG tablet    ADVIL/MOTRIN    100 tablet    Take 1 tablet (600 mg) by mouth every 6 hours as needed for other (cramping)    Postpartum state       levonorgestrel 20 MCG/24HR IUD    MIRENA (52 MG)    1 each    1 each (20 mcg) by Intrauterine route once for 1 dose Lot #: [id number]    NDC#: 89634-570-97    Encounter for IUD insertion       predniSONE 20 MG tablet    DELTASONE    10 tablet    Take 2 tablets (40 mg) by mouth daily for 5 days    Cough, Intermittent asthma without complication, unspecified asthma severity       prenatal multivitamin plus iron 27-0.8 MG Tabs per tablet      Take 1 tablet by mouth    Supervision of high-risk pregnancy of elderly multigravida       TYLENOL PO      Take 1,000 mg by mouth every 8 hours as needed for mild pain or fever        vitamin B complex with vitamin C Tabs tablet      Take 1 tablet by mouth daily        VITAMIN D PO      Take 2,000 Units by mouth daily        ZYRTEC ALLERGY 10 MG Caps   Generic drug:  cetirizine HCl

## 2018-06-22 NOTE — TELEPHONE ENCOUNTER
"Called patient to confirm appt for tomorrow 08/08 at 3 pm. Patient needed to reschedule appt due to having MFM appt at 330 in [place]. When discussing this with patient there was a bad connection and call was dropped 2 times. Tried calling back and phone stated- \"all circuits are busy\". If patient does not call clinic back to reschedule, will try calling her tomorrow.   Dara Howe CMA    " Educated patient and mother to contact office if she has any signs or symptoms of allergic reaction.

## 2018-10-31 ENCOUNTER — RADIANT APPOINTMENT (OUTPATIENT)
Dept: GENERAL RADIOLOGY | Facility: CLINIC | Age: 38
End: 2018-10-31
Attending: INTERNAL MEDICINE
Payer: COMMERCIAL

## 2018-10-31 ENCOUNTER — OFFICE VISIT (OUTPATIENT)
Dept: INTERNAL MEDICINE | Facility: CLINIC | Age: 38
End: 2018-10-31
Payer: COMMERCIAL

## 2018-10-31 VITALS
SYSTOLIC BLOOD PRESSURE: 112 MMHG | HEART RATE: 87 BPM | BODY MASS INDEX: 41.05 KG/M2 | OXYGEN SATURATION: 97 % | RESPIRATION RATE: 22 BRPM | WEIGHT: 246.7 LBS | TEMPERATURE: 98.4 F | DIASTOLIC BLOOD PRESSURE: 78 MMHG

## 2018-10-31 DIAGNOSIS — R07.89 CHEST PRESSURE: ICD-10-CM

## 2018-10-31 DIAGNOSIS — R68.89 FEELING UNWELL: Primary | ICD-10-CM

## 2018-10-31 DIAGNOSIS — R06.02 SHORTNESS OF BREATH: ICD-10-CM

## 2018-10-31 LAB
ALBUMIN SERPL-MCNC: 3.7 G/DL (ref 3.4–5)
ALP SERPL-CCNC: 116 U/L (ref 40–150)
ALT SERPL W P-5'-P-CCNC: 20 U/L (ref 0–50)
ANION GAP SERPL CALCULATED.3IONS-SCNC: 8 MMOL/L (ref 3–14)
AST SERPL W P-5'-P-CCNC: 13 U/L (ref 0–45)
BILIRUB SERPL-MCNC: 0.3 MG/DL (ref 0.2–1.3)
BUN SERPL-MCNC: 12 MG/DL (ref 7–30)
CALCIUM SERPL-MCNC: 8.9 MG/DL (ref 8.5–10.1)
CHLORIDE SERPL-SCNC: 107 MMOL/L (ref 94–109)
CO2 SERPL-SCNC: 26 MMOL/L (ref 20–32)
CREAT SERPL-MCNC: 0.83 MG/DL (ref 0.52–1.04)
D DIMER PPP FEU-MCNC: 0.2 UG/ML FEU (ref 0–0.5)
ERYTHROCYTE [DISTWIDTH] IN BLOOD BY AUTOMATED COUNT: 13.9 % (ref 10–15)
FERRITIN SERPL-MCNC: 22 NG/ML (ref 12–150)
GFR SERPL CREATININE-BSD FRML MDRD: 77 ML/MIN/1.7M2
GLUCOSE SERPL-MCNC: 78 MG/DL (ref 70–99)
HCT VFR BLD AUTO: 42.3 % (ref 35–47)
HGB BLD-MCNC: 13.9 G/DL (ref 11.7–15.7)
IRON SATN MFR SERPL: 14 % (ref 15–46)
IRON SERPL-MCNC: 51 UG/DL (ref 35–180)
MCH RBC QN AUTO: 27.6 PG (ref 26.5–33)
MCHC RBC AUTO-ENTMCNC: 32.9 G/DL (ref 31.5–36.5)
MCV RBC AUTO: 84 FL (ref 78–100)
PLATELET # BLD AUTO: 253 10E9/L (ref 150–450)
POTASSIUM SERPL-SCNC: 4.1 MMOL/L (ref 3.4–5.3)
PROT SERPL-MCNC: 7.7 G/DL (ref 6.8–8.8)
RBC # BLD AUTO: 5.03 10E12/L (ref 3.8–5.2)
SODIUM SERPL-SCNC: 141 MMOL/L (ref 133–144)
TIBC SERPL-MCNC: 356 UG/DL (ref 240–430)
TSH SERPL DL<=0.005 MIU/L-ACNC: 1.49 MU/L (ref 0.4–4)
WBC # BLD AUTO: 6.8 10E9/L (ref 4–11)

## 2018-10-31 PROCEDURE — 85379 FIBRIN DEGRADATION QUANT: CPT | Performed by: INTERNAL MEDICINE

## 2018-10-31 PROCEDURE — 80053 COMPREHEN METABOLIC PANEL: CPT | Performed by: INTERNAL MEDICINE

## 2018-10-31 PROCEDURE — 85027 COMPLETE CBC AUTOMATED: CPT | Performed by: INTERNAL MEDICINE

## 2018-10-31 PROCEDURE — 83550 IRON BINDING TEST: CPT | Performed by: INTERNAL MEDICINE

## 2018-10-31 PROCEDURE — 36415 COLL VENOUS BLD VENIPUNCTURE: CPT | Performed by: INTERNAL MEDICINE

## 2018-10-31 PROCEDURE — 93000 ELECTROCARDIOGRAM COMPLETE: CPT | Performed by: INTERNAL MEDICINE

## 2018-10-31 PROCEDURE — 71046 X-RAY EXAM CHEST 2 VIEWS: CPT | Mod: FY

## 2018-10-31 PROCEDURE — 84443 ASSAY THYROID STIM HORMONE: CPT | Performed by: INTERNAL MEDICINE

## 2018-10-31 PROCEDURE — 99214 OFFICE O/P EST MOD 30 MIN: CPT | Performed by: INTERNAL MEDICINE

## 2018-10-31 PROCEDURE — 83540 ASSAY OF IRON: CPT | Performed by: INTERNAL MEDICINE

## 2018-10-31 PROCEDURE — 82728 ASSAY OF FERRITIN: CPT | Performed by: INTERNAL MEDICINE

## 2018-10-31 NOTE — PROGRESS NOTES
"  SUBJECTIVE:                                                      HPI: Ninoska Cottrell is a pleasant 38 year old female who presents with multiple symptoms:    - ongoing for the last month  - symptoms include:   - mild shortness of breath at rest   - dyspnea on exertion after walking only half a block   - chest pressure (not pain)   - dizziness - further describes as imbalance and possibly vertigo   - nausea, no vomiting   - subjective fevers and chills   - occasional sweats   - excessive fatigue   - irritable/crabby   - \"generally feeling cruddy\"    No recent change in medications.    Sexually active, but Mirena in place for birth control.    Generally healthy other than fibromyalgia and morbid obesity.    The medication, allergy, and problem lists have been reviewed and updated as appropriate.       OBJECTIVE:                                                      /78  Pulse 87  Temp 98.4  F (36.9  C) (Oral)  Resp 22  Wt 246 lb 11.2 oz (111.9 kg)  LMP 10/17/2018  SpO2 97%  BMI 41.05 kg/m2  Constitutional: tired appearing, mildly disheveled  Respiratory: normal respiratory effort; clear to auscultation bilaterally  Cardiovascular: regular rate and rhythm; no edema  Gastrointestinal: soft, non-tender, non-distended, and bowel sounds present; no organomegaly or masses   Musculoskeletal: normal gait and station  Psych: normal judgment and insight; normal mood and affect; recent and remote memory intact      ASSESSMENT/PLAN:                                                      (R68.89) Feeling unwell  (primary encounter diagnosis)  (R06.02) Shortness of breath  (R07.89) Chest pressure  Comment: etiology of multiple symptoms unclear.  Plan:    - EKG today.   - CXR and labs (CBC, iron studies, CMP, TSH reflex, and d-dimer).   - recommendations to follow.    The instructions on the AVS were discussed and explained to the patient. Patient expressed understanding of instructions.    (Chart documentation was " completed, in part, with Dragon voice-recognition software. Even though reviewed, some grammatical, spelling, and word errors may remain.)    Jaci Sharma MD   [address] [address]  T: [phone], F: [phone]

## 2018-10-31 NOTE — MR AVS SNAPSHOT
"              After Visit Summary   10/31/2018    Ninoska Ctotrell    MRN: [id number]           Patient Information     Date Of Birth          1980        Visit Information        Provider Department      10/31/2018 10:30 AM Jaci Sharma MD [facility]        Today's Diagnoses     Feeling unwell    -  1    Shortness of breath        Chest pressure          Care Instructions    EKG today.    ---    Chest xray and labs downstairs today.            Follow-ups after your visit        Future tests that were ordered for you today     Open Future Orders        Priority Expected Expires Ordered    XR Chest 2 Views Routine 10/31/2018 10/31/2019 10/31/2018            Who to contact     If you have questions or need follow up information about today's clinic visit or your schedule please contact [facility] directly at [phone].  Normal or non-critical lab and imaging results will be communicated to you by [employer], letter or phone within 4 business days after the clinic has received the results. If you do not hear from us within 7 days, please contact the clinic through [employer] or phone. If you have a critical or abnormal lab result, we will notify you by phone as soon as possible.  Submit refill requests through [email or web address] or call your pharmacy and they will forward the refill request to us. Please allow 3 business days for your refill to be completed.          Additional Information About Your Visit        MyChart Information     [email or web address] lets you send messages to your doctor, view your test results, renew your prescriptions, schedule appointments and more. To sign up, go to [email or web address] . Click on \"Log in\" on the left side of the screen, which will take you to the Welcome page. Then click on \"Sign up Now\" on the right side of the page.     You will be asked to enter the access code listed below, as well as some personal information. Please follow the " directions to create your username and password.     Your access code is: [email or web address]  Expires: 2019 10:51 AM     Your access code will  in 90 days. If you need help or a new code, please call your [place] clinic or [phone].        Care EveryWhere ID     This is your Care EveryWhere ID. This could be used by other organizations to access your [place] medical records  [id number]        Your Vitals Were     Pulse Temperature Respirations Last Period Pulse Oximetry BMI (Body Mass Index)    87 98.4  F (36.9  C) (Oral) 22 10/17/2018 97% 41.05 kg/m2       Blood Pressure from Last 3 Encounters:   10/31/18 112/78   18 128/88   18 138/89    Weight from Last 3 Encounters:   10/31/18 246 lb 11.2 oz (111.9 kg)   18 253 lb 11.2 oz (115.1 kg)   18 252 lb 5 oz (114.4 kg)              We Performed the Following     CBC with platelets     Comprehensive metabolic panel     D dimer, quantitative     EKG 12-lead complete w/read - Clinics     Ferritin     Iron and iron binding capacity     TSH with free T4 reflex        Primary Care Provider Office Phone # Fax #    Traci Padron [phone]       [address] [facility] [address]        Equal Access to Services     LAUREN CALDWELL : Hadii aad ku hadasho Soomaali, waaxda luqadaha, qaybta kaalmada kun, melida augustin. So [facility] [phone].    ATENCIÓN: Si habla español, tiene a irvin disposición servicios gratuitos de asistencia lingüística. Llame al [phone].    We comply with applicable federal civil rights laws and [place] laws. We do not discriminate on the basis of race, color, national origin, age, disability, sex, sexual orientation, or gender identity.            Thank you!     Thank you for choosing [facility]  for your care. Our goal is always to provide you with excellent care. Hearing back from our patients is one way we can continue to improve our  services. Please take a few minutes to complete the written survey that you may receive in the mail after your visit with us. Thank you!             Your Updated Medication List - Protect others around you: Learn how to safely use, store and throw away your medicines at www.disposemymeds.org.          This list is accurate as of 10/31/18 10:51 AM.  Always use your most recent med list.                   Brand Name Dispense Instructions for use Diagnosis    albuterol 108 (90 Base) MCG/ACT inhaler    PROAIR HFA/PROVENTIL HFA/VENTOLIN HFA    1 Inhaler    Inhale 2 puffs into the lungs every 6 hours as needed for shortness of breath / dyspnea or wheezing    Intermittent asthma without complication, unspecified asthma severity       FISH OIL PO      Take 1 capsule by mouth 2 times daily        ibuprofen 600 MG tablet    ADVIL/MOTRIN    100 tablet    Take 1 tablet (600 mg) by mouth every 6 hours as needed for other (cramping)    Postpartum state       levonorgestrel 20 MCG/24HR IUD    MIRENA (52 MG)    1 each    1 each (20 mcg) by Intrauterine route once for 1 dose Lot #: [id number]    NDC#: 50233-662-88    Encounter for IUD insertion       prenatal multivitamin plus iron 27-0.8 MG Tabs per tablet      Take 1 tablet by mouth    Supervision of high-risk pregnancy of elderly multigravida       TYLENOL PO      Take 1,000 mg by mouth every 8 hours as needed for mild pain or fever        vitamin B complex with vitamin C Tabs tablet      Take 1 tablet by mouth daily        VITAMIN D PO      Take 2,000 Units by mouth daily        ZYRTEC ALLERGY 10 MG Caps   Generic drug:  cetirizine HCl

## 2018-12-04 ENCOUNTER — OFFICE VISIT (OUTPATIENT)
Dept: INTERNAL MEDICINE | Facility: CLINIC | Age: 38
End: 2018-12-04
Payer: COMMERCIAL

## 2018-12-04 VITALS
WEIGHT: 246.3 LBS | OXYGEN SATURATION: 98 % | SYSTOLIC BLOOD PRESSURE: 102 MMHG | BODY MASS INDEX: 40.99 KG/M2 | DIASTOLIC BLOOD PRESSURE: 74 MMHG | HEART RATE: 84 BPM

## 2018-12-04 VITALS
RESPIRATION RATE: 16 BRPM | HEIGHT: 65 IN | WEIGHT: 246.5 LBS | HEART RATE: 72 BPM | TEMPERATURE: 98.2 F | BODY MASS INDEX: 41.07 KG/M2 | DIASTOLIC BLOOD PRESSURE: 80 MMHG | SYSTOLIC BLOOD PRESSURE: 124 MMHG

## 2018-12-04 DIAGNOSIS — Z53.9 ERRONEOUS ENCOUNTER--DISREGARD: Primary | ICD-10-CM

## 2018-12-04 DIAGNOSIS — R68.89 FEELING UNWELL: Primary | ICD-10-CM

## 2018-12-04 PROCEDURE — 99212 OFFICE O/P EST SF 10 MIN: CPT | Performed by: INTERNAL MEDICINE

## 2018-12-04 NOTE — MR AVS SNAPSHOT
"              After Visit Summary   12/4/2018    Ninoska Cottrell    MRN: [id number]           Patient Information     Date Of Birth          1980        Visit Information        Provider Department      12/4/2018 8:40 AM Cathy Trujillo PA-C [facility]        Today's Diagnoses     ERRONEOUS ENCOUNTER--DISREGARD    -  1       Follow-ups after your visit        Your next 10 appointments already scheduled     Dec 04, 2018  2:30 PM CST   [facility]    [address] [id number] [phone]              Who to contact     If you have questions or need follow up information about today's clinic visit or your schedule please contact [facility] directly at [phone].  Normal or non-critical lab and imaging results will be communicated to you by MyChart, letter or phone within 4 business days after the clinic has received the results. If you do not hear from us within 7 days, please contact the clinic through MyChart or phone. If you have a critical or abnormal lab result, we will notify you by phone as soon as possible.  Submit refill requests through [employer] or call your pharmacy and they will forward the refill request to us. Please allow 3 business days for your refill to be completed.          Additional Information About Your Visit        Care EveryWhere ID     This is your Care EveryWhere ID. This could be used by other organizations to access your [place] medical records  [id number]        Your Vitals Were     Pulse Temperature Respirations Height BMI (Body Mass Index)       72 98.2  F (36.8  C) (Oral) 16 5' 5\" (1.651 m) 41.02 kg/m2        Blood Pressure from Last 3 Encounters:   12/04/18 124/80   10/31/18 112/78   06/01/18 128/88    Weight from Last 3 Encounters:   12/04/18 246 lb 8 oz (111.8 kg)   10/31/18 246 lb 11.2 oz (111.9 kg) "   06/01/18 253 lb 11.2 oz (115.1 kg)              Today, you had the following     No orders found for display       Primary Care Provider Office Phone # Fax #    Traci Padron [phone]       [address] [place]  [facility] [address]        Equal Access to Services     [facility] : Hadii aad ku hadasho Soomaali, waaxda luqadaha, qaybta kaalmada adeegyada, waxay idiin hayaan adeeg josafat lamakennan . So [facility] [phone].    ATENCIÓN: Si habla español, tiene a irvin disposición servicios gratuitos de asistencia lingüística. [facility] [phone].    We comply with applicable federal civil rights laws and [place] laws. We do not discriminate on the basis of race, color, national origin, age, disability, sex, sexual orientation, or gender identity.            Thank you!     Thank you for choosing [facility]  for your care. Our goal is always to provide you with excellent care. Hearing back from our patients is one way we can continue to improve our services. Please take a few minutes to complete the written survey that you may receive in the mail after your visit with us. Thank you!             Your Updated Medication List - Protect others around you: Learn how to safely use, store and throw away your medicines at www.disposemymeds.org.          This list is accurate as of 12/4/18  9:02 AM.  Always use your most recent med list.                   Brand Name Dispense Instructions for use Diagnosis    albuterol 108 (90 Base) MCG/ACT inhaler    PROAIR HFA/PROVENTIL HFA/VENTOLIN HFA    1 Inhaler    Inhale 2 puffs into the lungs every 6 hours as needed for shortness of breath / dyspnea or wheezing    Intermittent asthma without complication, unspecified asthma severity       FISH OIL PO      Take 1 capsule by mouth 2 times daily        ibuprofen 600 MG tablet    ADVIL/MOTRIN    100 tablet    Take 1 tablet (600 mg) by mouth every 6 hours as needed for other (cramping)     Postpartum state       levonorgestrel 20 MCG/24HR IUD    MIRENA (52 MG)    1 each    1 each (20 mcg) by Intrauterine route once for 1 dose Lot #: [id number]    NDC#: 43381-844-91    Encounter for IUD insertion       prenatal multivitamin w/iron 27-0.8 MG tablet      Take 1 tablet by mouth    Supervision of high-risk pregnancy of elderly multigravida       TYLENOL PO      Take 1,000 mg by mouth every 8 hours as needed for mild pain or fever        vitamin B complex with vitamin C tablet      Take 1 tablet by mouth daily        VITAMIN D PO      Take 2,000 Units by mouth daily        ZYRTEC ALLERGY 10 MG Caps   Generic drug:  cetirizine HCl

## 2018-12-04 NOTE — MR AVS SNAPSHOT
After Visit Summary   12/4/2018    Ninoska Cottrell    MRN: [id number]           Patient Information     Date Of Birth          1980        Visit Information        Provider Department      12/4/2018 2:30 PM Jaci Sharma MD [facility]        Today's Diagnoses     Feeling unwell    -  1       Follow-ups after your visit        Follow-up notes from your care team     Return in about 6 months (around 6/4/2019) for Physical exam (earlier as needed).      Who to contact     If you have questions or need follow up information about today's clinic visit or your schedule please contact [facility] directly at [phone].  Normal or non-critical lab and imaging results will be communicated to you by MyChart, letter or phone within 4 business days after the clinic has received the results. If you do not hear from us within 7 days, please contact the clinic through MyChart or phone. If you have a critical or abnormal lab result, we will notify you by phone as soon as possible.  Submit refill requests through [employer] or call your pharmacy and they will forward the refill request to us. Please allow 3 business days for your refill to be completed.          Additional Information About Your Visit        Care EveryWhere ID     This is your Care EveryWhere ID. This could be used by other organizations to access your [place] medical records  [id number]        Your Vitals Were     Pulse Pulse Oximetry BMI (Body Mass Index)             84 98% 40.99 kg/m2          Blood Pressure from Last 3 Encounters:   12/04/18 102/74   12/04/18 124/80   10/31/18 112/78    Weight from Last 3 Encounters:   12/04/18 246 lb 4.8 oz (111.7 kg)   12/04/18 246 lb 8 oz (111.8 kg)   10/31/18 246 lb 11.2 oz (111.9 kg)              Today, you had the following     No orders found for display       Primary Care Provider Office Phone # Fax #    Traci Padron [phone]        [address] [facility] [address]        Equal Access to Services     JOHNIVAN JAVI : Hadii yasmine ku hadlu Sobennettali, waaxda luqadaha, qaybta kaalmada brendareagn, waxjustine dawna daogolden gifford aarongelacio augustin. So [facility] [phone].    ATENCIÓN: Si habla español, tiene a irvin disposición servicios gratuitos de asistencia lingüística. [facility] [phone].    We comply with applicable federal civil rights laws and [place] laws. We do not discriminate on the basis of race, color, national origin, age, disability, sex, sexual orientation, or gender identity.            Thank you!     Thank you for choosing [facility]  for your care. Our goal is always to provide you with excellent care. Hearing back from our patients is one way we can continue to improve our services. Please take a few minutes to complete the written survey that you may receive in the mail after your visit with us. Thank you!             Your Updated Medication List - Protect others around you: Learn how to safely use, store and throw away your medicines at www.disposemymeds.org.          This list is accurate as of 12/4/18  3:21 PM.  Always use your most recent med list.                   Brand Name Dispense Instructions for use Diagnosis    albuterol 108 (90 Base) MCG/ACT inhaler    PROAIR HFA/PROVENTIL HFA/VENTOLIN HFA    1 Inhaler    Inhale 2 puffs into the lungs every 6 hours as needed for shortness of breath / dyspnea or wheezing    Intermittent asthma without complication, unspecified asthma severity       FISH OIL PO      Take 1 capsule by mouth 2 times daily        ibuprofen 600 MG tablet    ADVIL/MOTRIN    100 tablet    Take 1 tablet (600 mg) by mouth every 6 hours as needed for other (cramping)    Postpartum state       levonorgestrel 20 MCG/24HR IUD    MIRENA (52 MG)    1 each    1 each (20 mcg) by Intrauterine route once for 1 dose Lot #: [id number]    NDC#: 00338-374-12    Encounter for IUD insertion        prenatal multivitamin w/iron 27-0.8 MG tablet      Take 1 tablet by mouth    Supervision of high-risk pregnancy of elderly multigravida       TYLENOL PO      Take 1,000 mg by mouth every 8 hours as needed for mild pain or fever        vitamin B complex with vitamin C tablet      Take 1 tablet by mouth daily        VITAMIN D PO      Take 2,000 Units by mouth daily        ZYRTEC ALLERGY 10 MG Caps   Generic drug:  cetirizine HCl

## 2018-12-04 NOTE — PROGRESS NOTES
SUBJECTIVE:   Ninoska Cottrell is a 38 year old female who presents to clinic today for the following health issues:      Pt is here today because she needs a school form filled out. She has been sick since September off and on and has missed a lot of school. Pt would like to withdraw from classes so they gave her a form to fill out.     Patient has seen Dr Sharma x 2 - most recently for an illness in 10/2018.      No charge today's visit. I am a same day/ acute provider- form needs to come from a PCP     Rescheduled with Dr Sharma for later today

## 2018-12-04 NOTE — PROGRESS NOTES
SUBJECTIVE:                                                      HPI: Ninoska Cottrell is a pleasant 38 year old female who presents with forms to be completed:    Patient was seen a little over a month ago for multiple symptoms including mild shortness of breath at rest, dyspnea on exertion, chest pressure, dizziness, nausea, subjective fevers and chills, excessive fatigue, and increased irritability. EKG, CXR, and labs were all, generally, within normal limits. Patient did not mention at the time of the visit that her symptoms were so debilitating that she was unable to attend her classes. She had and has not seen any providers before or after that visit.     Patient would like me to complete forms saying that she has a medical condition or disability that prevented her from attending her classes for the month prior to her visit with me. Unfortunately, she does not have an identifiable medical condition or disability. Moreover, she made no mention at her visit or afterwards that she had difficulty attending classes.     Unfortunately, I am unable to complete her forms.    The medication, allergy, and problem lists have been reviewed and updated as appropriate.       OBJECTIVE:                                                      /74  Pulse 84  Wt 246 lb 4.8 oz (111.7 kg)  SpO2 98%  BMI 40.99 kg/m2  Constitutional: well-appearing  Psych: normal judgment and insight; normal mood and affect; recent and remote memory intact      ASSESSMENT/PLAN:                                                      (R68.76) Feeling unwell  (primary encounter diagnosis)  Comment: see discussion above.  Plan: unable to complete forms.    The instructions on the AVS were discussed and explained to the patient. Patient expressed understanding of instructions.    (Chart documentation was completed, in part, with [employer] voice-recognition software. Even though reviewed, some grammatical, spelling, and word errors may  remain.)    Jaci Sharma MD   [address] [address]  T: [phone], F: [phone]

## 2019-02-06 ENCOUNTER — OFFICE VISIT (OUTPATIENT)
Dept: URGENT CARE | Facility: URGENT CARE | Age: 39
End: 2019-02-06
Payer: COMMERCIAL

## 2019-02-06 ENCOUNTER — ANCILLARY PROCEDURE (OUTPATIENT)
Dept: GENERAL RADIOLOGY | Facility: CLINIC | Age: 39
End: 2019-02-06
Attending: FAMILY MEDICINE
Payer: COMMERCIAL

## 2019-02-06 VITALS
BODY MASS INDEX: 41.75 KG/M2 | DIASTOLIC BLOOD PRESSURE: 82 MMHG | SYSTOLIC BLOOD PRESSURE: 122 MMHG | TEMPERATURE: 98.6 F | HEART RATE: 70 BPM | OXYGEN SATURATION: 100 % | WEIGHT: 250.9 LBS

## 2019-02-06 DIAGNOSIS — S19.9XXA NECK INJURY, INITIAL ENCOUNTER: ICD-10-CM

## 2019-02-06 DIAGNOSIS — S49.92XA SHOULDER INJURY, LEFT, INITIAL ENCOUNTER: Primary | ICD-10-CM

## 2019-02-06 PROCEDURE — 99214 OFFICE O/P EST MOD 30 MIN: CPT | Performed by: FAMILY MEDICINE

## 2019-02-06 PROCEDURE — 73030 X-RAY EXAM OF SHOULDER: CPT | Mod: LT

## 2019-02-06 PROCEDURE — 72040 X-RAY EXAM NECK SPINE 2-3 VW: CPT

## 2019-02-06 RX ORDER — METHOCARBAMOL 750 MG/1
750 TABLET, FILM COATED ORAL 4 TIMES DAILY
Qty: 28 TABLET | Refills: 0 | Status: SHIPPED | OUTPATIENT
Start: 2019-02-06 | End: 2019-02-13

## 2019-02-06 RX ORDER — NAPROXEN 500 MG/1
500 TABLET ORAL 2 TIMES DAILY WITH MEALS
Qty: 14 TABLET | Refills: 0 | Status: SHIPPED | OUTPATIENT
Start: 2019-02-06 | End: 2019-02-13

## 2019-02-06 NOTE — PROGRESS NOTES
SUBJECTIVE:  Chief Complaint   Patient presents with     Urgent Care     left shoulder shooting pain to fingers    .ident presents with a chief complaint of left shoulder and neck.  The injury occurred 1 day ago.   The injury happened while while walking.   How: fall , trauma: on ice immediate pain  The patient complained of moderate pain and has had decreased ROM.    Pain exacerbated by movement    He treated it initially with ice.   This is the first time this type of injury has occurred to this patient.     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     Fibromyalgia      Hyperlipidemia      Migraine      Murmur, cardiac      Obesity      Preeclampsia 2012     Allergies   Allergen Reactions     Blueberry Swelling     Codeine Hives     Eggs [Chicken-Derived Products (Egg)] Nausea     Yeast      Latex Swelling and Rash     Social History     Tobacco Use     Smoking status: Former Smoker     Last attempt to quit: 2000     Years since quittin.1     Smokeless tobacco: Never Used   Substance Use Topics     Alcohol use: No     Alcohol/week: 0.0 oz       ROSINTEGUMENTARY/SKIN: NEGATIVE for open wound/bleeding and NEGATIVE for bruising  MUSCULOSKELETAL: NEGATIVE for joint swelling, paresthesias, radicular pain    EXAM: /82   Pulse 70   Temp 98.6  F (37  C) (Tympanic)   Wt 113.8 kg (250 lb 14.4 oz)   SpO2 100%   BMI 41.75 kg/m  Gen: healthy,alert,no distress  Extremity: neck and shoulder has pain with palpation and rom.   There is not compromise to the distal circulation.  Pulses are +2 and CRT is brisk.GENERAL APPEARANCE: healthy, alert and no distress  EXTREMITIES: peripheral pulses normal  SKIN: no suspicious lesions or rashes  NEURO: Normal strength and tone, sensory exam grossly normal, mentation intact and speech normal    Xray without acute findings, read by Baudilio Danielle  D.O.      ICD-10-CM    1. Shoulder injury, left, initial encounter S49.92XA XR Shoulder Left G/E 3 Views     methocarbamol (ROBAXIN) 750 MG tablet     naproxen (NAPROSYN) 500 MG tablet   2. Neck injury, initial encounter S19.9XXA XR Cervical Spine 2/3 Views     methocarbamol (ROBAXIN) 750 MG tablet     naproxen (NAPROSYN) 500 MG tablet     Tylenol as well  RICE

## 2019-02-13 ENCOUNTER — OFFICE VISIT (OUTPATIENT)
Dept: INTERNAL MEDICINE | Facility: CLINIC | Age: 39
End: 2019-02-13
Payer: COMMERCIAL

## 2019-02-13 VITALS
HEART RATE: 64 BPM | SYSTOLIC BLOOD PRESSURE: 122 MMHG | OXYGEN SATURATION: 100 % | WEIGHT: 247 LBS | DIASTOLIC BLOOD PRESSURE: 80 MMHG | BODY MASS INDEX: 41.1 KG/M2 | TEMPERATURE: 98.1 F

## 2019-02-13 DIAGNOSIS — M25.512 ACUTE PAIN OF LEFT SHOULDER: Primary | ICD-10-CM

## 2019-02-13 PROCEDURE — 99213 OFFICE O/P EST LOW 20 MIN: CPT | Performed by: INTERNAL MEDICINE

## 2019-02-13 NOTE — PROGRESS NOTES
SUBJECTIVE:   Ninoska Cottrell is a 38 year old female who presents to clinic today for the following health issues:      ED/UC Followup:    Facility:  [facility]   Date of visit: 2/06/19  Reason for visit: shoulder injury after fall   Current Status: shoulder pain is still there 4/10    Reviewed urgent care note.  Reviewed x-rays.  Patient reports pain with lifting arms overhead and when laying on the left side.  Pain is overall improved but still present to a significant degree.  No prior history of shoulder problems.  Taking ibuprofen intermittently.             Problem list and histories reviewed & adjusted, as indicated.  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     Fibromyalgia      Hyperlipidemia      Migraine      Murmur, cardiac      Obesity      Preeclampsia 2012       Past Surgical History:  ---------------------------  Past Surgical History:   Procedure Laterality Date     APPENDECTOMY       C NONSPECIFIC PROCEDURE      right ankle fracture       Current Medications:  ---------------------------  Current Outpatient Medications   Medication Sig Dispense Refill     Acetaminophen (TYLENOL PO) Take 1,000 mg by mouth every 8 hours as needed for mild pain or fever       albuterol (PROAIR HFA/PROVENTIL HFA/VENTOLIN HFA) 108 (90 Base) MCG/ACT Inhaler Inhale 2 puffs into the lungs every 6 hours as needed for shortness of breath / dyspnea or wheezing 1 Inhaler 1     cetirizine HCl (ZYRTEC ALLERGY) 10 MG CAPS        Cholecalciferol (VITAMIN D PO) Take 2,000 Units by mouth daily       ibuprofen (ADVIL/MOTRIN) 600 MG tablet Take 1 tablet (600 mg) by mouth  every 6 hours as needed for other (cramping) 100 tablet 0     methocarbamol (ROBAXIN) 750 MG tablet Take 1 tablet (750 mg) by mouth 4 times daily for 7 days 28 tablet 0     naproxen (NAPROSYN) 500 MG tablet Take 1 tablet (500 mg) by mouth 2 times daily (with meals) for 7 days 14 tablet 0     Omega-3 Fatty Acids (FISH OIL PO) Take 1 capsule by mouth 2 times daily       Prenatal Vit-Fe Fumarate-FA (PRENATAL MULTIVITAMIN  PLUS IRON) 27-0.8 MG TABS per tablet Take 1 tablet by mouth       vitamin B complex with vitamin C (VITAMIN  B COMPLEX) TABS tablet Take 1 tablet by mouth daily       levonorgestrel (MIRENA, 52 MG,) 20 MCG/24HR IUD 1 each (20 mcg) by Intrauterine route once for 1 dose Lot #: [id number]    NDC#: 17526-945-10 1 each 0       Allergies:  -------------  Allergies   Allergen Reactions     Blueberry Swelling     Codeine Hives     Eggs [Chicken-Derived Products (Egg)] Nausea     Yeast      Latex Swelling and Rash       Social History:  -------------------  Social History     Socioeconomic History     Marital status:      Spouse name: Deandre     Number of children: 2     Years of education: Not on file     Highest education level: Not on file   Social Needs     Financial resource strain: Not on file     Food insecurity - worry: Not on file     Food insecurity - inability: Not on file     Transportation needs - medical: Not on file     Transportation needs - non-medical: Not on file   Occupational History     Employer: [employer]   Tobacco Use     Smoking status: Former Smoker     Last attempt to quit: 2000     Years since quittin.1     Smokeless tobacco: Never Used   Substance and Sexual Activity     Alcohol use: No     Alcohol/week: 0.0 oz     Drug use: No     Sexual activity: Yes     Partners: Male     Birth control/protection: IUD   Other Topics Concern     Parent/sibling w/ CABG, MI or angioplasty before 65F 55M? Not Asked      Service No     Blood Transfusions No     Caffeine Concern No      Occupational Exposure No     Hobby Hazards No     Sleep Concern No     Stress Concern No     Weight Concern No     Special Diet No     Back Care No     Exercise Yes     Comment: 1 time a week for 2 hours     Bike Helmet Yes     Seat Belt Yes     Self-Exams No   Social History Narrative     Not on file       Family Medical History:  ------------------------------  Family History   Problem Relation Age of Onset     Blood Disease Paternal Grandmother      Blood Disease Paternal Grandfather      Blood Disease Maternal Uncle      Blood Disease Father         Hemophilia         ROS:  REVIEW OF SYSTEMS:    RESP: negative for cough, dyspnea, wheezing, hemoptysis  CV: negative for chest pain, palpitations, PND, MOLINA, orthopnea; reports no changes in their ability to perform physical activity (from cardiovascular standpoint)  GI: negative for dysphagia, N/V, pain, melena, diarrhea and constipation  NEURO: negative for numbness/tingling, paralysis, incoordination, or focal weakness     OBJECTIVE:                                                    /80   Pulse 64   Temp 98.1  F (36.7  C) (Oral)   Wt 112 kg (247 lb)   SpO2 100%   BMI 41.10 kg/m       GENERAL alert and no distress  EYES:  Normal sclera,conjunctiva, EOMI  HENT: oral and posterior pharynx without lesions or erythema, facies symmetric  NECK: Neck supple. No LAD, without thyroidmegaly or JVD., Carotids without bruits.  RESP: Clear to ausculation bilaterally without wheezes or crackles. Normal BS in all fields.  CV: RRR normal S1S2 without murmurs, rubs or gallops. PMI normal  LYMPH: no cervical lymph adenopathy appreciated  MS: extremities- no gross deformities of the visible extremities noted, no edema  PSYCH: Alert and oriented times 3; speech- coherent  SKIN:  No obvious significant skin lesions on visible portions of face   LEFT SHOULDEDR:  Examination of the left shoulder shows pain with palpation of lateral shoulder area.   Specific testing of the  rotator muscles reproduces the patient's pain.          ASSESSMENT/PLAN:                                                      (M25.314) Acute pain of left shoulder  (primary encounter diagnosis)  Comment: Discussed rotator cuff injuries and conditions at length with the pt.  Reviewed the mechanics of the shoulder and roatotr cuff function.  Discussed conservative treatments such as rest, adjustment of activites involving the shoulder, NSAIDs, physical therapy.  if no improvement, then recommend referral to Orthopedics for steroid injection.   Plan: WOOD PT, HAND, AND CHIROPRACTIC REFERRAL               See Patient Instructions    RICHARD SEBASTIAN M.D., MD  [facility]    (Chart documentation may have been completed, in part, with [employer] voice-recognition software. Even though reviewed, some grammatical, spelling, and word errors may remain.)

## 2019-04-19 ENCOUNTER — OFFICE VISIT (OUTPATIENT)
Dept: URGENT CARE | Facility: URGENT CARE | Age: 39
End: 2019-04-19
Payer: COMMERCIAL

## 2019-04-19 VITALS
SYSTOLIC BLOOD PRESSURE: 128 MMHG | WEIGHT: 251 LBS | TEMPERATURE: 97.6 F | RESPIRATION RATE: 16 BRPM | HEART RATE: 86 BPM | BODY MASS INDEX: 41.77 KG/M2 | DIASTOLIC BLOOD PRESSURE: 87 MMHG | OXYGEN SATURATION: 98 %

## 2019-04-19 DIAGNOSIS — H92.02 OTALGIA, LEFT: Primary | ICD-10-CM

## 2019-04-19 PROCEDURE — 99213 OFFICE O/P EST LOW 20 MIN: CPT | Performed by: FAMILY MEDICINE

## 2019-04-19 RX ORDER — AMITRIPTYLINE HYDROCHLORIDE 10 MG/1
TABLET ORAL
Refills: 4 | COMMUNITY
Start: 2019-04-08 | End: 2020-03-09

## 2019-04-19 RX ORDER — MECLIZINE HYDROCHLORIDE 25 MG/1
25 TABLET ORAL 3 TIMES DAILY PRN
Qty: 15 TABLET | Refills: 1 | Status: SHIPPED | OUTPATIENT
Start: 2019-04-19 | End: 2022-05-15

## 2019-04-19 RX ORDER — AZITHROMYCIN 250 MG/1
TABLET, FILM COATED ORAL
Qty: 6 TABLET | Refills: 0 | Status: SHIPPED | OUTPATIENT
Start: 2019-04-19 | End: 2019-04-24

## 2019-04-19 ASSESSMENT — PAIN SCALES - GENERAL: PAINLEVEL: SEVERE PAIN (6)

## 2019-04-19 NOTE — PROGRESS NOTES
SUBJECTIVE:  Ninoska Cottrell is a 39 year old female with Lt ear pain for several days. Also some dizzyness. Has had a lot of ear infections  OBJECTIVE:  /87 (BP Location: Left arm, Patient Position: Sitting, Cuff Size: Adult Regular)   Pulse 86   Temp 97.6  F (36.4  C) (Oral)   Resp 16   Wt 113.9 kg (251 lb)   SpO2 98%   BMI 41.77 kg/m    General appearance: mild distress.    Ears: abnormal: R TM normal; L TM retracted scarred   Nose: purulent rhinorrhea  Oropharynx: mild erythema  Neck: supple and moderate nontender anterior cervical nodes  Lungs: chest clear to IPPA and clear to IPPA    ASSESSMENT:  Otitis Media zithro and meclizine    PLAN:  1) Antibiotics per EpicCare orders.  2) Symptomatic therapy suggested: use acetaminophen, ibuprofen prn.   3) Call or return to clinic prn if these symptoms worsen or fail to improve as anticipated.

## 2019-07-16 ENCOUNTER — OFFICE VISIT (OUTPATIENT)
Dept: URGENT CARE | Facility: URGENT CARE | Age: 39
End: 2019-07-16
Payer: COMMERCIAL

## 2019-07-16 VITALS
TEMPERATURE: 98.8 F | HEART RATE: 80 BPM | DIASTOLIC BLOOD PRESSURE: 80 MMHG | OXYGEN SATURATION: 98 % | BODY MASS INDEX: 41.89 KG/M2 | SYSTOLIC BLOOD PRESSURE: 128 MMHG | WEIGHT: 251.7 LBS

## 2019-07-16 DIAGNOSIS — R10.9 ABDOMINAL CRAMPING: Primary | ICD-10-CM

## 2019-07-16 DIAGNOSIS — N92.6 MISSED PERIOD: ICD-10-CM

## 2019-07-16 LAB — HCG UR QL: NEGATIVE

## 2019-07-16 PROCEDURE — 81025 URINE PREGNANCY TEST: CPT | Performed by: FAMILY MEDICINE

## 2019-07-16 PROCEDURE — 99213 OFFICE O/P EST LOW 20 MIN: CPT | Performed by: FAMILY MEDICINE

## 2019-07-16 NOTE — PROGRESS NOTES
SUBJECTIVE:  Chief Complaint   Patient presents with     Urgent Care     pt [facility] since february sxs 1x weeks stomach cramps      Ninoska Cottrell is a 39 year old female who presents to clinic for evaluation of possible pregnancy.  LMP  Feb 2019 and was normal, so she has 5 months without menses.  She has IUD, but was unable to feel the string- wondering if the IUD came out.  Today she feels crampy in her lower abdomen  She also reports absence of menses  fatigue.  She denies any abdominal pain, vaginal bleeding or fevers.    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     Fibromyalgia      Hyperlipidemia      Migraine      Murmur, cardiac      Obesity      Preeclampsia 2012     Patient Active Problem List   Diagnosis     Intermittent asthma     Migraine headache     Morbid obesity (H)       ALLERGIES:  Blueberry; Codeine; Eggs [chicken-derived products (egg)]; Yeast; and Latex      Current Outpatient Medications on File Prior to Visit:  Acetaminophen (TYLENOL PO) Take 1,000 mg by mouth every 8 hours as needed for mild pain or fever   albuterol (PROAIR HFA/PROVENTIL HFA/VENTOLIN HFA) 108 (90 Base) MCG/ACT Inhaler Inhale 2 puffs into the lungs every 6 hours as needed for shortness of breath / dyspnea or wheezing   amitriptyline (ELAVIL) 10 MG tablet TAKE 2 TABLETS BY ORAL ROUTE ONCE DAILY AT BEDTIME FOR 2 WEEKS THEN TAKE 3 AT BEDTIME   cetirizine HCl (ZYRTEC ALLERGY) 10 MG CAPS    Cholecalciferol (VITAMIN D PO) Take 2,000 Units by mouth daily   ibuprofen (ADVIL/MOTRIN) 600 MG tablet Take 1 tablet (600 mg) by mouth every 6 hours as needed for other (cramping)   meclizine (ANTIVERT) 25 MG tablet Take 1 tablet (25 mg) by mouth 3 times daily as needed for dizziness   Omega-3 Fatty Acids (FISH OIL PO) Take 1 capsule by mouth 2 times daily   Prenatal Vit-Fe Fumarate-FA (PRENATAL MULTIVITAMIN  PLUS  IRON) 27-0.8 MG TABS per tablet Take 1 tablet by mouth   vitamin B complex with vitamin C (VITAMIN  B COMPLEX) TABS tablet Take 1 tablet by mouth daily   [] azithromycin (ZITHROMAX) 250 MG tablet Take 2 tablets (500 mg) by mouth daily for 1 day, THEN 1 tablet (250 mg) daily for 4 days.   levonorgestrel (MIRENA, 52 MG,) 20 MCG/24HR IUD 1 each (20 mcg) by Intrauterine route once for 1 dose Lot #: [id number]    NDC#: 13209-213-23   [] methocarbamol (ROBAXIN) 750 MG tablet Take 1 tablet (750 mg) by mouth 4 times daily for 7 days   [] naproxen (NAPROSYN) 500 MG tablet Take 1 tablet (500 mg) by mouth 2 times daily (with meals) for 7 days     No current facility-administered medications on file prior to visit.     Social History     Tobacco Use     Smoking status: Former Smoker     Last attempt to quit: 2000     Years since quittin.5     Smokeless tobacco: Never Used   Substance Use Topics     Alcohol use: No     Alcohol/week: 0.0 oz       Family History   Problem Relation Age of Onset     Blood Disease Paternal Grandmother      Blood Disease Paternal Grandfather      Blood Disease Maternal Uncle      Blood Disease Father         Hemophilia         ROS:  CONSTITUTIONAL:NEGATIVE for fever, chills,   INTEGUMENTARY/SKIN: NEGATIVE for worrisome rashes,   or lesions  RESP:NEGATIVE for significant cough or SOB  GI: NEGATIVE  for change in bowel habits.  Some lower abdominal cramping2    OBJECTIVE   /80   Pulse 80   Temp 98.8  F (37.1  C) (Tympanic)   Wt 114.2 kg (251 lb 11.2 oz)   SpO2 98%   BMI 41.89 kg/m      :external genitalia normal, vagina normal without discharge, uterus normal size, shape, and consistency, no adenexal tenderness or masses noted on digital vaginal exam, no speculum  The IUD string was palpable.  Little vaginal blood on examination    GENERAL APPEARANCE: healthy, alert and no distress     ABDOMEN:  soft, nontender, no HSM or masses and bowel sounds normal  BACK: No  CVA tenderness  SKIN: no suspicious lesions or rashes    Results for orders placed or performed in visit on 07/16/19   HCG qualitative urine - CSC and Range   Result Value Ref Range    HCG Qual Urine Negative NEG^Negative           ASSESSMENT:  Abdominal cramping     - HCG qualitative urine - CSC and Range    Suspect she is at the start of her menses with uterine cramping and little vaginal bleed  No cervical motion pain to suspect PID,  No bladder pain    Missed period  Reassured that pregnancy test negative and IUD felt in place       Follow-up with Primary care provider  If persistent symptoms

## 2019-07-31 ENCOUNTER — TELEPHONE (OUTPATIENT)
Dept: INTERNAL MEDICINE | Facility: CLINIC | Age: 39
End: 2019-07-31

## 2019-07-31 NOTE — LETTER
[facility]  [address] [address] [phone]  July 31, 2019    Ninoska Cottrell  [address] [place] [id number]    Dear Ninoska,    We care about your health and based on a review of your medical records, recommend the the following, to better manage your health:      You are in particular need of attention regarding:  -Asthma    I am recommending that you:     -Complete and return the attached ASTHMA CONTROL TEST.  If your total score is 19 or less or you have been to the ER or urgent care for your asthma, then please schedule an asthma followup appointment.      Here is a list of Health Maintenance topics that are due now or due soon:  Health Maintenance Due   Topic Date Due     Asthma Control Test  1980     Asthma Action Plan - yearly  01/13/2015     Preventive Care Visit  06/21/2017     Depression Assessment  04/18/2018     PAP Smear  06/21/2019       Please call us at [phone] or [phone] (or use Apps4Pro) to address the above recommendations.     Thank you for trusting [facility].  We appreciate the opportunity to serve you and look forward to supporting your healthcare needs in the future.    If you have (or plan to have) any of these tests done at a facility other than a [facility] or a [facility], please have the results from these tests sent to your primary physician at [facility].    Healthy Regards,    Jaci Sharma MD

## 2019-07-31 NOTE — TELEPHONE ENCOUNTER
Panel Management Review      Patient has the following on her problem list:     Asthma review     ACT Total Scores 6/15/2015   ACT TOTAL SCORE 24   ASTHMA ER VISITS 0 = None   ASTHMA HOSPITALIZATIONS 0 = None      1. Is Asthma diagnosis on the Problem List? Yes    2. Is Asthma listed on Health Maintenance? Yes    3. Patient is due for:  ACT      Composite cancer screening  Chart review shows that this patient is due/due soon for the following None  Summary:    Patient is due/failing the following:   ACT    Action needed:   Patient needs to do ACT.    Type of outreach:    Sent letter.    Questions for provider review:    None                                                                                                                                    Laura Stephenson MA      Chart routed to self .

## 2019-09-28 ENCOUNTER — OFFICE VISIT (OUTPATIENT)
Dept: URGENT CARE | Facility: URGENT CARE | Age: 39
End: 2019-09-28
Payer: COMMERCIAL

## 2019-09-28 VITALS
DIASTOLIC BLOOD PRESSURE: 60 MMHG | HEIGHT: 66 IN | SYSTOLIC BLOOD PRESSURE: 108 MMHG | WEIGHT: 226 LBS | BODY MASS INDEX: 36.32 KG/M2 | HEART RATE: 89 BPM | TEMPERATURE: 98.6 F | RESPIRATION RATE: 18 BRPM | OXYGEN SATURATION: 99 %

## 2019-09-28 DIAGNOSIS — S16.1XXA STRAIN OF NECK MUSCLE, INITIAL ENCOUNTER: ICD-10-CM

## 2019-09-28 DIAGNOSIS — F45.41 STRESS HEADACHE: Primary | ICD-10-CM

## 2019-09-28 PROCEDURE — 99213 OFFICE O/P EST LOW 20 MIN: CPT | Performed by: FAMILY MEDICINE

## 2019-09-28 RX ORDER — CYCLOBENZAPRINE HCL 5 MG
5 TABLET ORAL
Qty: 5 TABLET | Refills: 0 | Status: SHIPPED | OUTPATIENT
Start: 2019-09-28 | End: 2020-03-09

## 2019-09-28 RX ORDER — MULTIPLE VITAMINS W/ MINERALS TAB 9MG-400MCG
1 TAB ORAL DAILY
COMMUNITY

## 2019-09-28 ASSESSMENT — ENCOUNTER SYMPTOMS
HEADACHES: 1
FEVER: 0
COUGH: 0
SORE THROAT: 0
WEAKNESS: 0
VOICE CHANGE: 0
NUMBNESS: 0
SINUS PAIN: 1
DIARRHEA: 0
PHOTOPHOBIA: 0
TROUBLE SWALLOWING: 0
SHORTNESS OF BREATH: 0
SINUS PRESSURE: 1
DIZZINESS: 0
NAUSEA: 0

## 2019-09-28 ASSESSMENT — MIFFLIN-ST. JEOR: SCORE: 1716.88

## 2019-09-28 ASSESSMENT — PAIN SCALES - GENERAL: PAINLEVEL: MODERATE PAIN (5)

## 2019-09-28 NOTE — PROGRESS NOTES
"Subjective     Ninoska Cottrell is a 39 year old female who presents to clinic today for the following health issues:    HPI   Presents with multiple symptoms. 1 week of trouble concentrating, headache back in head, right ringing ear and itchiness, frontal right sinus hurts. Headache is different than her usual migraines, which are frontal. 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  Using ibuprofen and zyrtec, not helping.     Reviewed and updated as needed this visit by Provider         Review of Systems   Constitutional: Negative for fever.   HENT: Positive for ear pain, sinus pressure and sinus pain. Negative for sore throat, trouble swallowing and voice change.    Eyes: Negative for photophobia.   Respiratory: Negative for cough and shortness of breath.    Cardiovascular: Negative for chest pain.   Gastrointestinal: Negative for diarrhea and nausea.   Neurological: Positive for headaches. Negative for dizziness, weakness and numbness.            Objective    /60 (BP Location: Left arm, Patient Position: Sitting, Cuff Size: Adult Large)   Pulse 89   Temp 98.6  F (37  C) (Oral)   Resp 18   Ht 1.676 m (5' 6\")   Wt 102.5 kg (226 lb)   LMP  (LMP Unknown)   SpO2 99%   Breastfeeding? No   BMI 36.48 kg/m    Body mass index is 36.48 kg/m .  Physical Exam  Vitals signs reviewed.   Constitutional:       General: She is not in acute distress.     Appearance: She is obese. She is not ill-appearing.   HENT:      Head: Normocephalic and atraumatic.      Right Ear: Tympanic membrane normal.      Left Ear: Tympanic membrane normal.      Nose: Rhinorrhea (clear) present. No congestion.      Mouth/Throat:      Mouth: Mucous membranes are moist.      Pharynx: No oropharyngeal exudate or posterior oropharyngeal erythema.   Eyes:      General: No scleral icterus.        Right eye: No discharge.         Left eye: No discharge.   Neck:      Musculoskeletal: Normal range of motion and neck supple. No " muscular tenderness.   Musculoskeletal:      Comments: Has posterior occipital and upper trapezius tenderness with palpation.   Lymphadenopathy:      Cervical: No cervical adenopathy.                Assessment & Plan     1. Stress headache  New problem.  Lots of life stressors.  Incidentally gave herself a muscle strain of her neck.  Will prescribe some Flexeril for symptom control.  Overall physical exam otherwise benign and non suggestive of infections or acute issues.  Precautions given for return to clinic including worsening symptoms, fever or shortness of breath, and instructed to seek medical care at that time.  Otherwise see PCP for additional management.Otherwise, see PCP for management.    2. Strain of neck muscle, initial encounter  - cyclobenzaprine (FLEXERIL) 5 MG tablet; Take 1 tablet (5 mg) by mouth nightly as needed for muscle spasms  Dispense: 5 tablet; Refill: 0         Return if symptoms worsen or fail to improve.    Jeremiah Lockwood MD  [facility]    Documentation was prepared using Dragon voice recognition software. Please excuse typographical errors. Please contact me if documentation is unclear.

## 2019-09-28 NOTE — PATIENT INSTRUCTIONS
Patient Education     Preventing Tension Headaches  Lifestyle changes are the key to preventing tension-type headaches. Learn what changes in your environment and daily activities can prevent the strain and tension that lead to headaches. If emotional stress is a factor, stress reduction may bring relief. Other lifestyle changes can help keep you healthier and better able to cope with pain.  What can cause tension headaches?  Causes of tension-type headaches include:    Posture and movement. Your posture while you sit, work, drive, and even sleep can put stress on your shoulders and neck. This can tighten muscles in the back of your head, causing headaches.    Lifting and carrying. These can cause strain on your back and neck, especially if you carry too much weight, carry weight unevenly, or use poor lifting technique.    Certain sports. Activities that involve jumping, running, and sudden starts, stops, or changes of direction can jar your neck and head. This may lead to tight muscles and pain. Weightlifting and other activities that require upper body strength can lead to tight neck and shoulder muscles.    Jaw tension. Clenching your jaw or grinding your teeth can result in tension and pain. This may happen while you sleep without your knowing it.    Eye problems. Eyestrain can cause tension in the muscles around the eyes. Or a problem with your eyeglass prescription can make you hold your head at an awkward angle. This can cause neck strain and headaches.    Emotional stress. Many factors lead to emotional stress: overwork, family problems, financial difficulties, or sudden life changes. This may cause muscle tension.  What you can do  Once you know what s causing your headaches, you can work to prevent them. You may need to seek professional help to make some of the needed changes.    Posture and movement changes. Change the setup of your workspace and car. Learn and maintain good posture. Avoid positions that  strain your neck or shoulders. Make sure your bedding and pillows provide good support. Avoid sleeping on couches, chairs, or floors when a bed is available.     Lifting and carrying. Learn good lifting technique. Make sure to use the proper tools and equipment for lifting.    Change your sport. Switch to a low-impact sport. To help relieve stress on your neck and head, cut back on activities that depend on upper body strength or that put a lot of twisting motion on the back, such as golf.     Dental work. See your dentist if you think your headaches are caused by jaw tension or teeth grinding.    New eyewear. Buy an extra pair of glasses adjusted for reading or working at a computer. This helps to reduce eyestrain and keep your neck at a comfortable angle.    Stress management. Learn techniques for relaxing and reducing emotional stress, like deep breathing, visualization, progressive relaxation, and biofeedback. Regular sleep habits can also help to control stress.    Regular sleep and meals. It is important to have a regular sleep cycle and to avoid skipping meals.  Exercise can help  Exercise can improve overall health and help you to relax.    Neck exercises help keep your neck muscles relaxed during the day. Try the neck exercises shown on this sheet. Start in a neutral (relaxed, centered) position. Do 3 repetitions every 2 to 4 hours throughout the day.    Low-impact aerobic exercise helps keep your muscles strong and flexible. This helps prevent tension and the pain it causes.    Stretching, alesha chi, and yoga help keep your muscles flexible. They may also help relieve emotional stress.    Lower your left ear toward your left shoulder. Return to neutral. Repeat on the right side.   Lower your chin to your chest and slowly return to neutral.     Look to the left. Return to neutral. Then look to the right.    Move your head forward and back while keeping the top of your head level.   Date Last Reviewed:  4/1/2018 2000-2018 [employer]. [address] [place] [address], [place] [address]. All rights reserved. This information is not intended as a substitute for professional medical care. Always follow your healthcare professional's instructions.    Patient Education     Neck Sprain or Strain  A sudden force that causes turning or bending of the neck can cause sprain or strain. An example would be the force from a car accident. This can stretch or tear muscles called a strain. It can also stretch or tear ligaments called a sprain. Either of these can cause neck pain. Sometimes neck pain occurs after a simple awkward movement. In either case, muscle spasm is commonly present and contributes to the pain.   Unless you had a forceful physical injury (for example, a car accident or fall), X-rays are often not ordered for the initial evaluation of neck pain. If pain continues and does not respond to medical treatment, X-rays and other tests may be done later.  Home care    You may feel more soreness and spasm the first few days after the injury. Rest until symptoms start to improve.    When lying down, use a comfortable pillow or a rolled towel that supports the head and keeps the spine in a neutral position. The position of the head should not be tilted forward or backward.    Apply an ice pack over the injured area for 15 to 20 minutes every 3 to 6 hours. Do this for the first 24 to 48 hours. You can make an ice pack by filling a plastic bag that seals at the top with ice cubes and then wrapping it with a thin towel. After 48 hours, apply heat (warm shower or warm bath) for 15 to 20 minutes several times a day, or alternate ice and heat.    You may use over-the-counter pain medicine to control pain, unless another pain medicine was prescribed. If you have chronic liver or kidney disease or ever had a stomach ulcer or gastrointestinal bleeding, talk with your healthcare provider before using these medicines.    If a  soft cervical collar was prescribed, only ear it for periods of increased pain. It should not be worn for more than 3 hours a day, or for longer than 1 to 2 weeks.  Follow-up care  Follow up with your healthcare provider, or as directed. Physical therapy may be needed.  Sometimes fractures don t show up on the first X-ray. Bruises and sprains can sometimes hurt as much as a fracture. These injuries can take time to heal completely. If your symptoms don t improve or they get worse, talk with your healthcare provider. You may need a repeat X-ray or other tests. If X-rays were taken, you will be told of any new findings that may affect your care.  Call 911  Call 911 if you have:    Neck swelling, difficulty or painful swallowing    Trouble breathing    Chest pain  When to seek medical advice  Call your healthcare provider right away if any of these occur:    Pain becomes worse or spreads into your arms or legs    Weakness or numbness in one or both arms or legs  Date Last Reviewed: 5/1/2018 2000-2018 [employer]. [address] [place] [address], [place] [address]. All rights reserved. This information is not intended as a substitute for professional medical care. Always follow your healthcare professional's instructions.

## 2019-10-25 ENCOUNTER — OFFICE VISIT (OUTPATIENT)
Dept: URGENT CARE | Facility: URGENT CARE | Age: 39
End: 2019-10-25
Payer: COMMERCIAL

## 2019-10-25 ENCOUNTER — ANCILLARY PROCEDURE (OUTPATIENT)
Dept: GENERAL RADIOLOGY | Facility: CLINIC | Age: 39
End: 2019-10-25
Attending: FAMILY MEDICINE
Payer: COMMERCIAL

## 2019-10-25 VITALS
RESPIRATION RATE: 16 BRPM | OXYGEN SATURATION: 97 % | SYSTOLIC BLOOD PRESSURE: 118 MMHG | DIASTOLIC BLOOD PRESSURE: 82 MMHG | TEMPERATURE: 97.8 F | HEIGHT: 66 IN | HEART RATE: 86 BPM | BODY MASS INDEX: 41.14 KG/M2 | WEIGHT: 256 LBS

## 2019-10-25 DIAGNOSIS — M79.672 LEFT FOOT PAIN: Primary | ICD-10-CM

## 2019-10-25 PROCEDURE — 73610 X-RAY EXAM OF ANKLE: CPT | Mod: LT

## 2019-10-25 PROCEDURE — 99213 OFFICE O/P EST LOW 20 MIN: CPT | Performed by: FAMILY MEDICINE

## 2019-10-25 ASSESSMENT — MIFFLIN-ST. JEOR: SCORE: 1852.96

## 2019-10-25 NOTE — PATIENT INSTRUCTIONS
Patient Education     Understanding Heel Pain    Your heel is the back part of your foot. A band of tissue called the plantar fascia connects the heel bone to the bones in the ball of your foot. Nerves run from the heel up the inside of your ankle and into your leg. When you feel pain in the bottom of your heel, the plantar fascia may be inflamed. Overuse, Achilles tightness, and excess body weight can cause the tissue to tear or pull away from the bone. Sometimes the inflamed plantar fascia also irritates a nerve, causing more pain.  What causes heel pain?  Wearing shoes with poor cushioning can irritate the tissue in your heel (plantar fascia). Being overweight or standing for long periods can also irritate the tissue. Running, walking, tennis, and other sports that put stress on the heels can cause tiny tears in the tissue. If your lower leg muscles are tight, this is more likely to happen. A tight Achilles tendon will also contribute to heel pain.  Symptoms  You may feel pain on the bottom or on the inside edge of your heel. The pain may be sharp when you get out of bed or when you stand up after sitting for a while. You may feel a dull ache in your heel after you ve been standing for a long time on a hard surface. Running can also cause a dull ache.  Preventing future problems  To prevent future heel pain, wear shoes with well-cushioned heels. And do exercises prescribed by your healthcare provider to stretch the plantar fascia and the muscles in the lower leg.   Date Last Reviewed: 10/1/2017    8691-7692 [employer]. [address] [place] [address], [place] [address]. All rights reserved. This information is not intended as a substitute for professional medical care. Always follow your healthcare professional's instructions.           Patient Education     Heel Spurs    The plantar fascia is a thick, fibrous layer of tissue that covers the bones on the bottom of your foot. It holds the foot bones in an  arched position. Plantar fasciitis is a painful swelling of the plantar fascia.  A heel spur is an overgrowth of bone where the plantar fascia attaches to the heel bone. The heel spur itself usually doesn t cause pain. However, the heel spur might be a sign of plantar fasciitis which may cause your foot pain.  Plantar fasciitis can develop slowly or suddenly. It usually affects one foot at a time. Heel pain can feel sharp, like a knife sticking into the bottom of your foot. You may feel pain after exercising, long-distance jogging, stair climbing, long periods of standing, or after standing up.  Risk factors for plantar fasciitis include: arthritis, diabetes, obesity or recent weight gain, flat foot, and having high arches. Wearing high heels, loose shoes, or shoes with poor arch support adds to the risk.    Foot pain is usually worse in the morning. But it improves with walking. By the end of the day there may be a dull aching. Treatment includes short-term rest and controlling inflammation. It may take up to 9 months before all symptoms go away. In rare cases, a steroid injection in the foot, or surgery, may be needed.  Home care    If you are overweight, lose weight to help healing.    Choose supportive shoes with good arch support and shock absorbency. Replace athletic shoes when they become worn out. Don t walk or run barefoot.    Premade or custom-fitted shoe inserts may be helpful. Inserts made of silicone seem to be the most effective. Custom-made inserts can be provided by a foot specialist, physical therapist, or orthopedist.    Premade or custom-made night splints keep the heel stretched out while you sleep. They may prevent morning pain.    Don't do activities that stress the feet: jogging, prolonged standing or walking, contact sports, etc.    First thing in the morning and before sports, stretch the bottom of your foot. Gently flex your ankle so the toes move toward your knee.    Icing may help control  heel pain. Apply an ice pack to the heel for 10 to 20 minutes as a preventive. Or ice your heel after a severe flare-up of symptoms. You may repeat this every 1 to 2 hours as needed.    You may use over-the-counter pain medicine to control pain, unless another medicine was prescribed. Anti-inflammatory pain medicines, such as ibuprofen or naproxen, may work better than acetaminophen. If you have chronic liver or kidney disease or ever had a stomach ulcer or gastrointestinal bleeding, talk with your healthcare provider before using these medicines.    Shoe inserts, a night splint, or a special boot may be needed. Use these as directed by your healthcare provider.  Follow-up care   Follow up with your healthcare provider, physical therapist, or foot specialist as advised.  When to seek medical advice  Call your healthcare provider right away if any of these occur:    The pain worsens.    There is no relief after a few weeks of home treatment.  Date Last Reviewed: 5/1/2018 2000-2018 [employer]. [address] [place] [address]. All rights reserved. This information is not intended as a substitute for professional medical care. Always follow your healthcare professional's instructions.           Patient Education     Plantar Fasciitis  Plantar fasciitis is a painful swelling of the plantar fascia. The plantar fascia is a thick, fibrous layer of tissue that covers the bones on the bottom of your foot. It supports the foot bones in an arched position.  Plantar fasciitis can happen gradually or suddenly. It usually affects one foot at a time. Heel pain can be sharp, like a knife sticking into the bottom of your foot. You may feel pain after exercising, long-distance jogging, stair climbing, long periods of standing, or after standing up.  Risk factors include: non-active lifestyle, arthritis, diabetes, obesity or recent weight gain, flat foot, high arch. Wearing high heels, loose shoes, or shoes  with poor arch support for long periods of time adds to the risk. This problem is commonly found in runners and dancers. It also found in people who stand on hard surfaces for long periods of time.  Foot pain from this condition is usually worse in the morning. But it often improves with walking. By the end of the day there may be a dull aching. Treatment requires short-term rest and controlling swelling. It may take up to 9 months before all symptoms go away. Rarely, a steroid injection into the foot, or surgery, may be needed.  Home care    If you are overweight, lose weight to help healing.    Choose supportive shoes with good arch support and shock absorbency. Replace athletic shoes when they become worn out. Don t walk or run barefoot.    Premade or custom-fitted shoe inserts may be helpful. Inserts made of silicone seem to be the most effective. Custom-made inserts can be provided by foot specialist, physical therapist, or orthopedist.    Premade or custom-made night splints keep the heel stretched out while you sleep. They may prevent morning pain.    Limit activities that stress the feet: jogging, prolonged standing or walking, contact sports, etc.    First thing in the morning and before sports, stretch the bottom of your feet. Gently flex your ankle so the toes move toward your knee.    Icing may help control heel pain. Apply an ice pack to the heel for 10 to 20 minutes as a preventive. Or ice your heel after a severe flare-up of symptoms. You may repeat this every 1 to 2 hours as needed.    You may use over-the-counter pain medicine to control pain, unless another medicine was prescribed. Anti-inflammatory pain medicines, such as ibuprofen or naproxen, may work better than acetaminophen. If you have chronic liver or kidney disease or ever had a stomach ulcer or gastrointestinal bleeding, talk with your healthcare provider before using these medicines.  Follow-up care  Follow up with your healthcare  provider, or as advised.  Call for an appointment if pain worsens or there is no relief after a few weeks of home treatment. Shoe inserts, a night splint, or a special boot may be required.  If X-rays were taken, you will be told of any new findings that may affect your care.  When to seek medical advice  Call your healthcare provider right away if any of these occur:    Foot swelling    Redness or warmth with increasing pain  Date Last Reviewed: 5/1/2018 2000-2018 [employer]. [address] [place] [address]. All rights reserved. This information is not intended as a substitute for professional medical care. Always follow your healthcare professional's instructions.

## 2019-10-30 NOTE — PROGRESS NOTES
"SUBJECTIVE:  Ninoska Cottrell, a 39 year old female scheduled an appointment to discuss the following issues:  Left foot pain    Medical, social, surgical, and family histories reviewed.    Urgent Care (left foot pain)  Left foot pain for more than 1 week.  No injury, but patient has been practicing karate with a lot of kicking and foot work.  Pt states her pain is mostly at her left heel.  She has been wearing a splint and kinetic tape.  Pt is not pregnant, has an IUD, LMP last month.    ROS:  See HPI.  No nausea/vomiting.  No fever/chills.  No chest pain/SOB.  No BM/urine problems.  No dizziness or syncope.      OBJECTIVE:  /82   Pulse 86   Temp 97.8  F (36.6  C) (Oral)   Resp 16   Ht 1.676 m (5' 6\")   Wt 116.1 kg (256 lb)   LMP  (LMP Unknown)   SpO2 97%   BMI 41.32 kg/m    EXAM:  GENERAL APPEARANCE:  alert and mild distress; afebrile  EYES: Eyes grossly normal to inspection, PERRL and conjunctivae and sclerae normal  HENT: ear canals and TM's normal and nose and mouth without ulcers or lesions  NECK: no adenopathy, no asymmetry, masses, or scars and neck normal to palpation  RESP: lungs clear to auscultation - no rales, rhonchi or wheezes  CV: regular rates and rhythm, normal S1 S2, no S3 or S4 and no murmur, click or rub  MS: extremities normal- no gross deformities noted; tenderness at plantar aspect left heel at plantar fascia insertion; no bony tenderness of medial or lateral malleoli, navicular bone or metatarsals  SKIN: no suspicious lesions or rashes  NEURO: Normal strength and tone, mentation intact and speech normal      ASSESSMENT/PLAN:  (M79.672) Left foot pain  (primary encounter diagnosis)  Comment: xray showed no fracture or dislocation and no heel spur; likely plantar fasciitis/inflammation of plantar fascia insertion site  Plan: XR Ankle Left G/E 3 Views, order for DME,         SOFTLINE CENTRAL SILICONE L, ARCH SUPPORT         INSERT, order for DME  Care instructions given.  Pt to f/up " PCP if no improvement or worsening.  Warning signs and symptoms explained.

## 2019-12-04 ENCOUNTER — OFFICE VISIT (OUTPATIENT)
Dept: URGENT CARE | Facility: URGENT CARE | Age: 39
End: 2019-12-04
Payer: COMMERCIAL

## 2019-12-04 VITALS
TEMPERATURE: 98.1 F | DIASTOLIC BLOOD PRESSURE: 84 MMHG | HEART RATE: 76 BPM | RESPIRATION RATE: 18 BRPM | OXYGEN SATURATION: 100 % | SYSTOLIC BLOOD PRESSURE: 129 MMHG

## 2019-12-04 DIAGNOSIS — R05.9 COUGH: ICD-10-CM

## 2019-12-04 DIAGNOSIS — R09.89 CHEST CONGESTION: Primary | ICD-10-CM

## 2019-12-04 DIAGNOSIS — J45.901 MODERATE ASTHMA WITH EXACERBATION, UNSPECIFIED WHETHER PERSISTENT: ICD-10-CM

## 2019-12-04 DIAGNOSIS — R05.8 PRODUCTIVE COUGH: ICD-10-CM

## 2019-12-04 PROCEDURE — 99214 OFFICE O/P EST MOD 30 MIN: CPT | Performed by: PHYSICIAN ASSISTANT

## 2019-12-04 RX ORDER — ALBUTEROL SULFATE 90 UG/1
2 AEROSOL, METERED RESPIRATORY (INHALATION) EVERY 6 HOURS
Qty: 8.5 G | Refills: 0 | Status: SHIPPED | OUTPATIENT
Start: 2019-12-04 | End: 2022-10-24

## 2019-12-04 RX ORDER — AZITHROMYCIN 250 MG/1
TABLET, FILM COATED ORAL
Qty: 6 TABLET | Refills: 0 | Status: SHIPPED | OUTPATIENT
Start: 2019-12-04 | End: 2020-03-09

## 2019-12-04 RX ORDER — BENZONATATE 200 MG/1
200 CAPSULE ORAL 3 TIMES DAILY PRN
Qty: 21 CAPSULE | Refills: 0 | Status: SHIPPED | OUTPATIENT
Start: 2019-12-04 | End: 2020-03-09

## 2019-12-04 NOTE — PROGRESS NOTES
SUBJECTIVE:   Ninoska Cottrell is a 39 year old female presenting with a chief complaint of chest congestion, productive cough with asthma.  Onset of symptoms was 1.5 weeks ago.  Course of illness is worsening.    Severity moderate  Current and Associated symptoms: chest congestion, productive cough, coughing  Treatment measures tried include OTC medications.  Predisposing factors include asthma and recent illness.    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     Fibromyalgia      Hyperlipidemia      Migraine      Murmur, cardiac      Obesity      Preeclampsia 2012        Allergies   Allergen Reactions     Blueberry Swelling     Codeine Hives     Codeine      Eggs [Chicken-Derived Products (Egg)] Nausea     Latex      Yeast      Latex Swelling and Rash     Family History   Problem Relation Age of Onset     Blood Disease Paternal Grandmother      Blood Disease Paternal Grandfather      Blood Disease Maternal Uncle      Blood Disease Father         Hemophilia       Social History     Tobacco Use     Smoking status: Former Smoker     Last attempt to quit: 2000     Years since quittin.9     Smokeless tobacco: Never Used   Substance Use Topics     Alcohol use: No     Alcohol/week: 0.0 standard drinks       ROS:  CONSTITUTIONAL:NEGATIVE for fever, chills, change in weight  INTEGUMENTARY/SKIN: NEGATIVE for worrisome rashes, moles or lesions  EYES: NEGATIVE for vision changes or irritation  ENT/MOUTH: POSITIVE for nasal congestion  RESP:POSITIVE for cough-productive, Hx asthma and wheezing  CV: NEGATIVE for chest pain, palpitations or peripheral edema  GI: NEGATIVE for nausea, abdominal pain, heartburn, or change in bowel habits  : negative for and dysuria  MUSCULOSKELETAL: NEGATIVE for significant arthralgias or myalgia  NEURO: NEGATIVE for weakness, dizziness or paresthesias    OBJECTIVE  :BP  129/84   Pulse 76   Temp 98.1  F (36.7  C) (Oral)   Resp 18   SpO2 100%   GENERAL APPEARANCE: healthy, alert and no distress  EYES: EOMI,  PERRL, conjunctiva clear  HENT: ear canals and TM's normal.  Nose and mouth without ulcers, erythema or lesions  NECK: supple, nontender, no lymphadenopathy  RESP: expiratory wheezes generalized  CV: regular rates and rhythm, normal S1 S2, no murmur noted  ABDOMEN:  soft, nontender, no HSM or masses and bowel sounds normal  NEURO: Normal strength and tone, sensory exam grossly normal,  normal speech and mentation  SKIN: no suspicious lesions or rashes    ASSESSMENT/PLAN      ICD-10-CM    1. Chest congestion R09.89 azithromycin (ZITHROMAX) 250 MG tablet   2. Productive cough R05 azithromycin (ZITHROMAX) 250 MG tablet   3. Moderate asthma with exacerbation, unspecified whether persistent J45.901 albuterol (PROVENTIL HFA) 108 (90 Base) MCG/ACT inhaler   4. Cough R05 benzonatate (TESSALON) 200 MG capsule       Orders Placed This Encounter     azithromycin (ZITHROMAX) 250 MG tablet     benzonatate (TESSALON) 200 MG capsule     albuterol (PROVENTIL HFA) 108 (90 Base) MCG/ACT inhaler       zpak for infection  Tessalon for coughing  proventil for wheezing  Follow up with PCP as needed  See orders in Epic

## 2020-03-07 ENCOUNTER — OFFICE VISIT (OUTPATIENT)
Dept: URGENT CARE | Facility: URGENT CARE | Age: 40
End: 2020-03-07
Payer: COMMERCIAL

## 2020-03-07 VITALS
SYSTOLIC BLOOD PRESSURE: 122 MMHG | OXYGEN SATURATION: 98 % | DIASTOLIC BLOOD PRESSURE: 84 MMHG | HEIGHT: 66 IN | BODY MASS INDEX: 42.36 KG/M2 | TEMPERATURE: 97.4 F | HEART RATE: 69 BPM | WEIGHT: 263.6 LBS | RESPIRATION RATE: 18 BRPM

## 2020-03-07 DIAGNOSIS — M25.511 ACUTE PAIN OF RIGHT SHOULDER: Primary | ICD-10-CM

## 2020-03-07 PROCEDURE — 99214 OFFICE O/P EST MOD 30 MIN: CPT | Performed by: PHYSICIAN ASSISTANT

## 2020-03-07 RX ORDER — TRAMADOL HYDROCHLORIDE 50 MG/1
50 TABLET ORAL EVERY 6 HOURS PRN
Qty: 10 TABLET | Refills: 0 | Status: SHIPPED | OUTPATIENT
Start: 2020-03-07 | End: 2020-03-09

## 2020-03-07 ASSESSMENT — MIFFLIN-ST. JEOR: SCORE: 1882.43

## 2020-03-07 ASSESSMENT — ENCOUNTER SYMPTOMS: ARTHRALGIAS: 1

## 2020-03-08 NOTE — PATIENT INSTRUCTIONS
Patient Education     Acute Pain, Uncertain Cause  Pain can be caused by many conditions that range from very minor to very serious. In some cases, though, pain comes and goes with no apparent cause.  We were not able to find the exact cause for your pain. At this time there is no sign of any serious illness causing your pain. More tests may be needed to determine the cause. In many cases, pain like this goes away by itself.  Home care  Take any medicines as prescribed. If another medicine was not prescribed for pain, you can take an over-the-counter pain medicine such as ibuprofen or acetaminophen. Use these as directed on the label.    Follow-up care  Follow up with your healthcare provider or our staff as directed.  When to seek medical advice  Call your healthcare provider for any of the following:    Pain changes in pattern    Pain doesn't lessen or gets worse    New symptoms appear    Fever of 100.4 F (38 C) or higher, or as directed by your healthcare provider  Date Last Reviewed: 4/1/2018 2000-2019 [employer]. [address] [place] [address], [place] [address]. All rights reserved. This information is not intended as a substitute for professional medical care. Always follow your healthcare professional's instructions.

## 2020-03-08 NOTE — PROGRESS NOTES
SUBJECTIVE:   Ninoska Cottrell is a 40 year old female presenting with a chief complaint of   Chief Complaint   Patient presents with     Shoulder Pain       She is an established patient of [facility].  Patient fell one month ago on her right arm.  She had been treating with ice and ibuprofen.  RHD.  For past few days intense pain.      MS Injury/Pain    Onset of symptoms was 1 month(s) ago.  Location: right shoulder  Context:       The injury happened while at home      Mechanism: fall       Patient experienced immediate pain  Course of symptoms is same.    Severity severe  Current and Associated symptoms: Pain  Denies    Aggravating Factors: movement  Therapies to improve symptoms include: ibuprofen  This is the first time this type of problem has occurred for this patient.       Review of Systems   Musculoskeletal: Positive for arthralgias.   All other systems reviewed and are negative.      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     Fibromyalgia      Hyperlipidemia      Migraine      Murmur, cardiac      Obesity      Preeclampsia 2012     Family History   Problem Relation Age of Onset     Blood Disease Paternal Grandmother      Blood Disease Paternal Grandfather      Blood Disease Maternal Uncle      Blood Disease Father         Hemophilia     Current Outpatient Medications   Medication Sig Dispense Refill     Acetaminophen (TYLENOL PO) Take 1,000 mg by mouth every 8 hours as needed for mild pain or fever       albuterol (PROAIR HFA/PROVENTIL HFA/VENTOLIN HFA) 108 (90 Base) MCG/ACT Inhaler Inhale 2 puffs into the lungs every 6 hours as needed for shortness of breath / dyspnea or wheezing 1 Inhaler 1     albuterol (PROVENTIL HFA) 108 (90 Base) MCG/ACT inhaler Inhale 2 puffs into the lungs every 6 hours 8.5 g 0     ibuprofen (ADVIL/MOTRIN) 600 MG tablet Take 1 tablet (600 mg) by mouth every 6  hours as needed for other (cramping) 100 tablet 0     multivitamin w/minerals (MULTI-VITAMIN) tablet Take 1 tablet by mouth daily       traMADol (ULTRAM) 50 MG tablet Take 1 tablet (50 mg) by mouth every 6 hours as needed for severe pain 10 tablet 0     vitamin B complex with vitamin C (VITAMIN  B COMPLEX) TABS tablet Take 1 tablet by mouth daily       amitriptyline (ELAVIL) 10 MG tablet TAKE 2 TABLETS BY ORAL ROUTE ONCE DAILY AT BEDTIME FOR 2 WEEKS THEN TAKE 3 AT BEDTIME  4     azithromycin (ZITHROMAX) 250 MG tablet 2 tabs po qd day 1, then 1 tab po qd days 2-5 (Patient not taking: Reported on 3/7/2020) 6 tablet 0     cetirizine HCl (ZYRTEC ALLERGY) 10 MG CAPS        Cholecalciferol (VITAMIN D PO) Take 2,000 Units by mouth daily       cyclobenzaprine (FLEXERIL) 5 MG tablet Take 1 tablet (5 mg) by mouth nightly as needed for muscle spasms (Patient not taking: Reported on 10/25/2019) 5 tablet 0     levonorgestrel (MIRENA, 52 MG,) 20 MCG/24HR IUD 1 each (20 mcg) by Intrauterine route once for 1 dose Lot #: [id number]    NDC#: 33376-160-18 1 each 0     meclizine (ANTIVERT) 25 MG tablet Take 1 tablet (25 mg) by mouth 3 times daily as needed for dizziness (Patient not taking: Reported on 10/25/2019) 15 tablet 1     Omega-3 Fatty Acids (FISH OIL PO) Take 1 capsule by mouth 2 times daily       order for DME Equipment being ordered: left ankle air spint (Patient not taking: Reported on 3/7/2020) 1 each 0     order for DME Equipment being ordered: bilateral arch and heel support inserts  Diagnosis:  Heel and foot pain (Patient not taking: Reported on 3/7/2020) 1 each 1     Prenatal Vit-Fe Fumarate-FA (PRENATAL MULTIVITAMIN  PLUS IRON) 27-0.8 MG TABS per tablet Take 1 tablet by mouth       Social History     Tobacco Use     Smoking status: Former Smoker     Last attempt to quit: 2000     Years since quittin.1     Smokeless tobacco: Never Used   Substance Use Topics     Alcohol use: No     Alcohol/week: 0.0 standard  "drinks       OBJECTIVE  /84   Pulse 69   Temp 97.4  F (36.3  C) (Oral)   Resp 18   Ht 1.676 m (5' 6\")   Wt 119.6 kg (263 lb 9.6 oz)   SpO2 98%   Breastfeeding No   BMI 42.55 kg/m      Physical Exam  Vitals signs and nursing note reviewed. Exam conducted with a chaperone present.   Constitutional:       General: She is not in acute distress.     Appearance: Normal appearance. She is obese. She is not ill-appearing, toxic-appearing or diaphoretic.   Musculoskeletal:      Comments: Significantly decreased passive and active ROM.   Good pulses.  +supraspinatous testing.  Generalized tenderness to palpation of anterior shoulder.  Elbow exam and neck exam unremarkable.    Skin:     Capillary Refill: Capillary refill takes less than 2 seconds.   Neurological:      General: No focal deficit present.      Mental Status: She is alert.   Psychiatric:         Mood and Affect: Mood normal.         Labs:  No results found for this or any previous visit (from the past 24 hour(s)).    X-Ray was not done.    ASSESSMENT:      ICD-10-CM    1. Acute pain of right shoulder M25.511 ORTHOPEDICS ADULT REFERRAL     traMADol (ULTRAM) 50 MG tablet     ARM SLING L        Medical Decision Making:    Differential Diagnosis:  MS Injury Pain: Suspect RTC tear with adhesive capsulitis.    Serious Comorbid Conditions:  Adult:  None    PLAN:    MS Injury/Pain  ice and sling, ortho referral and ultram.  Discussed possible allergic side effects.  Patient is not taking elavil and has never had a seizure.      Followup:    If not improving or if condition worsens, follow up with your Primary Care Provider, In 4  day(s) follow up with  Ortho    Patient Instructions     Patient Education     Acute Pain, Uncertain Cause  Pain can be caused by many conditions that range from very minor to very serious. In some cases, though, pain comes and goes with no apparent cause.  We were not able to find the exact cause for your pain. At this time there " is no sign of any serious illness causing your pain. More tests may be needed to determine the cause. In many cases, pain like this goes away by itself.  Home care  Take any medicines as prescribed. If another medicine was not prescribed for pain, you can take an over-the-counter pain medicine such as ibuprofen or acetaminophen. Use these as directed on the label.    Follow-up care  Follow up with your healthcare provider or our staff as directed.  When to seek medical advice  Call your healthcare provider for any of the following:    Pain changes in pattern    Pain doesn't lessen or gets worse    New symptoms appear    Fever of 100.4 F (38 C) or higher, or as directed by your healthcare provider  Date Last Reviewed: 4/1/2018 2000-2019 [employer]. [address] [place] [address], [place] [address]. All rights reserved. This information is not intended as a substitute for professional medical care. Always follow your healthcare professional's instructions.

## 2020-03-09 ENCOUNTER — ANCILLARY PROCEDURE (OUTPATIENT)
Dept: GENERAL RADIOLOGY | Facility: CLINIC | Age: 40
End: 2020-03-09
Attending: FAMILY MEDICINE
Payer: COMMERCIAL

## 2020-03-09 ENCOUNTER — OFFICE VISIT (OUTPATIENT)
Dept: ORTHOPEDICS | Facility: CLINIC | Age: 40
End: 2020-03-09
Attending: PHYSICIAN ASSISTANT
Payer: COMMERCIAL

## 2020-03-09 VITALS
SYSTOLIC BLOOD PRESSURE: 122 MMHG | DIASTOLIC BLOOD PRESSURE: 84 MMHG | BODY MASS INDEX: 42.27 KG/M2 | HEIGHT: 66 IN | WEIGHT: 263 LBS

## 2020-03-09 DIAGNOSIS — M25.511 RIGHT SHOULDER PAIN, UNSPECIFIED CHRONICITY: ICD-10-CM

## 2020-03-09 DIAGNOSIS — S43.101A AC SEPARATION, RIGHT, INITIAL ENCOUNTER: Primary | ICD-10-CM

## 2020-03-09 PROCEDURE — 99204 OFFICE O/P NEW MOD 45 MIN: CPT | Performed by: FAMILY MEDICINE

## 2020-03-09 PROCEDURE — 73030 X-RAY EXAM OF SHOULDER: CPT | Mod: RT

## 2020-03-09 RX ORDER — DICLOFENAC SODIUM 75 MG/1
75 TABLET, DELAYED RELEASE ORAL 2 TIMES DAILY PRN
Qty: 60 TABLET | Refills: 0 | Status: SHIPPED | OUTPATIENT
Start: 2020-03-09 | End: 2020-11-06

## 2020-03-09 ASSESSMENT — MIFFLIN-ST. JEOR: SCORE: 1879.71

## 2020-03-09 NOTE — PROGRESS NOTES
[facility]   Clinic Visit s Mar 9, 2020    PCP: Traci Padron    ASSESSMENT/PLAN    ICD-10-CM    1. AC separation, right, initial encounter  S43.101A diclofenac (VOLTAREN) 75 MG EC tablet   2. Right shoulder pain, unspecified chronicity  M25.511 XR Shoulder Right G/E 3 Views     diclofenac (VOLTAREN) 75 MG EC tablet           Ninoska is a 40 year old female who is seen in consultation at the request of Dr. Parker for   Chief Complaint   Patient presents with     Right Shoulder - Pain       Injury: fall on her right side while at home. Landed on right hip, shoulder and wrist.      Right hand dominant    Location of Pain: right shoulder anterior  and posterior, radiating to hand   Duration of Pain: acute, 1 month(s),   Rating of Pain at worst: 10/10  Rating of Pain Currently: 7/10  Pain is better with: activity avoidance   Pain is worse with: ADLs:  all activities  Treatment so far consists of: ice and ibuprofen 800mg 4 times a day, arm sling, tramadol (quit, feels sick)  Associated symptoms: numbness and tingling in hand, periodic, comes and goes, from fingers to elbows or shoulder to elbow, randomly, weakness of   Recent imaging completed: No recent imaging completed.  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 Provider to review and confirm.)         Family History   Problem Relation Age of Onset      "Blood Disease Paternal Grandmother      Blood Disease Paternal Grandfather      Blood Disease Maternal Uncle      Blood Disease Father         Hemophilia       Social History     Socioeconomic History     Marital status:      Spouse name: Deandre     Number of children: 2     Years of education: Not on file     Highest education level: Not on file   Occupational History     Employer: [employer]   Social Needs     Financial resource strain: Not on file     Food insecurity     Worry: Not on file     Inability: Not on file     Transportation needs     Medical: Not on file     Non-medical: Not on file   Tobacco Use     Smoking status: Former Smoker     Last attempt to quit: 2000     Years since quittin.2     Smokeless tobacco: Never Used   Substance and Sexual Activity     Alcohol use: No     Alcohol/week: 0.0 standard drinks     Drug use: No       Past Surgical History:   Procedure Laterality Date     APPENDECTOMY       C NONSPECIFIC PROCEDURE      right ankle fracture             Review of Systems   Musculoskeletal: Positive for joint pain.   All other systems reviewed and are negative.        Physical Exam  Musculoskeletal:      Right shoulder: Normal. She exhibits tenderness.     /84   Ht 1.676 m (5' 6\")   Wt 119.3 kg (263 lb)   BMI 42.45 kg/m    Constitutional:well-developed, well-nourished, and in no distress.   Cardiovascular: Intact distal pulses.    Neurological: alert. Gait Normal:   Gait, station, stance, and balance appear normal for age  Skin: Skin is warm and dry.   Psychiatric: Mood and affect normal.   Respiratory: unlabored, speaks in full sentences  Lymph: no LAD, no lymphangitis      Right Hand Exam     Tenderness   The patient is experiencing no tenderness.     Range of Motion   Wrist   Extension: normal   Flexion: normal   Pronation: normal   Supination: normal     Muscle Strength   Wrist extension: 5/5   Wrist flexion: 5/5   : 5/5     Other   Erythema: absent  Scars: " absent  Sensation: decreased  Pulse: present    Comments:  Subjective decreased sensation over fingertips, but sensation is present.      Right Elbow Exam     Range of Motion   The patient has normal right elbow ROM.  Extension: normal   Flexion: normal   Pronation: normal   Supination: normal     Muscle Strength   The patient has normal right elbow strength.  Pronation:  5/5   Supination:  5/5     Other   Erythema: absent  Sensation: normal  Pulse: present      Right Shoulder Exam     Tenderness   The patient is experiencing tenderness in the acromioclavicular joint.    Range of Motion   Active abduction:  70 normal   Passive abduction: normal   Extension: normal   External rotation: 70   Forward flexion:  70 (passive full, painful) normal   Internal rotation 0 degrees: normal     Muscle Strength   Abduction: 5/5   Internal rotation: 5/5   External rotation: 5/5   Supraspinatus: 5/5   Subscapularis: 5/5   Biceps: 5/5     Tests   Vines test: positive  Cross arm: positive  Impingement: positive  Drop arm: positive    Other   Erythema: absent  Scars: absent  Sensation: normal  Pulse: present    Comments:  Strength limited by pain but appears to be intact                  X-ray images Ordered and independently reviewed by me in the office today with the patient. X-ray shows:     Recent Results (from the past 744 hour(s))   XR Shoulder Right G/E 3 Views    Narrative    XR SHOULDER RT G/E 4 VW 3/9/2020 1:28 PM     HISTORY: Right shoulder pain, unspecified chronicity      Impression    IMPRESSION: Negative exam.    ERICKA OATES MD     ASSESSMENT/PLAN    ICD-10-CM    1. AC separation, right, initial encounter  S43.101A diclofenac (VOLTAREN) 75 MG EC tablet   2. Right shoulder pain, unspecified chronicity  M25.511 XR Shoulder Right G/E 3 Views     diclofenac (VOLTAREN) 75 MG EC tablet     Symptoms primarily localizing about the shoulder, and neck exam was negative for provocation of symptoms.  Distal upper extremity  tingling and numbness not verified on exam.  Will monitor during follow-up.  Will switch to diclofenac instead of ibuprofen, encouraged icing 15 minutes a few times daily, and use of sling as desired.  Educated on pendulum range of motion exercises.  Recheck in 2 to 4 weeks pending progress.

## 2020-03-09 NOTE — PATIENT INSTRUCTIONS
If you have any further questions for your physician or physician s care team you can call [phone] and use option 2 then 3 to leave a voice message.

## 2020-03-09 NOTE — LETTER
3/9/2020         RE: Ninoska Cottrell  [address]  [facility] [address]        Dear Colleague,    Thank you for referring your patient, Ninoska Cottrell, to the  SPORTS MEDICINE. Please see a copy of my visit note below.    [facility]   Clinic Visit s Mar 9, 2020    PCP: Traci Padron    ASSESSMENT/PLAN    ICD-10-CM    1. AC separation, right, initial encounter  S43.101A diclofenac (VOLTAREN) 75 MG EC tablet   2. Right shoulder pain, unspecified chronicity  M25.511 XR Shoulder Right G/E 3 Views     diclofenac (VOLTAREN) 75 MG EC tablet           Ninoska is a 40 year old female who is seen in consultation at the request of Dr. Parker for   Chief Complaint   Patient presents with     Right Shoulder - Pain       Injury: fall on her right side while at home. Landed on right hip, shoulder and wrist.      Right hand dominant    Location of Pain: right shoulder anterior  and posterior, radiating to hand   Duration of Pain: acute, 1 month(s),   Rating of Pain at worst: 10/10  Rating of Pain Currently: 7/10  Pain is better with: activity avoidance   Pain is worse with: ADLs:  all activities  Treatment so far consists of: ice and ibuprofen 800mg 4 times a day, arm sling, tramadol (quit, feels sick)  Associated symptoms: numbness and tingling in hand, periodic, comes and goes, from fingers to elbows or shoulder to elbow, randomly, weakness of   Recent imaging completed: No recent imaging completed.  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 Provider to review and confirm.)         Family History   Problem Relation Age of Onset     Blood Disease Paternal Grandmother      Blood Disease Paternal Grandfather      Blood Disease Maternal Uncle      Blood Disease Father         Hemophilia       Social History     Socioeconomic History     Marital status:      Spouse name: Deandre     Number of children: 2     Years of education: Not on file     Highest education level: Not on file   Occupational History     Employer: [employer]   Social Needs     Financial resource strain: Not on file     Food insecurity     Worry: Not on file     Inability: Not on file     Transportation needs     Medical: Not on file     Non-medical: Not on file   Tobacco Use     Smoking status: Former Smoker     Last attempt to quit: 2000     Years since quittin.2     Smokeless tobacco: Never Used   Substance and Sexual Activity     Alcohol use: No     Alcohol/week: 0.0 standard drinks     Drug use: No       Past Surgical History:   Procedure Laterality Date     APPENDECTOMY       C NONSPECIFIC PROCEDURE      right ankle fracture             Review of Systems   Musculoskeletal: Positive for joint pain.   All other systems reviewed and are negative.        Physical Exam  Musculoskeletal:      Right shoulder: Normal. She exhibits tenderness.     /84   Ht 1.676 m (5' 6\")   Wt 119.3 kg (263 lb)   BMI 42.45 kg/m    Constitutional:well-developed, well-nourished, and in no distress.   Cardiovascular: Intact distal pulses.    Neurological: alert. Gait Normal:   Gait, station, stance, and balance appear normal for age  Skin: Skin is warm and dry.   Psychiatric: Mood and affect normal.   Respiratory: unlabored, speaks in full sentences  Lymph: no LAD, no lymphangitis      Right Hand Exam     Tenderness   The patient is experiencing no " tenderness.     Range of Motion   Wrist   Extension: normal   Flexion: normal   Pronation: normal   Supination: normal     Muscle Strength   Wrist extension: 5/5   Wrist flexion: 5/5   : 5/5     Other   Erythema: absent  Scars: absent  Sensation: decreased  Pulse: present    Comments:  Subjective decreased sensation over fingertips, but sensation is present.      Right Elbow Exam     Range of Motion   The patient has normal right elbow ROM.  Extension: normal   Flexion: normal   Pronation: normal   Supination: normal     Muscle Strength   The patient has normal right elbow strength.  Pronation:  5/5   Supination:  5/5     Other   Erythema: absent  Sensation: normal  Pulse: present      Right Shoulder Exam     Tenderness   The patient is experiencing tenderness in the acromioclavicular joint.    Range of Motion   Active abduction:  70 normal   Passive abduction: normal   Extension: normal   External rotation: 70   Forward flexion:  70 (passive full, painful) normal   Internal rotation 0 degrees: normal     Muscle Strength   Abduction: 5/5   Internal rotation: 5/5   External rotation: 5/5   Supraspinatus: 5/5   Subscapularis: 5/5   Biceps: 5/5     Tests   Vines test: positive  Cross arm: positive  Impingement: positive  Drop arm: positive    Other   Erythema: absent  Scars: absent  Sensation: normal  Pulse: present    Comments:  Strength limited by pain but appears to be intact                  X-ray images Ordered and independently reviewed by me in the office today with the patient. X-ray shows:     Recent Results (from the past 744 hour(s))   XR Shoulder Right G/E 3 Views    Narrative    XR SHOULDER RT G/E 4 VW 3/9/2020 1:28 PM     HISTORY: Right shoulder pain, unspecified chronicity      Impression    IMPRESSION: Negative exam.    ERICKA OATES MD     ASSESSMENT/PLAN    ICD-10-CM    1. AC separation, right, initial encounter  S43.101A diclofenac (VOLTAREN) 75 MG EC tablet   2. Right shoulder pain,  unspecified chronicity  M25.511 XR Shoulder Right G/E 3 Views     diclofenac (VOLTAREN) 75 MG EC tablet     Symptoms primarily localizing about the shoulder, and neck exam was negative for provocation of symptoms.  Distal upper extremity tingling and numbness not verified on exam.  Will monitor during follow-up.  Will switch to diclofenac instead of ibuprofen, encouraged icing 15 minutes a few times daily, and use of sling as desired.  Educated on pendulum range of motion exercises.  Recheck in 2 to 4 weeks pending progress.      Again, thank you for allowing me to participate in the care of your patient.        Sincerely,        Hermilo Washburn MD

## 2020-11-06 ENCOUNTER — OFFICE VISIT (OUTPATIENT)
Dept: INTERNAL MEDICINE | Facility: CLINIC | Age: 40
End: 2020-11-06
Payer: COMMERCIAL

## 2020-11-06 VITALS
DIASTOLIC BLOOD PRESSURE: 80 MMHG | SYSTOLIC BLOOD PRESSURE: 132 MMHG | BODY MASS INDEX: 42.61 KG/M2 | HEART RATE: 75 BPM | RESPIRATION RATE: 16 BRPM | OXYGEN SATURATION: 100 % | TEMPERATURE: 98.6 F | WEIGHT: 264 LBS

## 2020-11-06 DIAGNOSIS — E66.01 MORBID OBESITY (H): ICD-10-CM

## 2020-11-06 DIAGNOSIS — M25.511 ACUTE PAIN OF RIGHT SHOULDER: Primary | ICD-10-CM

## 2020-11-06 PROCEDURE — 99214 OFFICE O/P EST MOD 30 MIN: CPT | Performed by: INTERNAL MEDICINE

## 2020-11-06 RX ORDER — RIZATRIPTAN BENZOATE 10 MG/1
TABLET ORAL
COMMUNITY
Start: 2020-07-02 | End: 2023-02-17

## 2020-11-06 RX ORDER — DICLOFENAC SODIUM 75 MG/1
75 TABLET, DELAYED RELEASE ORAL 2 TIMES DAILY PRN
Qty: 60 TABLET | Refills: 0 | Status: SHIPPED | OUTPATIENT
Start: 2020-11-06 | End: 2020-12-17

## 2020-11-06 NOTE — PROGRESS NOTES
Subjective     Ninoska Cottrell is a 40 year old female who presents to clinic today for the following health issues:    HPI         Musculoskeletal problem/pain  Onset/Duration: On and off in October worse in 3 weeks  Description  Location: Shoulder - right  Joint Swelling: no  Redness: no  Pain: YES  Warmth: no  Intensity:  moderate  Progression of Symptoms:  worsening and constant  Accompanying signs and symptoms:   Fevers: no  Numbness/tingling/weakness: YES- in arm and hand  History  Trauma to the area: Injury in February  Recent illness:  no  Previous similar problem: YES- Rotator Injury in Feb.2020  Previous evaluation:  YES-03/09/2020  Precipitating or alleviating factors:  Aggravating factors include: Lifting arm   Therapies tried and outcome: rest/inactivity, heat, ice and immobilization    Saw sports medicine in mid-March 2020 for what was called a  AC joint. Wanted to start PT but then COVID hit and everything shut down. She has been managing the pain off and on since then. Getting worse again. Aggravated when she picks up her daughter. Some intermittent tingling down her R arm. Diclofenac helped. Would like to refill that and stop the ibuprofen and Aleve she's been using.    Review of Systems   Constitutional, HEENT, cardiovascular, pulmonary, gi and gu systems are negative, except as otherwise noted.      Objective    /80   Pulse 75   Temp 98.6  F (37  C) (Temporal)   Resp 16   Wt 119.7 kg (264 lb)   SpO2 100%   BMI 42.61 kg/m    Body mass index is 42.61 kg/m .  Physical Exam   GENERAL: alert and in no distress.  EYES: conjunctivae/corneas clear. EOMs grossly intact  HENT: NC/AT, facies symmetric. Neck supple. No cervical LAD appreciated.  RESP: CTAB, no w/r/r.  CV: RRR, no m/r/g.  GI: NT, ND, no organomegaly, without rebound tenderness or guarding  MSK: No edema. No cyanosis or clubbing noted bilaterally in upper and/or lower extremities. L shoulder WNL. R shoulder with passive and  active ROM limited due to pain though no exam findings to suggest adhesive capsulitis. Empty can positive. Back scratch positive. Strength 5/5 in all fields.  SKIN: No significant ulcers, lesions, or rashes on the visualized portions of the skin  NEURO: Alert. Oriented. Sensation grossly WNL.    No results found for this or any previous visit (from the past 24 hour(s)).        Assessment & Plan     Acute pain of right shoulder  Ongoing rotator cuff issue. Has not been able to do PT due to COVID. Past XR WNL so will not repeat. Discussed taking only one NSAID and she'd like to use diclofenac. PT re-referral placed. If she fails PT, then will send to sports med.  - PHYSICAL THERAPY REFERRAL; Future  - diclofenac (VOLTAREN) 75 MG EC tablet; Take 1 tablet (75 mg) by mouth 2 times daily as needed for moderate pain    Morbid obesity (H)  Known issue that I take into account for their medical decisions. Encouraged her to work on weight loss. Has not gained during COVID times but has been unable to lose as well.    Return in about 6 months (around 5/6/2021) for Physical Exam.    Bob Frances MD  [facility]

## 2020-11-06 NOTE — PATIENT INSTRUCTIONS
Patient Education     The Shoulder Joint    The shoulder is made up of bones, muscles, ligaments, and tendons. They work together so you can reach, swing, and lift in comfort. Learning about the parts of the shoulder joint will help you to understand your shoulder problem.   The parts of the joint  The shoulder joint is where the humerus (upper arm bone) meets the scapula (shoulder blade):     Muscles and ligaments help make up the joint. They attach to the shoulder blade and upper arm bone.    At the top of the shoulder blade are two bony knobs called the acromion and coracoid process.    The subacromial space is between the top of the humerus and the acromion. This space is filled with tendons, muscles, and the subacromial bursa.    The bursa is a sac of fluid that cushions shoulder parts as they move.  The supraspinatus muscle and tendon are located in the subacromial space. They help form the rotator cuff and are commonly injured in a rotator cuff tear.   [employer] last reviewed this educational content on 5/1/2018 2000-2020 [employer]. [address] [place] [address], [place] [address]. All rights reserved. This information is not intended as a substitute for professional medical care. Always follow your healthcare professional's instructions.

## 2020-11-16 ENCOUNTER — THERAPY VISIT (OUTPATIENT)
Dept: PHYSICAL THERAPY | Facility: CLINIC | Age: 40
End: 2020-11-16
Attending: INTERNAL MEDICINE
Payer: COMMERCIAL

## 2020-11-16 DIAGNOSIS — M25.511 ACUTE PAIN OF RIGHT SHOULDER: ICD-10-CM

## 2020-11-16 PROCEDURE — 97161 PT EVAL LOW COMPLEX 20 MIN: CPT | Mod: GP

## 2020-11-16 PROCEDURE — 97110 THERAPEUTIC EXERCISES: CPT | Mod: GP

## 2020-11-16 PROCEDURE — 97112 NEUROMUSCULAR REEDUCATION: CPT | Mod: GP

## 2020-11-16 NOTE — PROGRESS NOTES
[facility] Initial Evaluation  Subjective:    Patient Health History  Ninoska Cottrell being seen for Right shoulder pain.     Problem began: 2/23/2020.   Problem occurred: Slipped on ice   Pain is reported as 4/10 on pain scale.  General health as reported by patient is good.  Pertinent medical history includes: anemia, asthma, depression, fibromyalgia, migraines/headaches and weakness.     Medical allergies: latex.   Surgeries include:  Other. Other surgery history details: Appendix removed.    Current medications:  Anti-inflammatory and muscle relaxants.    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   Primary job tasks include:  Computer work, lifting/carrying, prolonged sitting, pushing/pulling, repetitive tasks and other.                  Therapist Generated HPI Evaluation  Problem details: R shoulder: onset: Feb 2020 from slipping and falling; CC: dull pain top and back of shoulder; sometimes goes down the arm; difficulty brushing hair or drinking from cup; sometimes gets pain into wrist after prolonged holding (wonders if it's the shoulder down or wrist up); Since Feb, it got better for a little bit over the summer but has now gotten worse again.  Busy with three kids at home (8, 7, 3).  Sleeps on R side and that's painful.  .         Type of problem:  Right shoulder.    This is a new condition.  Condition occurred with:  A fall.  Where condition occurred: at home.  Patient reports pain:  In the joint.  Pain is described as aching and sharp (dull at times but can be sharp and throbbing; ) and is intermittent.  Pain is the same all the time.  Progression since onset: was better but now worse again.  Associated symptoms:  Loss of strength and loss of motion/stiffness. Symptoms are exacerbated by lying on extremity, certain positions, carrying, lifting and using arm at shoulder level  and relieved by rest.                              Objective:        Flexibility/Screens:   Negative screens:  Cervical                              Shoulder Evaluation:  ROM:  AROM:    Flexion:  Right:  110 (pain)    Abduction:  Right:  110 (pain)    Internal Rotation:  Right:  35  External Rotation:  Right:  95                      Strength:  : R RTC grossly 4+/5 and painful with MMT.                        Special Tests:      Right shoulder positive for the following special tests:Impingement  Right shoulder negative for the following special tests:Rotator cuff tear  Palpation:      Right shoulder tenderness present at: Acrimioclavicular; Supraspinatus and Infraspinatus       ROM:          :  Dominance: Right   Left: 32      Right: 30                                      General     ROS    Assessment/Plan:    Patient is a 40 year old female with right side shoulder complaints.    Patient has the following significant findings with corresponding treatment plan.                Diagnosis 1:  R shoulder pain; s&sx consistent with impingement;   Pain -  self management, education and home program  Decreased ROM/flexibility - manual therapy and therapeutic exercise  Impaired muscle performance - neuro re-education  Decreased function - therapeutic activities    Therapy Evaluation Codes:   Previous and current functional limitations:  (See Goal Flow Sheet for this information)    Short term and Long term goals: (See Goal Flow Sheet for this information)     Communication ability:  Patient appears to be able to clearly communicate and understand verbal and written communication and follow directions correctly.  Treatment Explanation - The following has been discussed with the patient:   RX ordered/plan of care  Anticipated outcomes  Possible risks and side effects  This patient would benefit from PT intervention to resume normal activities.   Rehab potential is good.    Frequency:  1 X week, once daily  Duration:  for 8 weeks  Discharge Plan:  Achieve all LTG.  Independent in home treatment program.  Reach maximal  therapeutic benefit.    Please refer to the daily flowsheet for treatment today, total treatment time and time spent performing 1:1 timed codes.

## 2020-11-30 ENCOUNTER — THERAPY VISIT (OUTPATIENT)
Dept: PHYSICAL THERAPY | Facility: CLINIC | Age: 40
End: 2020-11-30
Payer: COMMERCIAL

## 2020-11-30 DIAGNOSIS — M25.511 ACUTE PAIN OF RIGHT SHOULDER: ICD-10-CM

## 2020-11-30 PROCEDURE — 97112 NEUROMUSCULAR REEDUCATION: CPT | Mod: GP

## 2020-11-30 PROCEDURE — 97110 THERAPEUTIC EXERCISES: CPT | Mod: GP

## 2020-12-06 ENCOUNTER — HEALTH MAINTENANCE LETTER (OUTPATIENT)
Age: 40
End: 2020-12-06

## 2020-12-17 DIAGNOSIS — M25.511 ACUTE PAIN OF RIGHT SHOULDER: ICD-10-CM

## 2020-12-17 NOTE — TELEPHONE ENCOUNTER
Diclofenac    Routing refill request to provider for review/approval because:  Labs not current:  Hepatic panel, cbc    Labs out of range: Joey HEWITTN, RN, PHN

## 2021-03-26 NOTE — PROGRESS NOTES
[facility] Obstetrics Antepartum Progress Note          Assessment and Plan:    Assessment:   Vaginal bleeding x 2 , 3rd trimester, 33w4d   Increasing BP         Stable, no bleeding      Plan:   Monitor           Interval History:   Stable          Significant Problems:    None          Review of Systems:    The patient denies any chest pain, shortness of breath, excessive pain, fever, chills, purulent drainage from the wound, nausea or vomiting.          Medications:     Current Facility-Administered Medications   Medication     calcium carbonate (TUMS) chewable tablet 500 mg     acetaminophen (TYLENOL) tablet 975 mg     senna-docusate (SENOKOT-S;PERICOLACE) 8.6-50 MG per tablet 1 tablet     lidocaine 1 % 1 mL     lidocaine (LMX4) kit     sodium chloride (PF) 0.9% PF flush 3 mL     sodium chloride (PF) 0.9% PF flush 3 mL     NO Rho (D) immune globulin (RhoGam) needed - mother Rh POSITIVE             Physical Exam:     All vitals stable  Patient Vitals for the past 12 hrs:   BP Temp Temp src Resp   08/06/17 1121 136/85 98.1  F (36.7  C) Oral 20   08/06/17 0728 138/76 98.9  F (37.2  C) Oral 20     Abdomen: BS active. Soft, non-tender, Gravid.       Data:     All laboratory data related to this surgery reviewed  Hemoglobin   Date Value Ref Range Status   08/05/2017 10.7 (L) 11.7 - 15.7 g/dL Final   07/31/2017 10.9 (L) 11.7 - 15.7 g/dL Final   07/30/2017 11.5 (L) 11.7 - 15.7 g/dL Final   07/30/2017 11.6 (L) 11.7 - 15.7 g/dL Final   06/06/2017 10.6 (L) 11.7 - 15.7 g/dL Final     No imaging studies have been ordered    Silvestre Albright MD, MD      PROBLEM DIAGNOSES  Problem: Pilonidal cyst  Assessment and Plan: Excision of Pilonidal cyst

## 2021-06-04 ENCOUNTER — OFFICE VISIT (OUTPATIENT)
Dept: INTERNAL MEDICINE | Facility: CLINIC | Age: 41
End: 2021-06-04
Payer: COMMERCIAL

## 2021-06-04 ENCOUNTER — ANCILLARY PROCEDURE (OUTPATIENT)
Dept: GENERAL RADIOLOGY | Facility: CLINIC | Age: 41
End: 2021-06-04
Attending: PHYSICIAN ASSISTANT
Payer: COMMERCIAL

## 2021-06-04 VITALS
WEIGHT: 264.4 LBS | OXYGEN SATURATION: 97 % | BODY MASS INDEX: 42.68 KG/M2 | HEART RATE: 77 BPM | DIASTOLIC BLOOD PRESSURE: 80 MMHG | RESPIRATION RATE: 16 BRPM | SYSTOLIC BLOOD PRESSURE: 124 MMHG

## 2021-06-04 DIAGNOSIS — S99.921A FOOT INJURY, RIGHT, INITIAL ENCOUNTER: Primary | ICD-10-CM

## 2021-06-04 DIAGNOSIS — S99.921A FOOT INJURY, RIGHT, INITIAL ENCOUNTER: ICD-10-CM

## 2021-06-04 PROCEDURE — 73630 X-RAY EXAM OF FOOT: CPT | Mod: RT | Performed by: RADIOLOGY

## 2021-06-04 PROCEDURE — 99213 OFFICE O/P EST LOW 20 MIN: CPT | Performed by: PHYSICIAN ASSISTANT

## 2021-06-04 NOTE — PROGRESS NOTES
Assessment & Plan     Foot injury, right, initial encounter    - XR Foot Right G/E 3 Views; Future             Good foot wear,  Icing and time r      Return in about 2 weeks (around 6/18/2021) for recheck if not improving, regular primary provider.    RENAN Oneal [facility] SARBJIT Xiao is a 41 year old who presents for the following health issues     HPI     Concern - Right foot injury  Onset: 2 weeks ago   Description: Injured about 2 weeks ago and has iced and rested it and it is not any better  Intensity: mild  Progression of Symptoms:  same  Accompanying Signs & Symptoms: Pain is more towards big and pinky toe.   Patient with a fall and twisted the foot and ankle. Her ankle feels better but still having foot pain.   Previous history of similar problem:   Precipitating factors:        Worsened by:   Alleviating factors:        Improved by:   Therapies tried and outcome: Ice and rest but still hurting        Review of Systems   Constitutional, HEENT, cardiovascular, pulmonary, gi and gu systems are negative, except as otherwise noted.      Objective    /80   Pulse 77   Resp 16   Wt 119.9 kg (264 lb 6.4 oz)   SpO2 97%   BMI 42.68 kg/m    Body mass index is 42.68 kg/m .  Physical Exam   GENERAL: alert, no distress and obese  RESP: lungs clear to auscultation - no rales, rhonchi or wheezes  CV: regular rates and rhythm and normal S1 S2, no S3 or S4  MS: right foot, some pain at the metatarsal heads of the foot.   No swelling or bruising noted.   SKIN: no suspicious lesions or rashes    Xray - Reviewed and interpreted by me.  No fracture on prelim read

## 2021-06-10 ENCOUNTER — OFFICE VISIT (OUTPATIENT)
Dept: INTERNAL MEDICINE | Facility: CLINIC | Age: 41
End: 2021-06-10
Payer: COMMERCIAL

## 2021-06-10 VITALS
HEIGHT: 66 IN | OXYGEN SATURATION: 100 % | BODY MASS INDEX: 42.27 KG/M2 | SYSTOLIC BLOOD PRESSURE: 122 MMHG | TEMPERATURE: 98.5 F | WEIGHT: 263 LBS | DIASTOLIC BLOOD PRESSURE: 72 MMHG | HEART RATE: 94 BPM

## 2021-06-10 DIAGNOSIS — Z12.4 SCREENING FOR CERVICAL CANCER: ICD-10-CM

## 2021-06-10 DIAGNOSIS — R41.840 DIFFICULTY CONCENTRATING: ICD-10-CM

## 2021-06-10 DIAGNOSIS — F33.41 RECURRENT MAJOR DEPRESSIVE DISORDER, IN PARTIAL REMISSION (H): ICD-10-CM

## 2021-06-10 DIAGNOSIS — Z00.00 ROUTINE HISTORY AND PHYSICAL EXAMINATION OF ADULT: Primary | ICD-10-CM

## 2021-06-10 DIAGNOSIS — Z91.09 ENVIRONMENTAL ALLERGIES: ICD-10-CM

## 2021-06-10 DIAGNOSIS — E66.01 MORBID OBESITY (H): ICD-10-CM

## 2021-06-10 DIAGNOSIS — Z13.1 SCREENING FOR DIABETES MELLITUS: ICD-10-CM

## 2021-06-10 DIAGNOSIS — Z12.31 ENCOUNTER FOR SCREENING MAMMOGRAM FOR BREAST CANCER: ICD-10-CM

## 2021-06-10 DIAGNOSIS — F41.1 GAD (GENERALIZED ANXIETY DISORDER): ICD-10-CM

## 2021-06-10 DIAGNOSIS — Z13.220 SCREENING FOR CHOLESTEROL LEVEL: ICD-10-CM

## 2021-06-10 PROBLEM — M25.511 ACUTE PAIN OF RIGHT SHOULDER: Status: RESOLVED | Noted: 2020-11-16 | Resolved: 2021-06-10

## 2021-06-10 PROCEDURE — 87624 HPV HI-RISK TYP POOLED RSLT: CPT | Performed by: INTERNAL MEDICINE

## 2021-06-10 PROCEDURE — G0145 SCR C/V CYTO,THINLAYER,RESCR: HCPCS | Performed by: INTERNAL MEDICINE

## 2021-06-10 PROCEDURE — 99396 PREV VISIT EST AGE 40-64: CPT | Performed by: INTERNAL MEDICINE

## 2021-06-10 SDOH — ECONOMIC STABILITY: TRANSPORTATION INSECURITY
IN THE PAST 12 MONTHS, HAS LACK OF TRANSPORTATION KEPT YOU FROM MEETINGS, WORK, OR FROM GETTING THINGS NEEDED FOR DAILY LIVING?: NOT ASKED

## 2021-06-10 SDOH — ECONOMIC STABILITY: FOOD INSECURITY: WITHIN THE PAST 12 MONTHS, THE FOOD YOU BOUGHT JUST DIDN'T LAST AND YOU DIDN'T HAVE MONEY TO GET MORE.: NOT ASKED

## 2021-06-10 SDOH — ECONOMIC STABILITY: TRANSPORTATION INSECURITY
IN THE PAST 12 MONTHS, HAS THE LACK OF TRANSPORTATION KEPT YOU FROM MEDICAL APPOINTMENTS OR FROM GETTING MEDICATIONS?: NOT ASKED

## 2021-06-10 SDOH — ECONOMIC STABILITY: FOOD INSECURITY: WITHIN THE PAST 12 MONTHS, YOU WORRIED THAT YOUR FOOD WOULD RUN OUT BEFORE YOU GOT MONEY TO BUY MORE.: NOT ASKED

## 2021-06-10 SDOH — ECONOMIC STABILITY: INCOME INSECURITY: HOW HARD IS IT FOR YOU TO PAY FOR THE VERY BASICS LIKE FOOD, HOUSING, MEDICAL CARE, AND HEATING?: NOT ASKED

## 2021-06-10 SDOH — HEALTH STABILITY: MENTAL HEALTH: HOW OFTEN DO YOU HAVE A DRINK CONTAINING ALCOHOL?: 2-4 TIMES A MONTH

## 2021-06-10 SDOH — HEALTH STABILITY: MENTAL HEALTH: HOW OFTEN DO YOU HAVE 6 OR MORE DRINKS ON ONE OCCASION?: NEVER

## 2021-06-10 SDOH — HEALTH STABILITY: MENTAL HEALTH: HOW MANY STANDARD DRINKS CONTAINING ALCOHOL DO YOU HAVE ON A TYPICAL DAY?: 1 OR 2

## 2021-06-10 ASSESSMENT — ASTHMA QUESTIONNAIRES
QUESTION_3 LAST FOUR WEEKS HOW OFTEN DID YOUR ASTHMA SYMPTOMS (WHEEZING, COUGHING, SHORTNESS OF BREATH, CHEST TIGHTNESS OR PAIN) WAKE YOU UP AT NIGHT OR EARLIER THAN USUAL IN THE MORNING: ONCE OR TWICE
QUESTION_5 LAST FOUR WEEKS HOW WOULD YOU RATE YOUR ASTHMA CONTROL: SOMEWHAT CONTROLLED
QUESTION_4 LAST FOUR WEEKS HOW OFTEN HAVE YOU USED YOUR RESCUE INHALER OR NEBULIZER MEDICATION (SUCH AS ALBUTEROL): NOT AT ALL
ACT_TOTALSCORE: 20
QUESTION_1 LAST FOUR WEEKS HOW MUCH OF THE TIME DID YOUR ASTHMA KEEP YOU FROM GETTING AS MUCH DONE AT WORK, SCHOOL OR AT HOME: NONE OF THE TIME
QUESTION_2 LAST FOUR WEEKS HOW OFTEN HAVE YOU HAD SHORTNESS OF BREATH: THREE TO SIX TIMES A WEEK

## 2021-06-10 ASSESSMENT — PATIENT HEALTH QUESTIONNAIRE - PHQ9: SUM OF ALL RESPONSES TO PHQ QUESTIONS 1-9: 15

## 2021-06-10 ASSESSMENT — MIFFLIN-ST. JEOR: SCORE: 1874.71

## 2021-06-11 ASSESSMENT — ASTHMA QUESTIONNAIRES: ACT_TOTALSCORE: 20

## 2021-06-11 NOTE — PROGRESS NOTES
ASSESSMENT/PLAN                                                       (Z00.00) Routine history and physical examination of adult  (primary encounter diagnosis)  Comment: PMH, PSH, FH, SH, medications, allergies, immunizations, and preventative health measures reviewed and updated as appropriate.  Plan: see below for plans.      (Z12.4) Screening for cervical cancer  Plan: pap obtained today.    (Z12.31) Encounter for screening mammogram for breast cancer  Plan: screening mammogram ordered - patient to schedule.     (Z13.220) Screening for cholesterol level  (Z13.1) Screening for diabetes mellitus  Plan: fasting labs ordered - patient to schedule.     (E66.01) Morbid obesity (H)  Comment: known issue that I take into account for their medical decisions, no current exacerbations or new concerns.    (R41.840) Difficulty concentrating  Comment: did not evaluate.  Plan: referred for ADHD evaluation - patient will be contacted to schedule.      (Z91.09) Environmental allergies  Comment: did not evaluate.  Plan: referred for allergy evaluation - patient to schedule.     (F33.41) Recurrent major depressive disorder, in partial remission (H)  (F41.1) AMY (generalized anxiety disorder)  Comment: reasonably well-controlled without medication.     Jaci Sharma MD   [facility]  [address] [address]  T: [phone], F: [phone]    YEHUDA Cottrell is a very pleasant 41 year old female who presents for a physical.    Patient is interested in a referral for ADHD evaluation.    Patient is also interested in getting allergy testing.     ROS:  Constitutional: no unintentional weight loss or gain reported; no fevers, chills, or sweats reported  Cardiovascular: no chest pain, palpitations, or edema reported  Respiratory: no cough, wheezing, shortness of breath, or dyspnea on exertion reported  Gastrointestinal: no nausea, vomiting,  constipation, diarrhea, or abdominal pain reported  Genitourinary: no urinary frequency, urgency, dysuria, or hematuria reported  Integumentary: no rash or pruritus reported  Musculoskeletal: no back pain, muscle pain, joint pain, or joint swelling reported  Neurologic: no focal weakness, numbness, or tingling reported  Hematologic: no easy bruising or bleeding reported  Endocrine: no heat or cold intolerance reported; no polyuria or polydipsia reported  Psychiatric: see PMH below    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      History of pre-eclampsia      MDD (major depressive disorder)      Morbid obesity (H)      Past Surgical History:   Procedure Laterality Date     APPENDECTOMY       OPEN REDUCTION INTERNAL FIXATION ANKLE Right     hardware still in place     Family History   Problem Relation Age of Onset     Alzheimer Disease Paternal Grandmother      Hemophilia Paternal Grandmother      Myocardial Infarction Paternal Grandfather      Hemophilia Father      Hypertension Maternal Grandmother      Pancreatic Cancer Maternal Grandmother      Cerebrovascular Disease Maternal Grandfather      Breast Cancer Maternal Aunt      Bone Cancer Maternal Aunt      Diabetes No family hx of      Coronary Artery Disease Early Onset No family hx of      Colon Cancer No family hx of      Ovarian Cancer No family hx of      Social History     Occupational History     Occupation: Stay-at-home mom   Tobacco Use     Smoking status: Former Smoker     Packs/day: 0.50     Years: 3.00     Pack years: 1.50     Quit date: 2000     Years since quittin.4     Smokeless tobacco: Never Used   Substance and Sexual Activity     Alcohol use: Yes     Frequency: 2-4 times a month     Drinks per session: 1 or 2     Binge frequency: Never     Drug use: No     Sexual activity: Yes     Partners: Male     Birth control/protection: I.U.D.     Comment: Mirena placed in 2017      Comment: 1 time a week for 2 hours   Social History Narrative    .    3 kids (9, 8, and 4 as of 2021).    Walks daily; no formal exercise.      Allergies   Allergen Reactions     Codeine Hives     Eggs [Chicken-Derived Products (Egg)] Other (See Comments)     Flu-like symptoms     Latex Rash     Current Outpatient Medications   Medication Sig     albuterol (PROVENTIL HFA) 108 (90 Base) MCG/ACT inhaler Inhale 2 puffs into the lungs every 6 hours     cetirizine HCl (ZYRTEC ALLERGY) 10 MG CAPS      Cholecalciferol (VITAMIN D PO) Take 2,000 Units by mouth daily     diclofenac (VOLTAREN) 50 MG EC tablet Take 1 tablet (50 mg) by mouth 2 times daily as needed for moderate pain     levonorgestrel (MIRENA, 52 MG,) 20 MCG/24HR IUD 1 each (20 mcg) by Intrauterine route once for 1 dose Lot #: [id number]    NDC#: 71787-731-01     meclizine (ANTIVERT) 25 MG tablet Take 1 tablet (25 mg) by mouth 3 times daily as needed for dizziness     multivitamin w/minerals (MULTI-VITAMIN) tablet Take 1 tablet by mouth daily     Omega-3 Fatty Acids (FISH OIL PO) Take 1 capsule by mouth 2 times daily     Prenatal Vit-Fe Fumarate-FA (PRENATAL MULTIVITAMIN  PLUS IRON) 27-0.8 MG TABS per tablet Take 1 tablet by mouth     rizatriptan (MAXALT) 10 MG tablet TAKE 1 TAB BY MOUTH AT ONSET. MAY REPEAT 2 HRS LATER. MAX 2 TABS/24 HRS.     vitamin B complex with vitamin C (VITAMIN  B COMPLEX) TABS tablet Take 1 tablet by mouth daily     Immunization History   Administered Date(s) Administered     COVID-19,PF,Pfizer 04/24/2021, 05/15/2021     Influenza Vaccine IM > 6 months Valent IIV4 02/24/2017     MMR 09/03/2017     TDAP Vaccine (Adacel) 05/29/2013, 07/18/2017     TDAP Vaccine (Boostrix) 02/01/2010     PREVENTATIVE HEALTH                                                      BMI: morbidly obese  Blood pressure: within normal limits   Breast CA screening: DUE  Cervical CA screening: DUE  Colon CA screening: not medically indicated at this time   Lung CA  "screening: patient does not meet screening criteria  Dexa: not medically indicated at this time   Screening cholesterol: DUE  Screening diabetes: DUE  STD testing: no risk factors present  Alcohol misuse screening: alcohol use reviewed - no intervention indicated at this time  Immunizations: reviewed; up to date     OBJECTIVE                                                      /72   Pulse 94   Temp 98.5  F (36.9  C) (Oral)   Ht 1.676 m (5' 6\")   Wt 119.3 kg (263 lb)   SpO2 100%   BMI 42.45 kg/m    Constitutional: well-appearing  Head, Ears, and Eyes: normocephalic; normal external auditory canal and pinna; tympanic membranes visualized and normal; normal lids and conjunctivae  Neck: supple, symmetric, no thyromegaly or lymphadenopathy  Respiratory: normal respiratory effort; clear to auscultation bilaterally  Cardiovascular: regular rate and rhythm; no edema  Gastrointestinal: soft, non-tender, and non-distended; no organomegaly or masses  Genitourinary: external genitalia, urethral meatus, and vagina normal; cervix visualized and normal in appearance  Musculoskeletal: normal gait and station  Psych: normal judgment and insight; normal mood and affect; recent and remote memory intact    ---  (Note was completed, in part, with [employer] voice-recognition software. Documentation was reviewed, but some grammatical, spelling, and word errors may remain.)    "

## 2021-06-16 LAB
COPATH REPORT: NORMAL
PAP: NORMAL

## 2021-06-17 ENCOUNTER — ANCILLARY PROCEDURE (OUTPATIENT)
Dept: MAMMOGRAPHY | Facility: CLINIC | Age: 41
End: 2021-06-17
Attending: INTERNAL MEDICINE
Payer: COMMERCIAL

## 2021-06-17 DIAGNOSIS — Z13.1 SCREENING FOR DIABETES MELLITUS: ICD-10-CM

## 2021-06-17 DIAGNOSIS — Z12.31 VISIT FOR SCREENING MAMMOGRAM: ICD-10-CM

## 2021-06-17 DIAGNOSIS — Z13.220 SCREENING FOR CHOLESTEROL LEVEL: ICD-10-CM

## 2021-06-17 DIAGNOSIS — Z12.31 ENCOUNTER FOR SCREENING MAMMOGRAM FOR BREAST CANCER: ICD-10-CM

## 2021-06-17 LAB
ALBUMIN SERPL-MCNC: 3.5 G/DL (ref 3.4–5)
ALP SERPL-CCNC: 112 U/L (ref 40–150)
ALT SERPL W P-5'-P-CCNC: 23 U/L (ref 0–50)
ANION GAP SERPL CALCULATED.3IONS-SCNC: 2 MMOL/L (ref 3–14)
AST SERPL W P-5'-P-CCNC: 19 U/L (ref 0–45)
BILIRUB SERPL-MCNC: 0.6 MG/DL (ref 0.2–1.3)
BUN SERPL-MCNC: 10 MG/DL (ref 7–30)
CALCIUM SERPL-MCNC: 9.1 MG/DL (ref 8.5–10.1)
CHLORIDE SERPL-SCNC: 105 MMOL/L (ref 94–109)
CHOLEST SERPL-MCNC: 204 MG/DL
CO2 SERPL-SCNC: 31 MMOL/L (ref 20–32)
CREAT SERPL-MCNC: 0.88 MG/DL (ref 0.52–1.04)
FINAL DIAGNOSIS: NORMAL
GFR SERPL CREATININE-BSD FRML MDRD: 82 ML/MIN/{1.73_M2}
GLUCOSE SERPL-MCNC: 97 MG/DL (ref 70–99)
HDLC SERPL-MCNC: 38 MG/DL
HPV HR 12 DNA CVX QL NAA+PROBE: NEGATIVE
HPV16 DNA SPEC QL NAA+PROBE: NEGATIVE
HPV18 DNA SPEC QL NAA+PROBE: NEGATIVE
LDLC SERPL CALC-MCNC: 141 MG/DL
NONHDLC SERPL-MCNC: 166 MG/DL
POTASSIUM SERPL-SCNC: 4.2 MMOL/L (ref 3.4–5.3)
PROT SERPL-MCNC: 7.3 G/DL (ref 6.8–8.8)
SODIUM SERPL-SCNC: 138 MMOL/L (ref 133–144)
SPECIMEN DESCRIPTION: NORMAL
SPECIMEN SOURCE CVX/VAG CYTO: NORMAL
TRIGL SERPL-MCNC: 124 MG/DL

## 2021-06-17 PROCEDURE — 80061 LIPID PANEL: CPT | Performed by: INTERNAL MEDICINE

## 2021-06-17 PROCEDURE — 77067 SCR MAMMO BI INCL CAD: CPT | Mod: TC | Performed by: RADIOLOGY

## 2021-06-17 PROCEDURE — 80053 COMPREHEN METABOLIC PANEL: CPT | Performed by: INTERNAL MEDICINE

## 2021-06-17 PROCEDURE — 36415 COLL VENOUS BLD VENIPUNCTURE: CPT | Performed by: INTERNAL MEDICINE

## 2021-06-17 PROCEDURE — 77063 BREAST TOMOSYNTHESIS BI: CPT | Mod: TC | Performed by: RADIOLOGY

## 2021-09-25 ENCOUNTER — HEALTH MAINTENANCE LETTER (OUTPATIENT)
Age: 41
End: 2021-09-25

## 2021-10-20 ENCOUNTER — VIRTUAL VISIT (OUTPATIENT)
Dept: PSYCHOLOGY | Facility: CLINIC | Age: 41
End: 2021-10-20
Attending: INTERNAL MEDICINE
Payer: COMMERCIAL

## 2021-10-20 DIAGNOSIS — F90.0 ADHD (ATTENTION DEFICIT HYPERACTIVITY DISORDER), INATTENTIVE TYPE: Primary | ICD-10-CM

## 2021-10-20 PROCEDURE — 90834 PSYTX W PT 45 MINUTES: CPT | Mod: GT | Performed by: PSYCHOLOGIST

## 2021-10-20 ASSESSMENT — ANXIETY QUESTIONNAIRES
2. NOT BEING ABLE TO STOP OR CONTROL WORRYING: NOT AT ALL
1. FEELING NERVOUS, ANXIOUS, OR ON EDGE: SEVERAL DAYS
5. BEING SO RESTLESS THAT IT IS HARD TO SIT STILL: SEVERAL DAYS
7. FEELING AFRAID AS IF SOMETHING AWFUL MIGHT HAPPEN: MORE THAN HALF THE DAYS
GAD7 TOTAL SCORE: 11
6. BECOMING EASILY ANNOYED OR IRRITABLE: NEARLY EVERY DAY
8. IF YOU CHECKED OFF ANY PROBLEMS, HOW DIFFICULT HAVE THESE MADE IT FOR YOU TO DO YOUR WORK, TAKE CARE OF THINGS AT HOME, OR GET ALONG WITH OTHER PEOPLE?: SOMEWHAT DIFFICULT
7. FEELING AFRAID AS IF SOMETHING AWFUL MIGHT HAPPEN: MORE THAN HALF THE DAYS
3. WORRYING TOO MUCH ABOUT DIFFERENT THINGS: SEVERAL DAYS
GAD7 TOTAL SCORE: 11
4. TROUBLE RELAXING: NEARLY EVERY DAY
GAD7 TOTAL SCORE: 11

## 2021-10-20 ASSESSMENT — PATIENT HEALTH QUESTIONNAIRE - PHQ9
SUM OF ALL RESPONSES TO PHQ QUESTIONS 1-9: 15
SUM OF ALL RESPONSES TO PHQ QUESTIONS 1-9: 15
10. IF YOU CHECKED OFF ANY PROBLEMS, HOW DIFFICULT HAVE THESE PROBLEMS MADE IT FOR YOU TO DO YOUR WORK, TAKE CARE OF THINGS AT HOME, OR GET ALONG WITH OTHER PEOPLE: VERY DIFFICULT

## 2021-10-21 ASSESSMENT — PATIENT HEALTH QUESTIONNAIRE - PHQ9: SUM OF ALL RESPONSES TO PHQ QUESTIONS 1-9: 15

## 2021-10-21 ASSESSMENT — ANXIETY QUESTIONNAIRES: GAD7 TOTAL SCORE: 11

## 2021-10-27 ENCOUNTER — FCC EXTENDED DOCUMENTATION (OUTPATIENT)
Dept: PSYCHOLOGY | Facility: CLINIC | Age: 41
End: 2021-10-27
Payer: COMMERCIAL

## 2021-10-27 ENCOUNTER — VIRTUAL VISIT (OUTPATIENT)
Dept: PSYCHOLOGY | Facility: CLINIC | Age: 41
End: 2021-10-27
Attending: INTERNAL MEDICINE
Payer: COMMERCIAL

## 2021-10-27 DIAGNOSIS — F90.0 ADHD (ATTENTION DEFICIT HYPERACTIVITY DISORDER), INATTENTIVE TYPE: Primary | ICD-10-CM

## 2021-10-27 PROCEDURE — 90791 PSYCH DIAGNOSTIC EVALUATION: CPT | Mod: GT | Performed by: PSYCHOLOGIST

## 2021-10-27 NOTE — PROGRESS NOTES
[facility]  Matthias Guzman, PhD, LP.  [place], [place]    Disclaimer: Voice recognition software was used to generate this note. As a result, wrong word or 'sound-a-like' substitutions may have occurred due to the inherent limitations of voice recognition software. There may be errors in the script that have gone undetected. Please consider this when interpreting information found in this chart.       PATIENT'S NAME: Ninoska Cottrell  PREFERRED NAME:   PRONOUNS:       MRN: [id number]  : 1980  ADDRESS: [address] [place] [id number]  ACCT. NUMBER:  [id number]  DATE OF SERVICE: 10/27/21    PREFERRED PHONE: [phone]  May we leave a program related message: Yes  SERVICE MODALITY:  Video Visit:      Provider verified identity through the following two step process.  Patient provided:  Patient is known previously to provider    Telemedicine Visit: The patient's condition can be safely assessed and treated via synchronous audio and visual telemedicine encounter.      Reason for Telemedicine Visit: Services only offered telehealth    Originating Site (Patient Location): Patient's home    Distant Site (Provider Location): [facility]    Consent:  The patient/guardian has verbally consented to: the potential risks and benefits of telemedicine (video visit) versus in person care; bill my insurance or make self-payment for services provided; and responsibility for payment of non-covered services.     Patient would like the video invitation sent by:  My Chart    Mode of Communication:  Video Conference via [employer]well    As the provider I attest to compliance with applicable laws and regulations related to telemedicine.    ADHD DIAGNOSTIC ASSESSMENT    Identifying Information:  Patient is a 41 year old,.  The pronoun use throughout this assessment reflects the patient's chosen pronoun.  Patient was  referred for an assessment byreferring provider.  Patient attended the session alone.     Chief Complaint:   Reports that she was previously diagnosed as a child prior to fifth grade.  Oldest recently diagnosed at age 5 or 6.  Also diagnosed with autism.  Middle child was diagnosed with ADHD last fall.  Youngest is 4 years old and showing signs already.  Client notes she has to make a detailed list of everything that she has to do every day or she gets distracted.  Recall she has a bachelor's degree in elementary education but took 10 years to achieve it.  Describes her self as a horrible student, usually getting D's and F's.  Noted that she had to repeat several classes or would not graduated.  Lots of potential talks and find she takes criticism very hard.  Reports anxiety and OCD symptoms on her mother's side of the family particularly in her mother and maternal grandmother.  Client recalled that when she visited her grandmother she could not still sit long enough to learn knitting and this made her somewhat OCD grandmother extraordinarily frustrated.  Parents are now together, father is described as a loner with substance abuse and alcohol problems.  Reports that her father left for [place] when she was 12 and did not see him again until she was 20.  Reports he is sober now.  Both parents have remarried.  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  She often does not get everything done that she needs to around the house.  Constantly thinking of other things she wants to do.  Difficulty sitting still even during conversations with her .  He has repeatedly told her she does not not listen.  Reports 2 suicide attempts in 2001 when she was first diagnosed with depression she was initially hospitalized for weekend and then later for 5 days.  She started antidepression medications at that time.  Notes she has not been on antidepressants for about 15 years now.    Social History:  Client reported she  grew up in [place], [place]. Client was the 1/4 children. parents  when client was 7.  when she was 5.. Client reported that his childhood was boring, lived with mom. Moved around a lot. Dad went to senior care when client was 5. [facility] for drugs. Visitation rights were revoked if he failed ua. Visits sporadic.   Client described his childhood family environment as having mild to moderate level of chaos.  Client described his current relationships with family of origin as supportive with frequent communication.        Client reported a history of 1 committed relationships or marriages. Client has been  for 12 years. Client reported having 3 children.  Client identified extensive stable and meaningful social connections. Client reported that he has been involved with the legal system. Roommate forged  Checks.         Client did not serve in the .   There are no ethnic, cultural or Samaritan factors that may be relevant for therapy. Client identified her preferred language to be English. Client reported he does not need the assistance of an  or other support involved in treatment. Modifications will not be used to assist communication in treatment.     ADHD Symptom History  Client's highest education level was college graduate. During the elementary, middle, and high school years, patient recalls academic strengths in the area of science and music. Client reported experiencing academic problems in math and reading books she was not interested in. Math past algebra was nearly impossible.. Client did identify the following learning problems: reading and mathematics disorder, dyslexia diagnosed after HS. Client did  receive tutoring services during the school years. Client did not receive special education services. Client reported significant behavior and discipline problems including: physical or verbal alteracations, disruptive classroom behavior and frequent penitentiary. Lots of fights  after school. Wouldn't keep her mouth shut.Client did attend post secondary school.   Client reported difficulty with childhood peer relationships.As a child, client reported that he failed to complete assigned chores in the home environment, had problems getting ready for school in the morning, had problems with organization and keeping track of items, misplaced or lost things, forgot school work or other items between home and school, needed frequent reminders by parents to be motivated or to complete work, displayed argumentative or oppositional behaviors, had problems managing temper with frequent emotional outbursts, caused damage to property and had difficulty managing personal hygiene.   Client reported that he is not currently employed.  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  Problems with attention/distraction, late completing projects.The longest period of employment has been 5.  Client has been terminated from a place of employment.As a child, client reported having sleep disturbance, including: frequent dreams / nightmares, insomnia, sleep walking and very much a night kid .  Client reported currently experiencing sleep disturbance, including: daytime drowsiness / fatigue, insomnia and sleep walking.  Client reported sleeping approximately 5 hous plus 1-2 hour afternoon nap hours per night.  Client reported that he has not completed a sleep study.  Client reported having an inconsistent diet.  There are not significant nutritional concerns.  Client reported engaging in regular exercise.      Motor Vehicle Operation:  Client has not received a 's license. Client does not drive.     Risk Taking Behaviors:  Client reported no history of risk taking behaviors      Client reports family history includes Alcoholism in his mother; Mental Illness in her mother.    Mental Health History:  Client reported the following biological family members or relatives with mental health  issues: Younger brother diagnosed with hyperactivity unsure of parents diagnoses though mom likely had some type of anxiety.  Client previously received the following mental health diagnosis: ADHD.  Client has received the following mental health services in the past: medication(s) from physician / PCP. Hospitalizations: In the [facility].  Previous / current commitments: None. Client is currently receiving the following services: medication(s) from physician / PCP.      Chemical Health History:  Client Reported drug problems in father, mom also had drug problems but has been sober longer. MGF ETOH. . Client has not received chemical dependency treatment in the past. Client is not currently receiving any chemical dependency treatment. Client reports no problems as a result of their drinking / drug use.  Client reported that she is scared of becoming like her father.    Client Reports:  Client reports she is alergic to beer and wine. Has rare mixed drink max of 2  Client quit smoking in 2001  Client denies using cannabis.   Client reports caffeine use as 2 pots of coffee/day.  Client denies using street drugs.  Client denies the non-medical use of prescription or over the counter drugs.    CAGE: None of the patient's responses to the CAGE screening were positive / Negative CAGE score   Based on the negative Cage-Aid score and clinical interview there  are not indications of drug or alcohol abuse.    Discussed the general effects of drugs and alcohol on health and well-being. Therapist gave client printed information about the effects of chemical use on his health and well being.      Significant Losses / Trauma / Abuse / Neglect Issues:  Mom is reported as verbally abusive which has improved over the years.    Issues of possible neglect are not present.      Medical Issues:  Client has had a physical exam to rule out medical causes for current symptoms.  Date of last physical exam was within the past year.  Client was encouraged to follow up with PCP if symptoms were to develop.  The client has a [place] [facility] Provider, who is named Jaci Sharma.  Client reports not having a psychiatrist.  Client reported the following current medical concerns: Morbid obesity, history of preeclampsia.  The client denies the presence of chronic or episodic pain.    Patient reports current meds as:   No outpatient medications have been marked as taking for the 10/27/21 encounter ([facility] Extended Documentation) with Thomas Matthias FRAUSTONan       Client Allergies:  No Known Allergies  no known allergies to medications    Medical History:  No past medical history on file.    Medication Adherence:  Client reports taking prescribed medications as prescribed.    Client was provided recommendation to follow-up with prescribing physician.      Patient's Strengths and Limitations:  Patient identified the following strengths or resources that will help them succeed in treatment: friends / good social support and family support. Things that may interfere with the patient's success in treatment include: none identified.     Safety Assessment:   Current Safety Concerns:  [place] Suicide Severity Rating Scale (Lifetime/Recent)  [place] Suicide Severity Rating (Lifetime/Recent) 11/15/2021   1. Wish to be Dead (Lifetime) Yes   Wish to be Dead Description (Lifetime) 200-01 when first diagnosed with depression   1. Wish to be Dead (Recent) No   2. Non-Specific Active Suicidal Thoughts (Lifetime) No   2. Non-Specific Active Suicidal Thoughts (Recent) No   3. Active Suicidal Ideation with any Methods (Not Plan) Without Intent to Act (Lifetime) No   3. Active Suicidal Ideation with any Methods (Not Plan) Without Intent to Act (Recent) No   4. Active Suicidal Ideation with Some Intent to Act, Without Specific Plan (Lifetime) No   4. Active Suicidal Ideation with Some Intent to Act, Without Specific Plan (Recent) No   5. Active Suicidal Ideation with  Specific Plan and Intent (Lifetime) Yes   Active Suicidal Ideation with Specific Plan and Intent Description (Lifetime) 2 attempts in 2001 Se above.   5. Active Suicidal Ideation with Specific Plan and Intent (Recent) No   Most Severe Ideation Rating (Lifetime) 4   Most Severe Ideation Description (Lifetime) (No Data)   Comments Feelings of just not wanting to go on   Frequency (Lifetime) 4   Duration (Lifetime) 3   Controllability (Lifetime) 4   Protective Factors  (Lifetime) 4   Reasons for Ideation (Lifetime) 4   Most Severe Ideation Rating (Past Month) NA   Frequency (Past Month) NA   Duration (Past Month) NA   Controllability (Past Month) NA   Protective Factors (Past Month) 1   Reasons for Ideation (Past Month) NA   Actual Attempt (Lifetime) Yes   Actual Attempt Description (Lifetime) Attempted overdose twice in 2001   Total Number of Actual Attempts (Lifetime) (No Data)   Comments 2   Actual Attempt (Past 3 Months) No   Has subject engaged in non-suicidal self-injurious behavior? (Lifetime) No   Has subject engaged in non-suicidal self-injurious behavior? (Past 3 Months) No   Interrupted Attempts (Lifetime) No   Interrupted Attempts (Past 3 Months) No   Aborted or Self-Interrupted Attempt (Lifetime) No   Aborted or Self-Interrupted Attempt (Past 3 Months) No   Preparatory Acts or Behavior (Lifetime) No   Preparatory Acts or Behavior (Past 3 Months) No   Most Recent Attempt Date (No Data)   Comments 2001   Most Recent Attempt Actual Lethality Code 2   Most Lethal Attempt Actual Lethality Code 2   Initial/First Attempt Date (No Data)   Comments 2001   Initial/First Attempt Actual Lethality Code 2     Patient denies current homicidal ideation and behaviors.  Patient denies current self-injurious ideation and behaviors.    Patient denied risk behaviors associated with substance use.  Patient denies any high risk behaviors associated with mental health symptoms.  Patient reports the following current concerns for  their personal safety: None.  Patient reports there are not firearms in the house.        History of Safety Concerns:  Patient denied a history of homicidal ideation.     Patient denied a history of personal safety concerns.    Patient denied a history of assaultive behaviors.    Patient denied a history of sexual assault behaviors.     Patient denied a history of risk behaviors associated with substance use.  Patient denies any history of high risk behaviors associated with mental health symptoms.  Patient reports the following protective factors: forward or future oriented thinking;dedication to family or friends    Risk Plan:  See Recommendations for Safety and Risk Management Plan    Review of Symptoms per patient report:  Depression: Change in sleep, Lack of interest, Excessive or inappropriate guilt, Change in energy level, Difficulties concentrating, Change in appetite, Suicidal ideation, Feelings of hopelessness and Feelings of helplessness  Myriam:  No Symptoms  Psychosis: No Symptoms  Anxiety: Excessive worry and Nervousness  Panic:  Palpitations, Shortness of breath, Tingling, Numbness, Sense of impending doom and usually uncued  Post Traumatic Stress Disorder:  Increased arousal   Eating Disorder: May forget to eat for several days or not feel full and over eat  ADD / ADHD:  Inattentive, Difficulties listening, Poor task completion, Poor organizational skills, Distractibility, Forgetful, Interrupts, Intrudes and Restlessness/fidgety  Conduct Disorder: No symptoms  Autism Spectrum Disorder: No symptoms  Obsessive Compulsive Disorder: No Symptoms    Patient reports the following compulsive behaviors and treatment history: none.      Diagnostic Criteria:   Attention Deficit Hyperactivity Disorder  A) A persistent pattern of inattention and/or hyperactivity-impulsivity that interferes with functioning or development, as characterized by (1) Inattention and/or (2) Hyperactivity and Impulsivity  (1)  Inattention: 6 or more of the following symptoms have persisted for at least 6 months to a degree that is inconsistent with developmental level and that negatively impacts directly on social and academic/occupational activities:  - Often fails to give close attention to details or makes careless mistakes in schoolwork, at work, or during other activities  - Often has difficulty sustaining attention in tasks or play activities  - Often does not seem to listen when spoken to directly  - Often does not follow through on instructions and fails to finish schoolwork, chores, or duties in the workplace  - Often has difficulty organizing tasks and activities  - Often avoids, dislikes, or is reluctant to engage in tasks that require sustained mental effort  - Often loses things necessary for tasks or activities  - Is often easily distractedby extraneous stimuli  - Is often forgetful in daily activities  (2) Hyeractivity and Impulsivity: 6 or more of the following symptoms have persisted for at least 6 months to a degree that is inconsistent with developmental level and that negatively impacts directly on social and academic/occupational activities:  - Often talks excessively  B) Several inattentive or hyperactive-impulsive symptoms were present prior to age 12 years  C) Several inattentive or hyperactive-impulsive symptoms are present in two or more settings  D) There is clear evidence that the symptoms interfere with, or reduce the quality of, social academic, or occupational functioning  E) The Symptoms do not occur exclusively during the course of schizophrenia or another psychotic disorder and are not better explained by another mental disorder    Functional Status:  Patient reports the following functional impairments: educational activities, organization, relationship(s) and work / vocational responsibilities.     WHODAS:   WHODAS 2.0 Total Score 10/20/2021   Total Score 30   Total Score MyChart 30     Nonprogrammatic  care:  Patient is requesting basic services to address current mental health concerns.    Clinical Summary:  1. Reason for assessment: ADHD evaluation.  2. Psychosocial, Cultural and Contextual Factors: None identified.  3. Principal DSM5 Diagnoses  (Sustained by DSM5 Criteria Listed Above):   Attention-Deficit/Hyperactivity Disorder  314.00 (F90.0) Predominantly inattentive presentation.  4. Other Diagnoses that is relevant to services:   History of depression and anxiety  5. Provisional Diagnosis: None  6. Prognosis: Expect Improvement.  7. Likely consequences of symptoms if not treated: Worsening depression and anxiety, decreased attention and concentration.  8. Client strengths include:  educated, employed and has a previous history of therapy .     Recommendations:     1. Plan for Safety and Risk Management:   Recommended that patient call 911 or go to the local ED should there be a change in any of these risk factors..          Report to child / adult protection services was NA.         3. Initial Treatment will focus on: Completing diagnostic assessment        4. Resources/Service Plan:    services are not indicated.   Modifications to assist communication are not indicated.   Additional disability accommodations are not indicated.      5. Collaboration:   Collaboration / coordination of treatment will be initiated with the following  support professionals: None identified.      6.  Referrals:   The following referral(s) will be initiated: None identified. Next Scheduled Appointment: 11/30/2021.     A Release of Information has been obtained for the following: Not applicable.    7. DIOGENES:    DIOGENES:  Discussed the general effects of drugs and alcohol on health and well-being. Provider gave patient printed information about the effects of chemical use on their health and well being. Recommendations: None.     8. Records:   These were reviewed at time of assessment.   Information in this assessment was  obtained from the medical record and  provided by patient who is a good historian.    Patient will have open access to their mental health medical record.      Provider Name/ Credentials:  Matthias Guzman, PhD, .  [facility], [place], [place]    October 27, 2021

## 2021-10-27 NOTE — PROGRESS NOTES
[facility]   Mental Health & Addiction Services      Progress Note - Initial Visit  Disclaimer: Voice recognition software was used to generate this note. As a result, wrong word or 'sound-a-like' substitutions may have occurred due to the inherent limitations of voice recognition software. There may be errors in the script that have gone undetected. Please consider this when interpreting information found in this chart.      Patient  Name:           Ninoska Cottrell    Date:   10/27/2021                                           Service Type: Individual                Visit Start Time:        11:00 AM                     visit End Time:          11:45 AM     Visit #:            2     Attendees:     Client attended alone     Service Modality:  Video Visit:      Provider verified identity through the following two step process.  Patient provided:  Patient      Telemedicine Visit: The patient's condition can be safely assessed and treated via synchronous audio and visual telemedicine encounter.       Reason for Telemedicine Visit: Services only offered telehealth     Originating Site (Patient Location): Patient's home     Distant Site (Provider Location): Provider Remote Setting- Home Office     Consent:  The patient/guardian has verbally consented to: the potential risks and benefits of telemedicine (video visit) versus in person care; bill my insurance or make self-payment for services provided; and responsibility for payment of non-covered services.      Patient would like the video invitation sent by:  My Chart     Mode of Communication:  Video Conference via [employer] [email or web address])     As the provider I attest to compliance with applicable laws and regulations related to telemedicine.                   DATA:              Interactive Complexity: No              Crisis: No                 Presenting Concerns/  Current Stressors:  Completed intake for ADHD assessment.  [facility].                  ASSESSMENT:  Mental Status Assessment:  Appearance:                            Appropriate   Eye Contact:                           Good   Psychomotor Behavior:          Restless   Attitude:                                   Cooperative   Orientation:                             All  Speech              Rate / Production:       Normal/ Responsive              Volume:                       Normal   Mood:                                      Normal  Affect:                                      Appropriate   Thought Content:                    Clear   Thought Form:                        Coherent   Insight:                                     Good         Safety Issues and Plan for Safety and Risk Management:                [place] Suicide Severity Rating Scale (Lifetime/Recent)  [place] Suicide Severity Rating (Lifetime/Recent) 11/15/2021   1. Wish to be Dead (Lifetime) Yes   Wish to be Dead Description (Lifetime) 200-01 when first diagnosed with depression   1. Wish to be Dead (Recent) No   2. Non-Specific Active Suicidal Thoughts (Lifetime) No   2. Non-Specific Active Suicidal Thoughts (Recent) No   3. Active Suicidal Ideation with any Methods (Not Plan) Without Intent to Act (Lifetime) No   3. Active Suicidal Ideation with any Methods (Not Plan) Without Intent to Act (Recent) No   4. Active Suicidal Ideation with Some Intent to Act, Without Specific Plan (Lifetime) No   4. Active Suicidal Ideation with Some Intent to Act, Without Specific Plan (Recent) No   5. Active Suicidal Ideation with Specific Plan and Intent (Lifetime) Yes   Active Suicidal Ideation with Specific Plan and Intent Description (Lifetime) 2 attempts in 2001 Se above.   5. Active Suicidal Ideation with Specific Plan and Intent (Recent) No   Most Severe Ideation Rating (Lifetime) 4   Most Severe Ideation Description (Lifetime) (No Data)   Comments Feelings of just not wanting to go on   Frequency (Lifetime) 4   Duration (Lifetime) 3    Controllability (Lifetime) 4   Protective Factors  (Lifetime) 4   Reasons for Ideation (Lifetime) 4   Most Severe Ideation Rating (Past Month) NA   Frequency (Past Month) NA   Duration (Past Month) NA   Controllability (Past Month) NA   Protective Factors (Past Month) 1   Reasons for Ideation (Past Month) NA   Actual Attempt (Lifetime) Yes   Actual Attempt Description (Lifetime) Attempted overdose twice in 2001   Total Number of Actual Attempts (Lifetime) (No Data)   Comments 2   Actual Attempt (Past 3 Months) No   Has subject engaged in non-suicidal self-injurious behavior? (Lifetime) No   Has subject engaged in non-suicidal self-injurious behavior? (Past 3 Months) No   Interrupted Attempts (Lifetime) No   Interrupted Attempts (Past 3 Months) No   Aborted or Self-Interrupted Attempt (Lifetime) No   Aborted or Self-Interrupted Attempt (Past 3 Months) No   Preparatory Acts or Behavior (Lifetime) No   Preparatory Acts or Behavior (Past 3 Months) No   Most Recent Attempt Date (No Data)   Comments 2001   Most Recent Attempt Actual Lethality Code 2   Most Lethal Attempt Actual Lethality Code 2   Initial/First Attempt Date (No Data)   Comments 2001   Initial/First Attempt Actual Lethality Code 2      Patient denies current fears or concerns for personal safety.  Patient denies current or recent suicidal ideation or behaviors.  Patient denies current or recent homicidal ideation or behaviors.  Patient denies current or recent self injurious behavior or ideation.  Patient denies other safety concerns.  Recommended that patient call 911 or go to the local ED should there be a change in any of these risk factors.  Patient reports there are no firearms in the house.                Diagnostic Criteria:  Attention Deficit Hyperactivity Disorder  A) A persistent pattern of inattention and/or hyperactivity-impulsivity that interferes with functioning or development, as characterized by (1) Inattention and/or (2) Hyperactivity  and Impulsivity  (1) Inattention: 6 or more of the following symptoms have persisted for at least 6 months to a degree that is inconsistent with developmental level and that negatively impacts directly on social and academic/occupational activities:  - Often fails to give close attention to details or makes careless mistakes in schoolwork, at work, or during other activities  - Often has difficulty sustaining attention in tasks or play activities  - Often does not seem to listen when spoken to directly  - Often does not follow through on instructions and fails to finish schoolwork, chores, or duties in the workplace  - Often has difficulty organizing tasks and activities  - Often avoids, dislikes, or is reluctant to engage in tasks that require sustained mental effort  - Often loses things necessary for tasks or activities  - Is often easily distractedby extraneous stimuli  - Is often forgetful in daily activities  (2) Hyeractivity and Impulsivity: 6 or more of the following symptoms have persisted for at least 6 months to a degree that is inconsistent with developmental level and that negatively impacts directly on social and academic/occupational activities:  - Often fidgets with or taps hands or feet or squirms in seat  - Often leaves seat in situations when remaining seated is expected  - Often talks excessively  - Often blurts out an answer before a question has been completed  B) Several inattentive or hyperactive-impulsive symptoms were present prior to age 12 years  C) Several inattentive or hyperactive-impulsive symptoms are present in two or more settings  D) There is clear evidence that the symptoms interfere with, or reduce the quality of, social academic, or occupational functioning  E) The Symptoms do not occur exclusively during the course of schizophrenia or another psychotic disorder and are not better explained by another mental disorder        DSM5 Diagnoses: (Sustained by DSM5 Criteria Listed  Above)  Diagnoses:  Attention-Deficit/Hyperactivity Disorder  314.00 (F90.0) Predominantly inattentive presentation  Psychosocial & Contextual Facto rule out hyperactivity.  Rs: History of depression and suicide attempts.  History of anxiety.  WHODAS 2.0 (12 item):   WHODAS 2.0 Total Score 10/20/2021   Total Score 30   Total Score MyChart 30      Intervention:              Educated on treatment planning and started identifying goals and interventions for treatment plan  Collateral Reports Completed:  Not Applicable        PLAN: (Homework, other):  1. Provider will continue Diagnostic Assessment.  Patient was given the following to do until next session: Complete ADHD rating scales     2. Provider recommended the following referrals: None.       3.  Suicide Risk and Safety Concerns were assessed for Ninoska Cottrell.     Patient meets the following risk assessment and triage: When the [place] Suicide Severity Rating Scale has been completed, the patient identifies lifetime history of suicidal ideation and/or Suicidal Behavior that is greater than 10 years.       The recommendation is to provide the Brief Safety Plan:     Adult Short Safety Plan:   Name: Ninoska Cottrell  YOB: 1980  Date: November 15, 2021   My primary care provider: Jaci Sharma  My primary care clinic: [facility]  My prescriber: Dr Sharma       My Triggers:  [place] and School stress       Additional People, Places, and Things that I can access for support: Family            What is important to me and makes life worth living: Children and Family .            GREEN     Good Control  1. I feel good  2. No suicidal thoughts   3. Can work, sleep and play        Action Steps  1. Self-care: balanced meals, exercising, sleep practices, etc.  2. Take your medications as prescribed.  3. Continue meetings with therapist and prescriber.  4.  Do the healthy things that I enjoy.  5.              YELLOW  Getting Worse  I have ANY of these:  1. I do not feel  good  2. Difficulty Concentrating  3. Sleep is changing  4. Increase/Change in my thoughts to hurt self and/or others, but I can still manage and not act on it.   5. Not taking care of self.  6.              Action Steps (in addition to the above):  1. Inform your therapist and psychiatric prescriber/PCP.  2. Keep taking your medications as prescribed.    3. Turn to people you can ask for help.  4. Use internal coping strategies -see below.  5. Create safe environment: notify friends/family of increase in symptoms  6.              RED  Get Help  If I have ANY of these:  1. Current and uncontrollable thoughts and/or behaviors to hurt self and/or others.   2.     Actions to manage my safety  1. Contact your emergency person   2. Call my crisis team- [facility] [phone] [facility]  3. Or Call 911 or go to the emergency room right away  4.           My Internal Coping Strategies include the following:  use my coping skills     [End for Brief Safety Plan]      Safety Concerns  How To Identify Situations That Make Your Mental Health Worse:  Triggers are things that make your mental health worse.  Look at the list below to help you find your triggers and what you can do about them.      1. Identify Early Warning Signs:     Sometimes symptoms return, even when people do their best to stay well. Symptoms can develop over a short period of time with little or no warning, but most of the time they emerge gradually over several weeks.  Early warning signs are changes that people experience when a relapse is starting. Some early warning signs are common and others are not as common.   Common Early Warning Signs:    Feeling depressed or low               2. Identify action steps to take when warning signs are noticed:     Taking Action- It is important to take action if you are experiencing early warning signs of a relapse.  The faster you act, the more likely it is that you can avoid a  full relapse.  It is helpful to identify several specific ways to cope with symptoms.       The following is my list of symptoms and coping strategies that I can use when they are present:     Symptom Coping Strategies   Anxiety -Talk with someone in your support system and let him or her know how you are feeling.  -Use relaxation techniques such as deep breathing or imagery.  -Use positive affirmations to counteract negative self-talk such as  I am learning to let go of worry.    Depression - Schedule your day; include activities you have to do and activities you enjoy doing.  - Get some exercise - walk, run, bike, or swim.  - Give yourself credit for even the smallest things you get done.   Sleep Difficulties    - Go to sleep at the same time every day.  - Do something relaxing before bed, such as drinking herbal tea or listening to music.  - Avoid having discussions about upsetting topics before going to bed.   Delusions    - Distract yourself from the disturbing thought by doing something that requires your attention such as a puzzle.  - Check out your beliefs by talking to someone you trust.    Hallucinations    - Use headphones to listen to music.  - Tell voices to  stop  or say to yourself,  I am safe.   - Ignore the hallucinations as much as possible; focus on other things.   Concentration Difficulties - Minimize distractions so there is only one thing for you to focus on at a time.    - Ask the person you are having a conversation with to slow down or repeat things you are unsure of.                                 Matthias Guzman             November 15, 2021                            Answers for HPI/ROS submitted by the patient on 10/20/2021  If you checked off any problems, how difficult have these problems made it for you to do your work, take care of things at home, or get along with other people?: Very difficult  PHQ9 TOTAL SCORE: 15  AMY 7 TOTAL SCORE: 11

## 2021-11-15 ASSESSMENT — COLUMBIA-SUICIDE SEVERITY RATING SCALE - C-SSRS
2. HAVE YOU ACTUALLY HAD ANY THOUGHTS OF KILLING YOURSELF LIFETIME?: NO
6. HAVE YOU EVER DONE ANYTHING, STARTED TO DO ANYTHING, OR PREPARED TO DO ANYTHING TO END YOUR LIFE?: NO
1. IN THE PAST MONTH, HAVE YOU WISHED YOU WERE DEAD OR WISHED YOU COULD GO TO SLEEP AND NOT WAKE UP?: YES
LETHALITY/MEDICAL DAMAGE CODE MOST LETHAL ACTUAL ATTEMPT: MODERATE PHYSICAL DAMAGE, MEDICAL ATTENTION NEEDED
TOTAL  NUMBER OF ABORTED OR SELF INTERRUPTED ATTEMPTS PAST 3 MONTHS: NO
3. HAVE YOU BEEN THINKING ABOUT HOW YOU MIGHT KILL YOURSELF?: NO
TOTAL  NUMBER OF ABORTED OR SELF INTERRUPTED ATTEMPTS PAST LIFETIME: NO
ATTEMPT LIFETIME: YES
5. HAVE YOU STARTED TO WORK OUT OR WORKED OUT THE DETAILS OF HOW TO KILL YOURSELF? DO YOU INTEND TO CARRY OUT THIS PLAN?: YES
4. HAVE YOU HAD THESE THOUGHTS AND HAD SOME INTENTION OF ACTING ON THEM?: NO
REASONS FOR IDEATION LIFETIME: MOSTLY TO END OR STOP THE PAIN (YOU COULDN'T GO ON LIVING WITH THE PAIN OR HOW YOU WERE FEELING)
1. IN THE PAST MONTH, HAVE YOU WISHED YOU WERE DEAD OR WISHED YOU COULD GO TO SLEEP AND NOT WAKE UP?: NO
5. HAVE YOU STARTED TO WORK OUT OR WORKED OUT THE DETAILS OF HOW TO KILL YOURSELF? DO YOU INTEND TO CARRY OUT THIS PLAN?: NO
LETHALITY/MEDICAL DAMAGE CODE FIRST ACTUAL ATTEMPT: MODERATE PHYSICAL DAMAGE, MEDICAL ATTENTION NEEDED
LETHALITY/MEDICAL DAMAGE CODE MOST RECENT ACTUAL ATTEMPT: MODERATE PHYSICAL DAMAGE, MEDICAL ATTENTION NEEDED
6. HAVE YOU EVER DONE ANYTHING, STARTED TO DO ANYTHING, OR PREPARED TO DO ANYTHING TO END YOUR LIFE?: NO
TOTAL  NUMBER OF INTERRUPTED ATTEMPTS PAST 3 MONTHS: NO
TOTAL  NUMBER OF INTERRUPTED ATTEMPTS LIFETIME: NO
4. HAVE YOU HAD THESE THOUGHTS AND HAD SOME INTENTION OF ACTING ON THEM?: NO
ATTEMPT PAST THREE MONTHS: NO
2. HAVE YOU ACTUALLY HAD ANY THOUGHTS OF KILLING YOURSELF?: NO

## 2021-11-15 NOTE — PROGRESS NOTES
[facility]   Mental Health & Addiction Services     Progress Note - Initial Visit  Disclaimer: Voice recognition software was used to generate this note. As a result, wrong word or 'sound-a-like' substitutions may have occurred due to the inherent limitations of voice recognition software. There may be errors in the script that have gone undetected. Please consider this when interpreting information found in this chart.     Patient  Name:  Ninoska Cottrell Date: 10/20/2021         Service Type: Individual     Visit Start Time: 10:00 AM  visit End Time: 10:45 AM    Visit #: 1    Attendees: Client attended alone    Service Modality:  Video Visit:      Provider verified identity through the following two step process.  Patient provided:  Patient     Telemedicine Visit: The patient's condition can be safely assessed and treated via synchronous audio and visual telemedicine encounter.      Reason for Telemedicine Visit: Services only offered telehealth    Originating Site (Patient Location): Patient's home    Distant Site (Provider Location): Provider Remote Setting- Home Office    Consent:  The patient/guardian has verbally consented to: the potential risks and benefits of telemedicine (video visit) versus in person care; bill my insurance or make self-payment for services provided; and responsibility for payment of non-covered services.     Patient would like the video invitation sent by:  My Chart    Mode of Communication:  Video Conference via [employer]    As the provider I attest to compliance with applicable laws and regulations related to telemedicine.       DATA:   Interactive Complexity: No   Crisis: No     Presenting Concerns/  Current Stressors:   Client presents for session 1 of ADHD assessment.  Reports that she was previously diagnosed as a child prior to fifth grade.  Oldest recently diagnosed at age 5 or 6.  Also diagnosed with autism.  Middle child was diagnosed with ADHD last fall.   Youngest is 4 years old and showing signs already.  Client notes she has to make a detailed list of everything that she has to do every day or she gets distracted.  Recall she has a bachelor's degree in elementary education but took 10 years to achieve it.  Describes her self as a horrible student, usually getting D's and F's.  Noted that she had to repeat several classes or would not graduated.  Lots of potential talks and find she takes criticism very hard.  Reports anxiety and OCD symptoms on her mother's side of the family particularly in her mother and maternal grandmother.  Client recalled that when she visited her grandmother she could not still sit long enough to learn knitting and this made her somewhat OCD grandmother extraordinarily frustrated.  Parents are now together, father is described as a loner with substance abuse and alcohol problems.  Reports that her father left for [place] when she was 12 and did not see him again until she was 20.  Reports he is sober now.  Both parents have remarried.  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  She often does not get everything done that she needs to around the house.  Constantly thinking of other things she wants to do.  Difficulty sitting still even during conversations with her .  He has repeatedly told her she does not not listen.  Reports 2 suicide attempts in 2001 when she was first diagnosed with depression she was initially hospitalized for weekend and then later for 5 days.  She started antidepression medications at that time.  Notes she has not been on antidepressants for about 15 years now.      ASSESSMENT:  Mental Status Assessment:  Appearance:   Appropriate   Eye Contact:   Good   Psychomotor Behavior: Restless   Attitude:   Cooperative   Orientation:   All  Speech   Rate / Production: Normal/ Responsive   Volume:  Normal   Mood:    Normal  Affect:    Appropriate   Thought Content:  Clear   Thought Form:  Coherent    Insight:    Good       Safety Issues and Plan for Safety and Risk Management:     [place] Suicide Severity Rating Scale (Lifetime/Recent)  [place] Suicide Severity Rating (Lifetime/Recent) 11/15/2021   1. Wish to be Dead (Lifetime) Yes   Wish to be Dead Description (Lifetime) 200-01 when first diagnosed with depression   1. Wish to be Dead (Recent) No   2. Non-Specific Active Suicidal Thoughts (Lifetime) No   2. Non-Specific Active Suicidal Thoughts (Recent) No   3. Active Suicidal Ideation with any Methods (Not Plan) Without Intent to Act (Lifetime) No   3. Active Suicidal Ideation with any Methods (Not Plan) Without Intent to Act (Recent) No   4. Active Suicidal Ideation with Some Intent to Act, Without Specific Plan (Lifetime) No   4. Active Suicidal Ideation with Some Intent to Act, Without Specific Plan (Recent) No   5. Active Suicidal Ideation with Specific Plan and Intent (Lifetime) Yes   Active Suicidal Ideation with Specific Plan and Intent Description (Lifetime) 2 attempts in 2001 Se above.   5. Active Suicidal Ideation with Specific Plan and Intent (Recent) No   Most Severe Ideation Rating (Lifetime) 4   Most Severe Ideation Description (Lifetime) (No Data)   Comments Feelings of just not wanting to go on   Frequency (Lifetime) 4   Duration (Lifetime) 3   Controllability (Lifetime) 4   Protective Factors  (Lifetime) 4   Reasons for Ideation (Lifetime) 4   Most Severe Ideation Rating (Past Month) NA   Frequency (Past Month) NA   Duration (Past Month) NA   Controllability (Past Month) NA   Protective Factors (Past Month) 1   Reasons for Ideation (Past Month) NA   Actual Attempt (Lifetime) Yes   Actual Attempt Description (Lifetime) Attempted overdose twice in 2001   Total Number of Actual Attempts (Lifetime) (No Data)   Comments 2   Actual Attempt (Past 3 Months) No   Has subject engaged in non-suicidal self-injurious behavior? (Lifetime) No   Has subject engaged in non-suicidal self-injurious behavior?  (Past 3 Months) No   Interrupted Attempts (Lifetime) No   Interrupted Attempts (Past 3 Months) No   Aborted or Self-Interrupted Attempt (Lifetime) No   Aborted or Self-Interrupted Attempt (Past 3 Months) No   Preparatory Acts or Behavior (Lifetime) No   Preparatory Acts or Behavior (Past 3 Months) No   Most Recent Attempt Date (No Data)   Comments 2001   Most Recent Attempt Actual Lethality Code 2   Most Lethal Attempt Actual Lethality Code 2   Initial/First Attempt Date (No Data)   Comments 2001   Initial/First Attempt Actual Lethality Code 2     Patient denies current fears or concerns for personal safety.  Patient denies current or recent suicidal ideation or behaviors.  Patient denies current or recent homicidal ideation or behaviors.  Patient denies current or recent self injurious behavior or ideation.  Patient denies other safety concerns.  Recommended that patient call 911 or go to the local ED should there be a change in any of these risk factors.  Patient reports there are no firearms in the house.     Diagnostic Criteria:  Attention Deficit Hyperactivity Disorder  A) A persistent pattern of inattention and/or hyperactivity-impulsivity that interferes with functioning or development, as characterized by (1) Inattention and/or (2) Hyperactivity and Impulsivity  (1) Inattention: 6 or more of the following symptoms have persisted for at least 6 months to a degree that is inconsistent with developmental level and that negatively impacts directly on social and academic/occupational activities:  - Often fails to give close attention to details or makes careless mistakes in schoolwork, at work, or during other activities  - Often has difficulty sustaining attention in tasks or play activities  - Often does not seem to listen when spoken to directly  - Often does not follow through on instructions and fails to finish schoolwork, chores, or duties in the workplace  - Often has difficulty organizing tasks and  activities  - Often avoids, dislikes, or is reluctant to engage in tasks that require sustained mental effort  - Often loses things necessary for tasks or activities  - Is often easily distractedby extraneous stimuli  - Is often forgetful in daily activities  (2) Hyeractivity and Impulsivity: 6 or more of the following symptoms have persisted for at least 6 months to a degree that is inconsistent with developmental level and that negatively impacts directly on social and academic/occupational activities:  - Often fidgets with or taps hands or feet or squirms in seat  - Often leaves seat in situations when remaining seated is expected  - Often talks excessively  - Often blurts out an answer before a question has been completed  B) Several inattentive or hyperactive-impulsive symptoms were present prior to age 12 years  C) Several inattentive or hyperactive-impulsive symptoms are present in two or more settings  D) There is clear evidence that the symptoms interfere with, or reduce the quality of, social academic, or occupational functioning  E) The Symptoms do not occur exclusively during the course of schizophrenia or another psychotic disorder and are not better explained by another mental disorder      DSM5 Diagnoses: (Sustained by DSM5 Criteria Listed Above)  Diagnoses: Attention-Deficit/Hyperactivity Disorder  314.00 (F90.0) Predominantly inattentive presentation  Psychosocial & Contextual Facto rule out hyperactivity.  Rs: History of depression and suicide attempts.  History of anxiety.  WHODAS 2.0 (12 item):   WHODAS 2.0 Total Score 10/20/2021   Total Score 30   Total Score MyChart 30     Intervention:   Educated on treatment planning and started identifying goals and interventions for treatment plan  Collateral Reports Completed:  Not Applicable      PLAN: (Homework, other):  1. Provider will continue Diagnostic Assessment.  Patient was given the following to do until next session: Complete ADHD rating  scales    2. Provider recommended the following referrals: None.      3.  Suicide Risk and Safety Concerns were assessed for Ninoska Cottrell.    Patient meets the following risk assessment and triage: When the [place] Suicide Severity Rating Scale has been completed, the patient identifies lifetime history of suicidal ideation and/or Suicidal Behavior that is greater than 10 years.      The recommendation is to provide the Brief Safety Plan:    Adult Short Safety Plan:   Name: Ninoska Cottrell  YOB: 1980  Date: November 15, 2021   My primary care provider: Jaci Sharma  [facility]  My prescriber: Dr Sharma     My Triggers:  [place] and School stress     Additional People, Places, and Things that I can access for support: Family         What is important to me and makes life worth living: Children and Family .         GREEN    Good Control  1. I feel good  2. No suicidal thoughts   3. Can work, sleep and play      Action Steps  1. Self-care: balanced meals, exercising, sleep practices, etc.  2. Take your medications as prescribed.  3. Continue meetings with therapist and prescriber.  4.  Do the healthy things that I enjoy.  5.            YELLOW  Getting Worse  I have ANY of these:  1. I do not feel good  2. Difficulty Concentrating  3. Sleep is changing  4. Increase/Change in my thoughts to hurt self and/or others, but I can still manage and not act on it.   5. Not taking care of self.  6.              Action Steps (in addition to the above):  1. Inform your therapist and psychiatric prescriber/PCP.  2. Keep taking your medications as prescribed.    3. Turn to people you can ask for help.  4. Use internal coping strategies -see below.  5. Create safe environment: notify friends/family of increase in symptoms  6.            RED  Get Help  If I have ANY of these:  1. Current and uncontrollable thoughts and/or behaviors to hurt self and/or others.   2.    Actions to manage my safety  1.  Contact your emergency person   2. Call my crisis team- [facility] [phone] [facility]  3. Or Call 911 or go to the emergency room right away  4.          My Internal Coping Strategies include the following:  use my coping skills    [End for Brief Safety Plan]     Safety Concerns  How To Identify Situations That Make Your Mental Health Worse:  Triggers are things that make your mental health worse.  Look at the list below to help you find your triggers and what you can do about them.     1. Identify Early Warning Signs:    Sometimes symptoms return, even when people do their best to stay well. Symptoms can develop over a short period of time with little or no warning, but most of the time they emerge gradually over several weeks.  Early warning signs are changes that people experience when a relapse is starting. Some early warning signs are common and others are not as common.   Common Early Warning Signs:    Feeling depressed or low     2. Identify action steps to take when warning signs are noticed:    Taking Action- It is important to take action if you are experiencing early warning signs of a relapse.  The faster you act, the more likely it is that you can avoid a full relapse.  It is helpful to identify several specific ways to cope with symptoms.      The following is my list of symptoms and coping strategies that I can use when they are present:    Symptom Coping Strategies   Anxiety -Talk with someone in your support system and let him or her know how you are feeling.  -Use relaxation techniques such as deep breathing or imagery.  -Use positive affirmations to counteract negative self-talk such as  I am learning to let go of worry.    Depression - Schedule your day; include activities you have to do and activities you enjoy doing.  - Get some exercise - walk, run, bike, or swim.  - Give yourself credit for even the smallest things you get done.   Sleep Difficulties    - Go to sleep at the same time every day.  - Do something relaxing before bed, such as drinking herbal tea or listening to music.  - Avoid having discussions about upsetting topics before going to bed.   Delusions   - Distract yourself from the disturbing thought by doing something that requires your attention such as a puzzle.  - Check out your beliefs by talking to someone you trust.    Hallucinations   - Use headphones to listen to music.  - Tell voices to  stop  or say to yourself,  I am safe.   - Ignore the hallucinations as much as possible; focus on other things.   Concentration Difficulties - Minimize distractions so there is only one thing for you to focus on at a time.    - Ask the person you are having a conversation with to slow down or repeat things you are unsure of.                 Matthias Guzman  November 15, 2021      Answers for HPI/ROS submitted by the patient on 10/20/2021  If you checked off any problems, how difficult have these problems made it for you to do your work, take care of things at home, or get along with other people?: Very difficult  PHQ9 TOTAL SCORE: 15  AMY 7 TOTAL SCORE: 11

## 2021-11-30 ENCOUNTER — FCC EXTENDED DOCUMENTATION (OUTPATIENT)
Dept: PSYCHOLOGY | Facility: CLINIC | Age: 41
End: 2021-11-30
Payer: COMMERCIAL

## 2021-11-30 ENCOUNTER — VIRTUAL VISIT (OUTPATIENT)
Dept: PSYCHOLOGY | Facility: CLINIC | Age: 41
End: 2021-11-30
Payer: COMMERCIAL

## 2021-11-30 DIAGNOSIS — F90.0 ADHD (ATTENTION DEFICIT HYPERACTIVITY DISORDER), INATTENTIVE TYPE: Primary | ICD-10-CM

## 2021-11-30 PROCEDURE — 96131 PSYCL TST EVAL PHYS/QHP EA: CPT | Mod: GT | Performed by: PSYCHOLOGIST

## 2021-11-30 PROCEDURE — 96130 PSYCL TST EVAL PHYS/QHP 1ST: CPT | Mod: GT | Performed by: PSYCHOLOGIST

## 2021-11-30 PROCEDURE — 90834 PSYTX W PT 45 MINUTES: CPT | Mod: GT | Performed by: PSYCHOLOGIST

## 2021-11-30 NOTE — PROGRESS NOTES
Progress Note  Disclaimer: Voice recognition software was used to generate this note. As a result, wrong word or 'sound-a-like' substitutions may have occurred due to the inherent limitations of voice recognition software. There may be errors in the script that have gone undetected. Please consider this when interpreting information found in this chart.       Client Name: Ninoska Cottrell  Date: 11/30/2021      Service Type: Individual      Session Start Time: 3:00 PM  session End Time: 3:45 PM     Session Length: 45    Session #: 3    Attendees: Client attended alone    Service Modality:  Video Visit:      Provider verified identity through the following two step process.  Patient provided:  Patient is known previously to provider    Telemedicine Visit: The patient's condition can be safely assessed and treated via synchronous audio and visual telemedicine encounter.      Reason for Telemedicine Visit: Services only offered telehealth    Originating Site (Patient Location): Patient's home    Distant Site (Provider Location): Provider Remote Setting- Home Office    Consent:  The patient/guardian has verbally consented to: the potential risks and benefits of telemedicine (video visit) versus in person care; bill my insurance or make self-payment for services provided; and responsibility for payment of non-covered services.     Patient would like the video invitation sent by:  My Chart    Mode of Communication:  Video Conference via [employer]    As the provider I attest to compliance with applicable laws and regulations related to telemedicine.     Treatment Plan Last Reviewed: Today  PHQ-9 / AMY-7 : See Flowsheets    DATA  Interactive Complexity: NO  Crisis: NO            DATA   ADHD Assessment feedback session. Reviewed results of testing. See [facility] extended documentation for results. Explained diagnosis and treatment options. Recommended medication evaluation be scheduled with  PCP. Also provided and reviewed ADHD patient handout. Pt will schedule follow-up with me after seeing PCP.     Progress Since Last Session (Related to Symptoms / Goals / Homework):   Symptoms: No change stable    Homework: Achieved / completed to satisfaction      Episode of Care Goals: Satisfactory progress - ACTION (Actively working towards change); Intervened by reinforcing change plan / affirming steps taken     Current / Ongoing Stressors and Concerns:   Difficulty with attention and concentration     Treatment Objective(s) Addressed in This Session:   Reviewed results of testing, provided ADHD Psychoeducation tips and resources, encouraged client to make appointment for medication evaluation.       Intervention:   psychoeducation regarding ADHD        ASSESSMENT: Current Emotional / Mental Status (status of significant symptoms):   Risk status (Self / Other harm or suicidal ideation)   Client denies current fears or concerns for personal safety.   Client denies current or recent suicidal ideation or behaviors.   Client denies current or recent homicidal ideation or behaviors.   Client denies current or recent self injurious behavior or ideation.   Client denies other safety concerns.   Recommended that patient call 911 or go to the local ED should there be a change in any of these risk factors.     Appearance:   Appropriate    Eye Contact:   Good    Psychomotor Behavior: Normal    Attitude:   Cooperative    Orientation:   All   Speech    Rate / Production: Normal     Volume:  Normal    Mood:    Normal   Affect:    Appropriate    Thought Content:  Clear    Thought Form:  Coherent  Logical    Insight:    Good      Medication Review:   No current psychiatric medications prescribed     Medication Compliance:   NA     Changes in Health Issues:   None reported     Chemical Use Review:   Substance Use: Chemical use reviewed, no active concerns identified      Tobacco Use: No current tobacco use.       Collateral  Reports Completed:   Not Applicable    PLAN: (Client Tasks / Therapist Tasks / Other)  See [facility] extended documentation for testing report.  Client to contact PCP for medication evaluation.  Client was given ADHD resource packet and reviewed reading list.  8-week follow-up set.      Matthias Guzman    Psychological Testing   Billing/Services Summary       Initial interview/Record Review 10/20/2021 11734    Intake 10/27/2021 07795    Interactive feedback Session 11/30/2021 04114    Testing Evaluation Services Base: 04991  (1st 60 mins) Add-on: 49027  (each addtl 60 mins)   Test Scoring and Interpretation  (Start/Stop), (Date, Day #) 60 minutes   Integration/Report Generation   (Start/Stop), (Date, Day #) 60 minutes   Clinical Decision Making/Battery Modification   (Start/Stop), (Date, Day #) 0 minutes   Post-Service Work   (Start/Stop), (Date, Day #) 0 minutes   Total Time: 120 minutes (2 hours, 00 minutes)   Total Units:              Diagnosis(es): (ICD-10)  F 90.0

## 2021-11-30 NOTE — PROGRESS NOTES
[facility]  ADHD Evaluation    This note consists of symbols derived from keyboarding, dictation and/or voice recognition software. As a result, there may be errors in the script that have gone undetected. Please consider this when interpreting information found in this chart.     Patient: Ninoska Cottrell  YOB: 1980  MRN: [id number]  Date(s) of assessment:  Diagnostic Assessment 10/20/21 and 10/27/21, Derik self-report and collateral measures scored and interpreted 11/30/21 and Feedback Session 11/30/21    Information about appointment:  Client attended three sessions to aid in determining client's mental health diagnosis or diagnoses and treatment recommendations that best address client concerns. Client records includingmedical were reviewed. A diagnostic assessment was conducted at the initial appointment. Client completed several rating scales to assist in assessing attention-related and other mental health symptoms that may be causing impairments in functioning. Rating scales were also completed by a collateral contact.    Assessment tools:      Derik Adult ADHD Rating Scale-IV: Self and Other Reports (BAARS-IV), Derik Functional Impairment Scale: Self and Other Reports (BFIS), Derik Deficits in Executive Functioning Scale: Self and Other Reports (BDEFS), Patient Health Questionnaire-9 (PHQ-9) and Generalized Anxiety Disorder-7 (AMY-7)    Assessment Results:    Behavioral Observations:  Reports that she was previously diagnosed as a child prior to fifth grade.  Oldest recently diagnosed at age 5 or 6.  Also diagnosed with autism.  Middle child was diagnosed with ADHD last fall.  Youngest is 4 years old and showing signs already.  Client notes she has to make a detailed list of everything that she has to do every day or she gets distracted.  Recall she has a bachelor's degree in elementary education but took 10 years to achieve it.  Describes her self as a horrible student,  usually getting D's and F's.  Noted that she had to repeat several classes or would not graduated.  Lots of potential talks and find she takes criticism very hard.  Reports anxiety and OCD symptoms on her mother's side of the family particularly in her mother and maternal grandmother.  Client recalled that when she visited her grandmother she could not still sit long enough to learn knitting and this made her somewhat OCD grandmother extraordinarily frustrated.  Parents are now together, father is described as a loner with substance abuse and alcohol problems.  Reports that her father left for [place] when she was 12 and did not see him again until she was 20.  Reports he is sober now.  Both parents have remarried.  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  She often does not get everything done that she needs to around the house.  Constantly thinking of other things she wants to do.  Difficulty sitting still even during conversations with her .  He has repeatedly told her she does not not listen.  Reports 2 suicide attempts in 2001 when she was first diagnosed with depression she was initially hospitalized for weekend and then later for 5 days.  She started antidepression medications at that time.  Notes she has not been on antidepressants for about 15 years now.    Derik Adult ADHD Rating Scale-IV: Self and Other Reports (BAARS-IV)  The BAARS-IV assesses for symptoms of ADHD that are experienced in one's daily life. This assessment measure includes self and collateral rating scales designed to provide information regarding current and childhood symptoms of ADHD including inattention, hyperactivity, and impulsivity. Self-report scores are reported as percentiles. Scores at the 76th-83rd percentile are considered marginal, scores at the 84th-92nd percentile are considered borderline, scores at the 93rd-95th percentile are considered mild, scores at the 96th-98th percentile are considered  "moderate, and those at the 99th percentile are considered severe. Collateral or \"other\" rating scales are reported as number of symptoms observed in comparison to those reported by the client. Norms and percentile scores are not available for collateral reports.     Current Symptoms Scale--Self Report:   Client completed the self-report inventory of current symptoms.     BAARS-IV SR Raw Score %tile     %tile   Inattention Total  32 99  Symptom Count 9 99   Hyperactivity Total 15 99  Symptom Count 4    Impulsivity Total 11 97  Symptom Count 2 99   Sluggish Cog Tempo 28 96  Symptom Count 8 98   Total ADHD Score 58 99  Total ADHD SX Count 15 99           Client indicated that the reported symptoms have resulted in impaired functioning in home and work. Overall, the results suggest the client is experiencing Severe ADHD symptoms.     Current Symptoms Scale--Other Report:  Client's Spouse completed the collateral report inventory of current symptoms.     BAARS-IV OR  Raw Score     Inattention Total  16 Symptom Count 1   Hyperactivity Total 6 Symptom Count 0   Impulsivity Total 10 Symptom Count 1   Sluggish Cog Tempo 13 Symptom Count 0   Total ADHD Score 32 Total ADHD SX Count 2         The collateral contact indicated the client demonstrates impaired functioning in school, home, work and social relationships} The collateral- and self-report scores are significantly different. The collateral contact's scores were generally lower than the client's report.    Childhood Symptoms Scale--Self-Report:  Client completed the self-report inventory of childhood symptoms.     BAARS-IV Child SR Raw Score %tile     %tile   Inattention Total  30 98  Symptom Count 8 98   Impulsivity Total 32 99  Symptom Count 9 99   Total Score 62 99        Symptom Count 17 99              Client indicated that the reported symptoms resulted in impaired functioning in school, home, work and social relationships Overall, the results suggest the client " "experienced  Severe symptoms of ADHD as a child.     Childhood Symptoms Scale--Other Report:  Client's Mother completed the collateral report inventory of childhood symptoms.     BAARS-IV Child OR Raw Score    Inattention Total  24 Symptom Count 6   Impulsivity Total 25 Symptom Count 6   Total Score  49     Symptom Count 12           Based on the collateral contact's recollection of client's childhood symptoms. The collateral contact indicated the client demonstrates impaired functioning in school, home, work and social relationshipsThe collateral- and self-report scores are significantly different.The collateral contact's scores were generally lower than the client's report.                          Derik Functional Impairment Scale: Self and Other Reports (BFIS)  The BFIS is used to assess an individuals' psychosocial impairment in major life/daily activities that may be due to a mental health disorder. This assessment measure includes self and collateral rating scales. Self-report scores are reported as percentiles. Scores at the 76th-83rd percentile are considered marginal, scores at the 84th-92nd percentile are considered borderline, scores at the 93rd-95th percentile are considered mild, scores at the 96th-98th percentile are considered moderate, and those at the 99th percentile are considered severe.Collateral or \"other\" rating scales are reported as number of symptoms observed in comparison to those reported by the client. Norms and percentile scores are not available for collateral reports.     Results indicate the client identified impairment (scores at or greater than 93rd percentile) in the following areas: home-family, home-chores, social-strangers, social-friends, community activities, marriage/cohabiting/dating, money management, sexual relations, daily responsibilities, self-care routines, health maintenance and childrearing The client's Mean Impairment Score was 7.67 (99th percentile) indicating " "the client is reporting Severe impairment in functioning across domains. Client's Spouse completed the collateral rating scale, which indicated discrepant results. The collateral contact's scores were generally lower than the client's report.     Derik Deficits in Executive Functioning Scale (BDEFS)  The BDEFS is a measure used for evaluating dimensions of adult executive functioning in daily life.This assessment measure includes self and collateral rating scales. Self-report scores are reported as percentiles. Scores at the 76th-83rd percentile are considered marginal, scores at the 84th-92nd percentile are considered borderline, scores at the 93rd-95th percentile are considered mild, scores at the 96th-98th percentile are considered moderate, and those at the 99th percentile are considered severe.Collateral or \"other\" rating scales are reported as number of symptoms observed in comparison to those reported by the client. Norms and percentile scores are not available for collateral reports.     BDEFS-LF SR  Raw Score %tile   Time Management Section 1 Total  62 98   Org/problem solving Section 2 Total 66 97   Self Restraint Section 3 Total 46 97   Self Motivation Section 4 Total 24 96   Emotional regulation Section 5 Total 35 95   Total EF Summary Score 233 98   EF Symptom Count 53    ADHD-EF Index Score 30 97         These scores suggest the client has Moderate deficits in executive functioning. These deficits are likely to be due to ADHD.     Client's Spouse completed the collateral rating scale, which indicated discrepant results. The collateral contact's scores were generally lower than the client's report.    Patient Health Questionnaire- 9 (PHQ-9)   This questionnaire is designed to assess for depression in adults.  Based on the score, she is experiencing moderate symptoms of depression. Client identified the following symptoms of depression: depressed mood, lack of interest, difficulty with sleep, poor " appetite or overeating, feeling bad about self, poor concentration and restlessness or lethargy.       Generalized Anxiety Disorder Questionnaire (AMY-7)  This questionnaire is designed to assess for anxiety in adults.  Based on the score, she is experiencing moderate symptoms of anxiety. Client identified the following symptoms of anxiety: feeling on edge/nervous/anxious, difficulty controlling worry, worrying about many different things, trouble relaxing, being restless, becoming easily annoyed or irritable and feeling something awful might happen      Summary (based on clinical interview, review of records, test results):    Current Symptoms:  Inattention:Severe   Hyperactivity:Severe   Childhood Symptoms:Severe   Functional impairment 12/15 Domains. Severity, Severe  Deficits in Executive Functioning: Moderate   Depression: moderate   Anxiety: moderate     ADHD is a neurodevelopmental disorder. As such, to qualify for a diagnosis of ADHD an individual must show some symptoms of the disorder before the age of 12; these symptoms must currently be present to a degree that is inconsistent with their developmental level; the symptoms must negatively impact the individual;  they must be present for more than six months;and  they must occur in multiple areas of the individuals life (e.g. Home and school).     In this case the client's family history, personal narrative, self-report, and collateral reports are consistent with a case of combined type ADHD.  The fact that this has not been addressed before likely accounts for the persistent depression and anxiety she has been experiencing.  With appropriate medications and self-help for ADHD she can expect to have a decrease in the level of depression and anxiety and an increase in the level of functioning she is capable of.      DSM5 Diagnoses: (Sustained by DSM5 Criteria Listed Above)  Diagnoses: Attention-Deficit/Hyperactivity Disorder  314.01 (F90.2) Combined  presentation;  Psychosocial & Contextual Factors: associated depression and anxiety      Recommendations:    Schedule an appointment with your physician to discuss a medication evaluation., Access resources through websites, books, and articles such as those provided  in the handout., Consider working with an ADHD  or individual therapist to learn skills to  assist with symptom management, as well as ways to improve relationships,  etc that may have been impacted by your symptoms. , Consider attending workshops or support groups through [employer] ([email or web address]). and Schedule a follow-up appointment with me in about six weeks to review symptoms, treatment involvement, and struggles and/or successes.    Matthias Guzman

## 2022-01-24 ENCOUNTER — VIRTUAL VISIT (OUTPATIENT)
Dept: PSYCHOLOGY | Facility: CLINIC | Age: 42
End: 2022-01-24
Payer: COMMERCIAL

## 2022-01-24 DIAGNOSIS — F90.0 ADHD (ATTENTION DEFICIT HYPERACTIVITY DISORDER), INATTENTIVE TYPE: Primary | ICD-10-CM

## 2022-01-24 PROCEDURE — 90832 PSYTX W PT 30 MINUTES: CPT | Mod: GT | Performed by: PSYCHOLOGIST

## 2022-01-24 ASSESSMENT — PATIENT HEALTH QUESTIONNAIRE - PHQ9
SUM OF ALL RESPONSES TO PHQ QUESTIONS 1-9: 9
10. IF YOU CHECKED OFF ANY PROBLEMS, HOW DIFFICULT HAVE THESE PROBLEMS MADE IT FOR YOU TO DO YOUR WORK, TAKE CARE OF THINGS AT HOME, OR GET ALONG WITH OTHER PEOPLE: NOT DIFFICULT AT ALL
SUM OF ALL RESPONSES TO PHQ QUESTIONS 1-9: 9

## 2022-01-24 NOTE — PROGRESS NOTES
Progress Note  Disclaimer: Voice recognition software was used to generate this note. As a result, wrong word or 'sound-a-like' substitutions may have occurred due to the inherent limitations of voice recognition software. There may be errors in the script that have gone undetected. Please consider this when interpreting information found in this chart.       Client Name: Ninoska Cottrell  Date: 1/24/22      Service Type: Individual      Session Start Time: 4:00 PM  session End Time: 4:30 PM     Session Length: 30    Session #: 4    Attendees: Client attended alone    Service Modality:  Video Visit:      Provider verified identity through the following two step process.  Patient provided:  Patient is known previously to provider    Telemedicine Visit: The patient's condition can be safely assessed and treated via synchronous audio and visual telemedicine encounter.      Reason for Telemedicine Visit: Services only offered telehealth    Originating Site (Patient Location): Patient's home    Distant Site (Provider Location): Provider Remote Setting- Home Office    Consent:  The patient/guardian has verbally consented to: the potential risks and benefits of telemedicine (video visit) versus in person care; bill my insurance or make self-payment for services provided; and responsibility for payment of non-covered services.     Patient would like the video invitation sent by:  My Chart    Mode of Communication:  Video Conference via [employer]    As the provider I attest to compliance with applicable laws and regulations related to telemedicine.     Treatment Plan Last Reviewed: Today  PHQ-9 / AMY-7 : See Flowsheets    DATA  Interactive Complexity: NO  Crisis: NO       DATA       Progress Since Last Session (Related to Symptoms / Goals / Homework):   Symptoms: No change stable    Homework: Achieved / completed to satisfaction      Episode of Care Goals: Satisfactory progress - ACTION  (Actively working towards change); Intervened by reinforcing change plan / affirming steps taken     Current / Ongoing Stressors and Concerns:   ADHD 1 month follow-up session. Has not had medication evaluation yet.      Treatment Objective(s) Addressed in This Session:   Reviewed results of testing, provided ADHD Psychoeducation tips and resources, encouraged client to make appointment for medication evaluation.       Intervention:   psychoeducation regarding ADHD        ASSESSMENT: Current Emotional / Mental Status (status of significant symptoms):   Risk status (Self / Other harm or suicidal ideation)   Client denies current fears or concerns for personal safety.   Client denies current or recent suicidal ideation or behaviors.   Client denies current or recent homicidal ideation or behaviors.   Client denies current or recent self injurious behavior or ideation.   Client denies other safety concerns.   Recommended that patient call 911 or go to the local ED should there be a change in any of these risk factors.     Appearance:   Appropriate    Eye Contact:   Good    Psychomotor Behavior: Normal    Attitude:   Cooperative    Orientation:   All   Speech    Rate / Production: Normal     Volume:  Normal    Mood:    Normal   Affect:    Appropriate    Thought Content:  Clear    Thought Form:  Coherent  Logical    Insight:    Good      Medication Review:   No current psychiatric medications prescribed     Medication Compliance:   NA     Changes in Health Issues:   None reported     Chemical Use Review:   Substance Use: Chemical use reviewed, no active concerns identified      Tobacco Use: No current tobacco use.       Collateral Reports Completed:   Not Applicable    PLAN: (Client Tasks / Therapist Tasks / Other)  See [facility] extended documentation for testing report.  Client to contact PCP for medication evaluation.  Client was given ADHD resource packet and reviewed reading list.  8-week follow-up set.      Matthias CHAND  Thomas    Answers for HPI/ROS submitted by the patient on 1/24/2022  If you checked off any problems, how difficult have these problems made it for you to do your work, take care of things at home, or get along with other people?: Not difficult at all  PHQ9 TOTAL SCORE: 9

## 2022-01-25 ASSESSMENT — PATIENT HEALTH QUESTIONNAIRE - PHQ9: SUM OF ALL RESPONSES TO PHQ QUESTIONS 1-9: 9

## 2022-04-08 ENCOUNTER — HOSPITAL ENCOUNTER (OUTPATIENT)
Dept: RESEARCH | Facility: CLINIC | Age: 42
Discharge: HOME | End: 2022-04-08
Attending: INTERNAL MEDICINE
Payer: COMMERCIAL

## 2022-04-08 ENCOUNTER — TELEPHONE (OUTPATIENT)
Dept: ENDOCRINOLOGY | Facility: CLINIC | Age: 42
End: 2022-04-08

## 2022-04-08 ENCOUNTER — LAB REQUISITION (OUTPATIENT)
Dept: LAB | Facility: CLINIC | Age: 42
End: 2022-04-08

## 2022-04-08 VITALS
HEART RATE: 68 BPM | WEIGHT: 275.57 LBS | BODY MASS INDEX: 44.29 KG/M2 | SYSTOLIC BLOOD PRESSURE: 121 MMHG | DIASTOLIC BLOOD PRESSURE: 78 MMHG | HEIGHT: 66 IN

## 2022-04-08 LAB
ALT SERPL W P-5'-P-CCNC: 22 U/L (ref 0–50)
AST SERPL W P-5'-P-CCNC: 13 U/L (ref 0–45)
CHOLEST SERPL-MCNC: 189 MG/DL
CREAT SERPL-MCNC: 0.69 MG/DL (ref 0.52–1.04)
FASTING STATUS PATIENT QL REPORTED: ABNORMAL
FASTING STATUS PATIENT QL REPORTED: ABNORMAL
GFR SERPL CREATININE-BSD FRML MDRD: >90 ML/MIN/1.73M2
GLUCOSE BLD-MCNC: 105 MG/DL (ref 70–99)
HBA1C MFR BLD: 5.1 % (ref 0–5.6)
HDLC SERPL-MCNC: 37 MG/DL
HGB BLD-MCNC: 13.9 G/DL (ref 11.7–15.7)
HOLD SPECIMEN: NORMAL
INSULIN SERPL-ACNC: 29.1 MU/L (ref 3–25)
LDLC SERPL CALC-MCNC: 129 MG/DL
NONHDLC SERPL-MCNC: 152 MG/DL
TRIGL SERPL-MCNC: 114 MG/DL
TSH SERPL DL<=0.005 MIU/L-ACNC: 0.92 MU/L (ref 0.4–4)

## 2022-04-08 PROCEDURE — 83036 HEMOGLOBIN GLYCOSYLATED A1C: CPT | Performed by: INTERNAL MEDICINE

## 2022-04-08 PROCEDURE — 510N000009 HC RESEARCH FACILITY, PER 15 MIN

## 2022-04-08 PROCEDURE — 80061 LIPID PANEL: CPT | Performed by: INTERNAL MEDICINE

## 2022-04-08 PROCEDURE — 82565 ASSAY OF CREATININE: CPT | Performed by: INTERNAL MEDICINE

## 2022-04-08 PROCEDURE — 84450 TRANSFERASE (AST) (SGOT): CPT | Performed by: INTERNAL MEDICINE

## 2022-04-08 PROCEDURE — 85018 HEMOGLOBIN: CPT | Performed by: INTERNAL MEDICINE

## 2022-04-08 PROCEDURE — 300N000003 LAB BILL ONLY: RESEARCH SPECIMEN PROCESSING, SIMPLE: Performed by: INTERNAL MEDICINE

## 2022-04-08 PROCEDURE — 82947 ASSAY GLUCOSE BLOOD QUANT: CPT | Performed by: INTERNAL MEDICINE

## 2022-04-08 PROCEDURE — 510N000017 HC CRU PATIENT CARE, PER 15 MIN

## 2022-04-08 PROCEDURE — 84460 ALANINE AMINO (ALT) (SGPT): CPT | Performed by: INTERNAL MEDICINE

## 2022-04-08 PROCEDURE — 300N000003 HC RESEARCH SPECIMEN PROCESSING, SIMPLE

## 2022-04-08 PROCEDURE — 84443 ASSAY THYROID STIM HORMONE: CPT | Performed by: INTERNAL MEDICINE

## 2022-04-08 PROCEDURE — 83525 ASSAY OF INSULIN: CPT | Performed by: INTERNAL MEDICINE

## 2022-04-08 NOTE — LETTER
Patient:  Ninoska Cottrell  :   1980  MRN:     [id number]        Ms.Brandy EULALIA Cottrell  [address] [place] [id number]        2022    Dear Ninoska    Here are your labs. Other than your insulin  and sugar being a bit higher (keep in mind that the 3-month measure of your blood sugar, the hemoglobin A1c is normal), everything else looked really normal.  We look forward to you continuing with our study.  At the end of the study, we will see if these values might change.     Regards    Anjana Martin MD, MS    Resulted Orders   Insulin level   Result Value Ref Range    Insulin 29.1 (H) 3.0 - 25.0 mU/L   AST   Result Value Ref Range    AST 13 0 - 45 U/L   ALT   Result Value Ref Range    ALT 22 0 - 50 U/L   Creatinine   Result Value Ref Range    Creatinine 0.69 0.52 - 1.04 mg/dL    GFR Estimate >90 >60 mL/min/1.73m2      Comment:      Effective 2021 eGFRcr in adults is calculated using the  CKD-EPI creatinine equation which includes age and gender (Donell villalobos al., NEJ, DOI: 10.1056/YVXPlq4215926)   TSH   Result Value Ref Range    TSH 0.92 0.40 - 4.00 mU/L   Lipid Profile   Result Value Ref Range    Cholesterol 189 <200 mg/dL    Triglycerides 114 <150 mg/dL    Direct Measure HDL 37 (L) >=50 mg/dL    LDL Cholesterol Calculated 129 (H) <=100 mg/dL    Non HDL Cholesterol 152 (H) <130 mg/dL    Patient Fasting > 8hrs? Unknown     Narrative    Cholesterol  Desirable:  <200 mg/dL    Triglycerides  Normal:  Less than 150 mg/dL  Borderline High:  150-199 mg/dL  High:  200-499 mg/dL  Very High:  Greater than or equal to 500 mg/dL    Direct Measure HDL  Female:  Greater than or equal to 50 mg/dL   Male:  Greater than or equal to 40 mg/dL    LDL Cholesterol  Desirable:  <100mg/dL  Above Desirable:  100-129 mg/dL   Borderline High:  130-159 mg/dL   High:  160-189 mg/dL   Very High:  >= 190 mg/dL    Non HDL Cholesterol  Desirable:  130 mg/dL  Above Desirable:  130-159 mg/dL  Borderline High:  160-189 mg/dL  High:   190-219 mg/dL  Very High:  Greater than or equal to 220 mg/dL   Hemoglobin   Result Value Ref Range    Hemoglobin 13.9 11.7 - 15.7 g/dL   Hemoglobin A1c   Result Value Ref Range    Hemoglobin A1C 5.1 0.0 - 5.6 %      Comment:      Normal <5.7%   Prediabetes 5.7-6.4%    Diabetes 6.5% or higher     Note: Adopted from ADA consensus guidelines.   Glucose   Result Value Ref Range    Glucose 105 (H) 70 - 99 mg/dL    Patient Fasting > 8hrs? Unknown        Anjana Martin MD

## 2022-04-08 NOTE — ADDENDUM NOTE
Encounter addended by: Stephon Lane on: 4/8/2022 4:09 PM   Actions taken: Charge Capture section accepted

## 2022-04-08 NOTE — RESULT ENCOUNTER NOTE
Dear Ninoska    Here are your labs. Other than your insulin  and sugar being a bit higher (keep in mind that the 3-month measure of your blood sugar, the hemoglobin A1c is normal), everything else looked really normal.  We look forward to you continuing with our study.  At the end of the study, we will see if these values might change.     Regards    Anjana Martin MD, MS

## 2022-04-08 NOTE — TELEPHONE ENCOUNTER
Pt qualifies for our study and she is agreeable to continuing    -  Dear Ninoska    Here are your labs. Other than your insulin  and sugar being a bit higher (keep in mind that the 3-month measure of your blood sugar, the hemoglobin A1c is normal), everything else looked really normal.  We look forward to you continuing with our study.  At the end of the study, we will see if these values might change.     Regards    Anjana Martin MD, MS    Lab Requisition on 04/08/2022   Component Date Value Ref Range Status     Insulin 04/08/2022 29.1 (A) 3.0 - 25.0 mU/L Final     AST 04/08/2022 13  0 - 45 U/L Final     ALT 04/08/2022 22  0 - 50 U/L Final     Creatinine 04/08/2022 0.69  0.52 - 1.04 mg/dL Final     GFR Estimate 04/08/2022 >90  >60 mL/min/1.73m2 Final    Effective December 21, 2021 eGFRcr in adults is calculated using the 2021 CKD-EPI creatinine equation which includes age and gender (Donell et al., NE, DOI: 10.1056/OZGPwk9559206)     TSH 04/08/2022 0.92  0.40 - 4.00 mU/L Final     Cholesterol 04/08/2022 189  <200 mg/dL Final     Triglycerides 04/08/2022 114  <150 mg/dL Final     Direct Measure HDL 04/08/2022 37 (A) >=50 mg/dL Final     LDL Cholesterol Calculated 04/08/2022 129 (A) <=100 mg/dL Final     Non HDL Cholesterol 04/08/2022 152 (A) <130 mg/dL Final     Patient Fasting > 8hrs? 04/08/2022 Unknown   Final     Hemoglobin 04/08/2022 13.9  11.7 - 15.7 g/dL Final     Hemoglobin A1C 04/08/2022 5.1  0.0 - 5.6 % Final    Normal <5.7%   Prediabetes 5.7-6.4%    Diabetes 6.5% or higher     Note: Adopted from ADA consensus guidelines.     Glucose 04/08/2022 105 (A) 70 - 99 mg/dL Final     Patient Fasting > 8hrs? 04/08/2022 Unknown   Final     Hold Specimen 04/08/2022 [facility]   Final

## 2022-05-15 ENCOUNTER — APPOINTMENT (OUTPATIENT)
Dept: CT IMAGING | Facility: CLINIC | Age: 42
DRG: 149 | End: 2022-05-15
Attending: EMERGENCY MEDICINE
Payer: COMMERCIAL

## 2022-05-15 ENCOUNTER — HOSPITAL ENCOUNTER (INPATIENT)
Facility: CLINIC | Age: 42
LOS: 2 days | Discharge: HOME | DRG: 149 | End: 2022-05-17
Attending: EMERGENCY MEDICINE | Admitting: INTERNAL MEDICINE
Payer: COMMERCIAL

## 2022-05-15 DIAGNOSIS — R29.898 RIGHT LEG WEAKNESS: ICD-10-CM

## 2022-05-15 DIAGNOSIS — R13.10 DYSPHAGIA, UNSPECIFIED TYPE: ICD-10-CM

## 2022-05-15 DIAGNOSIS — R29.818 ACUTE FOCAL NEUROLOGICAL DEFICIT, ONSET WITHIN 3 HOURS: ICD-10-CM

## 2022-05-15 DIAGNOSIS — R27.0 ATAXIA: ICD-10-CM

## 2022-05-15 LAB
ALBUMIN SERPL-MCNC: 3.5 G/DL (ref 3.4–5)
ALP SERPL-CCNC: 99 U/L (ref 40–150)
ALT SERPL W P-5'-P-CCNC: 32 U/L (ref 0–50)
ANION GAP SERPL CALCULATED.3IONS-SCNC: 5 MMOL/L (ref 3–14)
APTT PPP: 30 SECONDS (ref 22–38)
AST SERPL W P-5'-P-CCNC: 18 U/L (ref 0–45)
BASOPHILS # BLD AUTO: 0.1 10E3/UL (ref 0–0.2)
BASOPHILS NFR BLD AUTO: 1 %
BILIRUB SERPL-MCNC: 0.7 MG/DL (ref 0.2–1.3)
BUN SERPL-MCNC: 6 MG/DL (ref 7–30)
CALCIUM SERPL-MCNC: 8.4 MG/DL (ref 8.5–10.1)
CHLORIDE BLD-SCNC: 108 MMOL/L (ref 94–109)
CO2 SERPL-SCNC: 27 MMOL/L (ref 20–32)
CREAT SERPL-MCNC: 0.73 MG/DL (ref 0.52–1.04)
EOSINOPHIL # BLD AUTO: 0.2 10E3/UL (ref 0–0.7)
EOSINOPHIL NFR BLD AUTO: 2 %
ERYTHROCYTE [DISTWIDTH] IN BLOOD BY AUTOMATED COUNT: 12.7 % (ref 10–15)
ETHANOL SERPL-MCNC: <0.01 G/DL
GFR SERPL CREATININE-BSD FRML MDRD: >90 ML/MIN/1.73M2
GLUCOSE BLD-MCNC: 93 MG/DL (ref 70–99)
GLUCOSE BLDC GLUCOMTR-MCNC: 98 MG/DL (ref 70–99)
HCG SERPL QL: NEGATIVE
HCT VFR BLD AUTO: 40.9 % (ref 35–47)
HGB BLD-MCNC: 13.5 G/DL (ref 11.7–15.7)
IMM GRANULOCYTES # BLD: 0 10E3/UL
IMM GRANULOCYTES NFR BLD: 0 %
INR PPP: 0.98 (ref 0.85–1.15)
LYMPHOCYTES # BLD AUTO: 2 10E3/UL (ref 0.8–5.3)
LYMPHOCYTES NFR BLD AUTO: 21 %
MCH RBC QN AUTO: 29.2 PG (ref 26.5–33)
MCHC RBC AUTO-ENTMCNC: 33 G/DL (ref 31.5–36.5)
MCV RBC AUTO: 89 FL (ref 78–100)
MONOCYTES # BLD AUTO: 0.6 10E3/UL (ref 0–1.3)
MONOCYTES NFR BLD AUTO: 7 %
NEUTROPHILS # BLD AUTO: 6.6 10E3/UL (ref 1.6–8.3)
NEUTROPHILS NFR BLD AUTO: 69 %
NRBC # BLD AUTO: 0 10E3/UL
NRBC BLD AUTO-RTO: 0 /100
PLATELET # BLD AUTO: 286 10E3/UL (ref 150–450)
POTASSIUM BLD-SCNC: 3.6 MMOL/L (ref 3.4–5.3)
PROT SERPL-MCNC: 6.9 G/DL (ref 6.8–8.8)
RBC # BLD AUTO: 4.62 10E6/UL (ref 3.8–5.2)
SARS-COV-2 RNA RESP QL NAA+PROBE: NEGATIVE
SODIUM SERPL-SCNC: 140 MMOL/L (ref 133–144)
TROPONIN I SERPL HS-MCNC: 3 NG/L
WBC # BLD AUTO: 9.4 10E3/UL (ref 4–11)

## 2022-05-15 PROCEDURE — 36415 COLL VENOUS BLD VENIPUNCTURE: CPT | Performed by: EMERGENCY MEDICINE

## 2022-05-15 PROCEDURE — 96374 THER/PROPH/DIAG INJ IV PUSH: CPT | Mod: 59

## 2022-05-15 PROCEDURE — 84484 ASSAY OF TROPONIN QUANT: CPT | Performed by: EMERGENCY MEDICINE

## 2022-05-15 PROCEDURE — 99223 1ST HOSP IP/OBS HIGH 75: CPT | Mod: AI | Performed by: INTERNAL MEDICINE

## 2022-05-15 PROCEDURE — 250N000011 HC RX IP 250 OP 636: Performed by: EMERGENCY MEDICINE

## 2022-05-15 PROCEDURE — 250N000011 HC RX IP 250 OP 636: Performed by: STUDENT IN AN ORGANIZED HEALTH CARE EDUCATION/TRAINING PROGRAM

## 2022-05-15 PROCEDURE — 96361 HYDRATE IV INFUSION ADD-ON: CPT

## 2022-05-15 PROCEDURE — 200N000001 HC R&B ICU

## 2022-05-15 PROCEDURE — 82077 ASSAY SPEC XCP UR&BREATH IA: CPT | Performed by: EMERGENCY MEDICINE

## 2022-05-15 PROCEDURE — 250N000009 HC RX 250: Performed by: EMERGENCY MEDICINE

## 2022-05-15 PROCEDURE — 84703 CHORIONIC GONADOTROPIN ASSAY: CPT | Performed by: EMERGENCY MEDICINE

## 2022-05-15 PROCEDURE — 80061 LIPID PANEL: CPT | Performed by: INTERNAL MEDICINE

## 2022-05-15 PROCEDURE — 85610 PROTHROMBIN TIME: CPT | Performed by: EMERGENCY MEDICINE

## 2022-05-15 PROCEDURE — 51701 INSERT BLADDER CATHETER: CPT

## 2022-05-15 PROCEDURE — G0508 CRIT CARE TELEHEA CONSULT 60: HCPCS | Performed by: STUDENT IN AN ORGANIZED HEALTH CARE EDUCATION/TRAINING PROGRAM

## 2022-05-15 PROCEDURE — 258N000003 HC RX IP 258 OP 636: Performed by: STUDENT IN AN ORGANIZED HEALTH CARE EDUCATION/TRAINING PROGRAM

## 2022-05-15 PROCEDURE — 70496 CT ANGIOGRAPHY HEAD: CPT

## 2022-05-15 PROCEDURE — 99291 CRITICAL CARE FIRST HOUR: CPT | Mod: 25

## 2022-05-15 PROCEDURE — 85730 THROMBOPLASTIN TIME PARTIAL: CPT | Performed by: EMERGENCY MEDICINE

## 2022-05-15 PROCEDURE — 80053 COMPREHEN METABOLIC PANEL: CPT | Performed by: EMERGENCY MEDICINE

## 2022-05-15 PROCEDURE — 70450 CT HEAD/BRAIN W/O DYE: CPT

## 2022-05-15 PROCEDURE — C9803 HOPD COVID-19 SPEC COLLECT: HCPCS

## 2022-05-15 PROCEDURE — 70498 CT ANGIOGRAPHY NECK: CPT

## 2022-05-15 PROCEDURE — 99292 CRITICAL CARE ADDL 30 MIN: CPT

## 2022-05-15 PROCEDURE — 85048 AUTOMATED LEUKOCYTE COUNT: CPT | Performed by: EMERGENCY MEDICINE

## 2022-05-15 PROCEDURE — 3E03317 INTRODUCTION OF OTHER THROMBOLYTIC INTO PERIPHERAL VEIN, PERCUTANEOUS APPROACH: ICD-10-PCS | Performed by: EMERGENCY MEDICINE

## 2022-05-15 PROCEDURE — U0005 INFEC AGEN DETEC AMPLI PROBE: HCPCS | Performed by: EMERGENCY MEDICINE

## 2022-05-15 RX ORDER — SODIUM CHLORIDE 9 MG/ML
INJECTION, SOLUTION INTRAVENOUS CONTINUOUS PRN
Status: DISCONTINUED | OUTPATIENT
Start: 2022-05-15 | End: 2022-05-17 | Stop reason: HOSPADM

## 2022-05-15 RX ORDER — HYDRALAZINE HYDROCHLORIDE 20 MG/ML
10 INJECTION INTRAMUSCULAR; INTRAVENOUS EVERY 10 MIN PRN
Status: DISCONTINUED | OUTPATIENT
Start: 2022-05-15 | End: 2022-05-17 | Stop reason: HOSPADM

## 2022-05-15 RX ORDER — AZELASTINE 1 MG/ML
1 SPRAY, METERED NASAL PRN
COMMUNITY

## 2022-05-15 RX ORDER — IOPAMIDOL 755 MG/ML
75 INJECTION, SOLUTION INTRAVASCULAR ONCE
Status: COMPLETED | OUTPATIENT
Start: 2022-05-15 | End: 2022-05-15

## 2022-05-15 RX ORDER — METOPROLOL SUCCINATE 50 MG/1
50 TABLET, EXTENDED RELEASE ORAL DAILY
COMMUNITY
End: 2023-02-28

## 2022-05-15 RX ORDER — DIPHENHYDRAMINE HYDROCHLORIDE 50 MG/ML
50 INJECTION INTRAMUSCULAR; INTRAVENOUS ONCE
Status: DISCONTINUED | OUTPATIENT
Start: 2022-05-15 | End: 2022-05-17 | Stop reason: HOSPADM

## 2022-05-15 RX ORDER — FEXOFENADINE HCL 180 MG/1
180 TABLET ORAL DAILY
COMMUNITY

## 2022-05-15 RX ORDER — LIDOCAINE 40 MG/G
CREAM TOPICAL
Status: DISCONTINUED | OUTPATIENT
Start: 2022-05-15 | End: 2022-05-17 | Stop reason: HOSPADM

## 2022-05-15 RX ORDER — LABETALOL HYDROCHLORIDE 5 MG/ML
10 INJECTION, SOLUTION INTRAVENOUS EVERY 10 MIN PRN
Status: DISCONTINUED | OUTPATIENT
Start: 2022-05-15 | End: 2022-05-17 | Stop reason: HOSPADM

## 2022-05-15 RX ADMIN — IOPAMIDOL 75 ML: 755 INJECTION, SOLUTION INTRAVENOUS at 20:58

## 2022-05-15 RX ADMIN — SODIUM CHLORIDE 100 ML: 900 INJECTION INTRAVENOUS at 20:58

## 2022-05-15 RX ADMIN — Medication 25 MG: at 21:52

## 2022-05-15 RX ADMIN — SODIUM CHLORIDE: 9 INJECTION, SOLUTION INTRAVENOUS at 23:30

## 2022-05-15 RX ADMIN — SODIUM CHLORIDE: 9 INJECTION, SOLUTION INTRAVENOUS at 22:16

## 2022-05-15 ASSESSMENT — ACTIVITIES OF DAILY LIVING (ADL): ADLS_ACUITY_SCORE: 37

## 2022-05-15 ASSESSMENT — VISUAL ACUITY
OU: GLASSES
OU: GLASSES

## 2022-05-16 ENCOUNTER — APPOINTMENT (OUTPATIENT)
Dept: CARDIOLOGY | Facility: CLINIC | Age: 42
DRG: 149 | End: 2022-05-16
Attending: INTERNAL MEDICINE
Payer: COMMERCIAL

## 2022-05-16 ENCOUNTER — APPOINTMENT (OUTPATIENT)
Dept: PHYSICAL THERAPY | Facility: CLINIC | Age: 42
DRG: 149 | End: 2022-05-16
Attending: INTERNAL MEDICINE
Payer: COMMERCIAL

## 2022-05-16 ENCOUNTER — APPOINTMENT (OUTPATIENT)
Dept: OCCUPATIONAL THERAPY | Facility: CLINIC | Age: 42
DRG: 149 | End: 2022-05-16
Attending: INTERNAL MEDICINE
Payer: COMMERCIAL

## 2022-05-16 ENCOUNTER — APPOINTMENT (OUTPATIENT)
Dept: MRI IMAGING | Facility: CLINIC | Age: 42
DRG: 149 | End: 2022-05-16
Attending: INTERNAL MEDICINE
Payer: COMMERCIAL

## 2022-05-16 LAB
ANION GAP SERPL CALCULATED.3IONS-SCNC: 6 MMOL/L (ref 3–14)
APTT PPP: 31 SECONDS (ref 22–38)
BUN SERPL-MCNC: 5 MG/DL (ref 7–30)
CALCIUM SERPL-MCNC: 8.3 MG/DL (ref 8.5–10.1)
CHLORIDE BLD-SCNC: 107 MMOL/L (ref 94–109)
CHOLEST SERPL-MCNC: 186 MG/DL
CO2 SERPL-SCNC: 27 MMOL/L (ref 20–32)
CREAT SERPL-MCNC: 0.7 MG/DL (ref 0.52–1.04)
ERYTHROCYTE [DISTWIDTH] IN BLOOD BY AUTOMATED COUNT: 12.7 % (ref 10–15)
GFR SERPL CREATININE-BSD FRML MDRD: >90 ML/MIN/1.73M2
GLUCOSE BLD-MCNC: 100 MG/DL (ref 70–99)
GLUCOSE BLDC GLUCOMTR-MCNC: 88 MG/DL (ref 70–99)
HBA1C MFR BLD: 5.2 % (ref 0–5.6)
HCT VFR BLD AUTO: 40.7 % (ref 35–47)
HDLC SERPL-MCNC: 40 MG/DL
HGB BLD-MCNC: 13.4 G/DL (ref 11.7–15.7)
INR PPP: 1.03 (ref 0.85–1.15)
LDLC SERPL CALC-MCNC: 123 MG/DL
LVEF ECHO: NORMAL
MCH RBC QN AUTO: 29.1 PG (ref 26.5–33)
MCHC RBC AUTO-ENTMCNC: 32.9 G/DL (ref 31.5–36.5)
MCV RBC AUTO: 89 FL (ref 78–100)
NONHDLC SERPL-MCNC: 146 MG/DL
PLATELET # BLD AUTO: 239 10E3/UL (ref 150–450)
POTASSIUM BLD-SCNC: 4 MMOL/L (ref 3.4–5.3)
RBC # BLD AUTO: 4.6 10E6/UL (ref 3.8–5.2)
SODIUM SERPL-SCNC: 140 MMOL/L (ref 133–144)
TRIGL SERPL-MCNC: 114 MG/DL
TROPONIN I SERPL HS-MCNC: 4 NG/L
WBC # BLD AUTO: 6.5 10E3/UL (ref 4–11)

## 2022-05-16 PROCEDURE — 97530 THERAPEUTIC ACTIVITIES: CPT | Mod: GP | Performed by: PHYSICAL THERAPIST

## 2022-05-16 PROCEDURE — 80048 BASIC METABOLIC PNL TOTAL CA: CPT | Performed by: INTERNAL MEDICINE

## 2022-05-16 PROCEDURE — 250N000013 HC RX MED GY IP 250 OP 250 PS 637: Performed by: HOSPITALIST

## 2022-05-16 PROCEDURE — 97165 OT EVAL LOW COMPLEX 30 MIN: CPT | Mod: GO

## 2022-05-16 PROCEDURE — 97116 GAIT TRAINING THERAPY: CPT | Mod: GP | Performed by: PHYSICAL THERAPIST

## 2022-05-16 PROCEDURE — 85027 COMPLETE CBC AUTOMATED: CPT | Performed by: INTERNAL MEDICINE

## 2022-05-16 PROCEDURE — 36415 COLL VENOUS BLD VENIPUNCTURE: CPT | Performed by: INTERNAL MEDICINE

## 2022-05-16 PROCEDURE — 99223 1ST HOSP IP/OBS HIGH 75: CPT | Mod: GC | Performed by: PSYCHIATRY & NEUROLOGY

## 2022-05-16 PROCEDURE — 999N000208 ECHOCARDIOGRAM COMPLETE

## 2022-05-16 PROCEDURE — 83036 HEMOGLOBIN GLYCOSYLATED A1C: CPT | Performed by: STUDENT IN AN ORGANIZED HEALTH CARE EDUCATION/TRAINING PROGRAM

## 2022-05-16 PROCEDURE — 99232 SBSQ HOSP IP/OBS MODERATE 35: CPT | Performed by: HOSPITALIST

## 2022-05-16 PROCEDURE — 93306 TTE W/DOPPLER COMPLETE: CPT | Mod: 26 | Performed by: INTERNAL MEDICINE

## 2022-05-16 PROCEDURE — 85730 THROMBOPLASTIN TIME PARTIAL: CPT | Performed by: INTERNAL MEDICINE

## 2022-05-16 PROCEDURE — 97530 THERAPEUTIC ACTIVITIES: CPT | Mod: GO

## 2022-05-16 PROCEDURE — 999N000226 HC STATISTIC SLP IP EVAL DEFER: Performed by: SPEECH-LANGUAGE PATHOLOGIST

## 2022-05-16 PROCEDURE — 120N000001 HC R&B MED SURG/OB

## 2022-05-16 PROCEDURE — 70551 MRI BRAIN STEM W/O DYE: CPT

## 2022-05-16 PROCEDURE — 97535 SELF CARE MNGMENT TRAINING: CPT | Mod: GO

## 2022-05-16 PROCEDURE — 99356 PR PROLONGED SERV,INPATIENT,1ST HR: CPT | Mod: GC | Performed by: PSYCHIATRY & NEUROLOGY

## 2022-05-16 PROCEDURE — 255N000002 HC RX 255 OP 636: Performed by: INTERNAL MEDICINE

## 2022-05-16 PROCEDURE — 84484 ASSAY OF TROPONIN QUANT: CPT | Performed by: STUDENT IN AN ORGANIZED HEALTH CARE EDUCATION/TRAINING PROGRAM

## 2022-05-16 PROCEDURE — 85610 PROTHROMBIN TIME: CPT | Performed by: INTERNAL MEDICINE

## 2022-05-16 RX ORDER — ATORVASTATIN CALCIUM 80 MG/1
80 TABLET, FILM COATED ORAL EVERY EVENING
Status: DISCONTINUED | OUTPATIENT
Start: 2022-05-16 | End: 2022-05-17 | Stop reason: HOSPADM

## 2022-05-16 RX ORDER — METOPROLOL SUCCINATE 50 MG/1
50 TABLET, EXTENDED RELEASE ORAL DAILY
Status: DISCONTINUED | OUTPATIENT
Start: 2022-05-17 | End: 2022-05-17 | Stop reason: HOSPADM

## 2022-05-16 RX ORDER — METOPROLOL SUCCINATE 50 MG/1
50 TABLET, EXTENDED RELEASE ORAL DAILY
Status: DISCONTINUED | OUTPATIENT
Start: 2022-05-17 | End: 2022-05-16

## 2022-05-16 RX ORDER — ACETAMINOPHEN 650 MG/1
650 SUPPOSITORY RECTAL EVERY 4 HOURS PRN
Status: DISCONTINUED | OUTPATIENT
Start: 2022-05-16 | End: 2022-05-17 | Stop reason: HOSPADM

## 2022-05-16 RX ORDER — ACETAMINOPHEN 325 MG/1
650 TABLET ORAL EVERY 6 HOURS PRN
Status: DISCONTINUED | OUTPATIENT
Start: 2022-05-16 | End: 2022-05-17 | Stop reason: HOSPADM

## 2022-05-16 RX ADMIN — ACETAMINOPHEN 650 MG: 325 TABLET ORAL at 21:11

## 2022-05-16 RX ADMIN — ATORVASTATIN CALCIUM 80 MG: 80 TABLET, FILM COATED ORAL at 21:11

## 2022-05-16 RX ADMIN — HUMAN ALBUMIN MICROSPHERES AND PERFLUTREN 9 ML: 10; .22 INJECTION, SOLUTION INTRAVENOUS at 10:01

## 2022-05-16 RX ADMIN — ACETAMINOPHEN 650 MG: 325 TABLET ORAL at 10:28

## 2022-05-16 ASSESSMENT — ACTIVITIES OF DAILY LIVING (ADL)
ADLS_ACUITY_SCORE: 27
ADLS_ACUITY_SCORE: 37
DIFFICULTY_EATING/SWALLOWING: NO
PREVIOUS_RESPONSIBILITIES: MEAL PREP;HOUSEKEEPING;LAUNDRY;SHOPPING;DRIVING;CHILD CARE
PATIENT'S_PREFERRED_MEANS_OF_COMMUNICATION: VERBAL
WALKING_OR_CLIMBING_STAIRS_DIFFICULTY: YES
ADLS_ACUITY_SCORE: 27
DRESSING/BATHING_DIFFICULTY: NO
CONCENTRATING,_REMEMBERING_OR_MAKING_DECISIONS_DIFFICULTY: YES
ADLS_ACUITY_SCORE: 27
HEARING_DIFFICULTY_OR_DEAF: YES
ADLS_ACUITY_SCORE: 27
THE_FOLLOWING_AIDS_WERE_PROVIDED;: PATIENT DECLINED OFFER OF AUXILIARY AIDS
DOING_ERRANDS_INDEPENDENTLY_DIFFICULTY: OTHER (SEE COMMENTS)
VISION_MANAGEMENT: SEE NOTE
WERE_AUXILIARY_AIDS_OFFERED?: NO
ADLS_ACUITY_SCORE: 37
TOILETING_ISSUES: NO
TRANSFERRING: 0-->INDEPENDENT
HEARING_MANAGEMENT: SEE NOTE
ADLS_ACUITY_SCORE: 27
FALL_HISTORY_WITHIN_LAST_SIX_MONTHS: NO
WEAR_GLASSES_OR_BLIND: YES
ADLS_ACUITY_SCORE: 27
TRANSFERRING: 0-->INDEPENDENT
CHANGE_IN_FUNCTIONAL_STATUS_SINCE_ONSET_OF_CURRENT_ILLNESS/INJURY: NO
DIFFICULTY_COMMUNICATING: NO
WALKING_OR_CLIMBING_STAIRS: OTHER (SEE COMMENTS)
DESCRIBE_HEARING_LOSS: HEARING LOSS ON LEFT SIDE
ADLS_ACUITY_SCORE: 37

## 2022-05-16 ASSESSMENT — VISUAL ACUITY
OU: GLASSES

## 2022-05-16 NOTE — H&P
[facility]  History and Physical  Hospitalist       Date of Admission:  5/15/2022    Assessment & Plan   Ninoska Cottrell is a very pleasant 42 year old female with depression, anxiety, migraine headaches, obesity who was admitted on 5/15/2022 with neurologic changes suggestive of acute stroke of the medullary regions.    Acute stroke of medullary   Post lytic treatment per neuro stroke team  Presented with symptoms of dysphagia, choking, right-sided weakness. Brought to stabilization room and Code stroke called. Head CT showed no acute bleed. Head and neck CTA showed no measurable stenosis or dissection.  No vascular malformations. Recent hemoglobin A1c from 4/8/2022 is 5.1%.  Recent lipid panel shows dyslipidemia with low HDL and high LDL.  Minimal smoking history.  -admit to ICU with telemetry  -q 1 hour neuro checks  -Goal blood pressure systolic less than 180 and diastolic less than 105  -stroke neurology consultation  -obtain brain MRI  -Repeat head CT 24 hours postthrombolytic which would be between 9 and 10 PM on 5/16/2022.  -TTE with bubble study  -check lipid panel  -hemoglobin A1c 5.1%  -Start atorvastatin 40 mg daily when cleared by speech  -Physical therapy, occupational therapy, speech therapy consultations  -stroke education    History of migraine headaches  Previously prescribed Maxalt as needed.    Depression  Generalized anxiety disorder  Noted.  Resume any PTA medications if needed.    Obesity  BMI 44 noted.    Enrolled in endocrine study since 2019  Details unknown but has regular visits with endocrinology and monitoring of insulin and hemoglobin A1c.    Ruled Out COVID-19 infection  This patient was evaluated during a global COVID-19 pandemic, which necessitated consideration that the patient might be at risk for infection with the SARS-CoV-2 virus that causes COVID-19. Applicable protocols for evaluation were followed during the patient's care. Low suspicion for infection.  "Vaccination status: Fully vaccinated with Pfizer, most recent booster 12/15/2021.  -follow-up COVID-19 PCR test result =>negative    Clinically Significant Risk Factors Present on Admission          # Hypocalcemia: Ca = 8.4 mg/dL (Ref range: 8.5 - 10.1 mg/dL) and/or iCa = N/A on admission, will replace as needed         # Severe Obesity: Estimated body mass index is 44.06 kg/m  as calculated from the following:    Height as of this encounter: 1.676 m (5' 6\").    Weight as of this encounter: 123.8 kg (273 lb).      DVT prophylaxis: Pneumatic Compression Devices and Ambulate every shift  Code Status: Full    Disposition: Expected discharge in 2-3 days.     Samra Ceballos MD  Text Page    ~~~~~~~~~~~~~~~~~~~~~~~~~~~~~~~~~~~~~~~~~~~~~~~~~~~~~  Primary Care Physician   Jaci Sharma    Chief Complaint   Difficulty swallowing, choking on water    History is obtained from the patient and medical records.    History of Present Illness   Ninoska Cottrell is a very pleasant 42 year old female with depression, anxiety, migraine headaches, obesity who presents with neurologic changes. Noted to have symptoms of a strange throat-burning sensation while eating.  At that time she was eating shrimp and so wondered if there was possibly an allergy or intolerance to seafood even though she has previously tolerated it.  Soon thereafter she was choking on water and coughing quite a bit.  The symptoms moved beyond just the eating and swallowing where she had some imbalance and cannot walk quite right.  Had to grab onto her partner for assistance so that she did not fall to the ground.  No change in vision or room spinning dizziness.  Denies nausea, vomiting, shortness of breath, palpitations. Code stroke activated. Exam showed right limb ataxia, dysmetria. Imaging: So far head CT and CT CTA of the neck are without acute bleed, dissection, malformation or stenosis.  Given ongoing symptoms and being within the window for lytics TNKase " was given sometime around 9:50 PM.    Appears to be enrolled in an endocrine study for which she gets regular monitoring.  Recent hemoglobin A1c 5.1%.  Not known to have diabetes.  Has a history of migraine headaches, with pain being primarily on the right side of her head, specifically by the right eye.    Case reviewed with Dr. Cain from the Emergency Department. Labs and available imaging reviewed.     Past Medical History    I have reviewed this patient's medical history and updated it with pertinent information if needed.   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  Past Surgical History:   Procedure Laterality Date     APPENDECTOMY       OPEN REDUCTION INTERNAL FIXATION ANKLE Right     hardware still in place       Prior to Admission Medications   Prior to Admission Medications   Prescriptions Last Dose Informant Patient Reported? Taking?   albuterol (PROVENTIL HFA) 108 (90 Base) MCG/ACT inhaler PRN  No Yes   Sig: Inhale 2 puffs into the lungs every 6 hours   azelastine (ASTELIN) 0.1 % nasal spray PRN  Yes Yes   Sig: Spray 1 spray into both nostrils as needed for rhinitis   fexofenadine (ALLEGRA) 180 MG tablet 5/14/2022 at Unknown time  Yes Yes   Sig: Take 180 mg by mouth daily   melatonin 5 MG tablet PRN  Yes Yes   Sig: Take 5 mg by mouth nightly as needed for sleep   metoprolol succinate ER (TOPROL XL) 50 MG 24 hr tablet 5/14/2022 at Unknown time  Yes Yes   Sig: Take 50 mg by mouth daily   multivitamin w/minerals (THERA-VIT-M) tablet 5/14/2022 at Unknown time  Yes Yes   Sig: Take 1 tablet by mouth daily   rizatriptan (MAXALT) 10 MG tablet   Yes No   Sig: TAKE 1 TAB BY MOUTH AT ONSET. MAY REPEAT 2 HRS LATER.  MAX 2 TABS/24 HRS.      Facility-Administered Medications: None     Allergies   Allergies   Allergen Reactions     Codeine Hives     Eggs [Chicken-Derived Products (Egg)] Other (See Comments)     Flu-like symptoms     Latex Rash       Social History   I have reviewed this patient's social history and updated it with pertinent information if needed. Ninoska Cottrell  reports that she quit smoking about 22 years ago. She has a 1.50 pack-year smoking history. She has never used smokeless tobacco. She reports current alcohol use. She reports that she does not use drugs.    Family History   I have reviewed this patient's family history and updated it with pertinent information if needed.   Family History   Problem Relation Age of Onset     Alzheimer Disease Paternal Grandmother      Hemophilia Paternal Grandmother      Myocardial Infarction Paternal Grandfather      Hemophilia Father      Hypertension Maternal Grandmother      Pancreatic Cancer Maternal Grandmother      Cerebrovascular Disease Maternal Grandfather      Breast Cancer Maternal Aunt      Bone Cancer Maternal Aunt      Diabetes No family hx of      Coronary Artery Disease Early Onset No family hx of      Colon Cancer No family hx of      Ovarian Cancer No family hx of        Review of Systems   The 10 point Review of Systems is negative other than noted in the HPI or here.    Physical Exam   Temp: 98  F (36.7  C) Temp src: Temporal BP: 127/72 Pulse: 75   Resp: 18 SpO2: 98 % O2 Device: None (Room air)    Vital Signs with Ranges  Temp:  [98  F (36.7  C)] 98  F (36.7  C)  Pulse:  [74-90] 75  Resp:  [12-19] 18  BP: (127-140)/() 127/72  SpO2:  [96 %-100 %] 98 %  273 lbs 0 oz    Constitutional: Alert, NAD, pleasant and interactive  Eyes: PERRL, sclerae anicteric  HEENT: mmm, atraumatic  Respiratory: Lungs CTAB, no wheezes or crackles  Cardiovascular: RRR, no murmurs  no LE edema  GI: soft, non-tender, nondistended  Skin/Integument: warm, dry, no acute  lurdes  Genitourinary: not examined  Musc: GRIER, normal limb strength x 4  Neuro: AOx3, no nystagmus, speech intelligible, face symmetric  Right leg strength improved from previous description.  Patient was examined after TNK and transfer out of stabilization room.  Psych: friendly affect, not anxious, not confused      Data   Data reviewed today:  I personally reviewed:   Recent Labs   Lab 05/15/22  2122   WBC 9.4   HGB 13.5   MCV 89      INR 0.98      POTASSIUM 3.6   CHLORIDE 108   CO2 27   BUN 6*   CR 0.73   ANIONGAP 5   NYASIA 8.4*   GLC 93   ALBUMIN 3.5   PROTTOTAL 6.9   BILITOTAL 0.7   ALKPHOS 99   ALT 32   AST 18       Imaging:  Recent Results (from the past 24 hour(s))   CT Head w/o Contrast    Narrative    EXAM: CT HEAD W/O CONTRAST  LOCATION: [facility]  DATE/TIME: 5/15/2022 8:58 PM    INDICATION: Code stroke, right-sided weakness.  COMPARISON: None.  TECHNIQUE: Routine CT Head without IV contrast. Multiplanar reformats. Dose reduction techniques were used.    FINDINGS:  INTRACRANIAL CONTENTS: No intracranial hemorrhage, extraaxial collection, or mass effect.  No CT evidence of acute infarct. Normal parenchymal attenuation. Normal ventricles and sulci.     VISUALIZED ORBITS/SINUSES/MASTOIDS: No intraorbital abnormality. Mild membrane thickening in the inferior aspects of both maxillary sinuses. No middle ear or mastoid effusion.    BONES/SOFT TISSUES: No acute abnormality.      Impression    IMPRESSION:  1.  Normal head CT.    Findings phoned to referring provider at 5/15/2022 9:09 PM   CTA Head Neck with Contrast    Narrative    EXAM: CTA  HEAD NECK WITH CONTRAST  LOCATION: [facility]  DATE/TIME: 5/15/2022 8:57 PM    INDICATION: Code Stroke, right-sided weakness. Dysphagia. Choking.  COMPARISON: None.  CONTRAST: 75 mL Isovue 370  TECHNIQUE: Head and neck CT angiogram with IV contrast. Axial helical CT images of the head and neck vessels obtained  during the arterial phase of intravenous contrast administration. Axial 2D reconstructed images and multiplanar 3D MIP reconstructed   images of the head and neck vessels were performed by the technologist. Dose reduction techniques were used. All stenosis measurements made according to NASCET criteria unless otherwise specified.    FINDINGS:     HEAD CTA:  ANTERIOR CIRCULATION: No stenosis/occlusion, aneurysm, or high flow vascular malformation. Fetal origin both posterior cerebral arteries.    POSTERIOR CIRCULATION: No stenosis/occlusion, aneurysm, or high flow vascular malformation. Balanced vertebral arteries supply a normal basilar artery.     DURAL VENOUS SINUSES: Expected enhancement of the major dural venous sinuses.    NECK CTA:  RIGHT CAROTID: No measurable stenosis or dissection.    LEFT CAROTID: No measurable stenosis or dissection.    VERTEBRAL ARTERIES: No focal stenosis or dissection. Balanced vertebral arteries.    AORTIC ARCH: Classic aortic arch anatomy with no significant stenosis at the origin of the great vessels.    NONVASCULAR STRUCTURES: There is mild elongation of the right globe in AP dimension that may reflect staphyloma.      Impression    IMPRESSION:     HEAD CTA:   1.  Normal CTA [place].    NECK CTA:  1.  Normal neck CTA.    2.  Findings phoned to referring provider at 9:16 PM.

## 2022-05-16 NOTE — PROGRESS NOTES
"   05/16/22 1504   Quick Adds   Type of Visit Initial PT Evaluation   Living Environment   People in Home spouse;child(cruzito), dependent;other relative(s)  (mother in law; 3 girls 8,7 and 4)   Current Living Arrangements house   Home Accessibility stairs to enter home;stairs within home   Number of Stairs, Main Entrance 1   Stair Railings, Main Entrance railing on right side (ascending)   Number of Stairs, Within Home, Primary greater than 10 stairs   Stair Railings, Within Home, Primary railing on right side (ascending);railing on left side (ascending)   Living Environment Comments Bedroom in the basement.   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  She later found that she had a wide-based gait and felt like she was leaning more to 1 side). Pt did recieve TNK; however, nothing observed on imaging.Neurology has been consulted.  They suspect either vestibular neuritis vs incomplete/aborted stroke (received thrombolytic therapy in ER).  Neurology recommends repeat brain MRI tomorrow.   Existing Precautions/Restrictions no known precautions/restrictions   General Observations Chatting with her kids on face time at PT entry.   Cognition   Affect/Mental Status (Cognition) WNL   Orientation Status (Cognition) oriented x 4   Follows Commands " (Cognition) WNL   Pain Assessment   Patient Currently in Pain Yes, see Vital Sign flowsheet   Integumentary/Edema   Integumentary/Edema   (Head ache with position change)   Posture    Posture Forward head position;Protracted shoulders;Kyphosis   Range of Motion (ROM)   ROM Comment B LEs WFL, B UEs WFL   Strength (Manual Muscle Testing)   Strength Comments Demonstrates equal strength B at LEs and UEs.   Bed Mobility   Comment, (Bed Mobility) Sup>sit SBA.   Transfers   Comment, (Transfers) Sit<>stand, supervision   Gait/Stairs (Locomotion)   Comment, (Gait/Stairs) Bedside gait with CGA at gait belt, no acute LOB, pt holds arms at high guard.   Balance   Balance Comments Static stance good, dyanmic standing balance arms in high guard-reaches for furniture. Pt is stiff with UEs-almost as if forcing herself to keep her hands/arms out in front of herself.   Sensory Examination   Sensory Perception Comments LEs intact to light touch with gross assessment.   Coordination   Coordination Comments Heel to shin intact, finger to thumb intact. Rapid alternating sup/pronation pt with facial grimace in doing so. Keeps eyes closed with assessment.   Clinical Impression   Criteria for Skilled Therapeutic Intervention Yes, treatment indicated   PT Diagnosis (PT) Impaired funtional activity tolerance   Influenced by the following impairments HA with mobility, fatigue, slight balance impairment   Functional limitations due to impairments Decreased functional independence, feels unsteady with walking.   Clinical Presentation (PT Evaluation Complexity) Stable/Uncomplicated   Clinical Presentation Rationale see MR   Clinical Decision Making (Complexity) low complexity   Planned Therapy Interventions (PT) balance training;bed mobility training;gait training;home exercise program;neuromuscular re-education;patient/family education;ROM (range of motion);stair training;strengthening;stretching;transfer training;progressive  activity/exercise;home program guidelines   Risk & Benefits of therapy have been explained evaluation/treatment results reviewed;care plan/treatment goals reviewed;risks/benefits reviewed;current/potential barriers reviewed;participants voiced agreement with care plan;participants included;patient   PT Discharge Planning   PT Discharge Recommendation (DC Rec) home   PT Rationale for DC Rec Anticipate pt to be supervision or less with all functional mobility by the time of discharge. PT to continue to see for strength and balance activities for optimal funcitonal independence.   PT Brief overview of current status Sup>sit CGA. Sist<>Stand supervision. Ambulated in hallway ~ 80' with CGA at gait belt, out stretched arms-no acute LOB, but slight path deviation and reports HA with ambulation.   Total Evaluation Time   Total Evaluation Time (Minutes) 8   Physical Therapy Goals   PT Frequency Daily   PT Predicted Duration/Target Date for Goal Attainment 05/20/22   PT Goals Bed Mobility;Transfers;Gait;Stairs;PT Goal 1   PT: Bed Mobility Independent;Supine to/from sit   PT: Transfers Independent;Sit to/from stand;Bed to/from chair   PT: Gait Modified independent;Assistive device;Greater than 200 feet   PT: Stairs Supervision/stand-by assist;Greater than 10 stairs;Rail on both sides   PT: Goal 1 Pt will score >19/24 on the DGI with indication of decreased fall risk.

## 2022-05-16 NOTE — UTILIZATION REVIEW
Admission Status; Secondary Review Determination     Under the authority of the Utilization Management Commitee, the utilization review process indicated a secondary review on the above patient. The review outcome is based on review of the medical records, discussions with staff, and applying clinical experience noted on the date of the review.     (x) Inpatient Status Appropriate - This patient's medical care is consistent with medical management for inpatient care and reasonable inpatient medical practice.     RATIONALE FOR DETERMINATION:  42 year old female with depression, anxiety, migraine headaches, obesity who was admitted on 5/15/2022 with neurologic changes suggestive of acute ischemic CVA (difficulty swallowing and gait instability, and tingling/burning of her mouth, lips, throat.  She later found that she had a wide-based gait and felt like she was leaning more to 1 side).    Vital signs unremarkable.    Labs unremarkable.    Imaging  Included unremarkable TTE, neg head CT, and normal CTA of the head and neck.  Brain MRI was also normal.      The patient received thrombolytic therapy in the ER.      Today the patient continues to have dizzyness and decreased sensation on the R side of her body and L side of her face.      Neurology has been consulted.  They suspect either vestibular neuritis vs incomplete/aborted stroke (received thrombolytic therapy in ER).  Neurology recommends repeat brain MRI tomorrow.    Given suspected acute ischemic CVA on presentation with the administration of systemic thrombolytic therapy, and with ongoing symptoms requiring further hospitalization and repeat brain imaging tomorrow inpatient status appears appropriate.      At the time of admission with the information available to the attending physician more than 2 nights Hospital complex care was anticipated, based on patient risk of adverse outcome if treated as outpatient and complex care required. Inpatient admission is  appropriate based on the Medicare guidelines.    The information on this document is developed by the utilization review team in order for the business office to ensure compliance. This only denotes the appropriateness of proper admission status and does not reflect the quality of care rendered.   The definitions of Inpatient Status and Observation Status used in making the determination above are those provided in the CMS Coverage Manual, Chapter 1 and Chapter 6, section 70.4.     Sincerely,     Lencho Gay MD  Utilization Review   Physician Advisor   [facility]

## 2022-05-16 NOTE — PROGRESS NOTES
SPIRITUAL HEALTH SERVICES Progress Note  [facility] ICU    REFERRAL SOURCE: Request at admission for chaplaincy care    Ninoska geiger declined a visit at this time. Oriented her to requesting future support, if desired.    PLAN: Spiritual Health remains available, as needed.    Lo Vicente  Associate   Phone: [phone]

## 2022-05-16 NOTE — PLAN OF CARE
SLP: Orders received. Chart reviewed and discussed pt status with RN, pt, and family.  Pt's swallowing difficulty has resolved, and pt passed the nursing swallow screen.   MRI was negative for CVI.  Pt reports good tolerance of breakfast and no speech-language deficits.  SLP not indicated due to no current swallow or communication needs.  Defer discharge recommendations to medical care team.  Will complete orders.

## 2022-05-16 NOTE — PHARMACY-ADMISSION MEDICATION HISTORY
Pharmacy Medication History  Admission medication history interview status for the 5/15/2022  admission is complete. See EPIC admission navigator for prior to admission medications     Location of Interview: Patient room  Medication history sources: Patient and Surescripts    Significant changes made to the medication list:  Added Allegra, metoprolol, azelastine, melatonin   Removed omega 3 fatty acids, cetirizine, vitamin D, diclofenac, meclizine, vitamin B complex     In the past week, patient estimated taking medication this percent of the time: greater than 90%    Additional medication history information:   None    Medication reconciliation completed by provider prior to medication history? No    Time spent in this activity: 10 minutes    Prior to Admission medications    Medication Sig Last Dose Taking? Auth Provider   albuterol (PROVENTIL HFA) 108 (90 Base) MCG/ACT inhaler Inhale 2 puffs into the lungs every 6 hours PRN Yes Mehrdad Faustin, PA-C   azelastine (ASTELIN) 0.1 % nasal spray Spray 1 spray into both nostrils as needed for rhinitis PRN Yes Unknown, Entered By History   fexofenadine (ALLEGRA) 180 MG tablet Take 180 mg by mouth daily 5/14/2022 at Unknown time Yes Unknown, Entered By History   melatonin 5 MG tablet Take 5 mg by mouth nightly as needed for sleep PRN Yes Unknown, Entered By History   metoprolol succinate ER (TOPROL XL) 50 MG 24 hr tablet Take 50 mg by mouth daily 5/14/2022 at Unknown time Yes Unknown, Entered By History   multivitamin w/minerals (THERA-VIT-M) tablet Take 1 tablet by mouth daily 5/14/2022 at Unknown time Yes Reported, Patient   rizatriptan (MAXALT) 10 MG tablet TAKE 1 TAB BY MOUTH AT ONSET. MAY REPEAT 2 HRS LATER. MAX 2 TABS/24 HRS.   Reported, Patient       The information provided in this note is only as accurate as the sources available at the time of update(s)

## 2022-05-16 NOTE — CONSULTS
"[facility]    Stroke Consult Note    Reason for Consult: Stroke Code     Chief Complaint: Allergic Reaction (Pt. Having fullness and tingling to throat after eating meal this evening. Pt. Has had intolerance to egg in past. No resp distress noted. 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 She was reportedly eating dinner when at 2030 she noted fullness, tingling, and burning of her mouth, lips and throat. Then, she had trouble swallowing. Later she was having wide based gait and felt like she was leaning off to the side. She had a holocephalic pressure type headache earlier today which is not her typical migraine which is usually located over the R eye. She has had a single miscarriage and three healthy children. She has menorrhagia, and epistaxis from time to time that is prolonged, but not recently, and has not had life-threatening bleeding in the past or required transfusion/hospitalization due to blood loss.    Imaging Findings  No hemorrhage on CT, no clear early ischemic changes  No dissection or LVO on CTA    Intravenous Thrombolysis  Risks (including potential for bleeding and death), benefits, and alternatives to thrombolytic therapy were discussed with Patient and Family. Prior to tenecteplase (TNK) administration, the following issues were addressed:   - specific medical reason: hx of hemophila carrier status, waited for coagulation studies to result prior to administration  - in-hospital time delay: labs not collected prior to CT/telestroke encounter    Endovascular Treatment  Not initiated due to absence of proximal vessel occlusion    Impression   R hemiataxia and dysphagia- etiology indeterminate, but suspect posterior circulation stroke, ?R lateral medullary stroke    Recommendations  - Use orderset: \"Ischemic Stroke " "Post-Thrombolytics/Thrombectomy ICU Admission\"  - Neurochecks and vital signs per post-thrombolytic orders and monitor closely for any evidence of CNS hemorrhage, bleeding, or orolingual angioedema  - Goal BP <180 / 105  - Hold all antithrombotic and anticoagulant medications for 24 hrs post-thrombolytic  - Hold pharmacologic VTE prophylaxis for 24 hrs post-thrombolytic  - Statin:  atorvastatin 40 mg daily when cleared by SLP due to dysphagia at presentation  - Repeat Head CT 24 hrs post-thrombolytic  - MRI Brain with and without contrast  - TTE (with Bubble Study if age 60 yrs or less)  - Telemetry, EKG  - Bedside Glucose Monitoring  - Nutrition: pending SLP consult  - A1c, Lipid Panel, Troponin x 3  - PT/OT/SLP  - Stroke Education  - Euthermia, Euglycemia    Patient Follow-up    - final recommendation pending work-up    Thank you for this consult. We will continue to follow.     Sheri Givens MD  Vascular Neurology  To page me or covering stroke neurology team member, click here: [facility]  Choose \"On Call\" tab at top, then search dropdown box for \"Neurology Adult\", select location, press Enter, then look for stroke/neuro ICU/Telestroke.    ______________________________________________________    Clinically Significant Risk Factors Present on Admission          # Hypocalcemia: Ca = 8.4 mg/dL (Ref range: 8.5 - 10.1 mg/dL) and/or iCa = N/A on admission, will replace as needed            Past Medical History   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      History of pre-eclampsia 2012     MDD (major depressive disorder)      Morbid obesity (H)      Past Surgical History   Past Surgical History:   Procedure Laterality Date     APPENDECTOMY       OPEN REDUCTION INTERNAL FIXATION ANKLE Right     hardware still in place     Medications   Home Meds  Prior to Admission medications    Medication Sig Start Date End Date Taking? Authorizing Provider   albuterol " (PROVENTIL HFA) 108 (90 Base) MCG/ACT inhaler Inhale 2 puffs into the lungs every 6 hours 12/4/19  Yes Mehrdad Faustin, RENAN   azelastine (ASTELIN) 0.1 % nasal spray Spray 1 spray into both nostrils as needed for rhinitis   Yes Unknown, Entered By History   fexofenadine (ALLEGRA) 180 MG tablet Take 180 mg by mouth daily   Yes Unknown, Entered By History   melatonin 5 MG tablet Take 5 mg by mouth nightly as needed for sleep   Yes Unknown, Entered By History   metoprolol succinate ER (TOPROL XL) 50 MG 24 hr tablet Take 50 mg by mouth daily   Yes Unknown, Entered By History   multivitamin w/minerals (THERA-VIT-M) tablet Take 1 tablet by mouth daily   Yes Reported, Patient   rizatriptan (MAXALT) 10 MG tablet TAKE 1 TAB BY MOUTH AT ONSET. MAY REPEAT 2 HRS LATER. MAX 2 TABS/24 HRS. 7/2/20   Reported, Patient       Scheduled Meds    diphenhydrAMINE  50 mg Intravenous Once       Infusion Meds    niCARdipine       sodium chloride 20 mL/hr at 05/15/22 2216       PRN Meds  labetalol **OR** hydrALAZINE, niCARdipine, sodium chloride    Allergies   Allergies   Allergen Reactions     Codeine Hives     Eggs [Chicken-Derived Products (Egg)] Other (See Comments)     Flu-like symptoms     Latex Rash     Family History   Family History   Problem Relation Age of Onset     Alzheimer Disease Paternal Grandmother      Hemophilia Paternal Grandmother      Myocardial Infarction Paternal Grandfather      Hemophilia Father      Hypertension Maternal Grandmother      Pancreatic Cancer Maternal Grandmother      Cerebrovascular Disease Maternal Grandfather      Breast Cancer Maternal Aunt      Bone Cancer Maternal Aunt      Diabetes No family hx of      Coronary Artery Disease Early Onset No family hx of      Colon Cancer No family hx of      Ovarian Cancer No family hx of      Social History   Social History     Tobacco Use     Smoking status: Former Smoker     Packs/day: 0.50     Years: 3.00     Pack years: 1.50     Quit date: 1/1/2000      Years since quittin.3     Smokeless tobacco: Never Used   Substance Use Topics     Alcohol use: Yes     Drug use: No       Review of Systems   The 10 point Review of Systems is negative other than noted in the HPI or here.        PHYSICAL EXAMINATION  Temp:  [98  F (36.7  C)] 98  F (36.7  C)  Pulse:  [74-90] 75  Resp:  [12-19] 18  BP: (127-140)/() 127/72  SpO2:  [96 %-100 %] 98 %     Neuro Exam  Mental Status:  alert, oriented x 3, follows commands, speech clear and fluent, naming and repetition normal  Cranial Nerves:  visual fields intact (tested by nurse), EOMI with normal smooth pursuit, facial sensation intact and symmetric (tested by nurse), subtle R facial droop, hearing not formally tested but intact to conversation, no dysarthria, shoulder shrug equal bilaterally, tongue protrusion midline  Motor:  no abnormal movements, no pronator drift, RUE and RLE ciro and are labored in movement/feel heavy with antigravity testing, rapid finger tapping slowed on R  Reflexes:  unable to test (telestroke)  Sensory:  no extinction on double simultaneous stimulation (assessed by nurse), diminished sensation R face and leg, intact on L and on bilateral arms  Coordination:  FNF slowed and labored on R, normal on L; HS mildly ataxic on R, normal on L  Station/Gait:  not tested due to perception of lateropulsion when seated    Dysphagia Screen  Failed screening - Strict NPO pending SLP evaluation  05/15/2022 2200    Stroke Scales    NIHSS  1a. Level of Consciousness 0-->Alert, keenly responsive   1b. LOC Questions 0-->Answers both questions correctly   1c. LOC Commands 0-->Performs both tasks correctly   2.   Best Gaze 0-->Normal   3.   Visual 0-->No visual loss   4.   Facial Palsy 1-->Minor paralysis (flattened nasolabial fold, asymmetry on smiling)   5a. Motor Arm, Left 0-->No drift, limb holds 90 (or 45) degrees for full 10 secs   5b. Motor Arm, Right 0-->No drift, limb holds 90 (or 45) degrees for full 10 secs    6a. Motor Leg, Left 0-->No drift, leg holds 30 degree position for full 5 secs   6b. Motor Leg, right 0-->No drift, leg holds 30 degree position for full 5 secs   7.   Limb Ataxia 2-->Present in two limbs   8.   Sensory 1-->Mild-to-moderate sensory loss, patient feels pinprick is less sharp or is dull on the affected side, or there is a loss of superficial pain with pinprick, but patient is aware of being touched   9.   Best Language 0-->No aphasia, normal   10. Dysarthria 0-->Normal   11. Extinction and Inattention  0-->No abnormality   Total 4 (05/15/22 2125)       Modified Leatha Score (Pre-morbid)  0 - No symptoms.    Imaging  I personally reviewed all imaging; relevant findings per HPI.     Lab Results Data   CBC  Recent Labs   Lab 05/15/22  2122   WBC 9.4   RBC 4.62   HGB 13.5   HCT 40.9        Basic Metabolic Panel    Recent Labs   Lab 05/15/22  2122      POTASSIUM 3.6   CHLORIDE 108   CO2 27   BUN 6*   CR 0.73   GLC 93   NYASIA 8.4*     Liver Panel  Recent Labs   Lab 05/15/22  2122   PROTTOTAL 6.9   ALBUMIN 3.5   BILITOTAL 0.7   ALKPHOS 99   AST 18   ALT 32     INR    Recent Labs   Lab Test 05/15/22  2122   INR 0.98      Lipid Profile    Recent Labs   Lab Test 04/08/22  0817 06/17/21  0950   CHOL 189 204*   HDL 37* 38*   * 141*   TRIG 114 124     A1C    Recent Labs   Lab Test 04/08/22  0817 04/04/17  1642   A1C 5.1 4.8     Troponin I    Recent Labs   Lab 05/15/22  2122   TROPONINIS 3          Stroke Code Data Data   Stroke Code Data  (for stroke code with tele)  Stroke code activated 05/15/22   2046   First stroke provider response 05/15/22   2046   Video start time 05/15/22   2105   Video end time 05/15/22   2150   Last known normal 05/15/22   1759   Time of discovery  (or onset of symptoms)  05/15/22   1800   Head CT read by Stroke Neuro Dr/Provider 05/15/22   2102   Was stroke code de-escalated? No               Telestroke Service Details  Type of service telemedicine diagnostic  assessment of acute neurological changes   Reason telemedicine is appropriate patient requires assessment with a specialist for diagnosis and treatment of neurological symptoms   Mode of transmission secure interactive audio and video communication per [place]i   Originating site (patient location) [facility]    Distant site (provider location) Provider remote site       I have personally spent a total of 75 minutes providing critical care services supervising this patient's stroke code activation.  I personally reviewed all lab values and radiology images.  I evaluated and directed care for the patient's critical condition.

## 2022-05-16 NOTE — PLAN OF CARE
Pt A&O neurologically intact, no deficites.  Baseline c/o right side of head head ache.  RA with sats in the high 90's.  BP hemodynamically stable.  Voiding.  On bedrest secondary to TNK.  MRI completed this a.m.  plan for Echo with Bubble this a.m..

## 2022-05-16 NOTE — PROGRESS NOTES
[facility]  Hospitalist Progress Note        Bc Denney MD   05/16/2022        Interval History:        - Reports feeling better with some improvement in dizziness  -   - MRI brain 5/16 noted normal; ECHO pending  - Neurology following and plan to repeat MRI; ok to transfer out of ICU with Q4 neuro- checks; started atorvastatin 80 mg daily and also plan to start  mg daily on 5/17 if repeat MRI stable with no hemorrhagic transformation  - SLP noted no swallow eval needs; dysphagia improved, passed bedside swallow evaluation and tolerating PO  - OT rec home with assist         Assessment and Plan:        Ninoska Cottrell is a very pleasant 42 year old female with depression, anxiety, migraine headaches, obesity who was admitted on 5/15/2022 with neurologic changes suggestive of acute stroke of the medullary regions.     Acute stroke of medullary   Post lytic (TNKase) treatment per neuro stroke team  - presented with symptoms of dysphagia, choking, gait disturbance; initial head CT showed no acute bleed. Head and neck CTA showed no measurable stenosis or dissection.  No vascular malformations.   - evaluated by stroke neurology and is s/p TNKase on admisison, admitted to ICU  - clinically improving  - MRI brain 5/16 noted normal  - Neurology following and plan to repeat MRI 5/17; ok to transfer out of ICU with Q4 neuro- checks; started atorvastatin 80 mg daily for  and also plan to start  mg daily on 5/17 if repeat MRI stable with no hemorrhagic transformation  - ECHO 5/16: EF 60-65%, negative Bubble Study  - SLP noted no swallow eval needs; dysphagia improved, passed bedside swallow evaluation and tolerating PO  - OT rec home with assist  - Goal blood pressure systolic less than 180 and diastolic less than 105; will plan to resume PTA Toprol Xl 5/17/22  - hemoglobin A1c 5.1%     History of migraine headaches  Previously prescribed Maxalt as needed.     Obesity  BMI 44  "noted.     Enrolled in endocrine study since 2019  Details unknown but has regular visits with endocrinology and monitoring of insulin and hemoglobin A1c.     Ruled Out COVID-19 infection-- asymptomatic COVID screening negative on 5/15    Clinically Significant Risk Factors Present on Admission          # Hypocalcemia: Ca = 8.3 mg/dL (Ref range: 8.5 - 10.1 mg/dL) and/or iCa = N/A on admission, will replace as needed         # Severe Obesity: Estimated body mass index is 43.62 kg/m  as calculated from the following:    Height as of this encounter: 1.676 m (5' 6\").    Weight as of this encounter: 122.6 kg (270 lb 4.5 oz).         DVT Prophylaxis: mechanical with PCD boots    Code status: full code    Disposition: when cleared by neurology; will transfer out of ICU    Page Me (7 am to 6 pm)    Care plan discussed with patient, her  and nursing              Physical Exam:      Blood pressure (!) 144/94, pulse 71, temperature 98.2  F (36.8  C), temperature source Oral, resp. rate 16, height 1.676 m (5' 6\"), weight 122.6 kg (270 lb 4.5 oz), SpO2 100 %, not currently breastfeeding.  Vitals:    05/15/22 2008 05/15/22 2320   Weight: 123.8 kg (273 lb) 122.6 kg (270 lb 4.5 oz)     Vital Signs with Ranges  Temp:  [98  F (36.7  C)-98.2  F (36.8  C)] 98.2  F (36.8  C)  Pulse:  [68-90] 71  Resp:  [11-19] 16  BP: (103-144)/() 144/94  SpO2:  [95 %-100 %] 100 %  I/O's Last 24 hours  No intake/output data recorded.    Constitutional: Alert, awake and oriented X 3; resting comfortably in no apparent distress   HEENT: Pupils equal and reactive to light and accomodation, neck supple    Oral cavity: Moist mucosa   Cardiovascular: Normal s1 s2, regular rate and rhythm, no murmur   Lungs: B/l clear to auscultation, no wheezes or crepitations   Abdomen: Soft, nt, nd, no guarding, rigidity or rebound; BS +   LE : No edema   Musculoskeletal: Power 5/5 in all extremities   Neuro: No focal neurological deficits noted   Psychiatry: " normal mood and affect                Medications:          diphenhydrAMINE  50 mg Intravenous Once     sodium chloride (PF)  3 mL Intracatheter Q8H     PRN Meds: labetalol **OR** hydrALAZINE, lidocaine 4%, lidocaine (buffered or not buffered), - MEDICATION INSTRUCTIONS -, niCARdipine, sodium chloride (PF), sodium chloride         Data:      All new lab and imaging data was reviewed.   Recent Labs   Lab Test 05/16/22  0541 05/15/22  2122 04/08/22  0817 10/31/18  1135   WBC 6.5 9.4  --  6.8   HGB 13.4 13.5 13.9 13.9   MCV 89 89  --  84    286  --  253   INR 1.03 0.98  --   --       Recent Labs   Lab Test 05/16/22  0541 05/15/22  2327 05/15/22  2122 04/08/22  0817 04/08/22  0817 06/17/21  0950     --  140  --   --  138   POTASSIUM 4.0  --  3.6  --   --  4.2   CHLORIDE 107  --  108  --   --  105   CO2 27  --  27  --   --  31   BUN 5*  --  6*  --   --  10   CR 0.70  --  0.73  --  0.69 0.88   ANIONGAP 6  --  5  --   --  2*   NYASIA 8.3*  --  8.4*  --   --  9.1   * 98 93   < > 105* 97    < > = values in this interval not displayed.     Recent Labs   Lab Test 02/24/16  1439   TROPI <0.015  The 99th percentile for upper reference range is 0.045 ug/L.  Troponin values in   the range of 0.045 - 0.120 ug/L may be associated with risks of adverse   clinical events.

## 2022-05-16 NOTE — PROGRESS NOTES
05/16/22 1000   Quick Adds   Type of Visit Initial Occupational Therapy Evaluation   Living Environment   People in Home spouse;child(cruzito), dependent;other relative(s)  (mother in law, 3 kids)   Current Living Arrangements house   Home Accessibility stairs to enter home;stairs within home   Number of Stairs, Main Entrance 1   Stair Railings, Main Entrance railing on right side (ascending)   Number of Stairs, Within Home, Primary greater than 10 stairs   Stair Railings, Within Home, Primary railing on right side (ascending);railing on left side (ascending)   Living Environment Comments bedroom in basement, kids live upstairs, walk in shower   Self-Care   Usual Activity Tolerance excellent   Current Activity Tolerance fair   Equipment Currently Used at Home crutches   Fall history within last six months no   Activity/Exercise/Self-Care Comment Independent at baseline without device. 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 Lives with 3 kids, spouse and mother in law. Patient's bedroom is in basement level of home and childrens rooms are upstairs on 3rd floor.   Instrumental Activities of Daily Living (IADL)   Previous Responsibilities meal prep;housekeeping;laundry;shopping;driving;   General Information   Onset of Illness/Injury or Date of Surgery 05/15/22   Referring Physician Samra Ceballos MD   Additional Occupational Profile Info/Pertinent History of Current Problem 42 year old female with depression, anxiety, migraine headaches, obesity who was admitted on 5/15/2022 with neurologic changes suggestive of acute stroke of the medullary regions.   Cognitive Status Examination   Orientation Status orientation to person, place and time   Affect/Mental Status (Cognitive) WNL   Follows Commands WNL   Visual Perception   Visual Impairment/Limitations corrective lenses full-time   Sensory   Sensory Quick Adds Light touch   Sensation Light Touch, Rehab Eval   LUE within normal limits   RUE within  normal limits   Range of Motion Comprehensive   General Range of Motion no range of motion deficits identified   Strength Comprehensive (MMT)   General Manual Muscle Testing (MMT) Assessment no strength deficits identified   Muscle Tone Assessment   Muscle Tone Quick Adds No deficits were identified   Coordination   Gross Motor Coordination finger to nose WFL B UES   Fine Motor Coordination mild delay on R UE serial opposition, L UE WFL   Bed Mobility   Bed Mobility supine-sit;sit-supine   Supine-Sit [place] (Bed Mobility) supervision   Sit-Supine [place] (Bed Mobility) supervision   Transfers   Transfers toilet transfer;sit-stand transfer   Sit-Stand Transfer   Sit-Stand [place] (Transfers) contact guard   Toilet Transfer   Type (Toilet Transfer) sit-stand;stand-sit   [place] Level (Toilet Transfer) supervision   Toileting   [place] Level (Toileting) supervision   Clinical Impression   Criteria for Skilled Therapeutic Interventions Met (OT) Yes, treatment indicated   OT Diagnosis below baseline with ADL and mobility   OT Problem List-Impairments impacting ADL activity tolerance impaired;coordination;sensation;mobility   Assessment of Occupational Performance 1-3 Performance Deficits   Identified Performance Deficits IADL, mobility   Planned Therapy Interventions (OT) ADL retraining;IADL retraining;fine motor coordination training;progressive activity/exercise;transfer training   Clinical Decision Making Complexity (OT) low complexity   Risk & Benefits of therapy have been explained evaluation/treatment results reviewed;care plan/treatment goals reviewed;risks/benefits reviewed;current/potential barriers reviewed;participants voiced agreement with care plan;participants included;patient   OT Discharge Planning   OT Discharge Recommendation (DC Rec) home with assist   OT Rationale for DC Rec Patient requiring min-SBA for ADL and mobility. Will likely progress during IP stay to return home  with intermittent supervision and assist for high level IADL tasks.   Total Evaluation Time (Minutes)   Total Evaluation Time (Minutes) 10   OT Goals   Therapy Frequency (OT) 5 times/wk   OT Predicted Duration/Target Date for Goal Attainment 05/20/22   OT Goals Hygiene/Grooming;Lower Body Dressing;Transfers;Toilet Transfer/Toileting   OT: Hygiene/Grooming modified independent;while standing   OT: Lower Body Dressing Modified independent   OT: Transfer Modified independent   OT: Toilet Transfer/Toileting Modified independent

## 2022-05-17 ENCOUNTER — APPOINTMENT (OUTPATIENT)
Dept: PHYSICAL THERAPY | Facility: CLINIC | Age: 42
DRG: 149 | End: 2022-05-17
Payer: COMMERCIAL

## 2022-05-17 ENCOUNTER — APPOINTMENT (OUTPATIENT)
Dept: MRI IMAGING | Facility: CLINIC | Age: 42
DRG: 149 | End: 2022-05-17
Attending: HOSPITALIST
Payer: COMMERCIAL

## 2022-05-17 VITALS
SYSTOLIC BLOOD PRESSURE: 131 MMHG | TEMPERATURE: 97.5 F | OXYGEN SATURATION: 95 % | HEIGHT: 66 IN | BODY MASS INDEX: 43.44 KG/M2 | DIASTOLIC BLOOD PRESSURE: 79 MMHG | HEART RATE: 83 BPM | WEIGHT: 270.28 LBS | RESPIRATION RATE: 18 BRPM

## 2022-05-17 LAB
GLUCOSE BLDC GLUCOMTR-MCNC: 110 MG/DL (ref 70–99)
GLUCOSE BLDC GLUCOMTR-MCNC: 176 MG/DL (ref 70–99)

## 2022-05-17 PROCEDURE — 99239 HOSP IP/OBS DSCHRG MGMT >30: CPT | Performed by: HOSPITALIST

## 2022-05-17 PROCEDURE — 97750 PHYSICAL PERFORMANCE TEST: CPT | Mod: GP

## 2022-05-17 PROCEDURE — 255N000002 HC RX 255 OP 636: Performed by: INTERNAL MEDICINE

## 2022-05-17 PROCEDURE — A9585 GADOBUTROL INJECTION: HCPCS | Performed by: INTERNAL MEDICINE

## 2022-05-17 PROCEDURE — 97112 NEUROMUSCULAR REEDUCATION: CPT | Mod: GP

## 2022-05-17 PROCEDURE — 250N000013 HC RX MED GY IP 250 OP 250 PS 637: Performed by: STUDENT IN AN ORGANIZED HEALTH CARE EDUCATION/TRAINING PROGRAM

## 2022-05-17 PROCEDURE — 99233 SBSQ HOSP IP/OBS HIGH 50: CPT | Mod: GC | Performed by: PSYCHIATRY & NEUROLOGY

## 2022-05-17 PROCEDURE — 250N000013 HC RX MED GY IP 250 OP 250 PS 637: Performed by: HOSPITALIST

## 2022-05-17 PROCEDURE — 70553 MRI BRAIN STEM W/O & W/DYE: CPT

## 2022-05-17 RX ORDER — ASPIRIN 325 MG
325 TABLET ORAL DAILY
Qty: 30 TABLET | Refills: 0 | Status: SHIPPED | OUTPATIENT
Start: 2022-05-18 | End: 2022-06-17

## 2022-05-17 RX ORDER — GADOBUTROL 604.72 MG/ML
12 INJECTION INTRAVENOUS ONCE
Status: COMPLETED | OUTPATIENT
Start: 2022-05-17 | End: 2022-05-17

## 2022-05-17 RX ORDER — ASPIRIN 325 MG
325 TABLET ORAL DAILY
Status: DISCONTINUED | OUTPATIENT
Start: 2022-05-17 | End: 2022-05-17 | Stop reason: HOSPADM

## 2022-05-17 RX ORDER — ATORVASTATIN CALCIUM 80 MG/1
80 TABLET, FILM COATED ORAL EVERY EVENING
Qty: 30 TABLET | Refills: 0 | Status: SHIPPED | OUTPATIENT
Start: 2022-05-17 | End: 2022-06-17

## 2022-05-17 RX ADMIN — ASPIRIN 325 MG ORAL TABLET 325 MG: 325 PILL ORAL at 10:01

## 2022-05-17 RX ADMIN — METOPROLOL SUCCINATE 50 MG: 50 TABLET, EXTENDED RELEASE ORAL at 08:24

## 2022-05-17 RX ADMIN — GADOBUTROL 12 ML: 604.72 INJECTION INTRAVENOUS at 06:26

## 2022-05-17 ASSESSMENT — VISUAL ACUITY
OU: NORMAL ACUITY;GLASSES
OU: NORMAL ACUITY;GLASSES

## 2022-05-17 ASSESSMENT — ACTIVITIES OF DAILY LIVING (ADL)
ADLS_ACUITY_SCORE: 29

## 2022-05-17 NOTE — DISCHARGE SUMMARY
[facility]  Discharge Summary        Ninoska Cottrell MRN# [id number]   YOB: 1980 Age: 42 year old     Date of Admission:  5/15/2022  Date of Discharge:  5/17/2022  Admitting Physician:  Samra Ceballos MD  Discharge Physician: Bc Denney MD  Discharging Service: Hospitalist     Primary Provider: Jaci Sharma  Primary Care Physician Phone Number: [phone]         Discharge Diagnoses/Problem Oriented Hospital Course (Providers):    Ninoska Cottrell was admitted on 5/15/2022 by Samra Ceballos MD and I would refer you to their history and physical.  The following problems were addressed during her hospitalization:    Ninoska Cottrell is a very pleasant 42 year old female with depression, anxiety, migraine headaches, obesity who was admitted on 5/15/2022 with neurologic changes suggestive of acute stroke of the medullary regions.     Acute onset ataxia, dysphagia, facial symptoms  Likely MRI negative posterior circulation stroke   Post lytic (TNKase) treatment per neuro stroke team  - presented with symptoms of dysphagia, choking, gait disturbance; initial head CT showed no acute bleed. Head and neck CTA showed no measurable stenosis or dissection.  No vascular malformations.   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     Obesity  BMI 44 noted.     Enrolled in endocrine study since 2019  Details unknown but has regular visits with endocrinology and monitoring of insulin and hemoglobin A1c.     Ruled Out COVID-19 infection-- asymptomatic COVID screening negative on 5/15     Clinically Significant Risk Factors Present on Admission                     # Severe Obesity: Estimated body mass index is 43.62 kg/m  as calculated from the following:    Height as of this encounter: 1.676 m (5' 6\").    Weight as of this encounter: 122.6 kg (270 lb 4.5 oz).       Code status: full code        Brief Hospital Stay Summary Sent Home With Patient in AVS:        Reason for your hospital stay      You were admitted with likely stroke and you received a clot busting   medication (Tenecteplase). The MRIs , however, did not show any stroke.                 Pending Results:        Unresulted Labs Ordered in the Past 30 Days of this Admission     No orders found from 4/15/2022 to 5/16/2022.            Discharge Instructions and Follow-Up:      Follow-up Appointments     Follow-up and recommended labs and tests       Follow up with primary care provider, Jaci Sharma, within 7 days   for hospital follow- up.     Follow up with Neurology in 4-6 weeks.                 Discharge Disposition:      Discharged to home        Discharge Medications:        Current Discharge Medication List      START taking these medications    Details   aspirin (ASA) 325 MG tablet Take 1 tablet (325 mg) by mouth daily  Qty: 30 tablet, Refills: 0    Comments: Future refills by PCP Dr. Jaci Sharma with phone number [phone].  Associated Diagnoses: Acute focal neurological deficit, onset within 3 hours      atorvastatin (LIPITOR) 80 MG tablet Take 1 tablet (80 mg) by mouth every evening  Qty: 30 tablet, Refills: 0    Comments: Future refills by PCP Dr. Jaci Sharma with phone number " "[phone].  Associated Diagnoses: Acute focal neurological deficit, onset within 3 hours         CONTINUE these medications which have NOT CHANGED    Details   albuterol (PROVENTIL HFA) 108 (90 Base) MCG/ACT inhaler Inhale 2 puffs into the lungs every 6 hours  Qty: 8.5 g, Refills: 0    Associated Diagnoses: Moderate asthma with exacerbation, unspecified whether persistent      azelastine (ASTELIN) 0.1 % nasal spray Spray 1 spray into both nostrils as needed for rhinitis      fexofenadine (ALLEGRA) 180 MG tablet Take 180 mg by mouth daily      melatonin 5 MG tablet Take 5 mg by mouth nightly as needed for sleep      metoprolol succinate ER (TOPROL XL) 50 MG 24 hr tablet Take 50 mg by mouth daily      multivitamin w/minerals (THERA-VIT-M) tablet Take 1 tablet by mouth daily      rizatriptan (MAXALT) 10 MG tablet TAKE 1 TAB BY MOUTH AT ONSET. MAY REPEAT 2 HRS LATER. MAX 2 TABS/24 HRS.               Allergies:         Allergies   Allergen Reactions     Codeine Hives     Eggs [Chicken-Derived Products (Egg)] Other (See Comments)     Flu-like symptoms     Latex Rash           Consultations This Hospital Stay:      Consultation during this admission received from neurology        Condition and Physical on Discharge:      Discharge condition: Stable   Vitals: Blood pressure 131/79, pulse 83, temperature 97.5  F (36.4  C), temperature source Oral, resp. rate 18, height 1.676 m (5' 6\"), weight 122.6 kg (270 lb 4.5 oz), SpO2 95 %, not currently breastfeeding.     Constitutional: Alert, awake and orienetd X 3; lying comfortably in bed in no apparent distress   HEENT: Pupils equal and reactive to light and accomodation, EOMI intact; neck supple no raised JVD or rigidity    Oral cavity: Moist mucosa   Cardiovascular: Normal s1 s2, regular rate and rhythm, no murmur   Lungs: B/l clear to auscultation, no wheezes or crepitations   Abdomen: Soft, nt, nd, no guarding, rigidity or rebound; BS +   LE : No edema   Musculoskeletal: " Power 5/5 in all extremities   Neuro: No focal neurological deficits noted, CN II to XII grossly intact   Psychiatry: normal mood and affect             Discharge Time:      Greater than 30 minutes.        Image Results From This Hospital Stay (For Non-EPIC Providers):        Results for orders placed or performed during the hospital encounter of 05/15/22   CT Head w/o Contrast    Narrative    EXAM: CT HEAD W/O CONTRAST  LOCATION: [facility]  DATE/TIME: 5/15/2022 8:58 PM    INDICATION: Code stroke, right-sided weakness.  COMPARISON: None.  TECHNIQUE: Routine CT Head without IV contrast. Multiplanar reformats. Dose reduction techniques were used.    FINDINGS:  INTRACRANIAL CONTENTS: No intracranial hemorrhage, extraaxial collection, or mass effect.  No CT evidence of acute infarct. Normal parenchymal attenuation. Normal ventricles and sulci.     VISUALIZED ORBITS/SINUSES/MASTOIDS: No intraorbital abnormality. Mild membrane thickening in the inferior aspects of both maxillary sinuses. No middle ear or mastoid effusion.    BONES/SOFT TISSUES: No acute abnormality.      Impression    IMPRESSION:  1.  Normal head CT.    Findings phoned to referring provider at 5/15/2022 9:09 PM   CTA Head Neck with Contrast    Narrative    EXAM: CTA  HEAD NECK WITH CONTRAST  LOCATION: [facility]  DATE/TIME: 5/15/2022 8:57 PM    INDICATION: Code Stroke, right-sided weakness. Dysphagia. Choking.  COMPARISON: None.  CONTRAST: 75 mL Isovue 370  TECHNIQUE: Head and neck CT angiogram with IV contrast. Axial helical CT images of the head and neck vessels obtained during the arterial phase of intravenous contrast administration. Axial 2D reconstructed images and multiplanar 3D MIP reconstructed   images of the head and neck vessels were performed by the technologist. Dose reduction techniques were used. All stenosis measurements made according to NASCET criteria unless otherwise  specified.    FINDINGS:     HEAD CTA:  ANTERIOR CIRCULATION: No stenosis/occlusion, aneurysm, or high flow vascular malformation. Fetal origin both posterior cerebral arteries.    POSTERIOR CIRCULATION: No stenosis/occlusion, aneurysm, or high flow vascular malformation. Balanced vertebral arteries supply a normal basilar artery.     DURAL VENOUS SINUSES: Expected enhancement of the major dural venous sinuses.    NECK CTA:  RIGHT CAROTID: No measurable stenosis or dissection.    LEFT CAROTID: No measurable stenosis or dissection.    VERTEBRAL ARTERIES: No focal stenosis or dissection. Balanced vertebral arteries.    AORTIC ARCH: Classic aortic arch anatomy with no significant stenosis at the origin of the great vessels.    NONVASCULAR STRUCTURES: There is mild elongation of the right globe in AP dimension that may reflect staphyloma.      Impression    IMPRESSION:     HEAD CTA:   1.  Normal CTA [place].    NECK CTA:  1.  Normal neck CTA.    2.  Findings phoned to referring provider at 9:16 PM.   MR Brain w/o Contrast    Narrative    EXAM: MR BRAIN W/O CONTRAST  LOCATION: [facility]  DATE/TIME: 5/16/2022 5:10 AM    INDICATION: Stroke, follow up  COMPARISON: CTA head and neck 5/15/2022  TECHNIQUE: Routine multiplanar multisequence head MRI without intravenous contrast.    FINDINGS:  INTRACRANIAL CONTENTS: No acute or subacute infarct. No mass, acute hemorrhage, or extra-axial fluid collections. Normal brain parenchymal signal. Normal ventricles and sulci. Normal position of the cerebellar tonsils.     SELLA: No abnormality accounting for technique.    OSSEOUS STRUCTURES/SOFT TISSUES: Normal marrow signal. The major intracranial vascular flow voids are maintained.     ORBITS: No abnormality accounting for technique.     SINUSES/MASTOIDS: Mild mucosal thickening scattered about the paranasal sinuses. No middle ear or mastoid effusion.       Impression    IMPRESSION:  1.  Normal head  MRI.   MR Brain w/o & w Contrast    Narrative    EXAM: MR BRAIN W/O and W CONTRAST  LOCATION: [facility]  DATE/TIME: 2022 5:58 AM    INDICATION: Stroke, follow up  COMPARISON: 2022  CONTRAST: 12mL Gadavist  TECHNIQUE: Routine multiplanar multisequence head MRI without and with intravenous contrast.    FINDINGS:  INTRACRANIAL CONTENTS: No acute or subacute infarct. No mass, acute hemorrhage, or extra-axial fluid collections. Normal brain parenchymal signal. Normal ventricles and sulci. Normal position of the cerebellar tonsils. No pathologic contrast enhancement.    SELLA: No abnormality accounting for technique.    OSSEOUS STRUCTURES/SOFT TISSUES: Normal marrow signal. The major intracranial vascular flow voids are maintained.     ORBITS: No abnormality accounting for technique.     SINUSES/MASTOIDS: Mild mucosal thickening scattered about the paranasal sinuses. No middle ear or mastoid effusion.       Impression    IMPRESSION:  1.  Normal head MRI.   Echocardiogram Complete w Bubble study - For age 60 yrs or less     Value    LVEF  60-65%    Narrative    [id number]  [id number]  [id number]  [id number]^COURTNEY^RONNIE^MAYA     [facility]  Echocardiography Laboratory  [address] [place] [address]     Name: JULIO YOUNGBLOOD  MRN: [id number]  : 1980  Study Date: 2022 09:34 AM  Age: 42 yrs  Gender: Female  Patient Location: [facility]  Reason For Study: Cerebrovascular Incident  Ordering Physician: RONNIE VALDEZ  Referring Physician: Jaci Sharma  Performed By: Estelle Klein     BSA: 2.3 m2  Height: 66 in  Weight: 273 lb  HR: 73  BP: 133/82 mmHg  ______________________________________________________________________________  Procedure  Complete Portable Bubble Echo Adult. [employer] (NDC #[id number]) given  intravenously. Technically difficult study.  ______________________________________________________________________________  Interpretation  Summary     Technically difficult study with some improvement after contrast use. If  cardioembolic source of CVA is suspected, could consider SARAH.     The visual ejection fraction is 60-65%.  The right ventricle is normal in size and function.  There is no pericardial effusion.  No hemodynamically significant valvular disease.  A contrast injection (Bubble Study) was performed that was negative for flow  across the interatrial septum.  ______________________________________________________________________________  Left Ventricle  The left ventricle is normal in size. There is normal left ventricular wall  thickness. Left ventricular systolic function is normal. The visual ejection  fraction is 60-65%. Diastolic Doppler findings (E/E' ratio and/or other  parameters) suggest left ventricular filling pressures are indeterminate. No  regional wall motion abnormalities noted.     Right Ventricle  The right ventricle is normal in size and function.     Atria  Normal left atrial size. Right atrial size is normal. There is no color  Doppler evidence of an atrial shunt. A contrast injection (Bubble Study) was  performed that was negative for flow across the interatrial septum.     Mitral Valve  The mitral valve is not well visualized. There is trace to mild mitral  regurgitation. There is no mitral valve stenosis.     Tricuspid Valve  The tricuspid valve is not well visualized. The right ventricular systolic  pressure is approximated at 19.0 mmHg plus the right atrial pressure.     Aortic Valve  The aortic valve is trileaflet. No aortic regurgitation is present. No  hemodynamically significant valvular aortic stenosis.     Pulmonic Valve  The pulmonic valve is not well visualized.     Vessels  The aortic root is normal size. The inferior vena cava was normal in size with  preserved respiratory variability.     Pericardium  There is no pericardial effusion.      ______________________________________________________________________________  MMode/2D Measurements & Calculations  IVSd: 0.87 cm  LVIDd: 4.3 cm  LVIDs: 3.0 cm  LVPWd: 0.88 cm  FS: 30.3 %  LV mass(C)d: 120.6 grams  LV mass(C)dI: 52.8 grams/m2     Ao root diam: 3.0 cm  LA dimension: 3.2 cm  asc Aorta Diam: 2.9 cm  LA/Ao: 1.1  LA Volume (BP): 54.8 ml  LA Volume Index (BP): 24.0 ml/m2  RWT: 0.41  TAPSE: 2.8 cm     Doppler Measurements & Calculations  MV E max edgar: 88.1 cm/sec  MV A max edgar: 66.2 cm/sec  MV E/A: 1.3     MV dec slope: 421.8 cm/sec2  TR max edgar: 218.2 cm/sec  TR max P.0 mmHg  E/E' av.9  Lateral E/e': 7.3  Medial E/e': 10.4     ______________________________________________________________________________  Report approved by: Mily Mcleod 2022 11:05 AM                 Most Recent Lab Results In EPIC (For Non-EPIC Providers):    Most Recent 3 CBC's:  Recent Labs   Lab Test 2241 05/15/22  2122 04/08/22  0817 10/31/18  1135   WBC 6.5 9.4  --  6.8   HGB 13.4 13.5 13.9 13.9   MCV 89 89  --  84    286  --  253      Most Recent 3 BMP's:  Recent Labs   Lab Test 22  0820 22  0237 22  1253 22  0541 05/15/22  2327 05/15/22  2122 22  0817 22  0817 21  0950   NA  --   --   --  140  --  140  --   --  138   POTASSIUM  --   --   --  4.0  --  3.6  --   --  4.2   CHLORIDE  --   --   --  107  --  108  --   --  105   CO2  --   --   --  27  --  27  --   --  31   BUN  --   --   --  5*  --  6*  --   --  10   CR  --   --   --  0.70  --  0.73  --  0.69 0.88   ANIONGAP  --   --   --  6  --  5  --   --  2*   NYASIA  --   --   --  8.3*  --  8.4*  --   --  9.1   * 110* 88 100*   < > 93   < > 105* 97    < > = values in this interval not displayed.     Most Recent 3 Troponin's:  Recent Labs   Lab Test 16  1439   TROPI <0.015  The 99th percentile for upper reference range is 0.045 ug/L.  Troponin values in   the range of 0.045 - 0.120 ug/L may  be associated with risks of adverse   clinical events.       Most Recent 3 INR's:  Recent Labs   Lab Test 05/16/22  0541 05/15/22  2122   INR 1.03 0.98     Most Recent 2 LFT's:  Recent Labs   Lab Test 05/15/22  2122 04/08/22  0817 06/17/21  0950   AST 18 13 19   ALT 32 22 23   ALKPHOS 99  --  112   BILITOTAL 0.7  --  0.6     Most Recent Cholesterol Panel:  Recent Labs   Lab Test 05/15/22  2122   CHOL 186   *   HDL 40*   TRIG 114     Most Recent 6 Bacteria Isolates From Any Culture (See EPIC Reports for Culture Details):  Recent Labs   Lab Test 08/18/17  1000 03/24/17 2056 02/03/17  0917 11/03/16  0955   CULT Beta hemolytic Streptococcus group B  isolated  * No Beta Streptococcus isolated 50,000 to 100,000 colonies/mL mixed urogenital charley  Susceptibility testing not routinely done   No Beta Streptococcus isolated     Most Recent TSH, T4 and HgbA1c:   Recent Labs   Lab Test 05/16/22 0541 04/08/22  0817   TSH  --  0.92   A1C 5.2 5.1

## 2022-05-17 NOTE — PROGRESS NOTES
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     Patient presented 5/15 with difficulty swallowing and gait instability which she noticed while eating dinner at 2030.  She noted fullness and tingling/burning of her mouth, lips, throat.  Additionally, she noted difficulty swallowing.  She later found that she had a wide-based gait and felt like she was leaning more to 1 side.  She reports a holocephalic pressure headache which occurred earlier 5/15 which was not her typical migraine.  She notes a history of menorrhagia, epistaxis but denied recent.  She also notes history of miscarriage x1 with other viable pregnancies.    5/16:  She notes dizziness with some element of room spinning especially on position change and exasperated by changing of head position.  She also notes continued decrease sensation in the right hemibody and left hemiface.    Of note patient reports a upper respiratory infection at the end of April 2022    Stroke Evaluation Summarized    MRI/Head CT Results for orders placed or performed during the hospital encounter of 05/15/22   CT Head w/o Contrast    Impression    IMPRESSION:  1.  Normal head CT.    Findings phoned to referring provider at 5/15/2022 9:09 PM   CTA Head Neck with Contrast    Impression    IMPRESSION:     HEAD CTA:   1.  Normal CTA [place] [facility] [place].    NECK CTA:  1.  Normal neck CTA.    2.  Findings phoned to referring provider at 9:16 PM.   MR Brain w/o Contrast    Impression    IMPRESSION:  1.  Normal head MRI.           Echocardiogram Pending   EKG/Telemetry pending   Other Testing Not Applicable     LDL  5/15/2022: 123 mg/dL   A1C  5/16/2022:  "5.2 %   Troponin No lab value available in past 48 hrs       Impression  Patient with continued symptoms despite negative MRI x2.  This may represent a type of vestibular neuritis given her upper respiratory symptoms 2 weeks ago; patient with alternating symptoms of decreased pin prick and exam seemingly inconsistent with central etiology--I.e. patient with decreased pp in left face, next day right forehead only. Due to negative MRI and alternating symptoms functional neurologic disorder in conjunction with possible peripheral vertigo (as pt had URI ~2 weeks prior to admission and dizziness sensation is exacerbated by head movement).     Recommendations  - Aspirin 325mg  - Continue statin   - Follow up with general neurology in 2-8 weeks for further work up    Patient Follow-up    - in 2-8 weeks with [facility] or other local neurologist of patient's choice    Thank you for this consult.  Thank you for this consult. Please page with any further questions and to re-engage.        The Stroke Staff is Dr. RUBINA Hayes MD  Vascular Neurology Fellow  To page me or covering stroke neurology team member, click here: [facility]   Choose \"On Call\" tab at top, then search dropdown box for \"Neurology Adult\", select location, press Enter, then look for stroke/neuro ICU/telestroke.    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 Authorizing Provider   albuterol (PROVENTIL HFA) 108 (90 Base) MCG/ACT inhaler Inhale 2 puffs into the lungs every 6 hours 12/4/19  Yes Mehrdad Faustin, RENAN   azelastine (ASTELIN) 0.1 % nasal spray Spray 1 spray into both nostrils as needed for rhinitis   Yes Unknown, Entered By History   fexofenadine (ALLEGRA) 180 MG tablet Take 180 mg by mouth daily   Yes Unknown, Entered By History   melatonin 5 MG tablet Take 5 mg by mouth nightly as needed for sleep   Yes Unknown, Entered By History   metoprolol succinate ER (TOPROL XL) 50 MG 24 hr tablet Take 50 mg by mouth daily   Yes Unknown, Entered By History   multivitamin w/minerals (THERA-VIT-M) tablet Take 1 tablet by mouth daily   Yes Reported, Patient   rizatriptan (MAXALT) 10 MG tablet TAKE 1 TAB BY MOUTH AT ONSET. MAY REPEAT 2 HRS LATER. MAX 2 TABS/24 HRS. 7/2/20   Reported, Patient       Scheduled Meds    aspirin  325 mg Oral Daily     atorvastatin  80 mg Oral QPM     diphenhydrAMINE  50 mg Intravenous Once     metoprolol succinate ER  50 mg Oral Daily     sodium chloride (PF)  3 mL Intracatheter Q8H       Infusion Meds    - MEDICATION INSTRUCTIONS -       sodium chloride Stopped (05/16/22 0014)       PRN Meds  acetaminophen **OR** acetaminophen, labetalol **OR** hydrALAZINE, lidocaine 4%, lidocaine (buffered or not buffered), - MEDICATION INSTRUCTIONS -, sodium chloride (PF), sodium chloride    Allergies   Allergies   Allergen Reactions     Codeine Hives     Eggs [Chicken-Derived Products (Egg)] Other (See Comments)     Flu-like symptoms     Latex Rash     Family History   Family History   Problem Relation Age of Onset     Alzheimer Disease Paternal Grandmother      Hemophilia Paternal Grandmother      Myocardial Infarction Paternal Grandfather      Hemophilia Father      Hypertension Maternal Grandmother      Pancreatic Cancer Maternal Grandmother      Cerebrovascular Disease Maternal Grandfather      Breast Cancer Maternal Aunt      Bone  Cancer Maternal Aunt      Diabetes No family hx of      Coronary Artery Disease Early Onset No family hx of      Colon Cancer No family hx of      Ovarian Cancer No family hx of      Social History   Social History     Tobacco Use     Smoking status: Former Smoker     Packs/day: 0.50     Years: 3.00     Pack years: 1.50     Quit date: 2000     Years since quittin.3     Smokeless tobacco: Never Used   Substance Use Topics     Alcohol use: Yes     Drug use: No       Review of Systems   The 10 point Review of Systems is negative other than noted in the HPI or here.        PHYSICAL EXAMINATION   Temp:  [97.5  F (36.4  C)-98.4  F (36.9  C)] 97.5  F (36.4  C)  Pulse:  [70-93] 83  Resp:  [14-18] 18  BP: (131-151)/(78-93) 131/79  SpO2:  [93 %-98 %] 95 %    General Exam  General:  patient lying in bed without any acute distress    HEENT:  normocephalic/atraumatic  Cardio:  RRR  Pulmonary:  no respiratory distress  Abdomen:  non-distended  Extremities:  no edema  Skin:  intact     Neuro Exam  Mental Status:  alert, oriented x 3, follows commands, speech clear and fluent, naming and repetition normal  Cranial Nerves:  visual fields intact, PERRL, EOMI with normal smooth pursuit, facial sensation intact and decreased pp in right forehead, facial movements symmetric, hearing not formally tested but intact to conversation, palate elevation symmetric and uvula midline, no dysarthria, shoulder shrug strong bilaterally, tongue protrusion midline  Motor:  normal muscle tone and bulk, no abnormal movements, able to move all limbs spontaneously, strength 5/5 throughout upper and lower extremities, no pronator drift  Reflexes:  toes down-going  Sensory:  light touch sensation intact and symmetric throughout upper and lower extremities, no extinction on double simultaneous stimulation , Decreased PP in R lower leg  Coordination:  No ataxia   Station/Gait:  deferred    Dysphagia Screen  Per Nursing    Stroke Scales    NIHSS  1a.  Level of Consciousness 0-->Alert, keenly responsive   1b. LOC Questions 0-->Answers both questions correctly   1c. LOC Commands 0-->Performs both tasks correctly   2.   Best Gaze 0-->Normal   3.   Visual 0-->No visual loss   4.   Facial Palsy 0-->Normal symmetrical movements   5a. Motor Arm, Left 0-->No drift, limb holds 90 (or 45) degrees for full 10 secs   5b. Motor Arm, Right 0-->No drift, limb holds 90 (or 45) degrees for full 10 secs   6a. Motor Leg, Left 0-->No drift, leg holds 30 degree position for full 5 secs   6b. Motor Leg, right 0-->No drift, leg holds 30 degree position for full 5 secs   7.   Limb Ataxia 0-->Absent   8.   Sensory 1-->Mild-to-moderate sensory loss, patient feels pinprick is less sharp or is dull on the affected side, or there is a loss of superficial pain with pinprick, but patient is aware of being touched (RLE decreased sensation)   9.   Best Language 0-->No aphasia, normal   10. Dysarthria 0-->Normal   11. Extinction and Inattention  0-->No abnormality   Total 1 (05/17/22 0800)       Modified Leatha Score (Pre-morbid)  0-No deficits    Imaging  I personally reviewed all imaging; relevant findings per HPI.    Labs Data   CBC  Recent Labs   Lab 05/16/22  0541 05/15/22  2122   WBC 6.5 9.4   RBC 4.60 4.62   HGB 13.4 13.5   HCT 40.7 40.9    286     Basic Metabolic Panel   Recent Labs   Lab 05/17/22  0820 05/17/22  0237 05/16/22  1253 05/16/22  0541 05/15/22  2327 05/15/22  2122   NA  --   --   --  140  --  140   POTASSIUM  --   --   --  4.0  --  3.6   CHLORIDE  --   --   --  107  --  108   CO2  --   --   --  27  --  27   BUN  --   --   --  5*  --  6*   CR  --   --   --  0.70  --  0.73   * 110* 88 100*   < > 93   NYASIA  --   --   --  8.3*  --  8.4*    < > = values in this interval not displayed.     Liver Panel  Recent Labs   Lab 05/15/22  2122   PROTTOTAL 6.9   ALBUMIN 3.5   BILITOTAL 0.7   ALKPHOS 99   AST 18   ALT 32     INR    Recent Labs   Lab Test 05/16/22  0599  05/15/22  2122   INR 1.03 0.98      Lipid Profile    Recent Labs   Lab Test 05/15/22  2122 04/08/22  0817 06/17/21  0950   CHOL 186 189 204*   HDL 40* 37* 38*   * 129* 141*   TRIG 114 114 124     A1C    Recent Labs   Lab Test 05/16/22  0541 04/08/22  0817 04/04/17  1642   A1C 5.2 5.1 4.8     Troponin I    Recent Labs   Lab 05/16/22  0541 05/15/22  2122   TROPONINIS 4 3          Stroke Consult Data Data   This was a non-emergent, non-telestroke consult.

## 2022-05-17 NOTE — PHARMACY-CONSULT NOTE
Pharmacy Consult to evaluate for medication related stroke core measures    Ninoska Cottrell, 42 year old female admitted for CVA on 5/15/2022.    Thrombolytic was given on 5/15/22 at 2200.    VTE Prophylaxis SCDs /PCDs placed on 5/15/22, as appropriate prior to end of hospital day 2.    Antithrombotic: aspirin started on 5/17/22, as appropriate by end of hospital day 2. Continue antithrombotic therapy on discharge to meet quality measures, unless contraindicated.    Anticoagulation if history of A-fib/flutter: Patient does not have history of A-fib/flutter - anticoagulation not required for medication related stroke core measures.     LDL Cholesterol Calculated   Date Value Ref Range Status   05/15/2022 123 (H) <=100 mg/dL Final   06/17/2021 141 (H) <100 mg/dL Final     Comment:     Above desirable:  100-129 mg/dl  Borderline High:  130-159 mg/dL  High:             160-189 mg/dL  Very high:       >189 mg/dl         Patient currently receiving Lipitor (atorvastatin) continue statin on discharge to meet quality measures, unless contraindicated.    Recommendations: None at this time    Thank you for the consult.    Reynold Chou [facility] 5/17/2022 9:01 AM

## 2022-05-17 NOTE — PROGRESS NOTES
Patient seen and examined; patient's  present in room    - Transferred out of ICU on 5/16  - repeat MRI 5/17 unremarkable as well  - reports feeling better apart from some continued dizziness, no focal neurological deficits  - stroke neurology recommended starting  mg daily and to continue with Statin; to follow up with neurology in 2-8 weeks    Discharge home today    Please see discharge summary for details

## 2022-05-17 NOTE — DISCHARGE INSTRUCTIONS
Your risk factors for stroke or TIA (transient ischemic attack):    Your Risk Factors Your Results Normal Ranges   High blood pressure BP Readings from Last 1 Encounters:   05/17/22 131/79    Less than 120/80   Cholesterol              Total Lab Results   Component Value Date    CHOL 186 05/15/2022    CHOL 204 06/17/2021      Less than 150    Triglycerides   Lab Results   Component Value Date    TRIG 114 05/15/2022    TRIG 124 06/17/2021    Less than 150   LDL Lab Results   Component Value Date     05/15/2022     06/17/2021       Less than 70   HDL Lab Results   Component Value Date    HDL 40 05/15/2022    HDL 38 06/17/2021            Greater than 40 (men)  Greater than 50 (women)   Diabetes Recent Labs   Lab 05/17/22  0820   *    Fasting blood glucose    Smoking/tobacco use  Quit smoking and tobacco   Overweight  Lose 1-2 pounds a week   Lack of exercise  30 minutes moderate activity each day   Other risk factors include carotid (neck) artery disease, atrial fibrillation and stress. You may be on new medicine to treat high blood pressure, cholesterol, diabetes or atrial fibrillation.    Understanding Stroke Booklet given to patient. Please refer to booklet for further information.    Stroke warning signs and symptoms - CALL 911 right away for:  - Sudden numbness or weakness in the face, arm or leg (often on one side of the body).  - Sudden confusion or trouble understanding what is going on.  - Sudden blurred or decreased vision in one or both eyes.  - Sudden trouble speaking, loss of balance, dizziness or problems with coordination.  - Sudden, severe headache for no reason.  - Fainting or seizures.  - Symptoms may go away then come back suddenly.

## 2022-05-17 NOTE — CONSULTS
Care Management Discharge Note    Discharge Date: 05/17/2022       Discharge Disposition:  Home    Discharge Services:      Discharge DME:      Discharge Transportation:      Private pay costs discussed: Not applicable    PAS Confirmation Code:    Patient/family educated on Medicare website which has current facility and service quality ratings:      Education Provided on the Discharge Plan:    Persons Notified of Discharge Plans:   Patient/Family in Agreement with the Plan:      Handoff Referral Completed: No    Additional Information:  CM consult acknowledged for discharge planning.  Attempted several times to meet with patient but she was busy with other providers. Discharge to home is recommended.  Follow up at [facility] is recommended and clinic information was put on AVS.  Please contact [facility] if discharge needs identified.      Cathy Montilla RN

## 2022-05-17 NOTE — PROGRESS NOTES
"Physical Therapy Discharge Summary    Reason for therapy discharge:    Discharged to home with outpatient therapy.    Progress towards therapy goal(s). See goals on Care Plan in [facility] electronic health record for goal details.  Goals partially met.  Barriers to achieving goals:   discharge from facility.    Therapy recommendation(s):    Continued therapy is recommended.  Rationale/Recommendations:   .    Last PT rx:      05/17/22 1617   Signing Clinician's Name / Credentials   Signing clinician's name / credentials Brittany Dressler, DPT, NCS   Quick Adds   Rehab Discipline PT   Gait Training   Distance in Feet (Required for LE Total Joints) Distant supervision-Kierra RW - edu to stay wtih walker consistently.   Treatment Detail SB-Eladio no AD during FGA.   Stair, Performance   Treatment Detail 4 stairs 3x step-to down and step-to and through up. Pt reports feeling like she'll fall forward descending - edu on electrical tape at home on stair edges. 1 rail per home. Distant supervision - edu for home as well. Seated break.   Physical Performance Test or Measurement   Minutes of Treatment 15   Symptoms Noted During/After Treatment fatigue;shortness of breath   Treatment Detail FGA 9/30 no AD (falls risk indoors & outdoors < 20).Limited self-efficacy without AD. Gait speed 6m/15sec.   Neuromuscular Re-Education   Minutes of Treatment 15   Symptoms Noted During/After Treatment dizziness;fatigue   Treatment Detail PT: mCTSIB(+) all setting (WBOS needed - distant supervision ground EO & EC, close SBA EO Airex, LOB 8sec EC Airex).   VOR (+) H & V, severe sx vertically - lied down. R eye intermittently adducting but convergence < 2\" - pt reports this is not new. Edu on vestib hypofunction - HEP handout on VOR H & V 10-15sec 3x/day or until sx ensuring asx wtihin 2min. Finished in bed alarm armed. (Full ocular range, no nystagmus to indicate neuritis. Likely central hypofunction from CVA)   PT Discharge Planning   PT Discharge " Recommendation (DC Rec) home with assist;home with outpatient physical therapy   PT Rationale for DC Rec Pt appropriate to return home with supervision with consistent RW use (ordered), OP PT (ordered) with  driving her (arranged).   PT Brief overview of current status FGA 9 (high falls risk), (+) mCTSIB and VOR V > R. Distant supervision step-to on stairs with 1 rail per home, Eladio no AD.   Additional Documentation   PT Plan DC orders in with PT plan in place.   Total Session Time   Total Session Time (minutes) 30 minutes

## 2022-05-17 NOTE — PLAN OF CARE
Goal Outcome Evaluation:    Reason for Admission: Pt transferred from TCU on PM shift on 5/16. Admitted due to Medullary stroke. Pt had TNK at 2152 on 5/16/22.     Code status: FC   Seizure or isolation precaution: N/A   Cognitive/Mentation: A/Ox 4  Use of CPAP: No  Neuros/CMS: intact, no deficit.   CIWA Protocol: NA  VS: stable   Tele: SR  GI: BS active x4, passing flatus,  Continent.  : voiding w/o difficulty. Continent.  Use of de jesus, external catheter, or urinal: N/A  Pulmonary: LS clear   Pain: PRN tylenol was given for headache in the back of her hea d  Critical Labs to Monitor: N/A   Electrolyte Replacement Protocol: N/A   Use of oxygen: N/A   Heparin or other drips: N/A     BG checks: q4h and at 0200  Tests, MRI, CT, SARAH, Echo: pt will need to have repeat MRI in the morning at 0900 on 5/17  Drains: saline locked   Skin: intact   Edema: No edema   Surgical site: N/A   Activity: Assist x 1 with GBW  Diet: regular  with thin  liquids. Takes pills w   Tube Feeding: N/A   Discharge: pending.

## 2022-05-18 ENCOUNTER — HOSPITAL ENCOUNTER (OUTPATIENT)
Dept: CARDIOLOGY | Facility: CLINIC | Age: 42
Discharge: HOME | End: 2022-05-18
Admitting: STUDENT IN AN ORGANIZED HEALTH CARE EDUCATION/TRAINING PROGRAM
Payer: COMMERCIAL

## 2022-05-18 ENCOUNTER — PATIENT OUTREACH (OUTPATIENT)
Dept: INTERNAL MEDICINE | Facility: CLINIC | Age: 42
End: 2022-05-18
Payer: COMMERCIAL

## 2022-05-18 PROCEDURE — 93270 REMOTE 30 DAY ECG REV/REPORT: CPT

## 2022-05-18 NOTE — PROGRESS NOTES
NSG DISCHARGE NOTE    Patient discharged to home at 7:57 PM via wheel chair. Accompanied by spouse and staff. Discharge instructions reviewed with patient, opportunity offered to ask questions. Prescriptions filled and sent with patient upon discharge. All belongings sent with patient.    Cindy Kingston RN on 5/17/2022 at 7:57 PM

## 2022-05-18 NOTE — PLAN OF CARE
Occupational Therapy Discharge Summary    Reason for therapy discharge:    Discharged to home.    Progress towards therapy goal(s). See goals on Care Plan in [facility] electronic health record for goal details.  Goals met    Therapy recommendation(s):    No further therapy is recommended.

## 2022-05-18 NOTE — TELEPHONE ENCOUNTER
What type of discharge? Inpatient  Risk of Hospital admission or ED visit: 10%  Is a TCM episode required? No  When should the patient follow up with PCP? 7 days of discharge.        Bia Kingsley RN  [facility]

## 2022-05-20 ENCOUNTER — OFFICE VISIT (OUTPATIENT)
Dept: INTERNAL MEDICINE | Facility: CLINIC | Age: 42
End: 2022-05-20
Payer: COMMERCIAL

## 2022-05-20 VITALS
BODY MASS INDEX: 43.39 KG/M2 | WEIGHT: 270 LBS | HEART RATE: 72 BPM | RESPIRATION RATE: 16 BRPM | OXYGEN SATURATION: 96 % | HEIGHT: 66 IN

## 2022-05-20 DIAGNOSIS — R53.81 PHYSICAL DECONDITIONING: ICD-10-CM

## 2022-05-20 DIAGNOSIS — R06.09 DYSPNEA ON EXERTION: ICD-10-CM

## 2022-05-20 DIAGNOSIS — R27.0 ATAXIA: ICD-10-CM

## 2022-05-20 DIAGNOSIS — E66.01 MORBID OBESITY (H): ICD-10-CM

## 2022-05-20 DIAGNOSIS — R13.10 DYSPHAGIA, UNSPECIFIED TYPE: ICD-10-CM

## 2022-05-20 DIAGNOSIS — F33.42 RECURRENT MAJOR DEPRESSIVE DISORDER, IN FULL REMISSION (H): ICD-10-CM

## 2022-05-20 DIAGNOSIS — Z09 HOSPITAL DISCHARGE FOLLOW-UP: Primary | ICD-10-CM

## 2022-05-20 PROBLEM — F33.41 RECURRENT MAJOR DEPRESSIVE DISORDER, IN PARTIAL REMISSION (H): Status: ACTIVE | Noted: 2022-05-20

## 2022-05-20 LAB
ERYTHROCYTE [DISTWIDTH] IN BLOOD BY AUTOMATED COUNT: 12.9 % (ref 10–15)
FOLATE SERPL-MCNC: 19.2 NG/ML
HCT VFR BLD AUTO: 41.2 % (ref 35–47)
HGB BLD-MCNC: 13.6 G/DL (ref 11.7–15.7)
MCH RBC QN AUTO: 29.1 PG (ref 26.5–33)
MCHC RBC AUTO-ENTMCNC: 33 G/DL (ref 31.5–36.5)
MCV RBC AUTO: 88 FL (ref 78–100)
PLATELET # BLD AUTO: 253 10E3/UL (ref 150–450)
RBC # BLD AUTO: 4.68 10E6/UL (ref 3.8–5.2)
VIT B12 SERPL-MCNC: 546 PG/ML (ref 193–986)
WBC # BLD AUTO: 7.5 10E3/UL (ref 4–11)

## 2022-05-20 PROCEDURE — 83550 IRON BINDING TEST: CPT | Performed by: INTERNAL MEDICINE

## 2022-05-20 PROCEDURE — 99214 OFFICE O/P EST MOD 30 MIN: CPT | Performed by: INTERNAL MEDICINE

## 2022-05-20 PROCEDURE — 85027 COMPLETE CBC AUTOMATED: CPT | Performed by: INTERNAL MEDICINE

## 2022-05-20 PROCEDURE — 82728 ASSAY OF FERRITIN: CPT | Performed by: INTERNAL MEDICINE

## 2022-05-20 PROCEDURE — 82306 VITAMIN D 25 HYDROXY: CPT | Performed by: INTERNAL MEDICINE

## 2022-05-20 PROCEDURE — 82607 VITAMIN B-12: CPT | Performed by: INTERNAL MEDICINE

## 2022-05-20 PROCEDURE — 36415 COLL VENOUS BLD VENIPUNCTURE: CPT | Performed by: INTERNAL MEDICINE

## 2022-05-20 PROCEDURE — 80053 COMPREHEN METABOLIC PANEL: CPT | Performed by: INTERNAL MEDICINE

## 2022-05-20 PROCEDURE — 82746 ASSAY OF FOLIC ACID SERUM: CPT | Performed by: INTERNAL MEDICINE

## 2022-05-20 NOTE — PROGRESS NOTES
ASSESSMENT/PLAN      (Z09) Hospital discharge follow-up  (primary encounter diagnosis)  (R13.10) Dysphagia, unspecified type  (R27.0) Ataxia  (R53.81) Physical deconditioning  (R06.00) Dyspnea on exertion  Comment: patient is clinically stable, but still significantly symptomatic.  Plan: nonfasting labs and CXR today to evaluate for potential organic causes of/contributors to ongoing symptoms; home care referral placed - patient will be contacted to schedule; follow-up in 1 month (earlier as needed).     (F33.42) Recurrent major depressive disorder, in full remission (H)  Comment: well-controlled on current regimen.      (E66.01) Morbid obesity (H)  Comment: known issue that I take into account for their medical decisions, no current exacerbations or new concerns.    Jaci Sharma MD   [address] [address]  T: [phone], F: [phone]    SUBJECTIVE     Ninoska Cottrell is a very pleasant 42 year old female who presents for hospital follow-up:    Accompanied by .    Hospital: [facility]  Date of Admission: 5/15/2022  Date of Discharge: 5/17/2022  Reason(s) for Admission: acute onset ataxia and dysphagia    Diagnostic tests, treatments/interventions, and discharge summary reviewed.    Summary of hospitalization:  - presented with dysphagia, choking, and gait disturbance  - CT head and head and neck CTA negative  - stroke neurology consulted; received TNKase with clinical improvement  - MRI brain normal  - normal echo and negative bubble study  - stroke neurology suggest vestibular neuritis contributing to symptoms  - started on aspirin 325 and Lipitor 80 mg daily    Medication changes since discharge: none  Adherent to discharge medications: yes  Problems taking discharge medications: no    Follow-up: with neurology in 4-6 weeks    Update since discharge:   - feeling extremely fatigued  - winded and exhausted with minimal exertion, like walking across  "her room  - still with significant brain fog  - still with occasional dizziness, leg numbness, and chest pressure    OBJECTIVE      Pulse 72   Resp 16   Ht 1.676 m (5' 6\")   Wt 122.5 kg (270 lb)   SpO2 96%   BMI 43.58 kg/m    Constitutional: tired-appearing  Respiratory: normal respiratory effort; clear to auscultation bilaterally  Cardiovascular: regular rate and rhythm; no edema  Musculoskeletal: walking with walker  Psych: normal judgment and insight; normal mood and affect; recent and remote memory intact  ---  (Note was completed, in part, with [employer] voice-recognition software. Documentation was reviewed, but some grammatical, spelling, and word errors may remain.)    "

## 2022-05-21 LAB
ALBUMIN SERPL-MCNC: 3.7 G/DL (ref 3.4–5)
ALP SERPL-CCNC: 103 U/L (ref 40–150)
ALT SERPL W P-5'-P-CCNC: 30 U/L (ref 0–50)
ANION GAP SERPL CALCULATED.3IONS-SCNC: 2 MMOL/L (ref 3–14)
AST SERPL W P-5'-P-CCNC: 17 U/L (ref 0–45)
BILIRUB SERPL-MCNC: 0.4 MG/DL (ref 0.2–1.3)
BUN SERPL-MCNC: 8 MG/DL (ref 7–30)
CALCIUM SERPL-MCNC: 9 MG/DL (ref 8.5–10.1)
CHLORIDE BLD-SCNC: 109 MMOL/L (ref 94–109)
CO2 SERPL-SCNC: 27 MMOL/L (ref 20–32)
CREAT SERPL-MCNC: 0.84 MG/DL (ref 0.52–1.04)
DEPRECATED CALCIDIOL+CALCIFEROL SERPL-MC: 31 UG/L (ref 20–75)
FERRITIN SERPL-MCNC: 86 NG/ML (ref 12–150)
GFR SERPL CREATININE-BSD FRML MDRD: 88 ML/MIN/1.73M2
GLUCOSE BLD-MCNC: 95 MG/DL (ref 70–99)
IRON SATN MFR SERPL: 18 % (ref 15–46)
IRON SERPL-MCNC: 61 UG/DL (ref 35–180)
POTASSIUM BLD-SCNC: 4.1 MMOL/L (ref 3.4–5.3)
PROT SERPL-MCNC: 7 G/DL (ref 6.8–8.8)
SODIUM SERPL-SCNC: 138 MMOL/L (ref 133–144)
TIBC SERPL-MCNC: 340 UG/DL (ref 240–430)

## 2022-05-23 ENCOUNTER — TELEPHONE (OUTPATIENT)
Dept: INTERNAL MEDICINE | Facility: CLINIC | Age: 42
End: 2022-05-23
Payer: COMMERCIAL

## 2022-05-23 ENCOUNTER — TELEPHONE (OUTPATIENT)
Dept: CARE COORDINATION | Facility: CLINIC | Age: 42
End: 2022-05-23
Payer: COMMERCIAL

## 2022-05-23 DIAGNOSIS — R13.10 DYSPHAGIA, UNSPECIFIED TYPE: Primary | ICD-10-CM

## 2022-05-23 DIAGNOSIS — R27.0 ATAXIA: ICD-10-CM

## 2022-05-23 DIAGNOSIS — R53.81 PHYSICAL DECONDITIONING: ICD-10-CM

## 2022-05-23 DIAGNOSIS — R06.09 DYSPNEA ON EXERTION: ICD-10-CM

## 2022-05-23 NOTE — TELEPHONE ENCOUNTER
Care coordination referral placed to see if patient can be placed with a different home health care agency.

## 2022-05-23 NOTE — TELEPHONE ENCOUNTER
[facility] CTA received notification from [facility] team requesting assistance from centralized [facility] team, in finding a home care agency to accept referral for skilled home care due to [facility] declining referral.    CTA called [facility] and per their request, home care referral and OV note from 5/20/22 was faxed through [employer] to them (fax number: [phone]), for their review. CTA will await return call from agency with update on status of referral.    The following [facility] declined referral:  [facility]- at capacity  [facility]- does not accept patient's insurance.    Writer waiting on a return call from [facility] as to whether or not they accept patient's insurance.    Received call back from [facility] and they are at capacity.      SHARMIN Santos  [facility]  [facility] - Ambulatory Care Management

## 2022-05-23 NOTE — TELEPHONE ENCOUNTER
Piero from [facility] called, stated that the referral was denied due to capacity.     Piero may be reached at [phone].

## 2022-05-26 NOTE — TELEPHONE ENCOUNTER
Received a call from Beverly at [facility]. They are able to begin SOC on 5/26/2022 if these items are faxed to them as soon as possible. If not SOC would not begin until 5/31 or 6/1.     Per Beverly from [facility] all items need to be signed (she states that the order and notes that were faxed previously are not signed).  Please fax the items below ASAP:    1.  Face to Face OV notes from 5/20   2.  Home Care referral  3. Facesheet    [facility] Fax is [phone]       Nahomi Colon  Care Transitions Assistant  Connected [facility] Resource [place]

## 2022-06-01 ENCOUNTER — LAB REQUISITION (OUTPATIENT)
Dept: LAB | Facility: CLINIC | Age: 42
End: 2022-06-01

## 2022-06-01 ENCOUNTER — TELEPHONE (OUTPATIENT)
Dept: ENDOCRINOLOGY | Facility: CLINIC | Age: 42
End: 2022-06-01
Payer: COMMERCIAL

## 2022-06-01 ENCOUNTER — HOSPITAL ENCOUNTER (OUTPATIENT)
Dept: RESEARCH | Facility: CLINIC | Age: 42
Discharge: HOME | End: 2022-06-01
Attending: INTERNAL MEDICINE | Admitting: INTERNAL MEDICINE
Payer: COMMERCIAL

## 2022-06-01 DIAGNOSIS — Z00.6 EXAMINATION OF PARTICIPANT OR CONTROL IN CLINICAL RESEARCH: ICD-10-CM

## 2022-06-01 LAB
INSULIN SERPL-ACNC: 13.1 MU/L (ref 3–25)
INSULIN SERPL-ACNC: 25.2 MU/L (ref 3–25)
INSULIN SERPL-ACNC: 28.5 MU/L (ref 3–25)
INSULIN SERPL-ACNC: 30.4 MU/L (ref 3–25)
INSULIN SERPL-ACNC: 30.4 MU/L (ref 3–25)
INSULIN SERPL-ACNC: 79.5 MU/L (ref 3–25)
INSULIN SERPL-ACNC: 84.7 MU/L (ref 3–25)
INSULIN SERPL-ACNC: 88.3 MU/L (ref 3–25)
INSULIN SERPL-ACNC: 94.4 MU/L (ref 3–25)

## 2022-06-01 PROCEDURE — 510N000013 HC RESEARCH GLUCOSE CLAMP, PER CLAMP

## 2022-06-01 PROCEDURE — 250N000009 HC RX 250: Performed by: PHYSICIAN ASSISTANT

## 2022-06-01 PROCEDURE — 510N000009 HC RESEARCH FACILITY, PER 15 MIN

## 2022-06-01 PROCEDURE — 96374 THER/PROPH/DIAG INJ IV PUSH: CPT

## 2022-06-01 PROCEDURE — 300N000004 HC RESEARCH SPECIMEN PROCESSING, MODERATE

## 2022-06-01 PROCEDURE — 250N000012 HC RX MED GY IP 250 OP 636 PS 637: Performed by: PHYSICIAN ASSISTANT

## 2022-06-01 PROCEDURE — 258N000002 HC RX IP 258 OP 250: Performed by: PHYSICIAN ASSISTANT

## 2022-06-01 PROCEDURE — 258N000001 HC RX 258: Performed by: PHYSICIAN ASSISTANT

## 2022-06-01 PROCEDURE — 300N000003 HC RESEARCH SPECIMEN PROCESSING, SIMPLE

## 2022-06-01 PROCEDURE — 83525 ASSAY OF INSULIN: CPT | Performed by: INTERNAL MEDICINE

## 2022-06-01 PROCEDURE — 510N000017 HC CRU PATIENT CARE, PER 15 MIN

## 2022-06-01 PROCEDURE — 96376 TX/PRO/DX INJ SAME DRUG ADON: CPT

## 2022-06-01 PROCEDURE — 96375 TX/PRO/DX INJ NEW DRUG ADDON: CPT

## 2022-06-01 RX ORDER — DEXTROSE 20 G/100ML
500 INJECTION, SOLUTION INTRAVENOUS CONTINUOUS
Status: DISCONTINUED | OUTPATIENT
Start: 2022-06-01 | End: 2022-06-02 | Stop reason: HOSPADM

## 2022-06-01 RX ADMIN — POTASSIUM PHOSPHATE, MONOBASIC AND POTASSIUM PHOSPHATE, DIBASIC: 224; 236 INJECTION, SOLUTION INTRAVENOUS at 10:00

## 2022-06-01 RX ADMIN — HUMAN INSULIN 6 UNITS: 100 INJECTION, SOLUTION SUBCUTANEOUS at 10:00

## 2022-06-01 RX ADMIN — HUMAN INSULIN 23 UNITS: 100 INJECTION, SOLUTION SUBCUTANEOUS at 12:22

## 2022-06-01 RX ADMIN — DEXTROSE MONOHYDRATE 500 ML: 70 INJECTION, SOLUTION INTRAVENOUS at 14:05

## 2022-06-01 NOTE — TELEPHONE ENCOUNTER
Patient in research study.  Elevated insulin to be expected given hyperinsulinemic euglycemic plan.    Component      Latest Ref Rng & Units 6/1/2022 6/1/2022 6/1/2022 6/1/2022          10:00 AM 10:30 AM 11:00 AM 11:30 AM   Insulin      3.0 - 25.0 mU/L 13.1 28.5 (H) 30.4 (H) 30.4 (H)     Component      Latest Ref Rng & Units 6/1/2022 6/1/2022 6/1/2022 6/1/2022          12:00 PM 12:30 PM  1:00 PM  1:30 PM   Insulin      3.0 - 25.0 mU/L 25.2 (H) 79.5 (H) 88.3 (H) 94.4 (H)     Component      Latest Ref Rng & Units 6/1/2022           2:00 PM   Insulin      3.0 - 25.0 mU/L 84.7 (H)

## 2022-06-01 NOTE — RESULT ENCOUNTER NOTE
Candy Xiao    We are delighted to have you in our study!  Here is your insulin level which is high due to the insulin infusion.  This is to be expected and your body will process this naturally.  We do feel that just the active tracking your food intake may be beneficial as it makes you more mindful of your intake.  At the end of the study, you will be able to meet with our study dietitians up to 8 times to help set goals and keep you encouraged    Welcome to our study!    Dr. Martin

## 2022-06-02 NOTE — ADDENDUM NOTE
Encounter addended by: Ryland Lloyd on: 6/2/2022 7:39 AM   Actions taken: Charge Capture section accepted

## 2022-06-06 NOTE — ADDENDUM NOTE
Encounter addended by: Alexandra Rodriguez on: 6/6/2022 1:46 PM   Actions taken: Charge Capture section accepted

## 2022-06-07 ENCOUNTER — PATIENT OUTREACH (OUTPATIENT)
Dept: NEUROLOGY | Facility: CLINIC | Age: 42
End: 2022-06-07
Payer: COMMERCIAL

## 2022-06-07 NOTE — TELEPHONE ENCOUNTER
Clinic Care Coordination Contact    Situation: Patient chart reviewed by care coordinator.    Background: Specialty care coordination referral placed on pt for post TNK follow-up and RNCC support/education.    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 NCCT, CTA unrevealing. Received IV TNK for suspected stroke. 12h post-TNK MRI without acute stroke. Impression, Acute onset ataxia, dysphagia, facial symptoms sp IV TNK, initial MRI negative for stroke x2. Given persistent dizziness, may be vestibular neuritis/peripheral vertigo. Etiology of patchy decreased sensation to pinprick is not clear but is not in a physiologic distribution to suggest stroke.    Plan/Recommendations: Per inpatient stroke neurology, pt recommended to remain on 325mg asa daily as well as atorvastatin 80mg daily until follow up with general neurology in 2-8 weeks at [facility]. RNCC to outreach to pt.    Anastasia Slater BS, RN, SCRN  RN Stroke Neurology Care Coordinator  [facility] Neuroscience Service Line

## 2022-06-07 NOTE — TELEPHONE ENCOUNTER
Clinic Care Coordination Contact  [facility]/Voicemail      Clinical Data: Care Coordinator Outreach  Outreach attempted x 1.  Left message on patient's voicemail with call back information and requested return call.  Plan: Care Coordinator will try to reach patient again in 1-2 business days.      Anastasia HEWITT, RN, SCRN  RN Stroke Neurology Care Coordinator  [facility] Neuroscience Service Line

## 2022-06-09 ENCOUNTER — PATIENT OUTREACH (OUTPATIENT)
Dept: NEUROLOGY | Facility: CLINIC | Age: 42
End: 2022-06-09
Attending: HOSPITALIST
Payer: COMMERCIAL

## 2022-06-09 NOTE — TELEPHONE ENCOUNTER
Clinic Care Coordination Contact  [facility]/Voicemail      Clinical Data: Care Coordinator Outreach  Outreach attempted x 2.  Left message on patient's voicemail with call back information and requested return call.  Plan: Care Coordinator will do no further outreaches at this time.      Anastasia HEWITT, RN, SCRN  RN Stroke Neurology Care Coordinator  [facility] Neuroscience Service Line

## 2022-06-09 NOTE — CONFIDENTIAL NOTE
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 NCCT, CTA unrevealing. Received IV TNK for suspected stroke. 12h post-TNK MRI without acute stroke. Impression, Acute onset ataxia, dysphagia, facial symptoms sp IV TNK, initial MRI negative for stroke x2. Given persistent dizziness, may be vestibular neuritis/peripheral vertigo. Etiology of patchy decreased sensation to pinprick is not clear but is not in a physiologic distribution to suggest stroke.    ASSESSMENT     Spoke with pt and explained the reasoning of my call. Pt states that she just had an appointment at [facility] yesterday, 6/8 , and unfortunately it was not from her perspective a productive appt. She states that the provider was dismissive to the working diagnoses from the inpatient team and told her that the symptoms are because of migraine and that her previous classic migraine has just likely changed and now presented with these new neurological symptoms. Pt states that although she has a migraine history, her symptoms with previous migraines have been very classic in presentation. She also notes that she has had another migraine since discharging from the hospital and it also presented in the classic matter and was not accompanied by any abnormal neurological changes. She has a follow up with a different provider in [place] where she is hopeful to have a more constructive conversation early July.     For now, she is continuing on 325mg asa and 80mg lipitor daily. She was advised by [facility] that she could discontinue use of asa given that they believe her symptoms were migraine  and not stroke. Pt is hesitant to stop it, so I advised her to discuss it further with her PCP and the new provider she will see in about a month. She is agreeable with that plan.     I did ask if she has had any instances of the initial symptoms that brought her into the hospital. She said that she has some some intermittent occurrences of BLE feet having some N/T or her BUE hands. She notes one incident of swallowing difficulty, but it only happened with one hard swallow and then subsequent drinks were okay per her report. I reviewed B.E.F.A.S.T. with her and explained some of the aspects that would be more concerning for stroke regarding unilateral symptoms, sudden onset peripherally that travels up/in, etc. She expressed that it was helpful to gain better understanding of symptoms and what to look out for. I also encouraged her to contact me directly in the mean time before her next appt if she has any questions or concerns about her care, but advised symptom concerns be addressed by triage or going to the ER. She acknowledged understanding and did not have any additional questions at the end of our call.     Care Management (ENC)  Assessment completed with:: Patient  Medication adherence problem (GOAL):: No  Knowledgeable about how to use meds:: Yes  Medication side effects suspected:: No  Difficulty keeping appointments:: No      Neuro General  Neurological Conditions: Stroke  Level of consciousness: alert  Orientation Level: Oriented X4  Dizziness or Light-Headedness: Yes (Intermittent, resolved)  Numbness: Yes (Had a couple intermittent occurrences)  Tingling: Yes (Had a couple intermittent occurrences)      Pt specific risk factors for stroke or TIA (transient ischemic attack):    Your Risk Factors Your Results Normal Ranges   High blood pressure BP Readings from Last 1 Encounters:   05/17/22 131/79    Less than 120/80   Cholesterol              Total Lab Results   Component Value Date    CHOL 186  05/15/2022    CHOL 204 2021      Less than 150    Triglycerides   Lab Results   Component Value Date    TRIG 114 05/15/2022    TRIG 124 2021    Less than 150   LDL Lab Results   Component Value Date     05/15/2022     2021       Less than 70   HDL Lab Results   Component Value Date    HDL 40 05/15/2022    HDL 38 2021            Greater than 40 (men)  Greater than 50 (women)   Diabetes @labrcntip(g)@ Fasting blood glucose    Smoking/tobacco use  Quit smoking and tobacco   Overweight  Lose 1-2 pounds a week   Lack of exercise  30 minutes moderate activity each day   Other risk factors include carotid (neck) artery disease, atrial fibrillation and stress.     Stroke warning signs and symptoms - CALL 911 right away for:  - Sudden numbness or weakness in the face, arm or leg (often on one side of the body).  - Sudden confusion or trouble understanding what is going on.  - Sudden blurred or decreased vision in one or both eyes.  - Sudden trouble speaking, loss of balance, dizziness or problems with coordination.  - Sudden, severe headache for no reason.  - Fainting or seizures.  - Symptoms may go away then come back suddenly.    PLAN     Follow-up plan:  Next outreach in about 2 months to check in with pt and access 90 day mRS. Pt has my contact information for interim questions or concerns.       Anastasia Slater BS, RN, SCRN  RN Stroke Neurology Care Coordinator  [facility] Neuroscience Service Line

## 2022-06-10 NOTE — TELEPHONE ENCOUNTER
Received return call from pt 6/9. Please see outreach encounter from 6/9 for documentation.    Anastasia Slater BS, RN, SCRN  RN Stroke Neurology Care Coordinator  [facility] Neuroscience Service Line

## 2022-06-13 ENCOUNTER — HOSPITAL ENCOUNTER (OUTPATIENT)
Dept: PHYSICAL THERAPY | Facility: CLINIC | Age: 42
Discharge: HOME | End: 2022-06-13
Payer: COMMERCIAL

## 2022-06-13 DIAGNOSIS — Z74.09 IMPAIRED FUNCTIONAL MOBILITY, BALANCE, GAIT, AND ENDURANCE: ICD-10-CM

## 2022-06-13 DIAGNOSIS — R29.818 ACUTE FOCAL NEUROLOGICAL DEFICIT, ONSET WITHIN 3 HOURS: Primary | ICD-10-CM

## 2022-06-13 PROCEDURE — 97110 THERAPEUTIC EXERCISES: CPT | Mod: GP | Performed by: PHYSICAL THERAPIST

## 2022-06-13 PROCEDURE — 97161 PT EVAL LOW COMPLEX 20 MIN: CPT | Mod: GP | Performed by: PHYSICAL THERAPIST

## 2022-06-14 NOTE — PROGRESS NOTES
06/13/22 1500   Quick Adds   Type of Visit Initial OP PT Evaluation   General Information   Start of Care Date 06/13/22   Referring Physician Samra Ceballos MD   Orders Evaluate and Treat as Indicated   Order Date 05/17/22   Medical Diagnosis Acute focal neurological deficit   Onset of illness/injury or Date of Surgery 05/15/22   Surgical/Medical history reviewed Yes   Pertinent history of current problem (include personal factors and/or comorbidities that impact the POC) Ninoska Cottrell is a 42 year old female who presented on 5/15/2022 to the ED with acute onset gait instability, dysphagia, and tingling/burning of face while eating at a restaurant and had developed a headache earlier that day when she went to a movie.  NCCT, CTA unrevealing. Received IV TNK for suspected stroke. 12h post-TNK MRI without acute stroke. MRI negative for stroke x2. Medical team is still unsure the cause of her symptoms. She had a short hospital stay and discharged home with FWW and recommendations to follow up with therapy. Since her hospitalization, she gets dizzy if she over does it, or looks up or down too fast. Dizziness occurs in the back of her head and she feels disoriented.  Occurs 1-2x/week. She sleeps with 2-3 pillows under her head.  She has tried walking around the MOA needed to sit down every 100-150 feet and only able to make 2 laps.  She is currently using a FWW in the community and just started trying to walk without the walker at home.  Family provides supervision when stair climbing and pt utilizes step to pattern descending. She has been helping with folding the laundry and started standing to complete this.  Patient has been difficulty with walking and balance as well as stairs.  She has been dragging her right leg when fatigued. She continues to experience fatigue and leg weakness leading to needing to use her hands to help with standing from chairs.  She gets mm spasms in her neck leading to jerking movements. Pt  lives in a multi level home with 12 steps between floors and 1-3 steps to enter. Baseline activity: Averages 8000 steps a day with walking and cleaning the house.  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 She completes yard work, cleaning, laundry and medication management. 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 No   Have you fallen and had an injury in the past year? No   Timed Up and Go score (seconds) 22.8   Is patient a fall risk? Yes   Abuse Screen (yes response referral indicated)   Feels Unsafe at Home or Work/School no   Feels Threatened by Someone no   Pain   Patient currently in pain Yes   Pain location neck   Pain description   (spasm)   Cognitive Status Examination   Orientation orientation to person, place and time   Level of Consciousness alert   Posture   Posture Forward head position;Protracted shoulders   Range of Motion (ROM)   ROM Comment WFL   Strength   Strength Comments Grossly 4/5 throughout   Transfer Skills   Transfer Comments independent with slower speed. Prefers to use UE support.   Gait   Gait Comments Pt ambulates with FWW, slow speed, WBOS, reduced step length and heel strike   Gait Special Tests   Gait Special Tests 25 FOOT TIMED WALK   Gait Special Tests 25 Foot Timed Walk   Seconds 12.8   Steps 20 Steps   Comments 0.59 m/s with FWW.  High fall risk   Balance Special Tests   Balance Special Tests Other   Balance special tests other Normal MARILU static balance eye open: 30 seconds,  Eyes closed: 30 seconds with mild sway   Balance Special Tests Sit to Stand Reps in 30 Seconds   Reps in 30 seconds 1   Height 17   Comments very slow and pt  "unsteady when unable to use her hands for balance   Sensory Examination   Sensory Perception Comments intermittent numbness in B LEs   Coordination   Coordination Comments impaired rapid alt toe taps   Planned Therapy Interventions   Planned Therapy Interventions balance training;gait training;neuromuscular re-education;strengthening;stretching;transfer training   Clinical Impression   Criteria for Skilled Therapeutic Interventions Met yes, treatment indicated   PT Diagnosis decreased functional mobility, strength, balance, gait   Influenced by the following impairments decreased strength, impaired balance, decreased coordination, reduced sensation   Functional limitations due to impairments difficulty with transfers, gait and stairs   Clinical Presentation Evolving/Changing   Clinical Presentation Rationale presentation, PMH, TUG, gait speed, balance 30\" Chair Stand   Clinical Decision Making (Complexity) Moderate complexity   Therapy Frequency 2 times/Week  (reducing frequency as tolerated)   Predicted Duration of Therapy Intervention (days/wks) up to 90 days   Risk & Benefits of therapy have been explained Yes   Patient, Family & other staff in agreement with plan of care Yes   Education Assessment   Preferred Learning Style Demonstration;Listening   Barriers to Learning No barriers   GOALS   PT Eval Goals 1;2;3;4;5   Goal 1   Goal Identifier 30\" Chair Stand   Goal Description Pt will be able to complete 5 stands in 30\" in order to ease functional strength   Goal Progress Baseline: 1   Target Date 09/11/22   Goal 2   Goal Identifier Gait speed   Goal Description Pt will ambulate at a comfortable gait speed of 0.85 m/s with AAD in order to ease community mobility   Goal Progress Baseline: 0.59 m/s   Target Date 09/11/22   Goal 3   Goal Identifier DGI   Goal Description Pt will score >/=18/24 on the DGI in order to reduce fall risk   Target Date 09/11/22   Goal 4   Goal Identifier 6 MWT   Goal Description Pt will " ambulate >100' compared to baseline in during the 6 MWT in order to improve activity tolerance for community participation   Target Date 09/11/22   Goal 5   Goal Identifier HEP   Goal Description Pt will be independent with a HEP to self manage symptoms   Target Date 09/11/22   Total Evaluation Time   PT Eval, Low Complexity Minutes (84248) 40

## 2022-06-16 NOTE — ADDENDUM NOTE
Encounter addended by: Chinyere James RN on: 6/16/2022 7:54 AM   Actions taken: Charge Capture section accepted

## 2022-06-17 ENCOUNTER — OFFICE VISIT (OUTPATIENT)
Dept: INTERNAL MEDICINE | Facility: CLINIC | Age: 42
End: 2022-06-17
Payer: COMMERCIAL

## 2022-06-17 VITALS
DIASTOLIC BLOOD PRESSURE: 78 MMHG | HEART RATE: 72 BPM | BODY MASS INDEX: 45.16 KG/M2 | TEMPERATURE: 98.4 F | SYSTOLIC BLOOD PRESSURE: 115 MMHG | OXYGEN SATURATION: 99 % | HEIGHT: 66 IN | WEIGHT: 281 LBS

## 2022-06-17 DIAGNOSIS — R06.09 DYSPNEA ON EXERTION: ICD-10-CM

## 2022-06-17 DIAGNOSIS — R53.83 FATIGUE, UNSPECIFIED TYPE: ICD-10-CM

## 2022-06-17 DIAGNOSIS — R27.0 ATAXIA: ICD-10-CM

## 2022-06-17 DIAGNOSIS — R53.81 PHYSICAL DECONDITIONING: ICD-10-CM

## 2022-06-17 DIAGNOSIS — H81.11 BENIGN PAROXYSMAL POSITIONAL VERTIGO OF RIGHT EAR: ICD-10-CM

## 2022-06-17 DIAGNOSIS — R13.10 DYSPHAGIA, UNSPECIFIED TYPE: Primary | ICD-10-CM

## 2022-06-17 PROBLEM — J44.9 COPD (CHRONIC OBSTRUCTIVE PULMONARY DISEASE) (H): Status: ACTIVE | Noted: 2022-06-17

## 2022-06-17 PROCEDURE — 99214 OFFICE O/P EST MOD 30 MIN: CPT | Performed by: INTERNAL MEDICINE

## 2022-06-17 ASSESSMENT — PATIENT HEALTH QUESTIONNAIRE - PHQ9
SUM OF ALL RESPONSES TO PHQ QUESTIONS 1-9: 13
10. IF YOU CHECKED OFF ANY PROBLEMS, HOW DIFFICULT HAVE THESE PROBLEMS MADE IT FOR YOU TO DO YOUR WORK, TAKE CARE OF THINGS AT HOME, OR GET ALONG WITH OTHER PEOPLE: VERY DIFFICULT
SUM OF ALL RESPONSES TO PHQ QUESTIONS 1-9: 13

## 2022-06-17 NOTE — PROGRESS NOTES
{PROVIDER CHARTING PREFERENCE:651897}    Subjective   Ninoska is a 42 year old{ACCOMPANIED BY STATEMENT (Optional):766657}, presenting for the following health issues:  Hospital F/U      HPI     {SUPERLIST (Optional):431138}  {additonal problems for provider to add (Optional):608128}    Review of Systems   {ROS COMP (Optional):162211}      Objective    There were no vitals taken for this visit.  There is no height or weight on file to calculate BMI.  Physical Exam   {Exam List (Optional):340909}    {Diagnostic Test Results (Optional):284349}    {AMBULATORY ATTESTATION (Optional):809082}            .  ..

## 2022-06-17 NOTE — PROGRESS NOTES
ASSESSMENT/PLAN                                                      (R13.10) Dysphagia, unspecified type  (primary encounter diagnosis)  (R27.0) Ataxia  Comment: No recurrent episodes.  Plan: referred to neurology to get a second opinion re: the etiology of her recent symptoms/hospitalization - patient will be contacted to schedule.      (R53.81) Physical deconditioning  (R53.83) Fatigue, unspecified type  (R06.00) Dyspnea on exertion  Comment: significantly improved since last visit; will likely continue to improve over time.  Plan: continue present management.    (H81.11) Benign paroxysmal positional vertigo of right ear  Plan: referred for vertical therapy - patient will be contacted to schedule.      Jaci Sharma MD   [address] [address]  T: [phone], F: [phone]    SUBJECTIVE                                                      Ninoska Cottrell is a very pleasant 42 year old female who presents for follow-up:    Accompanied by .    Patient was seen last month for hospital discharge follow-up. She had been hospitalized at [facility] 5/15/2022-5/17/2022 with dysphagia, choking, and gait disturbance. Stroke neurology consulted and patient received TNKase with clinical improvement. Additional work-up including CT head, CTA head and neck, MRI brain, echo, and bubble study all negative. At last visit patient was extremely fatigued, winded and exhausted with minimal exertion, suffering from significant brain fog, and still having occasional episodes of dizziness.    Patient (and ) reports significant improvement since last visit. She started to work with outpatient physical therapy ~1x/week. Still has episodes and days of extreme fatigue, but is feeling much closer to baseline. Minimal dyspnea with moderate exertion. Brain fog is significantly better and she is voraciously reading and engaging with others like she did prehospitalization. No recurrent  "episodes of dysphagia or ataxia.      She does report occasional episodes of vertigo.    She is also interested in getting a second neurologic opinion re: the etiology of her recent symptoms/hospitalization.    OBJECTIVE                                                      /78   Pulse 72   Temp 98.4  F (36.9  C) (Oral)   Ht 1.676 m (5' 6\")   Wt 127.5 kg (281 lb)   LMP  (LMP Unknown)   SpO2 99%   Breastfeeding No   BMI 45.35 kg/m    Constitutional: well-appearing  Respiratory: normal respiratory effort; clear to auscultation bilaterally  Cardiovascular: regular rate and rhythm; no edema  Gastrointestinal: soft, non-tender, and non-distended; no organomegaly or masses  Musculoskeletal: normal gait and station  Psych: normal judgment and insight; normal mood and affect; recent and remote memory intact    Positive Fidelina-Hallpike maneuver on the right.    ---    (Note documentation was completed, in part, with [employer] voice-recognition software. Documentation was reviewed, but some grammatical, spelling, and word errors may remain.)    "

## 2022-06-20 PROCEDURE — 93272 ECG/REVIEW INTERPRET ONLY: CPT | Performed by: INTERNAL MEDICINE

## 2022-06-27 ENCOUNTER — HOSPITAL ENCOUNTER (OUTPATIENT)
Dept: PHYSICAL THERAPY | Facility: CLINIC | Age: 42
Discharge: HOME | End: 2022-06-27
Payer: COMMERCIAL

## 2022-06-27 DIAGNOSIS — H81.10 BENIGN PAROXYSMAL POSITIONAL VERTIGO, UNSPECIFIED LATERALITY: Primary | ICD-10-CM

## 2022-06-27 DIAGNOSIS — Z74.09 IMPAIRED FUNCTIONAL MOBILITY, BALANCE, GAIT, AND ENDURANCE: ICD-10-CM

## 2022-06-27 PROCEDURE — 97110 THERAPEUTIC EXERCISES: CPT | Mod: GP | Performed by: PHYSICAL THERAPIST

## 2022-06-27 PROCEDURE — 97164 PT RE-EVAL EST PLAN CARE: CPT | Mod: GP | Performed by: PHYSICAL THERAPIST

## 2022-07-05 ENCOUNTER — OFFICE VISIT (OUTPATIENT)
Dept: URGENT CARE | Facility: URGENT CARE | Age: 42
End: 2022-07-05
Payer: COMMERCIAL

## 2022-07-05 VITALS
DIASTOLIC BLOOD PRESSURE: 90 MMHG | WEIGHT: 281 LBS | TEMPERATURE: 99.2 F | OXYGEN SATURATION: 100 % | SYSTOLIC BLOOD PRESSURE: 128 MMHG | RESPIRATION RATE: 20 BRPM | BODY MASS INDEX: 45.35 KG/M2 | HEART RATE: 79 BPM

## 2022-07-05 DIAGNOSIS — H66.002 ACUTE SUPPURATIVE OTITIS MEDIA OF LEFT EAR WITHOUT SPONTANEOUS RUPTURE OF TYMPANIC MEMBRANE, RECURRENCE NOT SPECIFIED: Primary | ICD-10-CM

## 2022-07-05 DIAGNOSIS — Z20.822 SUSPECTED COVID-19 VIRUS INFECTION: ICD-10-CM

## 2022-07-05 PROCEDURE — U0003 INFECTIOUS AGENT DETECTION BY NUCLEIC ACID (DNA OR RNA); SEVERE ACUTE RESPIRATORY SYNDROME CORONAVIRUS 2 (SARS-COV-2) (CORONAVIRUS DISEASE [COVID-19]), AMPLIFIED PROBE TECHNIQUE, MAKING USE OF HIGH THROUGHPUT TECHNOLOGIES AS DESCRIBED BY CMS-2020-01-R: HCPCS | Performed by: FAMILY MEDICINE

## 2022-07-05 PROCEDURE — U0005 INFEC AGEN DETEC AMPLI PROBE: HCPCS | Performed by: FAMILY MEDICINE

## 2022-07-05 PROCEDURE — 99213 OFFICE O/P EST LOW 20 MIN: CPT | Performed by: FAMILY MEDICINE

## 2022-07-05 RX ORDER — TOPIRAMATE 50 MG/1
TABLET, FILM COATED ORAL
COMMUNITY
Start: 2022-06-09 | End: 2023-07-26

## 2022-07-05 RX ORDER — AMOXICILLIN 875 MG
875 TABLET ORAL 2 TIMES DAILY
Qty: 20 TABLET | Refills: 0 | Status: SHIPPED | OUTPATIENT
Start: 2022-07-05 | End: 2022-07-15

## 2022-07-05 NOTE — PROGRESS NOTES
SUBJECTIVE: Ninoska Cottrell is a 42 year old female presenting with a chief complaint of nasal congestion, cough  and ear pain left.  Onset of symptoms was 4 day(s) ago.  Course of illness is worsening.    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      History of pre-eclampsia      MDD (major depressive disorder)      Morbid obesity (H)      Allergies   Allergen Reactions     Mold Fatigue and Itching     Vaccinium Angustifolium      Codeine Hives     Eggs [Chicken-Derived Products (Egg)] Other (See Comments)     Flu-like symptoms     Latex Rash     Latex Rash     Other reaction(s): Swelling, lips/tongue     Social History     Tobacco Use     Smoking status: Former Smoker     Packs/day: 0.50     Years: 3.00     Pack years: 1.50     Quit date: 2000     Years since quittin.5     Smokeless tobacco: Never Used   Substance Use Topics     Alcohol use: Yes       ROS:  SKIN: no rash  GI: no vomiting    OBJECTIVE:  BP (!) 128/90   Pulse 79   Temp 99.2  F (37.3  C) (Tympanic)   Resp 20   Wt 127.5 kg (281 lb)   LMP  (LMP Unknown)   SpO2 100%   BMI 45.35 kg/m  GENERAL APPEARANCE: healthy, alert and no distress  EYES: EOMI,  PERRL, conjunctiva clear  HENT: TM erythematous left, rhinorrhea clear and oral mucous membranes moist, no erythema noted  RESP: lungs clear to auscultation - no rales, rhonchi or wheezes  SKIN: no suspicious lesions or rashes      ICD-10-CM    1. Acute suppurative otitis media of left ear without spontaneous rupture of tympanic membrane, recurrence not specified  H66.002 amoxicillin (AMOXIL) 875 MG tablet   2. Suspected COVID-19 virus infection  Z20.822 Symptomatic; Yes; 7/3/2022 COVID-19 Virus (Coronavirus) by PCR Nose       Fluids/Rest, f/u if worse/not any better

## 2022-07-06 LAB — SARS-COV-2 RNA RESP QL NAA+PROBE: NEGATIVE

## 2022-07-14 ENCOUNTER — OFFICE VISIT (OUTPATIENT)
Dept: URGENT CARE | Facility: URGENT CARE | Age: 42
End: 2022-07-14
Payer: COMMERCIAL

## 2022-07-14 VITALS
DIASTOLIC BLOOD PRESSURE: 82 MMHG | HEART RATE: 76 BPM | OXYGEN SATURATION: 99 % | WEIGHT: 281 LBS | TEMPERATURE: 98.7 F | SYSTOLIC BLOOD PRESSURE: 118 MMHG | BODY MASS INDEX: 45.35 KG/M2 | RESPIRATION RATE: 18 BRPM

## 2022-07-14 DIAGNOSIS — H66.002 ACUTE SUPPURATIVE OTITIS MEDIA OF LEFT EAR WITHOUT SPONTANEOUS RUPTURE OF TYMPANIC MEMBRANE, RECURRENCE NOT SPECIFIED: ICD-10-CM

## 2022-07-14 DIAGNOSIS — R05.9 COUGH: Primary | ICD-10-CM

## 2022-07-14 DIAGNOSIS — J45.901 MODERATE ASTHMA WITH EXACERBATION, UNSPECIFIED WHETHER PERSISTENT: ICD-10-CM

## 2022-07-14 LAB
FLUAV AG SPEC QL IA: NEGATIVE
FLUBV AG SPEC QL IA: NEGATIVE

## 2022-07-14 PROCEDURE — 99214 OFFICE O/P EST MOD 30 MIN: CPT | Performed by: PHYSICIAN ASSISTANT

## 2022-07-14 PROCEDURE — 87804 INFLUENZA ASSAY W/OPTIC: CPT | Performed by: PHYSICIAN ASSISTANT

## 2022-07-14 RX ORDER — CEFDINIR 300 MG/1
300 CAPSULE ORAL 2 TIMES DAILY
Qty: 20 CAPSULE | Refills: 0 | Status: SHIPPED | OUTPATIENT
Start: 2022-07-14 | End: 2022-07-24

## 2022-07-14 RX ORDER — PREDNISONE 20 MG/1
20 TABLET ORAL 2 TIMES DAILY
Qty: 10 TABLET | Refills: 0 | Status: SHIPPED | OUTPATIENT
Start: 2022-07-14 | End: 2022-10-24

## 2022-07-14 RX ORDER — BENZONATATE 200 MG/1
200 CAPSULE ORAL 3 TIMES DAILY PRN
Qty: 21 CAPSULE | Refills: 0 | Status: SHIPPED | OUTPATIENT
Start: 2022-07-14 | End: 2022-07-21

## 2022-07-14 RX ORDER — ALBUTEROL SULFATE 90 UG/1
2 AEROSOL, METERED RESPIRATORY (INHALATION) EVERY 6 HOURS
Qty: 8.5 G | Refills: 0 | Status: SHIPPED | OUTPATIENT
Start: 2022-07-14 | End: 2024-04-24

## 2022-07-14 NOTE — PROGRESS NOTES
Assessment & Plan     Cough    Results for orders placed or performed in visit on 07/14/22   Influenza A/B antigen     Status: Normal    Specimen: Nose; Swab   Result Value Ref Range    Influenza A antigen Negative Negative    Influenza B antigen Negative Negative    Narrative    Test results must be correlated with clinical data. If necessary, results should be confirmed by a molecular assay or viral culture.       - Influenza A/B antigen  - benzonatate (TESSALON) 200 MG capsule; Take 1 capsule (200 mg) by mouth 3 times daily as needed    Acute suppurative otitis media of left ear without spontaneous rupture of tympanic membrane, recurrence not specified    You have an infection of the middle ear, the space behind the eardrum. This is also called acute otitis media (AOM). Sometimes it's caused by the common cold. This is because congestion can block the internal passage (eustachian tube) that drains fluid from the middle ear. When the middle ear fills with fluid, bacteria can grow there and cause an infection. Oral antibiotics are used to treat this illness, not ear drops. Symptoms usually start to improve within 1 to 2 days of treatment.    Patient failed on amox, switch to omnicef  - cefdinir (OMNICEF) 300 MG capsule; Take 1 capsule (300 mg) by mouth 2 times daily for 10 days    Moderate asthma with exacerbation, unspecified whether persistent    Asthma is a disease where the medium and small air passages in the lung go into spasm and restrict air flow. Inflammation and swelling of the airways cause further blockage. During an acute asthma attack, these factors cause trouble breathing, wheezing, cough, and chest tightness.     - albuterol (PROVENTIL HFA) 108 (90 Base) MCG/ACT inhaler; Inhale 2 puffs into the lungs every 6 hours  - predniSONE (DELTASONE) 20 MG tablet; Take 1 tablet (20 mg) by mouth 2 times daily       BMI:   Estimated body mass index is 45.35 kg/m  as calculated from the following:    Height as  "of 6/17/22: 1.676 m (5' 6\").    Weight as of this encounter: 127.5 kg (281 lb).       At today's visit with Ninoska Cottrell , we discussed results, diagnosis, medications and formulated a plan.  We also discussed red flags for immediate return to clinic/ER, as well as indications for follow up if no improvement. Patient understood and agreed to plan. Ninoska Cottrell was discharged with stable vitals and has no further questions.       No follow-ups on file.    Mehrdad Faustin, [facility], PA-C  [facility] URGENT [facility]    Emili Xiao is a 42 year old, presenting for the following health issues:  Cough (Cough and ear pain X 1 week. Neg covid test )      HPI     Ninoska Cottrell, 42 year old, female presents to the urgent care today with:   Cough (Cough and ear pain X 1 week. Neg covid test )  asthma with wheezing    Review of Systems   Constitutional, HEENT, cardiovascular, pulmonary, gi and gu systems are negative, except as otherwise noted.      Objective    /82   Pulse 76   Temp 98.7  F (37.1  C) (Tympanic)   Resp 18   Wt 127.5 kg (281 lb)   LMP  (LMP Unknown)   SpO2 99%   BMI 45.35 kg/m    Body mass index is 45.35 kg/m .  Physical Exam   GENERAL: healthy, alert and no distress  EYES: Eyes grossly normal to inspection, PERRL and conjunctivae and sclerae normal  HENT: normal cephalic/atraumatic, left ear: erythematous and bulging membrane, nose and mouth without ulcers or lesions, oropharynx clear and oral mucous membranes moist  NECK: no adenopathy, no asymmetry, masses, or scars and thyroid normal to palpation  RESP: expiratory wheezes L upper anterior and throughout  CV: regular rate and rhythm, normal S1 S2, no S3 or S4, no murmur, click or rub, no peripheral edema and peripheral pulses strong  ABDOMEN: soft, nontender, no hepatosplenomegaly, no masses and bowel sounds normal  MS: no gross musculoskeletal defects noted, no edema  SKIN: no suspicious lesions or rashes  NEURO: Normal strength and " tone, mentation intact and speech normal  PSYCH: mentation appears normal, affect normal/bright                    .  ..

## 2022-07-15 ENCOUNTER — HOSPITAL ENCOUNTER (OUTPATIENT)
Dept: PHYSICAL THERAPY | Facility: CLINIC | Age: 42
Discharge: HOME | End: 2022-07-15
Payer: COMMERCIAL

## 2022-07-15 DIAGNOSIS — R29.818 ACUTE FOCAL NEUROLOGICAL DEFICIT, ONSET WITHIN 3 HOURS: ICD-10-CM

## 2022-07-15 DIAGNOSIS — Z74.09 IMPAIRED FUNCTIONAL MOBILITY, BALANCE, GAIT, AND ENDURANCE: Primary | ICD-10-CM

## 2022-07-15 PROCEDURE — 97110 THERAPEUTIC EXERCISES: CPT | Mod: GP

## 2022-07-15 PROCEDURE — 97116 GAIT TRAINING THERAPY: CPT | Mod: GP

## 2022-07-20 ENCOUNTER — HOSPITAL ENCOUNTER (OUTPATIENT)
Dept: PHYSICAL THERAPY | Facility: CLINIC | Age: 42
Discharge: HOME | End: 2022-07-20
Payer: COMMERCIAL

## 2022-07-20 DIAGNOSIS — R29.818 ACUTE FOCAL NEUROLOGICAL DEFICIT, ONSET WITHIN 3 HOURS: ICD-10-CM

## 2022-07-20 DIAGNOSIS — Z74.09 IMPAIRED FUNCTIONAL MOBILITY, BALANCE, GAIT, AND ENDURANCE: Primary | ICD-10-CM

## 2022-07-20 PROCEDURE — 97110 THERAPEUTIC EXERCISES: CPT | Mod: GP

## 2022-07-20 PROCEDURE — 97112 NEUROMUSCULAR REEDUCATION: CPT | Mod: GP

## 2022-07-27 ENCOUNTER — HOSPITAL ENCOUNTER (OUTPATIENT)
Dept: PHYSICAL THERAPY | Facility: CLINIC | Age: 42
Discharge: HOME | End: 2022-07-27
Payer: COMMERCIAL

## 2022-07-27 PROCEDURE — 97112 NEUROMUSCULAR REEDUCATION: CPT | Mod: GP

## 2022-07-27 PROCEDURE — 97110 THERAPEUTIC EXERCISES: CPT | Mod: GP

## 2022-08-03 ENCOUNTER — HOSPITAL ENCOUNTER (OUTPATIENT)
Dept: PHYSICAL THERAPY | Facility: CLINIC | Age: 42
Discharge: HOME | End: 2022-08-03
Payer: COMMERCIAL

## 2022-08-03 ENCOUNTER — OFFICE VISIT (OUTPATIENT)
Dept: URGENT CARE | Facility: URGENT CARE | Age: 42
End: 2022-08-03

## 2022-08-03 ENCOUNTER — ANCILLARY PROCEDURE (OUTPATIENT)
Dept: GENERAL RADIOLOGY | Facility: CLINIC | Age: 42
End: 2022-08-03
Attending: PHYSICIAN ASSISTANT
Payer: COMMERCIAL

## 2022-08-03 VITALS
HEART RATE: 71 BPM | TEMPERATURE: 98.4 F | WEIGHT: 281 LBS | OXYGEN SATURATION: 100 % | DIASTOLIC BLOOD PRESSURE: 79 MMHG | BODY MASS INDEX: 45.35 KG/M2 | SYSTOLIC BLOOD PRESSURE: 121 MMHG

## 2022-08-03 DIAGNOSIS — Z20.822 PERSON UNDER INVESTIGATION FOR COVID-19: Primary | ICD-10-CM

## 2022-08-03 DIAGNOSIS — R05.9 COUGH: ICD-10-CM

## 2022-08-03 DIAGNOSIS — R29.818 ACUTE FOCAL NEUROLOGICAL DEFICIT, ONSET WITHIN 3 HOURS: Primary | ICD-10-CM

## 2022-08-03 DIAGNOSIS — J06.9 UPPER RESPIRATORY TRACT INFECTION, UNSPECIFIED TYPE: ICD-10-CM

## 2022-08-03 DIAGNOSIS — Z74.09 IMPAIRED FUNCTIONAL MOBILITY, BALANCE, GAIT, AND ENDURANCE: ICD-10-CM

## 2022-08-03 LAB — SARS-COV-2 RNA RESP QL NAA+PROBE: NEGATIVE

## 2022-08-03 PROCEDURE — 99214 OFFICE O/P EST MOD 30 MIN: CPT | Mod: CS | Performed by: PHYSICIAN ASSISTANT

## 2022-08-03 PROCEDURE — 97112 NEUROMUSCULAR REEDUCATION: CPT | Mod: GP | Performed by: PHYSICAL THERAPIST

## 2022-08-03 PROCEDURE — U0003 INFECTIOUS AGENT DETECTION BY NUCLEIC ACID (DNA OR RNA); SEVERE ACUTE RESPIRATORY SYNDROME CORONAVIRUS 2 (SARS-COV-2) (CORONAVIRUS DISEASE [COVID-19]), AMPLIFIED PROBE TECHNIQUE, MAKING USE OF HIGH THROUGHPUT TECHNOLOGIES AS DESCRIBED BY CMS-2020-01-R: HCPCS | Performed by: PHYSICIAN ASSISTANT

## 2022-08-03 PROCEDURE — 71046 X-RAY EXAM CHEST 2 VIEWS: CPT | Mod: TC | Performed by: RADIOLOGY

## 2022-08-03 PROCEDURE — U0005 INFEC AGEN DETEC AMPLI PROBE: HCPCS | Performed by: PHYSICIAN ASSISTANT

## 2022-08-03 PROCEDURE — 97110 THERAPEUTIC EXERCISES: CPT | Mod: GP | Performed by: PHYSICAL THERAPIST

## 2022-08-03 RX ORDER — PREDNISONE 20 MG/1
40 TABLET ORAL DAILY
Qty: 8 TABLET | Refills: 0 | Status: SHIPPED | OUTPATIENT
Start: 2022-08-03 | End: 2022-08-07

## 2022-08-03 RX ORDER — AZITHROMYCIN 250 MG/1
TABLET, FILM COATED ORAL
Qty: 6 TABLET | Refills: 0 | Status: SHIPPED | OUTPATIENT
Start: 2022-08-03 | End: 2022-08-08

## 2022-08-08 ENCOUNTER — PATIENT OUTREACH (OUTPATIENT)
Dept: NEUROLOGY | Facility: CLINIC | Age: 42
End: 2022-08-08

## 2022-08-08 NOTE — PROGRESS NOTES
Clinic Care Coordination Contact  [facility]/Voicemail       Clinical Data: Care Coordinator Outreach to collect 90 day mRS  Outreach attempted x 1.  Left message on patient's voicemail with call back information and requested return call.  Plan: Care Coordinator will try to reach patient again in 1-2 business days.      Anastasia Slater BS, RN, SCRN  RN Stroke Neurology Care Coordinator  [facility] Neuroscience Service Line

## 2022-08-10 ENCOUNTER — HOSPITAL ENCOUNTER (OUTPATIENT)
Dept: PHYSICAL THERAPY | Facility: CLINIC | Age: 42
Discharge: HOME | End: 2022-08-10
Payer: COMMERCIAL

## 2022-08-10 DIAGNOSIS — R29.818 ACUTE FOCAL NEUROLOGICAL DEFICIT, ONSET WITHIN 3 HOURS: Primary | ICD-10-CM

## 2022-08-10 DIAGNOSIS — Z74.09 IMPAIRED FUNCTIONAL MOBILITY, BALANCE, GAIT, AND ENDURANCE: ICD-10-CM

## 2022-08-10 PROCEDURE — 97112 NEUROMUSCULAR REEDUCATION: CPT | Mod: GP | Performed by: PHYSICAL THERAPIST

## 2022-08-10 NOTE — PROGRESS NOTES
Clinic Care Coordination Contact  [facility]       Clinical Data: Care Coordinator Outreach to collect 90 day mRS.  Outreach attempted x 2.  Unable to leave  as her mailbox was full.  Plan: Care Coordinator will try to reach patient again in 1-2 business days.      Anastasia Slater BS, RN, SCRN  RN Stroke Neurology Care Coordinator  [facility] Neuroscience Service Line

## 2022-08-11 NOTE — PROGRESS NOTES
Clinic Care Coordination Contact  [facility]       Clinical Data: Care Coordinator Outreach  Outreach attempted x 3.  Unable to leave  as her mailbox was full.  Plan: Care Coordinator will do no further outreaches at this time.      Anastasia Slater BS, RN, SCRN  RN Stroke Neurology Care Coordinator  [facility] Neuroscience Service Line

## 2022-08-17 ENCOUNTER — HOSPITAL ENCOUNTER (OUTPATIENT)
Dept: PHYSICAL THERAPY | Facility: CLINIC | Age: 42
Discharge: HOME | End: 2022-08-17
Payer: COMMERCIAL

## 2022-08-17 DIAGNOSIS — R29.818 ACUTE FOCAL NEUROLOGICAL DEFICIT, ONSET WITHIN 3 HOURS: Primary | ICD-10-CM

## 2022-08-17 DIAGNOSIS — Z74.09 IMPAIRED FUNCTIONAL MOBILITY, BALANCE, GAIT, AND ENDURANCE: ICD-10-CM

## 2022-08-17 PROCEDURE — 97110 THERAPEUTIC EXERCISES: CPT | Mod: GP | Performed by: PHYSICAL THERAPIST

## 2022-08-17 PROCEDURE — 97112 NEUROMUSCULAR REEDUCATION: CPT | Mod: GP | Performed by: PHYSICAL THERAPIST

## 2022-08-24 ENCOUNTER — HOSPITAL ENCOUNTER (OUTPATIENT)
Dept: PHYSICAL THERAPY | Facility: CLINIC | Age: 42
Discharge: HOME | End: 2022-08-24
Payer: COMMERCIAL

## 2022-08-24 DIAGNOSIS — R29.818 ACUTE FOCAL NEUROLOGICAL DEFICIT, ONSET WITHIN 3 HOURS: Primary | ICD-10-CM

## 2022-08-24 DIAGNOSIS — Z74.09 IMPAIRED FUNCTIONAL MOBILITY, BALANCE, GAIT, AND ENDURANCE: ICD-10-CM

## 2022-08-24 PROCEDURE — 97112 NEUROMUSCULAR REEDUCATION: CPT | Mod: GP

## 2022-08-24 PROCEDURE — 97110 THERAPEUTIC EXERCISES: CPT | Mod: GP

## 2022-08-27 ENCOUNTER — HEALTH MAINTENANCE LETTER (OUTPATIENT)
Age: 42
End: 2022-08-27

## 2022-08-27 NOTE — PROGRESS NOTES
SUBJECTIVE:  Ninoska Cottrell is a 42 year old female who presents to the clinic today with a chief complaint of cough, nasal congestion, ear pain.  Her cough is described as persiting over 1 month.    The patient's symptoms are mild and stable.  Associated symptoms include as above. The patient's symptoms are exacerbated by no particular triggers  Patient has been using nothing  to improve symptoms.    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      History of pre-eclampsia      MDD (major depressive disorder)      Morbid obesity (H)        Current Outpatient Medications   Medication Sig Dispense Refill     albuterol (PROVENTIL HFA) 108 (90 Base) MCG/ACT inhaler Inhale 2 puffs into the lungs every 6 hours 8.5 g 0     albuterol (PROVENTIL HFA) 108 (90 Base) MCG/ACT inhaler Inhale 2 puffs into the lungs every 6 hours 8.5 g 0     azelastine (ASTELIN) 0.1 % nasal spray Spray 1 spray into both nostrils as needed for rhinitis       fexofenadine (ALLEGRA) 180 MG tablet Take 180 mg by mouth daily       metoprolol succinate ER (TOPROL XL) 50 MG 24 hr tablet Take 50 mg by mouth daily       multivitamin w/minerals (THERA-VIT-M) tablet Take 1 tablet by mouth daily       rizatriptan (MAXALT) 10 MG tablet TAKE 1 TAB BY MOUTH AT ONSET. MAY REPEAT 2 HRS LATER. MAX 2 TABS/24 HRS.       topiramate (TOPAMAX) 50 MG tablet TAKE 1/2 TABLET AT NIGHT FOR 7 DAYS, 1 TABLET AT NIGHT FOR 7 DAYS AND THEN 1 TABLET TWICE A DAY       predniSONE (DELTASONE) 20 MG tablet Take 1 tablet (20 mg) by mouth 2 times daily (Patient not taking: Reported on 8/3/2022) 10 tablet 0       Social History     Tobacco Use     Smoking status: Former Smoker     Packs/day: 0.50     Years: 3.00     Pack years: 1.50     Quit date: 2000     Years since quittin.6     Smokeless tobacco: Never Used   Substance Use Topics     Alcohol use: Yes       ROS  Review of systems negative except as stated  above.    OBJECTIVE:  /79 (BP Location: Right arm, Patient Position: Sitting, Cuff Size: Adult Large)   Pulse 71   Temp 98.4  F (36.9  C) (Oral)   Wt 127.5 kg (281 lb)   LMP  (LMP Unknown)   SpO2 100%   BMI 45.35 kg/m    GENERAL APPEARANCE: healthy, alert and no distress  EYES: EOMI,  PERRL, conjunctiva clear  HENT: ear canals and TM's normal.  Nose and mouth without ulcers, erythema or lesions  NECK: supple, nontender, no lymphadenopathy  RESP: lungs clear to auscultation - no rales, rhonchi or wheezes  CV: regular rates and rhythm, normal S1 S2, no murmur noted  NEURO: Normal strength and tone, sensory exam grossly normal,  normal speech and mentation  SKIN: no suspicious lesions or rashes      Results for orders placed or performed in visit on 08/03/22   XR Chest 2 Views     Status: None    Narrative    CHEST TWO VIEWS  8/3/2022 3:19 PM     HISTORY: 42-year-old woman with history of cough.       Impression    IMPRESSION: Since May 20, 2022, heart size is normal. No pleural  effusion, pneumothorax, or abnormal area of consolidation.    MARBIN SOSA MD         SYSTEM ID:  [id number]   Results for orders placed or performed in visit on 08/03/22   Symptomatic; Unknown COVID-19 Virus (Coronavirus) by PCR Nose     Status: Normal    Specimen: Nose; Swab   Result Value Ref Range    SARS CoV2 PCR Negative Negative, Testing sent to reference lab. Results will be returned via unsolicited result    Narrative    Testing was performed using the Aptima SARS-CoV-2 Assay on the  [employer] Instrument System. Additional information about this  Emergency Use Authorization (EUA) assay can be found via the Lab  Guide. This test should be ordered for the detection of SARS-CoV-2 in  individuals who meet SARS-CoV-2 clinical and/or epidemiological  criteria. Test performance is unknown in asymptomatic patients. This  test is for in vitro diagnostic use under the FDA EUA for  laboratories certified under CLIA to perform high  complexity testing.  This test has not been FDA cleared or approved. A negative result  does not rule out the presence of PCR inhibitors in the specimen or  target RNA in concentration below the limit of detection for the  assay. The possibility of a false negative should be considered if  the patient's recent exposure or clinical presentation suggests  COVID-19. This test was validated by the [facility]. This laboratory is certified under  the Clinical Laboratory Improvement Amendments of 1988 (CLIA-88) as  qualified to perform high complexity laboratory testing.       ASSESSMENT:   (Z20.822) Person under investigation for COVID-19  (primary encounter diagnosis)  (J06.9) Upper respiratory tract infection, unspecified type  (R05.9) Cough  Plan: XR Chest 2 Views, Symptomatic; Unknown COVID-19        Virus (Coronavirus) by PCR, azithromycin         (ZITHROMAX) 250 MG tablet, predniSONE         (DELTASONE) 20 MG tablet      Covid-19  Pt was evaluated during a global COVID-19 pandemic, which necessitated consideration that the patient might be at risk for infection with the SARS-CoV-2 virus that causes COVID-19.   Applicable protocols for evaluation were followed during the patient's care.   COVID-19 was considered as part of the patient's evaluation. The plan for testing is:  a test was ordered during this visit.    No severe headache, chest pain, shortness of breath  No additional infectious symptoms  Rest, isolate for results, hydrate, follow up if worsening or new symptoms  Unvaccinated HH members to isolate until test results, if positive isolate for 2 weeks and follow up for testing if symptoms occur  Red flags and emergent follow up discussed, and understood by patient  Follow up with PCP if symptoms worsen or fail to improve    Surgical mask, shield, gloves worn by provider      There are no Patient Instructions on file for this visit.

## 2022-08-30 ENCOUNTER — LAB REQUISITION (OUTPATIENT)
Dept: LAB | Facility: CLINIC | Age: 42
End: 2022-08-30

## 2022-08-30 ENCOUNTER — HOSPITAL ENCOUNTER (OUTPATIENT)
Dept: RESEARCH | Facility: CLINIC | Age: 42
Discharge: HOME | End: 2022-08-30
Attending: INTERNAL MEDICINE | Admitting: INTERNAL MEDICINE
Payer: COMMERCIAL

## 2022-08-30 ENCOUNTER — TELEPHONE (OUTPATIENT)
Dept: ENDOCRINOLOGY | Facility: CLINIC | Age: 42
End: 2022-08-30

## 2022-08-30 DIAGNOSIS — Z00.6 EXAMINATION OF PARTICIPANT OR CONTROL IN CLINICAL RESEARCH: Primary | ICD-10-CM

## 2022-08-30 LAB
CHOLEST SERPL-MCNC: 191 MG/DL
FASTING STATUS PATIENT QL REPORTED: NORMAL
GLUCOSE SERPL-MCNC: 93 MG/DL (ref 70–99)
HBA1C MFR BLD: 5.1 %
HDLC SERPL-MCNC: 38 MG/DL
LDLC SERPL CALC-MCNC: 129 MG/DL
NONHDLC SERPL-MCNC: 153 MG/DL
TRIGL SERPL-MCNC: 122 MG/DL

## 2022-08-30 PROCEDURE — 510N000017 HC CRU PATIENT CARE, PER 15 MIN

## 2022-08-30 PROCEDURE — 82947 ASSAY GLUCOSE BLOOD QUANT: CPT | Performed by: INTERNAL MEDICINE

## 2022-08-30 PROCEDURE — 510N000016 HC RESEARCH MEALS, PER MEAL

## 2022-08-30 PROCEDURE — 96375 TX/PRO/DX INJ NEW DRUG ADDON: CPT

## 2022-08-30 PROCEDURE — 250N000012 HC RX MED GY IP 250 OP 636 PS 637: Performed by: PHYSICIAN ASSISTANT

## 2022-08-30 PROCEDURE — 96376 TX/PRO/DX INJ SAME DRUG ADON: CPT

## 2022-08-30 PROCEDURE — 258N000002 HC RX IP 258 OP 250: Performed by: PHYSICIAN ASSISTANT

## 2022-08-30 PROCEDURE — 510N000009 HC RESEARCH FACILITY, PER 15 MIN

## 2022-08-30 PROCEDURE — 96374 THER/PROPH/DIAG INJ IV PUSH: CPT

## 2022-08-30 PROCEDURE — 999N000127 HC STATISTIC PERIPHERAL IV START W US GUIDANCE

## 2022-08-30 PROCEDURE — 300N000003 HC RESEARCH SPECIMEN PROCESSING, SIMPLE

## 2022-08-30 PROCEDURE — 300N000004 HC RESEARCH SPECIMEN PROCESSING, MODERATE

## 2022-08-30 PROCEDURE — 250N000009 HC RX 250: Performed by: PHYSICIAN ASSISTANT

## 2022-08-30 PROCEDURE — 258N000001 HC RX 258: Performed by: PHYSICIAN ASSISTANT

## 2022-08-30 PROCEDURE — 510N000013 HC RESEARCH GLUCOSE CLAMP, PER CLAMP

## 2022-08-30 PROCEDURE — 83036 HEMOGLOBIN GLYCOSYLATED A1C: CPT | Performed by: INTERNAL MEDICINE

## 2022-08-30 PROCEDURE — 80061 LIPID PANEL: CPT | Performed by: INTERNAL MEDICINE

## 2022-08-30 RX ORDER — DEXTROSE 20 G/100ML
500 INJECTION, SOLUTION INTRAVENOUS CONTINUOUS
Status: DISCONTINUED | OUTPATIENT
Start: 2022-08-30 | End: 2022-08-31 | Stop reason: HOSPADM

## 2022-08-30 RX ORDER — LIDOCAINE 40 MG/G
CREAM TOPICAL
Status: DISCONTINUED | OUTPATIENT
Start: 2022-08-30 | End: 2022-08-31 | Stop reason: HOSPADM

## 2022-08-30 RX ADMIN — HUMAN INSULIN 23 UNITS: 100 INJECTION, SOLUTION SUBCUTANEOUS at 11:50

## 2022-08-30 RX ADMIN — HUMAN INSULIN 6 UNITS: 100 INJECTION, SOLUTION SUBCUTANEOUS at 09:50

## 2022-08-30 RX ADMIN — POTASSIUM PHOSPHATE, MONOBASIC AND POTASSIUM PHOSPHATE, DIBASIC: 224; 236 INJECTION, SOLUTION INTRAVENOUS at 09:50

## 2022-08-30 RX ADMIN — DEXTROSE 500 ML: 20 INJECTION, SOLUTION INTRAVENOUS at 13:50

## 2022-08-30 NOTE — ADDENDUM NOTE
Encounter addended by: Marcia Hanna RN on: 8/30/2022 3:33 PM   Actions taken: Charge Capture section accepted, LDA properties accepted

## 2022-08-30 NOTE — ADDENDUM NOTE
Encounter addended by: Stephon Lane on: 8/30/2022 3:50 PM   Actions taken: Charge Capture section accepted

## 2022-08-30 NOTE — TELEPHONE ENCOUNTER
Pt had lab results related to the study    Lab Requisition on 08/30/2022   Component Date Value Ref Range Status     Hemoglobin A1C 08/30/2022 5.1  <5.7 % Final    Normal <5.7%   Prediabetes 5.7-6.4%    Diabetes 6.5% or higher     Note: Adopted from ADA consensus guidelines.     Glucose 08/30/2022 93  70 - 99 mg/dL Final     Patient Fasting > 8hrs? 08/30/2022 Unknown   Final     Cholesterol 08/30/2022 191  <200 mg/dL Final     Triglycerides 08/30/2022 122  <150 mg/dL Final     Direct Measure HDL 08/30/2022 38 (A) >=50 mg/dL Final     LDL Cholesterol Calculated 08/30/2022 129 (A) <=100 mg/dL Final     Non HDL Cholesterol 08/30/2022 153 (A) <130 mg/dL Final

## 2022-08-30 NOTE — LETTER
Patient:  Ninoska Cottrell  :   1980  MRN:     [id number]        Ms.Brandy EULALIA Cottrell  [address] [place] [id number]        2022    Dear Ninoska    Thank you for your participation in the See Food 2 study. As you may be aware, the timing of your eating has been found to significantly improve health and metabolism in lab animals and may be a unique way that some humans may be able to lose weight and improve sleep and metabolism. Your participation in this study helps us understand how monitoring your diet might affect metabolism in humans and we are grateful for the time and dedication you have given to our efforts.    Please let us know if you have any questions related to the study. Also, please find attached your most recent labs. Your cholesterol remains similar compared with previous values and your HgbA1c has improved    With gratitude    Anjana Martin MD, MS    Resulted Orders   Hemoglobin A1c   Result Value Ref Range    Hemoglobin A1C 5.1 <5.7 %      Comment:      Normal <5.7%   Prediabetes 5.7-6.4%    Diabetes 6.5% or higher     Note: Adopted from ADA consensus guidelines.   Glucose   Result Value Ref Range    Glucose 93 70 - 99 mg/dL    Patient Fasting > 8hrs? Unknown    Lipid Profile   Result Value Ref Range    Cholesterol 191 <200 mg/dL    Triglycerides 122 <150 mg/dL    Direct Measure HDL 38 (L) >=50 mg/dL    LDL Cholesterol Calculated 129 (H) <=100 mg/dL    Non HDL Cholesterol 153 (H) <130 mg/dL    Narrative    Cholesterol  Desirable:  <200 mg/dL    Triglycerides  Normal:  Less than 150 mg/dL  Borderline High:  150-199 mg/dL  High:  200-499 mg/dL  Very High:  Greater than or equal to 500 mg/dL    Direct Measure HDL  Female:  Greater than or equal to 50 mg/dL   Male:  Greater than or equal to 40 mg/dL    LDL Cholesterol  Desirable:  <100mg/dL  Above Desirable:  100-129 mg/dL   Borderline High:  130-159 mg/dL   High:  160-189 mg/dL   Very High:  >= 190 mg/dL    Non HDL Cholesterol  Desirable:   130 mg/dL  Above Desirable:  130-159 mg/dL  Borderline High:  160-189 mg/dL  High:  190-219 mg/dL  Very High:  Greater than or equal to 220 mg/dL     Component      Latest Ref Rng & Units 5/15/2022 5/16/2022   Cholesterol      <200 mg/dL 186    Triglycerides      <150 mg/dL 114    HDL Cholesterol      >=50 mg/dL 40 (L)    LDL Cholesterol Calculated      <=100 mg/dL 123 (H)    Non HDL Cholesterol      <130 mg/dL 146 (H)    GLUCOSE BY METER POCT      70 - 99 mg/dL 98    Hemoglobin A1C      0.0 - 5.6 %  5.2

## 2022-08-30 NOTE — RESULT ENCOUNTER NOTE
Candy Xiao    Thank you for your participation in the See Food 2 study. As you may be aware, the timing of your eating has been found to significantly improve health and metabolism in lab animals and may be a unique way that some humans may be able to lose weight and improve sleep and metabolism. Your participation in this study helps us understand how monitoring your diet might affect metabolism in humans and we are grateful for the time and dedication you have given to our efforts.    Please let us know if you have any questions related to the study. Also, please find attached your most recent labs. Your cholesterol remains similar compared with previous values and your HgbA1c has improved    With gratitude    Anjana Martin MD, MS

## 2022-08-31 ENCOUNTER — HOSPITAL ENCOUNTER (OUTPATIENT)
Dept: PHYSICAL THERAPY | Facility: CLINIC | Age: 42
Discharge: HOME | End: 2022-08-31
Payer: COMMERCIAL

## 2022-08-31 ENCOUNTER — TELEPHONE (OUTPATIENT)
Dept: ENDOCRINOLOGY | Facility: CLINIC | Age: 42
End: 2022-08-31

## 2022-08-31 DIAGNOSIS — Z74.09 IMPAIRED FUNCTIONAL MOBILITY, BALANCE, GAIT, AND ENDURANCE: ICD-10-CM

## 2022-08-31 DIAGNOSIS — R29.818 ACUTE FOCAL NEUROLOGICAL DEFICIT, ONSET WITHIN 3 HOURS: Primary | ICD-10-CM

## 2022-08-31 PROCEDURE — 97110 THERAPEUTIC EXERCISES: CPT | Mod: GP

## 2022-08-31 NOTE — TELEPHONE ENCOUNTER
Final labs noted below    Component      Latest Ref Rng & Units 8/30/2022 8/30/2022 8/30/2022 8/30/2022           6:00 AM  7:48 AM  9:50 AM 10:20 AM   Cholesterol      <200 mg/dL 191      Triglycerides      <150 mg/dL 122      HDL Cholesterol      >=50 mg/dL 38 (L)      LDL Cholesterol Calculated      <=100 mg/dL 129 (H)      Non HDL Cholesterol      <130 mg/dL 153 (H)      Glucose      70 - 99 mg/dL 93      Patient Fasting > 8hrs?       Unknown      Hemoglobin A1C      <5.7 % 5.1      Insulin      2.6 - 24.9 uU/mL  28.7 (H) 26.9 (H) 17.2     Component      Latest Ref Rng & Units 8/30/2022 8/30/2022 8/30/2022 8/30/2022          10:50 AM 11:20 AM 11:50 AM 12:20 PM   Cholesterol      <200 mg/dL       Triglycerides      <150 mg/dL       HDL Cholesterol      >=50 mg/dL       LDL Cholesterol Calculated      <=100 mg/dL       Non HDL Cholesterol      <130 mg/dL       Glucose      70 - 99 mg/dL       Patient Fasting > 8hrs?             Hemoglobin A1C      <5.7 %       Insulin      2.6 - 24.9 uU/mL 42.1 (H) 38.1 (H) 44.1 (H) 125.0 (H)     Component      Latest Ref Rng & Units 8/30/2022 8/30/2022 8/30/2022          12:50 PM  1:20 PM  1:50 PM   Cholesterol      <200 mg/dL      Triglycerides      <150 mg/dL      HDL Cholesterol      >=50 mg/dL      LDL Cholesterol Calculated      <=100 mg/dL      Non HDL Cholesterol      <130 mg/dL      Glucose      70 - 99 mg/dL      Patient Fasting > 8hrs?            Hemoglobin A1C      <5.7 %      Insulin      2.6 - 24.9 uU/mL 123.0 (H) 115.0 (H) 99.0 (H)

## 2022-08-31 NOTE — PROGRESS NOTES
"[facility]    Outpatient Physical Therapy Progress Note  Patient: Ninoska Cottrell  : 1980    Beginning/End Dates of Reporting Period:  2022 to 2022    Referring Provider: Samra Ceballos MD    Therapy Diagnosis: decreased functional mobility, strength, balance, gait     Client Self Report: Went to [facility] on Monday, used scooter at [facility].    Objective Measurements:  Objective Measure: 6MWT  Details: 1095 feet from 150 foot loop, initially with no AD, used SPC for final minute              Objective Measure: 30 second sit to stand  Details: 13 rep        Goals:  Goal Identifier 30\" Chair Stand   Goal Description Pt will be able to complete 5 stands in 30\" in order to ease functional strength   Target Date 22   Date Met  22   Progress (detail required for progress note): Progressed from 1 rep to 13 rep     Goal Identifier Gait speed   Goal Description Pt will ambulate at a comfortable gait speed of 0.85 m/s with AAD in order to ease community mobility   Target Date 22   Date Met  22   Progress (detail required for progress note): Progresed to 0.96 m/s from 0.59 m/s     Goal Identifier DGI   Goal Description Pt will score >/=18/24 on the DGI in order to reduce fall risk   Target Date 22   Date Met      Progress (detail required for progress note): to be further assessed     Goal Identifier 6 MWT   Goal Description Pt will ambulate >100' compared to baseline in during the 6 MWT in order to improve activity tolerance for community participation   Target Date 22   Date Met  22   Progress (detail required for progress note): Patient progressed from 833 feet with FWW to 1095 feet with no AD and then SPC for final minute     Goal Identifier HEP   Goal Description Pt will be independent with a HEP to self manage symptoms   Target Date 22   Date Met      Progress " (detail required for progress note): Patient reports current confidence with exercises completed at home.     Patient has made great progress during reporting period, meeting strength and endurance goals. Patient continues to be limited by R hip pain and weakness impacting gait and balance to be addressed in additional 2-4 visits.     Plan:  Changes to therapy plan of care: adding 2-4 visits to further address R hip pain and working towards improved gait pattern.     Discharge:  No

## 2022-08-31 NOTE — ADDENDUM NOTE
Encounter addended by: Tiffanie Hernandez, PT on: 8/31/2022 12:38 PM   Actions taken: Flowsheet accepted, Clinical Note Signed, Charge Capture section accepted

## 2022-09-01 NOTE — ADDENDUM NOTE
Encounter addended by: Ryland Lloyd on: 9/1/2022 9:57 AM   Actions taken: Charge Capture section accepted

## 2022-09-08 NOTE — ADDENDUM NOTE
Encounter addended by: Alexandra Rodriguez on: 9/8/2022 10:00 AM   Actions taken: Charge Capture section accepted

## 2022-09-12 ENCOUNTER — HOSPITAL ENCOUNTER (OUTPATIENT)
Dept: PHYSICAL THERAPY | Facility: CLINIC | Age: 42
Discharge: HOME | End: 2022-09-12
Payer: COMMERCIAL

## 2022-09-12 DIAGNOSIS — R29.818 ACUTE FOCAL NEUROLOGICAL DEFICIT, ONSET WITHIN 3 HOURS: Primary | ICD-10-CM

## 2022-09-12 DIAGNOSIS — Z74.09 IMPAIRED FUNCTIONAL MOBILITY, BALANCE, GAIT, AND ENDURANCE: ICD-10-CM

## 2022-09-12 PROCEDURE — 97112 NEUROMUSCULAR REEDUCATION: CPT | Mod: GP

## 2022-09-12 PROCEDURE — 97110 THERAPEUTIC EXERCISES: CPT | Mod: GP

## 2022-09-12 NOTE — PROGRESS NOTES
09/12/22 1506   Signing Clinician's Name / Credentials   Signing clinician's name / credentials Tiffanie Hernandez, PT, DPT   Dynamic Gait Index (Stanislaw and Washburn Maryellen, 1995)   Gait Level Surface 2   Change in Gait Speed 3   Gait and Horizontal Head Turns 2   Gait with Vertical Head Turns 3   Gait and Pivot Turns 3   Step Over Obstacle 3   Step Around Obstacles 3   Steps 3   Total Dynamic Gait Index Score  (A score of 19 or less has been correlated to an increased risk of falls in community dwelling older adults, patients with vestibular disorders, and patients with MS.)   Total Score (out of 24) 22

## 2022-09-19 NOTE — PROGRESS NOTES
Please call patient. She would like to know if the pregablin can be refilled. She said pain management . She said they would not refill until she sees pain management. She has an appointment with then on 10/15/2022.    She also said they Duloxetine is suppose to be for pain, not from behavioral health.     Please call patient to discuss the refill and her concerns.    SUBJECTIVE:  Chief Complaint   Patient presents with     Urgent Care     Pt states fell while ice skating , hurt left knee, instant swelling 7x hrs      Ninoska Cottrell is a 37 year old female presents with a chief complaint of left knee pain since falling on the ice while skating earlier today.  Denies any other injury.  Denies any open wounds.    Complains of pain with weight bearing and with movement of the knee.      SH: here with her .    She is nursing their infant, born in September 2017.    No previous knee problems.      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     Fibromyalgia      Hyperlipidemia      Migraine      Murmur, cardiac      Obesity      Preeclampsia 2012     Patient Active Problem List   Diagnosis     Intermittent asthma     Migraine headache     Encounter for IUD insertion     Lactating mother     Clogged duct, postpartum     Social History   Substance Use Topics     Smoking status: Former Smoker     Quit date: 1/1/2000     Smokeless tobacco: Never Used     Alcohol use No       ROS:  CONSTITUTIONAL:NEGATIVE for fever, chills, change in weight  INTEGUMENTARY/SKIN: NEGATIVE for worrisome rashes, moles or lesions  ENT/MOUTH: NEGATIVE for ear, mouth and throat problems  RESP:NEGATIVE for significant cough or SOB  CV: NEGATIVE for chest pain, palpitations or peripheral edema  GI: NEGATIVE for nausea, abdominal pain, heartburn, or change in bowel habits  MUSCULOSKELETAL: as per HPI  NEURO: NEGATIVE for weakness, dizziness or paresthesias  HEME/ALLERGY/IMMUNE: NEGATIVE for bleeding problems    EXAM:   /80  Pulse 82  Temp 97.1  F (36.2  C) (Oral)  Wt 268 lb (121.6 kg)  SpO2 98%  Breastfeeding? Yes  BMI 43.26 kg/m2  Gen: alert in moderate distress secondary to pain  Extremity: right knee: tender to light touch.  Subtle swelling.  Unable to assess whether patella  is mobile secondary to patient's pain level.  Also unable to assess joint stability.    There is not compromise to the distal circulation.  SKIN: no suspicious lesions or rashes  NEURO: Normal strength and tone, sensory exam grossly normal, mentation intact and speech normal    X-RAY was done.  No fractures appreciated by me during UC visit.      (S89.92XA) Knee injury, left, initial encounter  (primary encounter diagnosis)  Comment: discussed potency of pain medication and risk for addiction.  Advised to take only as needed.    Plan: HYDROcodone-acetaminophen (NORCO) 5-325 MG per         tablet, order for DME, order for DME        XR Knee Left 1/2 Views          You must pump your breast milk if you take the vicodin.     Rest, Ice, Compression and Elevation.     Preliminary xray reading is negative.      Follow up with orthopedics within one week for re-check, sooner should symptoms persist    Patient expresses understanding and agreement with the assessment and plan as above.

## 2022-09-26 ENCOUNTER — HOSPITAL ENCOUNTER (OUTPATIENT)
Dept: PHYSICAL THERAPY | Facility: CLINIC | Age: 42
Discharge: HOME | End: 2022-09-26
Payer: COMMERCIAL

## 2022-09-26 DIAGNOSIS — R29.818 ACUTE FOCAL NEUROLOGICAL DEFICIT, ONSET WITHIN 3 HOURS: Primary | ICD-10-CM

## 2022-09-26 DIAGNOSIS — Z74.09 IMPAIRED FUNCTIONAL MOBILITY, BALANCE, GAIT, AND ENDURANCE: ICD-10-CM

## 2022-09-26 PROCEDURE — 97530 THERAPEUTIC ACTIVITIES: CPT | Mod: GP

## 2022-09-26 PROCEDURE — 97110 THERAPEUTIC EXERCISES: CPT | Mod: GP

## 2022-09-26 NOTE — PROGRESS NOTES
"[facility]    Outpatient Physical Therapy Discharge Note  Patient: Ninoska Cottrell  : 1980    Beginning/End Dates of Reporting Period:  2022 to 2022    Referring Provider: Samra Ceballos MD    Therapy Diagnosis: decreased functional mobility, strength, balance, gait     Client Self Report: Very tired today. Reports that R hip pain has been better. Had one fall since last session. Been working on getting up/down from the ground which is getting better when she has the L leg in front.      Goals:  Goal Identifier 30\" Chair Stand   Goal Description Pt will be able to complete 5 stands in 30\" in order to ease functional strength   Target Date 22   Date Met  22   Progress (detail required for progress note): Progressed from 1 rep to 13 rep     Goal Identifier Gait speed   Goal Description Pt will ambulate at a comfortable gait speed of 0.85 m/s with AAD in order to ease community mobility   Target Date 22   Date Met  22   Progress (detail required for progress note): Progresed to 0.96 m/s from 0.59 m/s     Goal Identifier DGI   Goal Description Pt will score >/=18/24 on the DGI in order to reduce fall risk   Target Date 22   Date Met  22   Progress (detail required for progress note): 2022     Goal Identifier 6 MWT   Goal Description Pt will ambulate >100' compared to baseline in during the 6 MWT in order to improve activity tolerance for community participation   Target Date 22   Date Met  22   Progress (detail required for progress note): Patient progressed from 833 feet with FWW to 1095 feet with no AD and then SPC for final minute     Goal Identifier HEP   Goal Description Pt will be independent with a HEP to self manage symptoms   Target Date 22   Date Met   2022   Progress (detail required for progress note): Patient reports " current confidence with exercises completed at home.       Patient has made significant progress over POC, meeting all goals. Patient has also progressed to independence and safety with floor transfers demonstrated at discharge and improvement in R hip pain. Patient still currently using SPC intermittently as needed but displays no fall risk based on DGI when assessed in session.     Plan:  Discharge from therapy.    Discharge:    Reason for Discharge: Patient has met all goals.    Discharge Plan: Patient to continue home program.

## 2022-10-23 PROBLEM — J44.9 COPD (CHRONIC OBSTRUCTIVE PULMONARY DISEASE) (H): Status: RESOLVED | Noted: 2022-06-17 | Resolved: 2022-10-23

## 2022-10-23 PROBLEM — R27.0 ATAXIA: Status: RESOLVED | Noted: 2022-05-15 | Resolved: 2022-10-23

## 2022-10-23 PROBLEM — R29.898 RIGHT LEG WEAKNESS: Status: RESOLVED | Noted: 2022-05-15 | Resolved: 2022-10-23

## 2022-10-23 PROBLEM — R13.10 DYSPHAGIA, UNSPECIFIED TYPE: Status: RESOLVED | Noted: 2022-05-15 | Resolved: 2022-10-23

## 2022-10-23 PROBLEM — R29.818 ACUTE FOCAL NEUROLOGICAL DEFICIT, ONSET WITHIN 3 HOURS: Status: RESOLVED | Noted: 2022-05-15 | Resolved: 2022-10-23

## 2022-10-23 PROBLEM — F33.41 RECURRENT MAJOR DEPRESSIVE DISORDER, IN PARTIAL REMISSION (H): Status: RESOLVED | Noted: 2022-05-20 | Resolved: 2022-10-23

## 2022-10-24 ENCOUNTER — VIRTUAL VISIT (OUTPATIENT)
Dept: INTERNAL MEDICINE | Facility: CLINIC | Age: 42
End: 2022-10-24
Payer: COMMERCIAL

## 2022-10-24 DIAGNOSIS — R05.3 CHRONIC COUGH: Primary | ICD-10-CM

## 2022-10-24 PROCEDURE — 99213 OFFICE O/P EST LOW 20 MIN: CPT | Mod: GT | Performed by: INTERNAL MEDICINE

## 2022-10-24 RX ORDER — MONTELUKAST SODIUM 10 MG/1
10 TABLET ORAL AT BEDTIME
Qty: 90 TABLET | Refills: 0 | Status: SHIPPED | OUTPATIENT
Start: 2022-10-24 | End: 2023-02-28

## 2022-10-24 RX ORDER — OMEPRAZOLE 40 MG/1
40 CAPSULE, DELAYED RELEASE ORAL DAILY
Qty: 90 CAPSULE | Refills: 0 | Status: SHIPPED | OUTPATIENT
Start: 2022-10-24 | End: 2023-02-28

## 2022-10-24 ASSESSMENT — PATIENT HEALTH QUESTIONNAIRE - PHQ9
10. IF YOU CHECKED OFF ANY PROBLEMS, HOW DIFFICULT HAVE THESE PROBLEMS MADE IT FOR YOU TO DO YOUR WORK, TAKE CARE OF THINGS AT HOME, OR GET ALONG WITH OTHER PEOPLE: SOMEWHAT DIFFICULT
SUM OF ALL RESPONSES TO PHQ QUESTIONS 1-9: 14
SUM OF ALL RESPONSES TO PHQ QUESTIONS 1-9: 14

## 2022-10-24 NOTE — PROGRESS NOTES
VIDEO VISIT                                                       ASSESSMENT/PLAN                                                      (R05.3) Chronic cough  (primary encounter diagnosis)  Comment: recent negative chest x-rays; nonresponsive to albuterol; no significant provement with daily Allegra and Flonase; suspect suboptimally controlled rhinitis and postnasal drip and possibly acid reflux causing/contributing to chronic cough.  Plan: TRIAL of daily Singulair for chronic rhinitis/postnasal drip; TRIAL of high-dose PPI for suspected acid reflux; if symptoms worsen, change, or do not improve in the next 4-6 weeks, patient to contact MD.      Total time of video call between patient and provider was 6 minutes (11:07-11:13am). Provider location: office. Patient location: home. Platform: Pheed.     Jaci Sharma MD   [address] [address]  T: [phone], F: [phone]    SUBJECTIVE                                                      Ninoska Cottrell is a very pleasant 42 year old female who requested a video visit to discuss an ongoing cough:    Patient was made aware that this visit will be billed the same as an in-person visit and has given verbal consent to proceed with this video visit.     Cough has been ongoing for several months now. Occasionally productive. Occurs day and the night. No significant improvement with albuterol. No significant improvement with daily Allegra and Flonase. Reports ongoing rhinitis, sinus congestion, and postnasal drip.    No wheezing or shortness of breath.  No fevers or chills.  No sinus pressure or pain.    CXRs in May and August both within normal limits.    OBJECTIVE                                                       Vitals: No vitals were obtained today due to virtual visit.    General: appears healthy, alert and no distress  Psychiatric: mentation normal, affect normal/bright, judgement and insight intact, normal speech  and appearance well-groomed    ---    (Note was completed, in part, with [employer] voice-recognition software. Documentation reviewed, but some grammatical, spelling, and word errors may remain.)

## 2022-11-07 ENCOUNTER — OFFICE VISIT (OUTPATIENT)
Dept: OBGYN | Facility: CLINIC | Age: 42
End: 2022-11-07
Payer: COMMERCIAL

## 2022-11-07 VITALS
BODY MASS INDEX: 44.82 KG/M2 | WEIGHT: 277.7 LBS | HEART RATE: 73 BPM | SYSTOLIC BLOOD PRESSURE: 124 MMHG | DIASTOLIC BLOOD PRESSURE: 84 MMHG

## 2022-11-07 DIAGNOSIS — Z30.433 ENCOUNTER FOR REMOVAL AND REINSERTION OF INTRAUTERINE CONTRACEPTIVE DEVICE: Primary | ICD-10-CM

## 2022-11-07 PROCEDURE — 58301 REMOVE INTRAUTERINE DEVICE: CPT | Performed by: OBSTETRICS & GYNECOLOGY

## 2022-11-07 PROCEDURE — 58300 INSERT INTRAUTERINE DEVICE: CPT | Performed by: OBSTETRICS & GYNECOLOGY

## 2022-11-07 NOTE — PROGRESS NOTES
Encounter for IUD removal and reinsertion    Ms. Ninoska Cottrell 42 year old P3 (hx of vaginal deliveries) presents for IUD removal and reinsertion. She had the Mirena IUD inserted in 2017.   She reports that her bleeding patter on the MIrena IUD has been scant to non-existent until more recently when it started to return to her normal heavy menses prior to IUD insertion. So she decided to have it switched out today. Otherwise, no other issues or concerns today.       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      History of pre-eclampsia      MDD (major depressive disorder)      Morbid obesity (H)        Past Surgical History:   Procedure Laterality Date     APPENDECTOMY       OPEN REDUCTION INTERNAL FIXATION ANKLE Right     hardware still in place       Current Outpatient Medications   Medication     albuterol (PROVENTIL HFA) 108 (90 Base) MCG/ACT inhaler     azelastine (ASTELIN) 0.1 % nasal spray     fexofenadine (ALLEGRA) 180 MG tablet     metoprolol succinate ER (TOPROL XL) 50 MG 24 hr tablet     montelukast (SINGULAIR) 10 MG tablet     multivitamin w/minerals (THERA-VIT-M) tablet     omeprazole (PRILOSEC) 40 MG DR capsule     rizatriptan (MAXALT) 10 MG tablet     topiramate (TOPAMAX) 50 MG tablet     No current facility-administered medications for this visit.       Allergies   Allergen Reactions     Mold Fatigue and Itching     Vaccinium Angustifolium      Codeine Hives     Eggs [Chicken-Derived Products (Egg)] Other (See Comments)     Flu-like symptoms     Latex Rash     Latex Rash     Other reaction(s): Swelling, lips/tongue       Social History     Tobacco Use     Smoking status: Former     Packs/day: 0.50     Years: 3.00     Pack years: 1.50     Types: Cigarettes     Quit date: 2000     Years since quittin.8     Smokeless tobacco: Never   Substance Use Topics     Alcohol use: Yes     Drug use: No       Family History   Problem  Relation Age of Onset     Alzheimer Disease Paternal Grandmother      Hemophilia Paternal Grandmother      Myocardial Infarction Paternal Grandfather      Hemophilia Father      Hypertension Maternal Grandmother      Pancreatic Cancer Maternal Grandmother      Cerebrovascular Disease Maternal Grandfather      Breast Cancer Maternal Aunt      Bone Cancer Maternal Aunt      Diabetes No family hx of      Coronary Artery Disease Early Onset No family hx of      Colon Cancer No family hx of      Ovarian Cancer No family hx of        C: NEGATIVE for fever, chills, change in weight  HEENT: NEGATIVE for visual changes, runny nose, epistaxis, ear pain, tinnitus, tooth ache, sore throat, difficulty with swallowing, sinus pain  R: NEGATIVE for significant cough or SOB  CV: NEGATIVE for chest pain, palpitations or peripheral edema  BREAST: NEGATIVE For soreness, pain, lumps or nipple discharge  GI: NEGATIVE for nausea, abdominal pain, heartburn, or change in bowel habits  : NEGATIVE for frequency, dysuria, hematuria, vaginal discharge, or irregular bleeding  Neuro: NEGATIVE for numbness, tingling, focal weakness, or headache  INT: NEGATIVE for rashes, lesions or pruritis   PSYCH: NEGATIVE for anxiety, depression, or halluciinations    Exam:   /84 (BP Location: Left arm, Cuff Size: Adult Large)   Pulse 73   Wt 126 kg (277 lb 11.2 oz)   LMP 10/15/2022 (Approximate)   BMI 44.82 kg/m    General Appearance: Well nourished, well developed female, NAD, AOx3  Neurological: Mental Status Normal Skin: Normal skin turgor  HEET: Atraumatic, normocephalic  Pelvic: Normal external female genitalia.  No external lesions, normal hair distribution, no adenopathy. Speculum exam reveals vaginal epithelium well rugated with no abnormal discharge. Cervix appears smooth, pink, with no visible lesions. Bimanual exam deferred.  Extremities: No clubbing, no cyanosis and no edema    Procedure: IUD removal   After written consent was obtained  from the patient, IUD strings were grasped with ring forcep and IUD easily removed intact with minimal patient discomfort noted.  No bleeding noted.    Procedure: IUD insertion  After informed consent was obtained from the patient, a speculum was placed in the vagina to visualized the cervix.  The cervix was then swabbed with a betadine prep.  Tenaculum was placed at the 12 o'clock position on the cervix and the uterus sounded to 7.5 cm.  The Mirena  IUD was then placed in the usual fashion under sterile technique.  Strings were clipped about 2 cm from the cervical os.  Tenaculum was removed and cervix was hemostatic. There were no complications. The patient tolerated the procedure well.    A/P: IUD removal and reinsertion  -- The patient should feel for the IUD strings after her next menses.  If unable to locate them, she should return to clinic for a speculum examination for confirmation that the IUD is in place. Bleeding pattern of this particular IUD was discussed with the patient. She is aware that the IUD will need to be removed in 7 years or PRN.  She is to return to clinic for her next annual or PRN.      Cheryl Sanz MD  [facility]

## 2022-11-07 NOTE — NURSING NOTE
"Chief Complaint   Patient presents with     IUD     Remove & Insert Mirena  IUD  Placed 10/25/2017         Initial /84 (BP Location: Left arm, Cuff Size: Adult Large)   Pulse 73   Wt 126 kg (277 lb 11.2 oz)   LMP 10/15/2022 (Approximate)   BMI 44.82 kg/m   Estimated body mass index is 44.82 kg/m  as calculated from the following:    Height as of 22: 1.676 m (5' 6\").    Weight as of this encounter: 126 kg (277 lb 11.2 oz).  BP completed using cuff size: large    Questioned patient about current smoking habits.  Pt. quit smoking some time ago.          The following HM Due: NONE    Cathy Ornelas, AMY on 2022 at 10:03 AM           "

## 2022-11-14 ENCOUNTER — OFFICE VISIT (OUTPATIENT)
Dept: NEUROLOGY | Facility: CLINIC | Age: 42
End: 2022-11-14
Attending: INTERNAL MEDICINE
Payer: COMMERCIAL

## 2022-11-14 VITALS
BODY MASS INDEX: 44.64 KG/M2 | OXYGEN SATURATION: 99 % | HEIGHT: 66 IN | DIASTOLIC BLOOD PRESSURE: 85 MMHG | WEIGHT: 277.78 LBS | HEART RATE: 56 BPM | SYSTOLIC BLOOD PRESSURE: 129 MMHG

## 2022-11-14 DIAGNOSIS — R42 DIZZY SPELLS: Primary | ICD-10-CM

## 2022-11-14 DIAGNOSIS — R41.9 COGNITIVE COMPLAINTS: ICD-10-CM

## 2022-11-14 PROCEDURE — 99205 OFFICE O/P NEW HI 60 MIN: CPT | Performed by: PSYCHIATRY & NEUROLOGY

## 2022-11-14 NOTE — PATIENT INSTRUCTIONS
AFTER VISIT SUMMARY (AVS):    At today's visit we thoroughly discussed current symptoms, previous work-up, and the plan.    We discussed that your current episodes of dizziness and cognitive difficulties might be related to newly started Topamax/topiramate.  I would advise you to reduce the dose of topiramate to 25 mg in the morning and 50 mg in the evening for 2 weeks followed by elimination of the morning dose altogether only taking 50 mg in the evening after that.  We discussed that we might completely stop this medication if your symptoms improve, but not resolve.    We also discussed option of performing EEG, which might be considered if you still have symptoms without changes after medication adjustment.    Next follow-up appointment is in the next 2-3 months or earlier if needed.    Please do not hesitate to call me with any questions or concerns.    Thanks.

## 2022-11-14 NOTE — PROGRESS NOTES
"ESTABLISHED PATIENT NEUROLOGY NOTE    DATE OF VISIT: 11/14/2022  CLINIC LOCATION: [facility]  MRN: [id number]  PATIENT NAME: Ninoska Cottrell  YOB: 1980    REASON FOR VISIT:   Chief Complaint   Patient presents with     Consult     Dizziness, gait issue      SUBJECTIVE:                                                      HISTORY OF PRESENT ILLNESS: Patient is new to me, but was previously seen by stroke neurology team.  History and prior evaluation are briefly summarized below.  Please refer to previous neurology notes for more detailed information.    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  On 5/15/2022 she presented to [facility] with gait instability, dysphagia, obesity and facial paresthesias.  Suspected to have stroke and received TNKase.    Today the patient reports that on 5/15/2022 she went for her  to a movie during which her head started to feel \"funny\" tingly.  It was associated with headaches, increased light and sound sensitivity, difficulty thinking and disorientation.  Later on her entire mouth became tingly and she developed difficulty swallowing.  Felt like she is eating \"class, not noodles\".  Her balance became affected and she decided to come to emergency room.    Initial head CT was unrevealing.  Head and neck CTA were normal.  Repeated twice brain MRI was normal/negative for acute stroke.  All images were personally reviewed and independently interpreted.    Additional stroke work-up included LDL of 129, and hemoglobin A1C of 5.1. Echocardiogram was negative for PFO or intracardiac thrombus.  The patient was evaluated by inpatient stroke neurology team.  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  The patient was advised to start 325 mg of aspirin " "along with atorvastatin.  30-day cardiac event monitoring was recommended and discharged and was negative for atrial fibrillation.  31 events of palpitations, dyspnea, and lightheadedness were reported, correlating with sinus rhythm.    Following the discharge, the patient was seen by Dr. Peoples ([facility]) in June 2022.  Her presentation felt to be consistent with complicated migraine with potential contribution from significant anxiety.  It felt that she does not need to be on aspirin, but Topamax was added to metoprolol for migraine prevention, and rizatriptan was continued.    After that during one of her visits with her primary care provider, the patient expressed interest in second opinion and was referred to our clinic.  Today, the patient reports that she continues to have intermittent nonpositional dizziness episode that happen up to twice per week lasting for minutes.  They are not triggered by head turns, changing her body positions or turns in bed.  She also feels unsteady during those episodes.  She reports increased forgetfulness and difficulty with peripheral vision.  She uses a cane occasionally to help his dizziness.  She also reports generalized fatigue, but denies any additional focal neurological symptoms.  She is on Topamax 50 mg twice daily, which seem to help with her headaches/migraines.  EXAM:                                                    Physical Exam:   Vitals: /84 (BP Location: Right arm, Patient Position: Supine, Cuff Size: Adult Large)   Pulse 75   Ht 1.676 m (5' 6\")   Wt 126 kg (277 lb 12.5 oz)   LMP 10/15/2022 (Approximate)   SpO2 96%   BMI 44.83 kg/m    Orthostatic vital signs:  Vitals:    11/14/22 0903 11/14/22 0908   BP: 136/84 129/85   BP Location: Right arm Right arm   Patient Position: Supine Standing   Cuff Size: Adult Large Adult Large   Pulse: 75 56   SpO2: 96% 99%   Weight: 126 kg (277 lb 12.5 oz)    Height: 1.676 m (5' 6\")  "   .  General: pt is in NAD, cooperative.  Skin: normal turgor, moist mucous membranes, no lesions/rashes noticed.  HEENT: ATNC, white sclera, normal conjunctiva.  Respiratory: Symmetric lung excursion, no accessory respiratory muscle use.  Abdomen: Non distended.  Neurological: awake, cooperative, follows commands, no aphasia or dysarthria noted, cranial nerves II-XII: no ptosis, extraocular motility is full, face is symmetric, tongue is midline, equally moves all extremities, strength is 5/5 bilaterally in upper and lower extremities, no pronator drift, deep tendon reflexes are equal bilaterally, sensation to the light touch, vibration, and pinprick is intact bilaterally, finger to nose and heel-knee-shin tests are intact bilaterally, casual gait is normal.  ASSESSMENT AND PLAN:                                                    Assessment: 42 year old female patient presents for a follow-up of hospitalization in May after an episode of gait instability, dysphagia, and facial paresthesias along with headaches, light/sound sensitivity, and cognitive difficulties.  Her work-up in the hospital was unrevealing (brain MRI was performed twice and was normal, we reviewed images together).  She also had 28-day of cardiac monitoring, during which 31 reported episodes were correlated with sinus rhythm.  No atrial fibrillation was found.    We had a prolonged discussion with the patient regarding what exactly happened to her in May 2022.  It is possible that she had confusional/complicated migraine episode (most likely).  The possibility of brainstem stroke seem to be unlikely at this point, although not totally excluded because of the use of TNKase.  I discussed with the patient that she might elect to continue 81 versus 325 mg of aspirin daily if she desires.    Her ongoing intermittent non-positional dizziness and cognitive complaints, might be a side effect of topiramate.  We decided to reduce the dose as the first step.   We also discussed that we might consider doing EEG to evaluate for the possibility of seizures if her symptoms do not improve after reduction/discontinuation of topiramate.    Diagnoses:    ICD-10-CM    1. Dizzy spells  R42 Adult Neurology  Referral      2. Cognitive complaints  R41.9 Adult Neurology  Referral        Plan: At today's visit we thoroughly discussed current symptoms, previous work-up, and the plan.    We discussed that her current episodes of dizziness and cognitive difficulties might be related to newly started Topamax (topiramate).  I advised her to reduce the dose to 25 mg in the morning and 50 in the evening for 2 weeks following by followed by eliminating morning dose altogether only taking 50 mg in the evening after that.  We discussed that we might completely stop this medication if her symptoms improve, but not resolve.    We also discussed option of performing EEG, which might be considered if she still has symptoms without changes after medication adjustment.    Next follow-up appointment is in the next 2-3 months or earlier if needed.    Total Time: 69 minutes spent on the date of the encounter doing chart review, history and exam, documentation and further activities per the note.    Nicholas Garcia MD  [facility]  (Chart documentation was completed in part with Dragon voice-recognition software. Even though reviewed, some grammatical, spelling, and word errors may remain.)

## 2022-11-14 NOTE — PROGRESS NOTES
"Ninoska Cottrell is a 42 year old female who presents for:  Chief Complaint   Patient presents with     Consult     Dizziness, gait issue         Initial Vitals:  /84 (BP Location: Right arm, Patient Position: Supine, Cuff Size: Adult Large)   Pulse 75   Ht 1.676 m (5' 6\")   Wt 126 kg (277 lb 12.5 oz)   LMP 10/15/2022 (Approximate)   SpO2 96%   BMI 44.83 kg/m   Estimated body mass index is 44.83 kg/m  as calculated from the following:    Height as of this encounter: 1.676 m (5' 6\").    Weight as of this encounter: 126 kg (277 lb 12.5 oz).. Body surface area is 2.42 meters squared. BP completed using cuff size: large    Visit Facilitator Comments: Orthostatic today standing blood pressure was hard to obtain BP cuff inflated past 250 twice and fully deflated before it took BP  (2nd set of vitals was taken on right arm, standing with adult large cuff)   BP- 129/85,  Pulse- 56, O2- 99%    Lo Portillo    "

## 2022-11-14 NOTE — PROGRESS NOTES
ESTABLISHED PATIENT NEUROLOGY NOTE    DATE OF VISIT: 11/14/2022  CLINIC LOCATION: [facility]  MRN: [id number]  PATIENT NAME: Ninoska Cottrell  YOB: 1980    REASON FOR VISIT: No chief complaint on file.    SUBJECTIVE:                                                      HISTORY OF PRESENT ILLNESS: Patient is new to me, but was previously seen by stroke neurology team.  History and prior evaluation are briefly summarized below.  Please refer to previous neurology notes for more detailed information.    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  On 5/15/2022 she presented to [facility] with gait instability, dysphagia, obesity and facial paresthesias.  Suspected to have stroke and received TNKase.    Initial head CT was unrevealing.  Head and neck CTA were normal.  Repeated twice brain MRI was normal/negative for acute stroke.  All images were personally reviewed and independently interpreted.    Additional stroke work-up included LDL of 129, and hemoglobin A1C of 5.1. Echocardiogram was negative for PFO or intracardiac thrombus.  The patient was evaluated by inpatient stroke neurology team.  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  The patient was advised to start 325 mg of aspirin along with atorvastatin.  30-day cardiac event monitoring was recommended and discharged and was negative for atrial fibrillation.  31 events of palpitations, dyspnea, and lightheadedness were reported, correlating with sinus rhythm.    Following the discharge, the patient was seen by Dr. Peoples ([facility]) in June 2022.  Her presentation felt to be consistent with complicated migraine with potential contribution from significant anxiety.  It felt that she does not need to be on  aspirin, but Topamax was added to metoprolol for migraine prevention, and rizatriptan was continued.    After that during one of her visits with her primary care provider, the patient expressed interest in second opinion and was referred to our clinic.  EXAM:                                                    Physical Exam:   Vitals: LMP 10/15/2022 (Approximate)     General: pt is in NAD, cooperative.  Skin: normal turgor, moist mucous membranes, no lesions/rashes noticed.  HEENT: ATNC, white sclera, normal conjunctiva.  Respiratory: Symmetric lung excursion, no accessory respiratory muscle use.  Abdomen: Non distended.  Neurological: awake, cooperative, follows commands, no aphasia or dysarthria noted, cranial nerves II-XII: no ptosis, extraocular motility is full, face is symmetric, tongue is midline, equally moves all extremities, strength is 5/5 bilaterally in upper and lower extremities, no pronator drift, deep tendon reflexes are equal bilaterally, sensation to the light touch, vibration, and pinprick is intact bilaterally, finger to nose and heel-knee-shin tests are intact bilaterally, casual gait is normal.  ASSESSMENT AND PLAN:                                                    Assessment: 42 year old female patient presents for a follow-up of ***.    Diagnoses:  No diagnosis found.  Plan:  There are no Patient Instructions on file for this visit.    Total Time: *** minutes spent on the date of the encounter doing chart review, history and exam, documentation and further activities per the note.    Nicholas Garcia MD  [facility]  (Chart documentation was completed in part with Dragon voice-recognition software. Even though reviewed, some grammatical, spelling, and word errors may remain.)

## 2022-11-14 NOTE — LETTER
"    11/14/2022         RE: Ninoska Cottrell  [address]  [place] [facility] [id number]        Dear Colleague,    Thank you for referring your patient, Ninoska Cottrell, to the [facility]. Please see a copy of my visit note below.    ESTABLISHED PATIENT NEUROLOGY NOTE    DATE OF VISIT: 11/14/2022  CLINIC LOCATION: [facility]  MRN: [id number]  PATIENT NAME: Ninoska Cottrell  YOB: 1980    REASON FOR VISIT:   Chief Complaint   Patient presents with     Consult     Dizziness, gait issue      SUBJECTIVE:                                                      HISTORY OF PRESENT ILLNESS: Patient is new to me, but was previously seen by stroke neurology team.  History and prior evaluation are briefly summarized below.  Please refer to previous neurology notes for more detailed information.    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  On 5/15/2022 she presented to [facility] with gait instability, dysphagia, obesity and facial paresthesias.  Suspected to have stroke and received TNKase.    Today the patient reports that on 5/15/2022 she went for her  to a movie during which her head started to feel \"funny\" tingly.  It was associated with headaches, increased light and sound sensitivity, difficulty thinking and disorientation.  Later on her entire mouth became tingly and she developed difficulty swallowing.  Felt like she is eating \"class, not noodles\".  Her balance became affected and she decided to come to emergency room.    Initial head CT was unrevealing.  Head and neck CTA were normal.  Repeated twice brain MRI was normal/negative for acute stroke.  All images were personally reviewed and independently interpreted.    Additional stroke work-up included LDL of 129, and hemoglobin A1C of 5.1. Echocardiogram was negative for PFO or intracardiac thrombus.  The patient was evaluated by inpatient " "stroke neurology team.  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  The patient was advised to start 325 mg of aspirin along with atorvastatin.  30-day cardiac event monitoring was recommended and discharged and was negative for atrial fibrillation.  31 events of palpitations, dyspnea, and lightheadedness were reported, correlating with sinus rhythm.    Following the discharge, the patient was seen by Dr. Peoples ([facility]) in June 2022.  Her presentation felt to be consistent with complicated migraine with potential contribution from significant anxiety.  It felt that she does not need to be on aspirin, but Topamax was added to metoprolol for migraine prevention, and rizatriptan was continued.    After that during one of her visits with her primary care provider, the patient expressed interest in second opinion and was referred to our clinic.  Today, the patient reports that she continues to have intermittent nonpositional dizziness episode that happen up to twice per week lasting for minutes.  They are not triggered by head turns, changing her body positions or turns in bed.  She also feels unsteady during those episodes.  She reports increased forgetfulness and difficulty with peripheral vision.  She uses a cane occasionally to help his dizziness.  She also reports generalized fatigue, but denies any additional focal neurological symptoms.  She is on Topamax 50 mg twice daily, which seem to help with her headaches/migraines.  EXAM:                                                    Physical Exam:   Vitals: /84 (BP Location: Right arm, Patient Position: Supine, Cuff Size: Adult Large)   Pulse 75   Ht 1.676 m (5' 6\")   Wt 126 kg (277 lb 12.5 oz)   LMP 10/15/2022 (Approximate)   SpO2 96%   BMI 44.83 kg/m    Orthostatic vital signs:  Vitals:    " "11/14/22 0903 11/14/22 0908   BP: 136/84 129/85   BP Location: Right arm Right arm   Patient Position: Supine Standing   Cuff Size: Adult Large Adult Large   Pulse: 75 56   SpO2: 96% 99%   Weight: 126 kg (277 lb 12.5 oz)    Height: 1.676 m (5' 6\")    .  General: pt is in NAD, cooperative.  Skin: normal turgor, moist mucous membranes, no lesions/rashes noticed.  HEENT: ATNC, white sclera, normal conjunctiva.  Respiratory: Symmetric lung excursion, no accessory respiratory muscle use.  Abdomen: Non distended.  Neurological: awake, cooperative, follows commands, no aphasia or dysarthria noted, cranial nerves II-XII: no ptosis, extraocular motility is full, face is symmetric, tongue is midline, equally moves all extremities, strength is 5/5 bilaterally in upper and lower extremities, no pronator drift, deep tendon reflexes are equal bilaterally, sensation to the light touch, vibration, and pinprick is intact bilaterally, finger to nose and heel-knee-shin tests are intact bilaterally, casual gait is normal.  ASSESSMENT AND PLAN:                                                    Assessment: 42 year old female patient presents for a follow-up of hospitalization in May after an episode of gait instability, dysphagia, and facial paresthesias along with headaches, light/sound sensitivity, and cognitive difficulties.  Her work-up in the hospital was unrevealing (brain MRI was performed twice and was normal, we reviewed images together).  She also had 28-day of cardiac monitoring, during which 31 reported episodes were correlated with sinus rhythm.  No atrial fibrillation was found.    We had a prolonged discussion with the patient regarding what exactly happened to her in May 2022.  It is possible that she had confusional/complicated migraine episode (most likely).  The possibility of brainstem stroke seem to be unlikely at this point, although not totally excluded because of the use of TNKase.  I discussed with the patient " that she might elect to continue 81 versus 325 mg of aspirin daily if she desires.    Her ongoing intermittent non-positional dizziness and cognitive complaints, might be a side effect of topiramate.  We decided to reduce the dose as the first step.  We also discussed that we might consider doing EMG to evaluate for the possibility of seizures if her symptoms do not improve after reduction/discontinuation of topiramate.    Diagnoses:    ICD-10-CM    1. Dizzy spells  R42 Adult Neurology  Referral      2. Cognitive complaints  R41.9 Adult Neurology  Referral        Plan: At today's visit we thoroughly discussed current symptoms, previous work-up, and the plan.    We discussed that her current episodes of dizziness and cognitive difficulties might be related to newly started Topamax (topiramate).  I advised her to reduce the dose to 25 mg in the morning and 50 in the evening for 2 weeks following by followed by eliminating morning dose altogether only taking 50 mg in the evening after that.  We discussed that we might completely stop this medication if her symptoms improve, but not resolve.    We also discussed option of performing EEG, which might be considered if she still has symptoms without changes after medication adjustment.    Next follow-up appointment is in the next 2-3 months or earlier if needed.    Total Time: 69 minutes spent on the date of the encounter doing chart review, history and exam, documentation and further activities per the note.    Nicholas Garcia MD  [facility]  (Chart documentation was completed in part with Dragon voice-recognition software. Even though reviewed, some grammatical, spelling, and word errors may remain.)     Ninoska Cottrell is a 42 year old female who presents for:  Chief Complaint   Patient presents with     Consult     Dizziness, gait issue         Initial Vitals:  /84 (BP Location: Right arm, Patient Position: Supine, Cuff  "Size: Adult Large)   Pulse 75   Ht 1.676 m (5' 6\")   Wt 126 kg (277 lb 12.5 oz)   LMP 10/15/2022 (Approximate)   SpO2 96%   BMI 44.83 kg/m   Estimated body mass index is 44.83 kg/m  as calculated from the following:    Height as of this encounter: 1.676 m (5' 6\").    Weight as of this encounter: 126 kg (277 lb 12.5 oz).. Body surface area is 2.42 meters squared. BP completed using cuff size: large    Visit Facilitator Comments: Orthostatic today standing blood pressure was hard to obtain BP cuff inflated past 250 twice and fully deflated before it took BP  (2nd set of vitals was taken on right arm, standing with adult large cuff)   BP- 129/85,  Pulse- 56, O2- 99%    Lo Portillo        Again, thank you for allowing me to participate in the care of your patient.        Sincerely,        Nicholas Garcia MD    "

## 2022-11-21 NOTE — NURSING NOTE
"Chief Complaint   Patient presents with     Prenatal Care     baby active and moving - activity level - GBS     35w2d    Initial /80 (BP Location: Right arm, Patient Position: Chair, Cuff Size: Adult Large)  Pulse 88  Wt 291 lb (132 kg)  LMP 12/14/2016 (Approximate)  BMI 46.97 kg/m2 Estimated body mass index is 46.97 kg/(m^2) as calculated from the following:    Height as of 7/30/17: 5' 6\" (1.676 m).    Weight as of this encounter: 291 lb (132 kg).  Medication Reconciliation: complete     Nurse assisted visit.  Cathy Kathleen MA.      "
64

## 2022-11-29 ENCOUNTER — VIRTUAL VISIT (OUTPATIENT)
Dept: INTERNAL MEDICINE | Facility: CLINIC | Age: 42
End: 2022-11-29
Payer: COMMERCIAL

## 2022-11-29 DIAGNOSIS — R05.9 COUGH: Primary | ICD-10-CM

## 2022-11-29 DIAGNOSIS — R05.3 CHRONIC COUGH: Primary | ICD-10-CM

## 2022-11-29 PROCEDURE — 99213 OFFICE O/P EST LOW 20 MIN: CPT | Mod: GT | Performed by: INTERNAL MEDICINE

## 2022-11-29 ASSESSMENT — ASTHMA QUESTIONNAIRES
ACT_TOTALSCORE: 15
QUESTION_1 LAST FOUR WEEKS HOW MUCH OF THE TIME DID YOUR ASTHMA KEEP YOU FROM GETTING AS MUCH DONE AT WORK, SCHOOL OR AT HOME: SOME OF THE TIME
QUESTION_4 LAST FOUR WEEKS HOW OFTEN HAVE YOU USED YOUR RESCUE INHALER OR NEBULIZER MEDICATION (SUCH AS ALBUTEROL): ONE OR TWO TIMES PER DAY
QUESTION_2 LAST FOUR WEEKS HOW OFTEN HAVE YOU HAD SHORTNESS OF BREATH: THREE TO SIX TIMES A WEEK
ACT_TOTALSCORE: 15
QUESTION_5 LAST FOUR WEEKS HOW WOULD YOU RATE YOUR ASTHMA CONTROL: SOMEWHAT CONTROLLED
QUESTION_3 LAST FOUR WEEKS HOW OFTEN DID YOUR ASTHMA SYMPTOMS (WHEEZING, COUGHING, SHORTNESS OF BREATH, CHEST TIGHTNESS OR PAIN) WAKE YOU UP AT NIGHT OR EARLIER THAN USUAL IN THE MORNING: ONCE OR TWICE

## 2022-11-29 NOTE — PROGRESS NOTES
VIDEO VISIT                                                       ASSESSMENT/PLAN                                                      (R05.3) Chronic cough  (primary encounter diagnosis)  Comment: etiology unclear; CXRs in May and August unremarkable; no improvement with albuterol or courses of Allegra, Flonase, Singulair, or high-dose PPI.  Plan: CT Chest Low Dose Non Contrast and PFTs ordered - patient to schedule; also referred to pulmonology for ongoing evaluation and treatment - patient to schedule.    Total time of video call between patient and provider was 5 minutes (12:58-1:03pm). Provider location: office. Patient location: home. Platform: [employer].     Jaci Sharma MD   [facility]  [address] [place] [id number]  T: [phone], F: [phone]    SUBJECTIVE                                                      Ninoska Cottrell is a very pleasant 42 year old female who requested a video visit to discuss her ongoing cough:    Patient was made aware that this visit will be billed the same as an in-person visit and has given verbal consent to proceed with this video visit.     This same cough was discussed at our 10/24/2022 visit. At that time her cough has been ongoing for several months, was occasionally productive, and occurred day and night. She had tried albuterol and daily Allegra and Flonase with no improvement in her cough. She also reported ongoing rhinitis, sinus congestion, and postnasal drip and spite of daily Allegra and Flonase use. Chest x-rays performed in May and again in August were unremarkable. At that visit Singulair was prescribed for chronic rhinitis and postnasal drip and a high-dose PPI was prescribed for possible acid reflux as causes of/contributors to her chronic cough.    Unfortunately, patient reports no improvement and possible worsening with above interventions. She continues to have frequent coughing fits, occasionally productive, day and  night.    No fevers or chills.  No hemoptysis, anorexia, or unintentional weight loss.  Not immunosuppressed.    OBJECTIVE                                                       Vitals: No vitals were obtained today due to virtual visit.    General: appears healthy, alert and no distress  Psychiatric: mentation normal, affect normal/bright, judgement and insight intact, normal speech and appearance well-groomed    ---    (Note was completed, in part, with [employer] voice-recognition software. Documentation reviewed, but some grammatical, spelling, and word errors may remain.)

## 2022-12-07 ENCOUNTER — ANCILLARY PROCEDURE (OUTPATIENT)
Dept: CT IMAGING | Facility: CLINIC | Age: 42
End: 2022-12-07
Attending: INTERNAL MEDICINE
Payer: COMMERCIAL

## 2022-12-07 DIAGNOSIS — R05.3 CHRONIC COUGH: ICD-10-CM

## 2022-12-07 PROCEDURE — 71250 CT THORAX DX C-: CPT

## 2022-12-08 NOTE — TELEPHONE ENCOUNTER
RECORDS RECEIVED FROM: internal    DATE RECEIVED: 2.6.23   NOTES STATUS DETAILS   OFFICE NOTE from referring provider internal  Jaci Sharma MD   OFFICE NOTE from other specialist internal  5/18/22 Edith      DISCHARGE SUMMARY from hospital     DISCHARGE REPORT from the ER internal    - 8.3.22 Ibis   7/14/22 Roxie   7/5/22 Shashi      MEDICATION LIST internal     IMAGING  (NEED IMAGES AND REPORTS)     CT SCAN internal  12/7/22   CHEST XRAY (CXR) internal  8.3.22, 5.20.22   TESTS     PULMONARY FUNCTION TESTING (PFT) internal  Scheduled 2.6.23

## 2023-01-07 ENCOUNTER — HEALTH MAINTENANCE LETTER (OUTPATIENT)
Age: 43
End: 2023-01-07

## 2023-02-06 ENCOUNTER — LAB (OUTPATIENT)
Dept: LAB | Facility: CLINIC | Age: 43
End: 2023-02-06
Payer: COMMERCIAL

## 2023-02-06 ENCOUNTER — PRE VISIT (OUTPATIENT)
Dept: PULMONOLOGY | Facility: CLINIC | Age: 43
End: 2023-02-06

## 2023-02-06 ENCOUNTER — OFFICE VISIT (OUTPATIENT)
Dept: PULMONOLOGY | Facility: CLINIC | Age: 43
End: 2023-02-06
Attending: INTERNAL MEDICINE
Payer: COMMERCIAL

## 2023-02-06 VITALS
SYSTOLIC BLOOD PRESSURE: 129 MMHG | HEIGHT: 66 IN | DIASTOLIC BLOOD PRESSURE: 82 MMHG | OXYGEN SATURATION: 98 % | WEIGHT: 275 LBS | RESPIRATION RATE: 18 BRPM | HEART RATE: 83 BPM | BODY MASS INDEX: 44.2 KG/M2

## 2023-02-06 DIAGNOSIS — R05.3 CHRONIC COUGH: ICD-10-CM

## 2023-02-06 DIAGNOSIS — Z00.6 EXAMINATION OF PARTICIPANT OR CONTROL IN CLINICAL RESEARCH: ICD-10-CM

## 2023-02-06 LAB
BASOPHILS # BLD AUTO: 0 10E3/UL (ref 0–0.2)
BASOPHILS NFR BLD AUTO: 1 %
CHOLEST SERPL-MCNC: 198 MG/DL
EOSINOPHIL # BLD AUTO: 0.1 10E3/UL (ref 0–0.7)
EOSINOPHIL NFR BLD AUTO: 2 %
ERYTHROCYTE [DISTWIDTH] IN BLOOD BY AUTOMATED COUNT: 13.2 % (ref 10–15)
HCT VFR BLD AUTO: 42.7 % (ref 35–47)
HDLC SERPL-MCNC: 41 MG/DL
HGB BLD-MCNC: 14.1 G/DL (ref 11.7–15.7)
IMM GRANULOCYTES # BLD: 0 10E3/UL
IMM GRANULOCYTES NFR BLD: 0 %
INSULIN SERPL-ACNC: 22.3 UU/ML (ref 2.6–24.9)
LDLC SERPL CALC-MCNC: 132 MG/DL
LYMPHOCYTES # BLD AUTO: 1.4 10E3/UL (ref 0.8–5.3)
LYMPHOCYTES NFR BLD AUTO: 22 %
MCH RBC QN AUTO: 27.5 PG (ref 26.5–33)
MCHC RBC AUTO-ENTMCNC: 33 G/DL (ref 31.5–36.5)
MCV RBC AUTO: 83 FL (ref 78–100)
MONOCYTES # BLD AUTO: 0.3 10E3/UL (ref 0–1.3)
MONOCYTES NFR BLD AUTO: 4 %
NEUTROPHILS # BLD AUTO: 4.5 10E3/UL (ref 1.6–8.3)
NEUTROPHILS NFR BLD AUTO: 71 %
NONHDLC SERPL-MCNC: 157 MG/DL
NRBC # BLD AUTO: 0 10E3/UL
NRBC BLD AUTO-RTO: 0 /100
PLATELET # BLD AUTO: 271 10E3/UL (ref 150–450)
RBC # BLD AUTO: 5.13 10E6/UL (ref 3.8–5.2)
TRIGL SERPL-MCNC: 124 MG/DL
WBC # BLD AUTO: 6.4 10E3/UL (ref 4–11)

## 2023-02-06 PROCEDURE — 94375 RESPIRATORY FLOW VOLUME LOOP: CPT | Performed by: INTERNAL MEDICINE

## 2023-02-06 PROCEDURE — 36415 COLL VENOUS BLD VENIPUNCTURE: CPT | Performed by: PATHOLOGY

## 2023-02-06 PROCEDURE — 82785 ASSAY OF IGE: CPT | Mod: 90 | Performed by: PATHOLOGY

## 2023-02-06 PROCEDURE — 85025 COMPLETE CBC W/AUTO DIFF WBC: CPT | Performed by: PATHOLOGY

## 2023-02-06 PROCEDURE — 94150 VITAL CAPACITY TEST: CPT | Performed by: INTERNAL MEDICINE

## 2023-02-06 PROCEDURE — 83525 ASSAY OF INSULIN: CPT | Mod: 90 | Performed by: PATHOLOGY

## 2023-02-06 PROCEDURE — 99215 OFFICE O/P EST HI 40 MIN: CPT | Mod: 25

## 2023-02-06 PROCEDURE — 94729 DIFFUSING CAPACITY: CPT | Performed by: INTERNAL MEDICINE

## 2023-02-06 PROCEDURE — 86003 ALLG SPEC IGE CRUDE XTRC EA: CPT | Mod: 90 | Performed by: PATHOLOGY

## 2023-02-06 PROCEDURE — G0463 HOSPITAL OUTPT CLINIC VISIT: HCPCS

## 2023-02-06 PROCEDURE — 94726 PLETHYSMOGRAPHY LUNG VOLUMES: CPT | Performed by: INTERNAL MEDICINE

## 2023-02-06 PROCEDURE — 80061 LIPID PANEL: CPT | Performed by: PATHOLOGY

## 2023-02-06 PROCEDURE — 99417 PROLNG OP E/M EACH 15 MIN: CPT

## 2023-02-06 RX ORDER — PREDNISONE 20 MG/1
40 TABLET ORAL DAILY
Qty: 6 TABLET | Refills: 0 | Status: SHIPPED | OUTPATIENT
Start: 2023-02-06 | End: 2023-03-01

## 2023-02-06 RX ORDER — FLUTICASONE PROPIONATE 220 UG/1
2 AEROSOL, METERED RESPIRATORY (INHALATION) 2 TIMES DAILY
Qty: 12 G | Refills: 3 | Status: SHIPPED | OUTPATIENT
Start: 2023-02-06 | End: 2023-06-23

## 2023-02-06 ASSESSMENT — ASTHMA QUESTIONNAIRES
QUESTION_5 LAST FOUR WEEKS HOW WOULD YOU RATE YOUR ASTHMA CONTROL: WELL CONTROLLED
QUESTION_4 LAST FOUR WEEKS HOW OFTEN HAVE YOU USED YOUR RESCUE INHALER OR NEBULIZER MEDICATION (SUCH AS ALBUTEROL): TWO OR THREE TIMES PER WEEK
QUESTION_3 LAST FOUR WEEKS HOW OFTEN DID YOUR ASTHMA SYMPTOMS (WHEEZING, COUGHING, SHORTNESS OF BREATH, CHEST TIGHTNESS OR PAIN) WAKE YOU UP AT NIGHT OR EARLIER THAN USUAL IN THE MORNING: TWO OR THREE NIGHTS A WEEK
QUESTION_1 LAST FOUR WEEKS HOW MUCH OF THE TIME DID YOUR ASTHMA KEEP YOU FROM GETTING AS MUCH DONE AT WORK, SCHOOL OR AT HOME: SOME OF THE TIME
ACT_TOTALSCORE: 13
QUESTION_2 LAST FOUR WEEKS HOW OFTEN HAVE YOU HAD SHORTNESS OF BREATH: MORE THAN ONCE A DAY
ACT_TOTALSCORE: 13

## 2023-02-06 ASSESSMENT — PAIN SCALES - GENERAL: PAINLEVEL: NO PAIN (0)

## 2023-02-06 NOTE — LETTER
2/6/2023         RE: Ninoska Cottrell  [address]  [facility] [id number]        Dear Colleague,    Thank you for referring your patient, Ninoska Cottrell, to the [facility] FOR LUNG SCIENCE AND [facility] CLIN[facility] [place]. Please see a copy of my visit note below.    [facility]  Pulmonary Medicine  Visit Clinic Note  February 6, 2023         ASSESSMENT & PLAN       Chronic cough    On her flow-volume loop, there could be some evidence for excessive dynamic airway collapse.  This would not necessarily be a cause for her cough, but could be a reason why she has a very coarse barking character to her cough.  Her chest CT scan is pretty normal.  On my review, there may be some areas of bronchial wall thickening, but it clearly is not severely abnormal.  Since she had some benefit with the Flovent inhaler in the past, I think that we should start there again.  I will prescribe her high-dose Flovent at 220 mcg 2 puffs twice a day.  We went over inhaler technique, and I told her about rinsing her mouth with water after morning and evening use.  I let her know she should take this every day for a month, and then get back to me via [employer] to let me know how her symptoms are doing.  If there is no benefit at all, then we can stop Flovent and search for another cause of her cough.  If there is some benefit, then she should continue this medication until hopefully her symptoms resolved.  I also did prescribe her 3 days of prednisone to take right at the beginning and see if we can get some quicker symptomatic relief.  I will check a CBC with differential looking for her eosinophil counts, and the [place] respiratory panel.  If treating airways disease does not help her cough, then I think we need to focus on sinus and allergy etiologies.      Sai Stephenson MD     I spent 65 minutes on the date of the encounter doing chart review, history and exam, documentation and discussing etiologies and  therapies for chronic cough.         Today's visit note:     Chief Complaint: Consult (New Chronic cough)      HISTORY OF PRESENT ILLNESS:    Ninoska Cottrell is a 42 year old year old female who is being seen for cough.    She has a history of exercise-induced asthma, which she was diagnosed back when she was 18 years old.  She states it never really has been a problem for her at all though.  In July of last year, she had a bad sinus infection.  Ever since that time she has had a daily cough.  In the beginning, she was treated with 2 different rounds of prednisone.  She also received 2 different antibiotics.  The first 1 was amoxicillin, but she had a bad reaction to that and so she took a different antibiotic.  She does not remember the name of this.  She does not think that either 1 of those really helped out that much, but it was early on.    She has also tried the inhalers of albuterol and Flovent.  She thinks they may have helped a little with her symptoms, but she only had a month of them.  She started Singulair to treat potential allergies.  She started omeprazole to treat potential reflux.  Neither 1 of these helped much.    At first, it felt like something was stuck in her throat and that was causing the cough.  Not feels like something is stuck in her chest.  She finds her self drinking fluids throughout the day, because that seems to help prevent the cough from happening.  The cough will be so bad it times where she gets posttussive emesis.  She does describe dyspnea with exertion as well as shortness of breath when she bends over to tie her shoe.  It sometimes does wake her up from sleep.  Other things that make it worse are smoke, dust, and cleaning her cats litter box.    She started taking metoprolol in 2020.  She started taking Topamax in May 2022.    toprol in 2020  topamax in may 2022         Past Medical and Surgical History:     Past Medical History:   Diagnosis Date     AMY (generalized anxiety  disorder)      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      History of pre-eclampsia      MDD (major depressive disorder)      Morbid obesity (H)      Past Surgical History:   Procedure Laterality Date     APPENDECTOMY       OPEN REDUCTION INTERNAL FIXATION ANKLE Right     hardware still in place           Family History:     Family History   Problem Relation Age of Onset     Alzheimer Disease Paternal Grandmother      Hemophilia Paternal Grandmother      Myocardial Infarction Paternal Grandfather      Hemophilia Father      Hypertension Maternal Grandmother      Pancreatic Cancer Maternal Grandmother      Cerebrovascular Disease Maternal Grandfather      Breast Cancer Maternal Aunt      Bone Cancer Maternal Aunt      Diabetes No family hx of      Coronary Artery Disease Early Onset No family hx of      Colon Cancer No family hx of      Ovarian Cancer No family hx of               Social History:     Social History     Socioeconomic History     Marital status:      Spouse name: Deandre     Number of children: 2     Years of education: Not on file     Highest education level: Not on file   Occupational History     Occupation: Stay-at-home mom   Tobacco Use     Smoking status: Former     Packs/day: 0.50     Years: 3.00     Pack years: 1.50     Types: Cigarettes     Quit date: 2000     Years since quittin.1     Smokeless tobacco: Never   Substance and Sexual Activity     Alcohol use: Yes     Drug use: No     Sexual activity: Yes     Partners: Male     Birth control/protection: I.U.D.     Comment: Mirena placed in    Other Topics Concern     Parent/sibling w/ CABG, MI or angioplasty before 65F 55M? Not Asked      Service No     Blood Transfusions No     Caffeine Concern No     Occupational Exposure No     Hobby Hazards No     Sleep Concern No     Stress Concern No     Weight Concern No     Special Diet No     Back Care No     Exercise Yes     Comment: 1 time a week for 2 hours     Bike Helmet Yes     Seat Belt  "Yes     Self-Exams No   Social History Narrative    .    3 kids (9, 8, and 4 as of 2021).    Walks daily; no formal exercise.      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          Medications:     Current Outpatient Medications   Medication     albuterol (PROVENTIL HFA) 108 (90 Base) MCG/ACT inhaler     azelastine (ASTELIN) 0.1 % nasal spray     fexofenadine (ALLEGRA) 180 MG tablet     levonorgestrel (MIRENA) 20 MCG/DAY IUD     multivitamin w/minerals (THERA-VIT-M) tablet     rizatriptan (MAXALT) 10 MG tablet     topiramate (TOPAMAX) 50 MG tablet     metoprolol succinate ER (TOPROL XL) 50 MG 24 hr tablet     montelukast (SINGULAIR) 10 MG tablet     omeprazole (PRILOSEC) 40 MG DR capsule     No current facility-administered medications for this visit.            Review of Systems:       A complete review of systems was otherwise negative except as noted in the HPI.      PHYSICAL EXAM:  /82   Pulse 83   Resp 18   Ht 1.676 m (5' 6\")   Wt 124.7 kg (275 lb)   SpO2 98%   BMI 44.39 kg/m       General: Comfortable, No apparent distress  Eyes: Anicteric  Ears: Hearing grossly normal  Mouth: Oral mucosa is moist, without any lesions. No oropharyngeal exudate.  Neck: supple, no thyromegaly  Lymphatics: No cervical or supraclavicular nodes  Respiratory: Good air movement. No crackles. No rhonchi. No wheezes  Cardiac: RRR, normal S1, S2. No murmurs.  Abdomen: Obese, soft, NT/ND  Musculoskeletal: Extremities normal. No clubbing. No cyanosis. No edema.  Skin: No rash on limited exam  Neuro: Normal mentation. Normal speech.           Data:   All laboratory and " imaging data reviewed.      PFT:          PFT Interpretation:  No airflow obstruction.  Normal lung volumes.  Normal diffusion capacity.  There appears to be a biphasic pattern to her expiratory flow limb on her flow volume loop.  This could represent excessive dynamic airway collapse or tracheobronchomalacia.  Valid Maneuver    Chest CT: I have reviewed the chest CT images from 12/7/2022 and agree with the radiologist interpretation below:  LUNGS AND PLEURA: Lungs are clear. No airway thickening or bronchiectasis. No fibrotic changes. No suspicious pulmonary nodules. No pleural effusion.     MEDIASTINUM: No adenopathy. Normal heart size. Nonaneurysmal aorta.     CORONARY ARTERY CALCIFICATION: None seen.     LIMITED UPPER ABDOMEN: Nothing acute.      MUSCULOSKELETAL: Nothing acute.                                                                      IMPRESSION:  1.  No findings to explain symptoms.      Recent Results (from the past 168 hour(s))   General PFT Lab (Please always keep checked)    Collection Time: 02/06/23  6:03 AM   Result Value Ref Range    FVC-Pred 3.59 L    FVC-Pre 3.60 L    FVC-%Pred-Pre 100 %    FEV1-Pre 3.02 L    FEV1-%Pred-Pre 102 %    FEV1FVC-Pred 82 %    FEV1FVC-Pre 84 %    FEFMax-Pred 7.25 L/sec    FEFMax-Pre 6.96 L/sec    FEFMax-%Pred-Pre 95 %    FEF2575-Pred 3.06 L/sec    FEF2575-Pre 3.26 L/sec    QVA7579-%Pred-Pre 106 %    ExpTime-Pre 6.27 sec    FIFMax-Pre 4.52 L/sec    VC-Pred 3.90 L    VC-Pre 3.53 L    VC-%Pred-Pre 90 %    IC-Pred 3.44 L    IC-Pre 2.77 L    IC-%Pred-Pre 80 %    ERV-Pred 0.46 L    ERV-Pre 0.76 L    ERV-%Pred-Pre 165 %    FEV1FEV6-Pred 84 %    FEV1FEV6-Pre 84 %    FRCPleth-Pred 2.80 L    FRCPleth-Pre 2.58 L    FRCPleth-%Pred-Pre 92 %    RVPleth-Pred 1.71 L    RVPleth-Pre 1.82 L    RVPleth-%Pred-Pre 106 %    TLCPleth-Pred 5.27 L    TLCPleth-Pre 5.35 L    TLCPleth-%Pred-Pre 101 %    DLCOunc-Pred 23.21 ml/min/mmHg    DLCOunc-Pre 26.93 ml/min/mmHg    DLCOunc-%Pred-Pre 116 %     VA-Pre 4.44 L    VA-%Pred-Pre 83 %    FEV1SVC-Pred 76 %    FEV1SVC-Pre 86 %     Again, thank you for allowing me to participate in the care of your patient.        Sincerely,        Jordon Stephenson MD

## 2023-02-06 NOTE — PATIENT INSTRUCTIONS
Please send a [employer] message to me in 1 month to let me know how your symptoms are doing.  We decide further follow up after that.

## 2023-02-06 NOTE — PROGRESS NOTES
[facility]  Pulmonary Medicine  Visit Clinic Note  February 6, 2023         ASSESSMENT & PLAN       Chronic cough    On her flow-volume loop, there could be some evidence for excessive dynamic airway collapse.  This would not necessarily be a cause for her cough, but could be a reason why she has a very coarse barking character to her cough.  Her chest CT scan is pretty normal.  On my review, there may be some areas of bronchial wall thickening, but it clearly is not severely abnormal.  Since she had some benefit with the Flovent inhaler in the past, I think that we should start there again.  I will prescribe her high-dose Flovent at 220 mcg 2 puffs twice a day.  We went over inhaler technique, and I told her about rinsing her mouth with water after morning and evening use.  I let her know she should take this every day for a month, and then get back to me via [employer] to let me know how her symptoms are doing.  If there is no benefit at all, then we can stop Flovent and search for another cause of her cough.  If there is some benefit, then she should continue this medication until hopefully her symptoms resolved.  I also did prescribe her 3 days of prednisone to take right at the beginning and see if we can get some quicker symptomatic relief.  I will check a CBC with differential looking for her eosinophil counts, and the [place] respiratory panel.  If treating airways disease does not help her cough, then I think we need to focus on sinus and allergy etiologies.      Sai Stephenson MD     I spent 65 minutes on the date of the encounter doing chart review, history and exam, documentation and discussing etiologies and therapies for chronic cough.         Today's visit note:     Chief Complaint: Consult (New Chronic cough)      HISTORY OF PRESENT ILLNESS:    Ninoska Cottrell is a 42 year old year old female who is being seen for cough.    She has a history of exercise-induced asthma, which she was  diagnosed back when she was 18 years old.  She states it never really has been a problem for her at all though.  In July of last year, she had a bad sinus infection.  Ever since that time she has had a daily cough.  In the beginning, she was treated with 2 different rounds of prednisone.  She also received 2 different antibiotics.  The first 1 was amoxicillin, but she had a bad reaction to that and so she took a different antibiotic.  She does not remember the name of this.  She does not think that either 1 of those really helped out that much, but it was early on.    She has also tried the inhalers of albuterol and Flovent.  She thinks they may have helped a little with her symptoms, but she only had a month of them.  She started Singulair to treat potential allergies.  She started omeprazole to treat potential reflux.  Neither 1 of these helped much.    At first, it felt like something was stuck in her throat and that was causing the cough.  Not feels like something is stuck in her chest.  She finds her self drinking fluids throughout the day, because that seems to help prevent the cough from happening.  The cough will be so bad it times where she gets posttussive emesis.  She does describe dyspnea with exertion as well as shortness of breath when she bends over to tie her shoe.  It sometimes does wake her up from sleep.  Other things that make it worse are smoke, dust, and cleaning her cats litter box.    She started taking metoprolol in 2020.  She started taking Topamax in May 2022.    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      History of pre-eclampsia 2012     MDD (major depressive disorder)      Morbid obesity (H)      Past Surgical History:   Procedure Laterality Date     APPENDECTOMY       OPEN REDUCTION INTERNAL FIXATION ANKLE Right     hardware still in place            Family History:     Family History   Problem Relation Age of Onset     Alzheimer Disease Paternal Grandmother      Hemophilia Paternal Grandmother      Myocardial Infarction Paternal Grandfather      Hemophilia Father      Hypertension Maternal Grandmother      Pancreatic Cancer Maternal Grandmother      Cerebrovascular Disease Maternal Grandfather      Breast Cancer Maternal Aunt      Bone Cancer Maternal Aunt      Diabetes No family hx of      Coronary Artery Disease Early Onset No family hx of      Colon Cancer No family hx of      Ovarian Cancer No family hx of               Social History:     Social History     Socioeconomic History     Marital status:      Spouse name: Deandre     Number of children: 2     Years of education: Not on file     Highest education level: Not on file   Occupational History     Occupation: Stay-at-home mom   Tobacco Use     Smoking status: Former     Packs/day: 0.50     Years: 3.00     Pack years: 1.50     Types: Cigarettes     Quit date: 2000     Years since quittin.1     Smokeless tobacco: Never   Substance and Sexual Activity     Alcohol use: Yes     Drug use: No     Sexual activity: Yes     Partners: Male     Birth control/protection: I.U.D.     Comment: Mirena placed in    Other Topics Concern     Parent/sibling w/ CABG, MI or angioplasty before 65F 55M? Not Asked      Service No     Blood Transfusions No     Caffeine Concern No     Occupational Exposure No     Hobby Hazards No     Sleep Concern No     Stress Concern No     Weight Concern No     Special Diet No     Back Care No     Exercise Yes     Comment: 1 time a week for 2 hours     Bike Helmet Yes     Seat Belt Yes     Self-Exams No   Social History Narrative    .    3 kids (9, 8, and 4 as of ).    Walks daily; no formal exercise.      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          Medications:     Current Outpatient Medications   Medication     albuterol (PROVENTIL HFA) 108 (90 Base) MCG/ACT inhaler     azelastine (ASTELIN) 0.1 % nasal spray     fexofenadine (ALLEGRA) 180 MG tablet     levonorgestrel (MIRENA) 20 MCG/DAY IUD     multivitamin w/minerals (THERA-VIT-M) tablet     rizatriptan (MAXALT) 10 MG tablet     topiramate (TOPAMAX) 50 MG tablet     metoprolol succinate ER (TOPROL XL) 50 MG 24 hr tablet     montelukast (SINGULAIR) 10 MG tablet     omeprazole (PRILOSEC) 40 MG DR capsule     No current facility-administered medications for this visit.            Review of Systems:       A complete review of systems was otherwise negative except as noted in the HPI.      PHYSICAL EXAM:  /82   Pulse 83   Resp 18   Ht 1.676 m (5' 6\")   Wt 124.7 kg (275 lb)   SpO2 98%   BMI 44.39 kg/m       General: Comfortable, No apparent distress  Eyes: Anicteric  Ears: Hearing grossly normal  Mouth: Oral mucosa is moist, without any lesions. No oropharyngeal exudate.  Neck: supple, no thyromegaly  Lymphatics: No cervical or supraclavicular nodes  Respiratory: Good air movement. No crackles. No rhonchi. No wheezes  Cardiac: RRR, normal S1, S2. No murmurs.  Abdomen: Obese, soft, NT/ND  Musculoskeletal: Extremities normal. No clubbing. No cyanosis. No edema.  Skin: No rash on limited exam  Neuro: Normal mentation. Normal speech.           Data:   All laboratory and imaging data reviewed.      PFT:          PFT Interpretation:  No airflow obstruction.  Normal lung volumes.  Normal diffusion capacity.  There appears to be a biphasic pattern to her expiratory flow limb on her flow volume loop.  This could represent excessive dynamic airway " collapse or tracheobronchomalacia.  Valid Maneuver    Chest CT: I have reviewed the chest CT images from 12/7/2022 and agree with the radiologist interpretation below:  LUNGS AND PLEURA: Lungs are clear. No airway thickening or bronchiectasis. No fibrotic changes. No suspicious pulmonary nodules. No pleural effusion.     MEDIASTINUM: No adenopathy. Normal heart size. Nonaneurysmal aorta.     CORONARY ARTERY CALCIFICATION: None seen.     LIMITED UPPER ABDOMEN: Nothing acute.      MUSCULOSKELETAL: Nothing acute.                                                                      IMPRESSION:  1.  No findings to explain symptoms.      Recent Results (from the past 168 hour(s))   General PFT Lab (Please always keep checked)    Collection Time: 02/06/23  6:03 AM   Result Value Ref Range    FVC-Pred 3.59 L    FVC-Pre 3.60 L    FVC-%Pred-Pre 100 %    FEV1-Pre 3.02 L    FEV1-%Pred-Pre 102 %    FEV1FVC-Pred 82 %    FEV1FVC-Pre 84 %    FEFMax-Pred 7.25 L/sec    FEFMax-Pre 6.96 L/sec    FEFMax-%Pred-Pre 95 %    FEF2575-Pred 3.06 L/sec    FEF2575-Pre 3.26 L/sec    GFE1964-%Pred-Pre 106 %    ExpTime-Pre 6.27 sec    FIFMax-Pre 4.52 L/sec    VC-Pred 3.90 L    VC-Pre 3.53 L    VC-%Pred-Pre 90 %    IC-Pred 3.44 L    IC-Pre 2.77 L    IC-%Pred-Pre 80 %    ERV-Pred 0.46 L    ERV-Pre 0.76 L    ERV-%Pred-Pre 165 %    FEV1FEV6-Pred 84 %    FEV1FEV6-Pre 84 %    FRCPleth-Pred 2.80 L    FRCPleth-Pre 2.58 L    FRCPleth-%Pred-Pre 92 %    RVPleth-Pred 1.71 L    RVPleth-Pre 1.82 L    RVPleth-%Pred-Pre 106 %    TLCPleth-Pred 5.27 L    TLCPleth-Pre 5.35 L    TLCPleth-%Pred-Pre 101 %    DLCOunc-Pred 23.21 ml/min/mmHg    DLCOunc-Pre 26.93 ml/min/mmHg    DLCOunc-%Pred-Pre 116 %    VA-Pre 4.44 L    VA-%Pred-Pre 83 %    FEV1SVC-Pred 76 %    FEV1SVC-Pre 86 %

## 2023-02-06 NOTE — NURSING NOTE
Chief Complaint   Patient presents with     Consult     New Chronic cough    Medications reviewed and vital signs taken.   Foreign Suggs CMA

## 2023-02-07 LAB
A ALTERNATA IGE QN: 1.41 KU(A)/L
A FUMIGATUS IGE QN: <0.1 KU(A)/L
BERMUDA GRASS IGE QN: <0.1 KU(A)/L
C HERBARUM IGE QN: <0.1 KU(A)/L
CAT DANDER IGG QN: <0.1 KU(A)/L
CEDAR IGE QN: <0.1 KU(A)/L
COMMON RAGWEED IGE QN: <0.1 KU(A)/L
COTTONWOOD IGE QN: <0.1 KU(A)/L
D FARINAE IGE QN: <0.1 KU(A)/L
D PTERONYSS IGE QN: <0.1 KU(A)/L
DLCOUNC-%PRED-PRE: 116 %
DLCOUNC-PRE: 26.93 ML/MIN/MMHG
DLCOUNC-PRED: 23.21 ML/MIN/MMHG
DOG DANDER+EPITH IGE QN: <0.1 KU(A)/L
ERV-%PRED-PRE: 165 %
ERV-PRE: 0.76 L
ERV-PRED: 0.46 L
EXPTIME-PRE: 6.27 SEC
FEF2575-%PRED-PRE: 106 %
FEF2575-PRE: 3.26 L/SEC
FEF2575-PRED: 3.06 L/SEC
FEFMAX-%PRED-PRE: 95 %
FEFMAX-PRE: 6.96 L/SEC
FEFMAX-PRED: 7.25 L/SEC
FEV1-%PRED-PRE: 102 %
FEV1-PRE: 3.02 L
FEV1FEV6-PRE: 84 %
FEV1FEV6-PRED: 84 %
FEV1FVC-PRE: 84 %
FEV1FVC-PRED: 82 %
FEV1SVC-PRE: 86 %
FEV1SVC-PRED: 76 %
FIFMAX-PRE: 4.52 L/SEC
FRCPLETH-%PRED-PRE: 92 %
FRCPLETH-PRE: 2.58 L
FRCPLETH-PRED: 2.8 L
FVC-%PRED-PRE: 100 %
FVC-PRE: 3.6 L
FVC-PRED: 3.59 L
IC-%PRED-PRE: 80 %
IC-PRE: 2.77 L
IC-PRED: 3.44 L
IGE SERPL-ACNC: 16 KU/L (ref 0–114)
MAPLE IGE QN: <0.1 KU(A)/L
MARSH ELDER IGE QN: <0.1 KU(A)/L
MOUSE URINE PROT IGE QN: <0.1 KU(A)/L
NETTLE IGE QN: <0.1 KU(A)/L
P NOTATUM IGE QN: <0.1 KU(A)/L
ROACH IGE QN: <0.1 KU(A)/L
RVPLETH-%PRED-PRE: 106 %
RVPLETH-PRE: 1.82 L
RVPLETH-PRED: 1.71 L
SALTWORT IGE QN: <0.1 KU(A)/L
SILVER BIRCH IGE QN: <0.1 KU(A)/L
TIMOTHY IGE QN: <0.1 KU(A)/L
TLCPLETH-%PRED-PRE: 101 %
TLCPLETH-PRE: 5.35 L
TLCPLETH-PRED: 5.27 L
VA-%PRED-PRE: 83 %
VA-PRE: 4.44 L
VC-%PRED-PRE: 90 %
VC-PRE: 3.53 L
VC-PRED: 3.9 L
WHITE ASH IGE QN: <0.1 KU(A)/L
WHITE ELM IGE QN: <0.1 KU(A)/L
WHITE MULBERRY IGE QN: <0.1 KU(A)/L
WHITE OAK IGE QN: <0.1 KU(A)/L

## 2023-02-17 ENCOUNTER — OFFICE VISIT (OUTPATIENT)
Dept: NEUROLOGY | Facility: CLINIC | Age: 43
End: 2023-02-17
Payer: COMMERCIAL

## 2023-02-17 VITALS
HEART RATE: 77 BPM | DIASTOLIC BLOOD PRESSURE: 78 MMHG | SYSTOLIC BLOOD PRESSURE: 155 MMHG | OXYGEN SATURATION: 98 % | WEIGHT: 275 LBS | BODY MASS INDEX: 44.39 KG/M2

## 2023-02-17 DIAGNOSIS — G43.001 MIGRAINE WITHOUT AURA AND WITH STATUS MIGRAINOSUS, NOT INTRACTABLE: ICD-10-CM

## 2023-02-17 DIAGNOSIS — R42 DIZZY SPELLS: Primary | ICD-10-CM

## 2023-02-17 DIAGNOSIS — R41.9 COGNITIVE COMPLAINTS: ICD-10-CM

## 2023-02-17 PROCEDURE — 99215 OFFICE O/P EST HI 40 MIN: CPT | Performed by: PSYCHIATRY & NEUROLOGY

## 2023-02-17 RX ORDER — RIZATRIPTAN BENZOATE 10 MG/1
10 TABLET ORAL
Qty: 12 TABLET | Refills: 3 | Status: SHIPPED | OUTPATIENT
Start: 2023-02-17 | End: 2023-07-26

## 2023-02-17 RX ORDER — ATOGEPANT 30 MG/1
30 TABLET ORAL DAILY
Qty: 30 TABLET | Refills: 3 | Status: SHIPPED | OUTPATIENT
Start: 2023-02-17 | End: 2023-07-26

## 2023-02-17 NOTE — PROGRESS NOTES
ESTABLISHED PATIENT NEUROLOGY NOTE    DATE OF VISIT: 2/17/2023  CLINIC LOCATION: [facility]  MRN: [id number]  PATIENT NAME: Ninoska Cottrell  YOB: 1980    REASON FOR VISIT: No chief complaint on file.    SUBJECTIVE:                                                      HISTORY OF PRESENT ILLNESS: Patient is here to follow up regarding dizziness and cognitive complaints. Please refer to my initial (11/14/2022)/other prior notes for further information.    Since the last visit, the patient reports *** after reduction of the dose of topiramate.  She denies interval development of new focal neurological symptoms.  EXAM:                                                    Physical Exam:   Vitals: There were no vitals taken for this visit.    General: pt is in NAD, cooperative.  Skin: normal turgor, moist mucous membranes, no lesions/rashes noticed.  HEENT: ATNC, white sclera, normal conjunctiva.  Respiratory: Symmetric lung excursion, no accessory respiratory muscle use.  Abdomen: Non distended.  Neurological: awake, cooperative, follows commands, no exam changes compared to the prior visit.  ASSESSMENT AND PLAN:                                                    Assessment: 42 year old female patient presents for follow-up of dizziness and cognitive difficulty.  She reports *** after the reduction of Topamax.  Previously we discussed option of EEG if symptoms do not improve after topiramate adjustment.    Diagnoses:    ICD-10-CM    1. Dizzy spells  R42       2. Cognitive complaints  R41.9         Plan:  There are no Patient Instructions on file for this visit.    Total Time: *** minutes spent on the date of the encounter doing chart review, history and exam, documentation and further activities per the note.    Nicholas Garcia MD  [facility]  (Chart documentation was completed in part with Dragon voice-recognition software. Even though reviewed, some grammatical,  spelling, and word errors may remain.)

## 2023-02-17 NOTE — PROGRESS NOTES
ESTABLISHED PATIENT NEUROLOGY NOTE    DATE OF VISIT: 2/17/2023  CLINIC LOCATION: [facility]  MRN: [id number]  PATIENT NAME: Ninoska Cottrell  YOB: 1980    REASON FOR VISIT:   Chief Complaint   Patient presents with     dizziness and follow up     Couple days had a dizziness     SUBJECTIVE:                                                      HISTORY OF PRESENT ILLNESS: Patient is here to follow up regarding dizziness and cognitive complaints. Please refer to my initial (11/14/2022)/other prior notes for further information.    Since the last visit, the patient reports improvement of her dizzy spells and cognition after reduction of the dose of topiramate.  Currently she takes 50 mg at bedtime.  She only experienced 1 dizzy spell couple of days ago, which was mild and very brief.  Word finding difficulty significantly improved, but not resolved.  At the same time, she reports worsening of her longstanding migraines.  She reports that right periorbital throbbing quite intense headaches, associated with light/sound/odor sensitivity, nausea, emesis, and intolerance of physical activity, occur 1-2 times per month lasting from a few hours to a few days.  She believes that in the past she was tried on amitriptyline/nortriptyline, but was not able to tolerate them.  She was also on Depakote, but it was not effective.  Until recently she took metoprolol for headache prevention, but ran out of prescription couple weeks ago.  She denies interval development of new focal neurological symptoms.  EXAM:                                                    Physical Exam:   Vitals: BP (!) 155/78 (BP Location: Right arm, Patient Position: Chair, Cuff Size: Adult Large)   Pulse 77   Wt 124.7 kg (275 lb)   SpO2 98%   BMI 44.39 kg/m      General: pt is in NAD, cooperative.  Skin: normal turgor, moist mucous membranes, no lesions/rashes noticed.  HEENT: ATNC, white sclera, normal conjunctiva.  Respiratory:  Symmetric lung excursion, no accessory respiratory muscle use.  Abdomen: Non distended.  Neurological: awake, cooperative, follows commands, no exam changes compared to the prior visit.  ASSESSMENT AND PLAN:                                                    Assessment: 42 year old female patient presents for follow-up of dizziness and cognitive difficulty.  She reports improvement of her dizzy spells and cognitive difficulties after the reduction of Topamax.  Previously we discussed option of EEG if symptoms do not improve after topiramate adjustment, but I do not feel that it is necessary at this point.      At the same time, the patient reports worsening of migraines with reduction of Topamax.  She previously tried amitriptyline/nortriptyline (side effects), metoprolol (not fully effective), and Depakote (not effective).  She is not able to tolerate higher doses of Topamax due to side effects.  We discussed that CGRP antagonist could be considered next, including Aimovig, Ajovy, Emgality, or Qulipta.  The patient would like to avoid injections/needles, but open to try a pill.  We reviewed typical side effects of Qulipta, that include drowsiness/fatigue, constipation, weight loss, decreased appetite, and nausea.  She is willing to proceed.  We will start with 30 mg/day dose, but discussed that we might increase it further to 60 mg if necessary in the future.  I refilled her rizatriptan that seems to be working well for acute therapy.    Ninoska to follow up with Primary Care provider regarding elevated blood pressure.     Diagnoses:    ICD-10-CM    1. Dizzy spells  R42       2. Cognitive complaints  R41.9       3. Migraine without aura and with status migrainosus, not intractable  G43.001 rizatriptan (MAXALT) 10 MG tablet     Atogepant (QULIPTA) 30 MG TABS        Plan: At today's visit we thoroughly discussed current symptoms, available treatment options, and the plan.    We decided to add Qulipta to migraine  prevention.  Advised the patient to take 30 mg daily and contact my clinic with any intolerable side effects, as discussed.  She should continue topiramate at 50 mg at bedtime for now, but we discussed that we might consider reducing her dose once her migraines under good control.  She should not restart metoprolol.    I advised the patient to continue rizatriptan without changes.  I updated her prescription.    I recommended to keep the headache diary and bring it to the next follow-up visit or upload via My Chart.     Next follow-up appointment is in the next 3-4 months or earlier if needed.    Total Time: 41 minutes spent on the date of the encounter doing chart review, history and exam, documentation and further activities per the note.    Nicholas Garcia MD  [facility]  (Chart documentation was completed in part with Dragon voice-recognition software. Even though reviewed, some grammatical, spelling, and word errors may remain.)

## 2023-02-17 NOTE — LETTER
2/17/2023         RE: Ninoska Cottrell  [address]  [place] [address]        Dear Colleague,    Thank you for referring your patient, Ninoska Cottrell, to the [facility]. Please see a copy of my visit note below.    ESTABLISHED PATIENT NEUROLOGY NOTE    DATE OF VISIT: 2/17/2023  CLINIC LOCATION: [facility]  MRN: [id number]  PATIENT NAME: Ninoska Cottrell  YOB: 1980    REASON FOR VISIT:   Chief Complaint   Patient presents with     dizziness and follow up     Couple days had a dizziness     SUBJECTIVE:                                                      HISTORY OF PRESENT ILLNESS: Patient is here to follow up regarding dizziness and cognitive complaints. Please refer to my initial (11/14/2022)/other prior notes for further information.    Since the last visit, the patient reports improvement of her dizzy spells and cognition after reduction of the dose of topiramate.  Currently she takes 50 mg at bedtime.  She only experienced 1 dizzy spell couple of days ago, which was mild and very brief.  Word finding difficulty significantly improved, but not resolved.  At the same time, she reports worsening of her longstanding migraines.  She reports that right periorbital throbbing quite intense headaches, associated with light/sound/odor sensitivity, nausea, emesis, and intolerance of physical activity, occur 1-2 times per month lasting from a few hours to a few days.  She believes that in the past she was tried on amitriptyline/nortriptyline, but was not able to tolerate them.  She was also on Depakote, but it was not effective.  Until recently she took metoprolol for headache prevention, but ran out of prescription couple weeks ago.  She denies interval development of new focal neurological symptoms.  EXAM:                                                    Physical Exam:   Vitals: BP (!) 155/78 (BP Location: Right arm, Patient Position: Chair, Cuff Size:  Adult Large)   Pulse 77   Wt 124.7 kg (275 lb)   SpO2 98%   BMI 44.39 kg/m      General: pt is in NAD, cooperative.  Skin: normal turgor, moist mucous membranes, no lesions/rashes noticed.  HEENT: ATNC, white sclera, normal conjunctiva.  Respiratory: Symmetric lung excursion, no accessory respiratory muscle use.  Abdomen: Non distended.  Neurological: awake, cooperative, follows commands, no exam changes compared to the prior visit.  ASSESSMENT AND PLAN:                                                    Assessment: 42 year old female patient presents for follow-up of dizziness and cognitive difficulty.  She reports improvement of her dizzy spells and cognitive difficulties after the reduction of Topamax.  Previously we discussed option of EEG if symptoms do not improve after topiramate adjustment, but I do not feel that it is necessary at this point.      At the same time, the patient reports worsening of migraines with reduction of Topamax.  She previously tried amitriptyline/nortriptyline (side effects), metoprolol (not fully effective), and Depakote (not effective).  She is not able to tolerate higher doses of Topamax due to side effects.  We discussed that CGRP antagonist could be considered next, including Aimovig, Ajovy, Emgality, or Qulipta.  The patient would like to avoid injections/needles, but open to try a pill.  We reviewed typical side effects of Qulipta, that include drowsiness/fatigue, constipation, weight loss, decreased appetite, and nausea.  She is willing to proceed.  We will start with 30 mg/day dose, but discussed that we might increase it further to 60 mg if necessary in the future.  I refilled her rizatriptan that seems to be working well for acute therapy.    Ninoska to follow up with Primary Care provider regarding elevated blood pressure.     Diagnoses:    ICD-10-CM    1. Dizzy spells  R42       2. Cognitive complaints  R41.9       3. Migraine without aura and with status migrainosus,  not intractable  G43.001 rizatriptan (MAXALT) 10 MG tablet     Atogepant (QULIPTA) 30 MG TABS        Plan: At today's visit we thoroughly discussed current symptoms, available treatment options, and the plan.    We decided to add Qulipta to migraine prevention.  Advised the patient to take 30 mg daily and contact my clinic with any intolerable side effects, as discussed.  She should continue topiramate at 50 mg at bedtime for now, but we discussed that we might consider reducing her dose once her migraines under good control.  She should not restart metoprolol.    I advised the patient to continue rizatriptan without changes.  I updated her prescription.    I recommended to keep the headache diary and bring it to the next follow-up visit or upload via My Chart.     Next follow-up appointment is in the next 3-4 months or earlier if needed.    Total Time: 41 minutes spent on the date of the encounter doing chart review, history and exam, documentation and further activities per the note.    Nicholas Garcia MD  [facility]  (Chart documentation was completed in part with Dragon voice-recognition software. Even though reviewed, some grammatical, spelling, and word errors may remain.)        Again, thank you for allowing me to participate in the care of your patient.        Sincerely,        Nicholas Garcia MD

## 2023-02-17 NOTE — PATIENT INSTRUCTIONS
AFTER VISIT SUMMARY (AVS):    At today's visit we thoroughly discussed current symptoms, available treatment options, and the plan.    We decided to add Qulipta to migraine prevention.  Please take 30 mg daily and contact my clinic with any intolerable side effects, as discussed.  Please continue topiramate at 50 mg at bedtime for now, but we discussed that we might consider reducing your dose once your migraines under good control.  Do not restart metoprolol.    Continue rizatriptan without changes.  I updated your prescription.    Please keep the headache diary and bring it to the next follow-up visit or upload via My Chart.     Next follow-up appointment is in the next 3-4 months or earlier if needed.    Please do not hesitate to call me with any questions or concerns.    Thanks.

## 2023-02-17 NOTE — NURSING NOTE
"Ninoska Cottrell is a 42 year old female who presents for:  Chief Complaint   Patient presents with     dizziness and follow up     Couple days had a dizziness        Initial Vitals:  BP (!) 155/78 (BP Location: Right arm, Patient Position: Chair, Cuff Size: Adult Large)   Pulse 77   Wt 124.7 kg (275 lb)   SpO2 98%   BMI 44.39 kg/m   Estimated body mass index is 44.39 kg/m  as calculated from the following:    Height as of 2/6/23: 1.676 m (5' 6\").    Weight as of this encounter: 124.7 kg (275 lb).. Body surface area is 2.41 meters squared. BP completed using cuff size: large... Pt BP was high at her request no need for recheck.   Appointment August 14 for headaches.    MA  Comments:    Annette Deluca    "

## 2023-02-28 DIAGNOSIS — R05.3 CHRONIC COUGH: ICD-10-CM

## 2023-02-28 PROBLEM — G43.009 MIGRAINE WITHOUT AURA: Status: ACTIVE | Noted: 2023-02-28

## 2023-03-01 ENCOUNTER — OFFICE VISIT (OUTPATIENT)
Dept: INTERNAL MEDICINE | Facility: CLINIC | Age: 43
End: 2023-03-01
Payer: COMMERCIAL

## 2023-03-01 VITALS
WEIGHT: 274.8 LBS | OXYGEN SATURATION: 100 % | DIASTOLIC BLOOD PRESSURE: 83 MMHG | BODY MASS INDEX: 44.35 KG/M2 | HEART RATE: 71 BPM | TEMPERATURE: 98 F | SYSTOLIC BLOOD PRESSURE: 123 MMHG

## 2023-03-01 DIAGNOSIS — Z23 HIGH PRIORITY FOR 2019-NCOV VACCINE: ICD-10-CM

## 2023-03-01 DIAGNOSIS — Z12.31 ENCOUNTER FOR SCREENING MAMMOGRAM FOR BREAST CANCER: ICD-10-CM

## 2023-03-01 DIAGNOSIS — Z00.00 ROUTINE HISTORY AND PHYSICAL EXAMINATION OF ADULT: Primary | ICD-10-CM

## 2023-03-01 DIAGNOSIS — E66.01 MORBID OBESITY (H): ICD-10-CM

## 2023-03-01 PROCEDURE — 91312 COVID-19 VACCINE BIVALENT BOOSTER 12+ (PFIZER): CPT | Performed by: INTERNAL MEDICINE

## 2023-03-01 PROCEDURE — 0124A COVID-19 VACCINE BIVALENT BOOSTER 12+ (PFIZER): CPT | Performed by: INTERNAL MEDICINE

## 2023-03-01 PROCEDURE — 99396 PREV VISIT EST AGE 40-64: CPT | Performed by: INTERNAL MEDICINE

## 2023-03-01 RX ORDER — OMEPRAZOLE 40 MG/1
CAPSULE, DELAYED RELEASE ORAL
Qty: 90 CAPSULE | Refills: 3 | Status: SHIPPED | OUTPATIENT
Start: 2023-03-01 | End: 2024-04-23

## 2023-03-01 ASSESSMENT — ENCOUNTER SYMPTOMS
SHORTNESS OF BREATH: 1
JOINT SWELLING: 0
DYSURIA: 0
PALPITATIONS: 0
EYE PAIN: 0
FEVER: 0
CONSTIPATION: 0
DIARRHEA: 0
HEARTBURN: 0
CHILLS: 0
COUGH: 1
ARTHRALGIAS: 1
FREQUENCY: 1
DIZZINESS: 1
BREAST MASS: 0
NAUSEA: 0
HEMATOCHEZIA: 0
HEMATURIA: 0
WEAKNESS: 0
HEADACHES: 1
SORE THROAT: 0
NERVOUS/ANXIOUS: 0
PARESTHESIAS: 0
ABDOMINAL PAIN: 0
MYALGIAS: 1

## 2023-03-01 ASSESSMENT — PATIENT HEALTH QUESTIONNAIRE - PHQ9
10. IF YOU CHECKED OFF ANY PROBLEMS, HOW DIFFICULT HAVE THESE PROBLEMS MADE IT FOR YOU TO DO YOUR WORK, TAKE CARE OF THINGS AT HOME, OR GET ALONG WITH OTHER PEOPLE: SOMEWHAT DIFFICULT
SUM OF ALL RESPONSES TO PHQ QUESTIONS 1-9: 7
SUM OF ALL RESPONSES TO PHQ QUESTIONS 1-9: 7

## 2023-03-01 ASSESSMENT — ASTHMA QUESTIONNAIRES
QUESTION_4 LAST FOUR WEEKS HOW OFTEN HAVE YOU USED YOUR RESCUE INHALER OR NEBULIZER MEDICATION (SUCH AS ALBUTEROL): ONCE A WEEK OR LESS
QUESTION_3 LAST FOUR WEEKS HOW OFTEN DID YOUR ASTHMA SYMPTOMS (WHEEZING, COUGHING, SHORTNESS OF BREATH, CHEST TIGHTNESS OR PAIN) WAKE YOU UP AT NIGHT OR EARLIER THAN USUAL IN THE MORNING: ONCE OR TWICE
ACT_TOTALSCORE: 17
QUESTION_1 LAST FOUR WEEKS HOW MUCH OF THE TIME DID YOUR ASTHMA KEEP YOU FROM GETTING AS MUCH DONE AT WORK, SCHOOL OR AT HOME: A LITTLE OF THE TIME
QUESTION_5 LAST FOUR WEEKS HOW WOULD YOU RATE YOUR ASTHMA CONTROL: SOMEWHAT CONTROLLED
QUESTION_2 LAST FOUR WEEKS HOW OFTEN HAVE YOU HAD SHORTNESS OF BREATH: ONCE A DAY
ACT_TOTALSCORE: 17

## 2023-03-01 NOTE — PROGRESS NOTES
ASSESSMENT/PLAN                                                       (Z00.00) Routine history and physical examination of adult  (primary encounter diagnosis)  Comment: PMH, PSH, FH, SH, medications, allergies, immunizations, and preventative health measures reviewed and updated as appropriate.  Plan: see below for plans.      (Z12.31) Encounter for screening mammogram for breast cancer  Plan: screening mammogram ordered - patient to schedule.     (Z23) High priority for 2019-nCoV vaccine  Plan: COVID-19 booster given today.    (E66.01) Morbid obesity (H)  Comment: known issue that I take into account for their medical decisions, no current exacerbations or new concerns.    Jaci Sharma MD   [address] [address]  T: [phone], F: [phone]    YEHUDA Cottrell is a very pleasant 43 year old female who presents for a physical.    ROS:  Constitutional: no unintentional weight loss or gain reported; no fevers, chills, or sweats reported  Cardiovascular: no chest pain, palpitations, or edema reported  Respiratory: no cough, wheezing, shortness of breath, or dyspnea on exertion reported  Gastrointestinal: no nausea, vomiting, constipation, diarrhea, or abdominal pain reported  Genitourinary: no urinary frequency, urgency, dysuria, or hematuria reported  Integumentary: no rash or pruritus reported  Musculoskeletal: no back pain, muscle pain, joint pain, or joint swelling reported  Neurologic: see PMH below  Hematologic: no easy bruising or bleeding reported  Endocrine: no heat or cold intolerance reported; no polyuria or polydipsia reported  Psychiatric: see PMH below    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      History of pre-eclampsia 2012     MDD (major depressive disorder)      Migraine without aura      Morbid obesity (H)      Past Surgical  History:   Procedure Laterality Date     APPENDECTOMY       OPEN REDUCTION INTERNAL FIXATION ANKLE Right     hardware still in place     Family History   Problem Relation Age of Onset     Breast Cancer Mother      Hemophilia Father      Hypertension Maternal Grandmother      Pancreatic Cancer Maternal Grandmother      Cerebrovascular Disease Maternal Grandfather      Alzheimer Disease Paternal Grandmother      Hemophilia Paternal Grandmother      Myocardial Infarction Paternal Grandfather      Breast Cancer Maternal Aunt      Bone Cancer Maternal Aunt      Diabetes No family hx of      Coronary Artery Disease Early Onset No family hx of      Colon Cancer No family hx of      Ovarian Cancer No family hx of      Social History     Occupational History     Occupation: Stay-at-home mom   Tobacco Use     Smoking status: Former     Packs/day: 0.50     Years: 3.00     Pack years: 1.50     Types: Cigarettes     Quit date: 2000     Years since quittin.1     Smokeless tobacco: Never   Vaping Use     Vaping Use: Never used   Substance and Sexual Activity     Alcohol use: Yes     Comment: couple drinks/month     Drug use: No     Sexual activity: Yes     Partners: Male     Birth control/protection: I.U.D.     Comment: Mirena placed in    Social History Narrative    .    3 kids (10, 9, and 5 as of ).    Walks daily; no formal exercise.      Allergies   Allergen Reactions     Amoxicillin Unknown     Codeine Hives     Latex Rash     Current Outpatient Medications   Medication Sig     albuterol (PROVENTIL HFA) 108 (90 Base) MCG/ACT inhaler Inhale 2 puffs into the lungs every 6 hours     Atogepant (QULIPTA) 30 MG TABS Take 30 mg by mouth daily     azelastine (ASTELIN) 0.1 % nasal spray Spray 1 spray into both nostrils as needed for rhinitis     fexofenadine (ALLEGRA) 180 MG tablet Take 180 mg by mouth daily     fluticasone (FLOVENT HFA) 220 MCG/ACT inhaler Inhale 2 puffs into the lungs 2 times daily      levonorgestrel (MIRENA) 20 MCG/DAY IUD 1 each (20 mcg) by Intrauterine route once     multivitamin w/minerals (THERA-VIT-M) tablet Take 1 tablet by mouth daily     rizatriptan (MAXALT) 10 MG tablet Take 1 tablet (10 mg) by mouth at onset of headache for migraine (Please limit use to less then 10 days per month)     topiramate (TOPAMAX) 50 MG tablet Take 1 tab 2 times daily     Immunization History   Administered Date(s) Administered     COVID-19 Vaccine 12+ (Pfizer) 04/24/2021, 05/15/2021, 12/15/2021     Influenza Vaccine >6 months (Alfuria,Fluzone) 02/24/2017     MMR 09/03/2017     TDAP Vaccine (Adacel) 05/29/2013, 07/18/2017     TDAP Vaccine (Boostrix) 02/01/2010     PREVENTATIVE HEALTH                                                      BMI: morbidly obese  Blood pressure: within normal limits   Breast CA screening: DUE  Cervical CA screening: up to date   Colon CA screening: not medically indicated at this time   Lung CA screening: patient does not meet screening criteria  Dexa: not medically indicated at this time   Screening cholesterol: up to date   Screening diabetes: up to date   STD testing: no risk factors present  Alcohol misuse screening: alcohol use reviewed - no intervention indicated at this time  Immunizations: reviewed; COVID-19 booster DUE and flu shot DUE - patient declines    OBJECTIVE                                                      /83   Pulse 71   Temp 98  F (36.7  C) (Oral)   Wt 124.6 kg (274 lb 12.8 oz)   SpO2 100%   BMI 44.35 kg/m    Constitutional: well-appearing  Head, Ears, and Eyes: normocephalic; normal external auditory canal and pinna; tympanic membranes visualized and normal; normal lids and conjunctivae  Neck: supple, symmetric, no thyromegaly or lymphadenopathy  Respiratory: normal respiratory effort; clear to auscultation bilaterally  Cardiovascular: regular rate and rhythm; no edema  Gastrointestinal: soft, non-tender, and non-distended; no organomegaly or  masses  Musculoskeletal: normal gait and station  Psych: normal judgment and insight; normal mood and affect; recent and remote memory intact    ---  (Note was completed, in part, with [employer] voice-recognition software. Documentation was reviewed, but some grammatical, spelling, and word errors may remain.)

## 2023-03-06 LAB — HBA1C MFR BLD: NORMAL %

## 2023-03-17 ENCOUNTER — ANCILLARY ORDERS (OUTPATIENT)
Dept: INTERNAL MEDICINE | Facility: CLINIC | Age: 43
End: 2023-03-17

## 2023-03-17 ENCOUNTER — HOSPITAL ENCOUNTER (OUTPATIENT)
Dept: MAMMOGRAPHY | Facility: CLINIC | Age: 43
Discharge: HOME | End: 2023-03-17
Attending: INTERNAL MEDICINE | Admitting: INTERNAL MEDICINE
Payer: COMMERCIAL

## 2023-03-17 DIAGNOSIS — Z12.31 ENCOUNTER FOR SCREENING MAMMOGRAM FOR BREAST CANCER: ICD-10-CM

## 2023-03-17 PROCEDURE — 77067 SCR MAMMO BI INCL CAD: CPT

## 2023-05-12 ENCOUNTER — OFFICE VISIT (OUTPATIENT)
Dept: URGENT CARE | Facility: URGENT CARE | Age: 43
End: 2023-05-12
Payer: COMMERCIAL

## 2023-05-12 VITALS
WEIGHT: 266.8 LBS | BODY MASS INDEX: 43.06 KG/M2 | HEART RATE: 83 BPM | DIASTOLIC BLOOD PRESSURE: 83 MMHG | SYSTOLIC BLOOD PRESSURE: 135 MMHG | TEMPERATURE: 98.3 F

## 2023-05-12 DIAGNOSIS — Z20.818 STREP THROAT EXPOSURE: ICD-10-CM

## 2023-05-12 DIAGNOSIS — R07.0 THROAT PAIN: Primary | ICD-10-CM

## 2023-05-12 LAB
DEPRECATED S PYO AG THROAT QL EIA: NEGATIVE
GROUP A STREP BY PCR: NOT DETECTED

## 2023-05-12 PROCEDURE — 99213 OFFICE O/P EST LOW 20 MIN: CPT | Performed by: FAMILY MEDICINE

## 2023-05-12 PROCEDURE — 87651 STREP A DNA AMP PROBE: CPT | Performed by: FAMILY MEDICINE

## 2023-05-12 RX ORDER — AZITHROMYCIN 250 MG/1
TABLET, FILM COATED ORAL
Qty: 6 TABLET | Refills: 0 | Status: SHIPPED | OUTPATIENT
Start: 2023-05-12 | End: 2023-05-17

## 2023-05-12 NOTE — PROGRESS NOTES
SUBJECTIVE:Ninoska Cottrell is a 43 year old female with a chief complaint of sore throat.    Onset of symptoms was day(s) ago.    Course of illness: still present.    Predisposing factors include ill contact: Family member  and exposure to strep.    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      History of pre-eclampsia      MDD (major depressive disorder)      Migraine without aura      Morbid obesity (H)      Allergies   Allergen Reactions     Amoxicillin Unknown     Latex Rash     Morphine And Related Hives     Social History     Tobacco Use     Smoking status: Former     Packs/day: 0.50     Years: 3.00     Pack years: 1.50     Types: Cigarettes     Quit date: 2000     Years since quittin.3     Smokeless tobacco: Never   Vaping Use     Vaping status: Never Used   Substance Use Topics     Alcohol use: Yes     Comment: couple drinks/month       ROS:  SKIN: no rash  GI: no vomiting    OBJECTIVE:   /83 (BP Location: Left arm, Patient Position: Sitting, Cuff Size: Adult Large)   Pulse 83   Temp 98.3  F (36.8  C) (Oral)   Wt 121 kg (266 lb 12.8 oz)   BMI 43.06 kg/m  GENERAL APPEARANCE: healthy, alert and no distress  EYES: EOMI,  PERRL, conjunctiva clear  HENT: ear canals and TM's normal.  Nose normal.  Pharynx erythematous with some exudate noted.  RESP: lungs clear to auscultation - no rales, rhonchi or wheezes  SKIN: no suspicious lesions or rashes    Rapid Strep test is negative; await throat culture results.      ICD-10-CM    1. Throat pain  R07.0 Streptococcus A Rapid Screen w/Reflex to PCR - Clinic Collect     Group A Streptococcus PCR Throat Swab     azithromycin (ZITHROMAX) 250 MG tablet      2. Strep throat exposure  Z20.818 azithromycin (ZITHROMAX) 250 MG tablet          Symptomatic treat with gargles, lozenges, and OTC analgesic as needed.  Follow-up with primary clinic if not improving.

## 2023-06-03 ENCOUNTER — ANCILLARY PROCEDURE (OUTPATIENT)
Dept: GENERAL RADIOLOGY | Facility: CLINIC | Age: 43
End: 2023-06-03
Attending: NURSE PRACTITIONER
Payer: COMMERCIAL

## 2023-06-03 ENCOUNTER — OFFICE VISIT (OUTPATIENT)
Dept: URGENT CARE | Facility: URGENT CARE | Age: 43
End: 2023-06-03
Payer: COMMERCIAL

## 2023-06-03 VITALS
DIASTOLIC BLOOD PRESSURE: 93 MMHG | OXYGEN SATURATION: 100 % | TEMPERATURE: 98.1 F | HEART RATE: 77 BPM | SYSTOLIC BLOOD PRESSURE: 122 MMHG | WEIGHT: 267.6 LBS | RESPIRATION RATE: 18 BRPM | BODY MASS INDEX: 43.19 KG/M2

## 2023-06-03 DIAGNOSIS — M72.2 PLANTAR FASCIITIS: Primary | ICD-10-CM

## 2023-06-03 DIAGNOSIS — M79.671 RIGHT FOOT PAIN: ICD-10-CM

## 2023-06-03 PROCEDURE — 99213 OFFICE O/P EST LOW 20 MIN: CPT | Performed by: NURSE PRACTITIONER

## 2023-06-03 PROCEDURE — 73630 X-RAY EXAM OF FOOT: CPT | Mod: TC | Performed by: RADIOLOGY

## 2023-06-03 NOTE — PROGRESS NOTES
Assessment & Plan     Plantar fasciitis    Right foot pain  - XR Foot Right G/E 3 Views     Patient Instructions     XR is interpreted as negative in the clinic, pending radiology review.  Results for orders placed or performed in visit on 06/03/23   XR Foot Right G/E 3 Views     Status: None    Narrative    EXAM: XR FOOT RIGHT G/E 3 VIEWS  LOCATION: [facility]  DATE/TIME: 6/3/2023 7:00 PM CDT    INDICATION:  Right foot pain  COMPARISON: 06/04/2021.      Impression    IMPRESSION: Normal joint alignment. No fracture, erosion, or bone lesion. Prior ORIF of the distal fibula.         Wear shoes all the time  Elevate your feet when seated.    Follow up with podiatry for plantar fascitis          Return in about 1 week (around 6/10/2023) for with regular provider if symptoms persist.    JANAE Monroe CNP  [facility]    Emili Xiao is a 43 year old female who presents to clinic today for the following health issues:  Chief Complaint   Patient presents with     Musculoskeletal Problem     Right ankle pain     HPI    MS Injury/Pain    Onset of symptoms was 1 month(s) ago.  Location: right foot  Context:       The injury happened while at home      Mechanism: had to go without shoes for a weekend and plantar faciitis flared.  Still painful 1 month later.        Patient experienced delayed pain, was able to bear weight directly after injury, no deformity was noted by the patient  Course of symptoms is same.    Severity moderately severe  Current and Associated symptoms: Pain, Decreased range of motion and Stiffness  Denies  Swelling, Bruising, Warmth and Redness  Aggravating Factors: walking and weight-bearing  Therapies to improve symptoms include: ice, rest, elevation and wearing shoes with inserts  This is not the first time this type of problem has occurred for this patient.    wants to make sure she does not have a stress fracture.      Review of  Systems  Constitutional, HEENT, cardiovascular, pulmonary, GI, , musculoskeletal, neuro, skin, endocrine and psych systems are negative, except as otherwise noted.      Objective    BP (!) 122/93 (BP Location: Left arm, Patient Position: Sitting, Cuff Size: Adult Large)   Pulse 77   Temp 98.1  F (36.7  C) (Oral)   Resp 18   Wt 121.4 kg (267 lb 9.6 oz)   SpO2 100%   BMI 43.19 kg/m    Physical Exam   GENERAL: healthy, alert and no distress  RESP: lungs clear to auscultation - no rales, rhonchi or wheezes  CV: regular rate and rhythm, normal S1 S2, no S3 or S4, no murmur, click or rub, no peripheral edema and peripheral pulses strong  MS: RLE exam shows normal strength and muscle mass, no deformities, no erythema, induration, or nodules and flat arch on right foot  SKIN: no suspicious lesions or rashes

## 2023-06-04 NOTE — PATIENT INSTRUCTIONS
XR is interpreted as negative in the clinic, pending radiology review.  Results for orders placed or performed in visit on 06/03/23   XR Foot Right G/E 3 Views     Status: None    Narrative    EXAM: XR FOOT RIGHT G/E 3 VIEWS  LOCATION: [facility]  DATE/TIME: 6/3/2023 7:00 PM CDT    INDICATION:  Right foot pain  COMPARISON: 06/04/2021.      Impression    IMPRESSION: Normal joint alignment. No fracture, erosion, or bone lesion. Prior ORIF of the distal fibula.         Wear shoes all the time  Elevate your feet when seated.    Follow up with podiatry for plantar fascitis

## 2023-06-23 DIAGNOSIS — R05.3 CHRONIC COUGH: ICD-10-CM

## 2023-06-23 RX ORDER — FLUTICASONE PROPIONATE 220 UG/1
2 AEROSOL, METERED RESPIRATORY (INHALATION) 2 TIMES DAILY
Qty: 12 G | Refills: 3 | Status: SHIPPED | OUTPATIENT
Start: 2023-06-23 | End: 2024-04-24

## 2023-07-26 ENCOUNTER — TELEPHONE (OUTPATIENT)
Dept: NEUROLOGY | Facility: CLINIC | Age: 43
End: 2023-07-26

## 2023-07-26 ENCOUNTER — OFFICE VISIT (OUTPATIENT)
Dept: NEUROLOGY | Facility: CLINIC | Age: 43
End: 2023-07-26
Payer: COMMERCIAL

## 2023-07-26 VITALS
HEIGHT: 66 IN | OXYGEN SATURATION: 99 % | BODY MASS INDEX: 42.43 KG/M2 | WEIGHT: 264 LBS | SYSTOLIC BLOOD PRESSURE: 128 MMHG | HEART RATE: 80 BPM | DIASTOLIC BLOOD PRESSURE: 75 MMHG

## 2023-07-26 DIAGNOSIS — R42 DIZZY SPELLS: ICD-10-CM

## 2023-07-26 DIAGNOSIS — R41.9 COGNITIVE COMPLAINTS: ICD-10-CM

## 2023-07-26 DIAGNOSIS — G43.001 MIGRAINE WITHOUT AURA AND WITH STATUS MIGRAINOSUS, NOT INTRACTABLE: Primary | ICD-10-CM

## 2023-07-26 PROCEDURE — 99214 OFFICE O/P EST MOD 30 MIN: CPT | Performed by: PSYCHIATRY & NEUROLOGY

## 2023-07-26 RX ORDER — TOPIRAMATE 50 MG/1
50 TABLET, FILM COATED ORAL DAILY
Qty: 90 TABLET | Refills: 1 | Status: SHIPPED | OUTPATIENT
Start: 2023-07-26 | End: 2023-12-11

## 2023-07-26 RX ORDER — RIZATRIPTAN BENZOATE 10 MG/1
10 TABLET ORAL
Qty: 12 TABLET | Refills: 3 | Status: SHIPPED | OUTPATIENT
Start: 2023-07-26 | End: 2023-12-11

## 2023-07-26 RX ORDER — ATOGEPANT 30 MG/1
30 TABLET ORAL DAILY
Qty: 30 TABLET | Refills: 3 | Status: SHIPPED | OUTPATIENT
Start: 2023-07-26 | End: 2023-12-11

## 2023-07-26 ASSESSMENT — PATIENT HEALTH QUESTIONNAIRE - PHQ9
SUM OF ALL RESPONSES TO PHQ QUESTIONS 1-9: 13
SUM OF ALL RESPONSES TO PHQ QUESTIONS 1-9: 13
10. IF YOU CHECKED OFF ANY PROBLEMS, HOW DIFFICULT HAVE THESE PROBLEMS MADE IT FOR YOU TO DO YOUR WORK, TAKE CARE OF THINGS AT HOME, OR GET ALONG WITH OTHER PEOPLE: SOMEWHAT DIFFICULT

## 2023-07-26 NOTE — PATIENT INSTRUCTIONS
AFTER VISIT SUMMARY (AVS):    At today's visit we thoroughly discussed current symptoms, available treatment options, and the plan.    We decided to start Qulipta when available to see if it helps migraines and tolerated. After that we will plan to reduce the topiramate dose.    Please keep the headache diary and bring it to the next follow-up visit or upload via My Chart.     Next follow-up appointment is in the next 4 months or earlier if needed.    Please do not hesitate to call me with any questions or concerns.    Thanks.

## 2023-07-26 NOTE — LETTER
"    7/26/2023         RE: Ninoska Cottrell  [address]  [facility] [address]        Dear Colleague,    Thank you for referring your patient, Ninoska Cottrell, to the [facility] NEUROLOGY [facility]. Please see a copy of my visit note below.    ESTABLISHED PATIENT NEUROLOGY NOTE    DATE OF VISIT: 7/26/2023  CLINIC LOCATION: [facility]  MRN: [id number]  PATIENT NAME: Ninoska Cottrell  YOB: 1980    REASON FOR VISIT:   Chief Complaint   Patient presents with     Headache     Headaches and Migraines      SUBJECTIVE:                                                      HISTORY OF PRESENT ILLNESS: Patient is here to follow up regarding migraines, dizziness and cognitive complaints.  The last visit was on 2/17/2023.  At that time we decided to start Qulipta.  Please refer to my initial/other prior notes for further information.    Since the last visit, the patient reports that her headaches are better, but still periodically flare.  She does not keep diary.  She did not start Qulipta for unclear reasons (pharmacy told her that they never received the prescription, and she did not let us know until now).  Currently she takes 50 mg of topiramate daily for headache prevention.  For acute therapy she uses Maxalt with good results.  No significant side effects or interval development of new neurological symptoms.  Her dizziness and cognitive complaints are stable (dizziness is infrequent, memory issues continue, but not worse).  EXAM:                                                    Physical Exam:   Vitals: /75   Pulse 80   Ht 1.679 m (5' 6.1\")   Wt 119.7 kg (264 lb)   SpO2 99%   BMI 42.48 kg/m      General: pt is in NAD, cooperative.  Skin: normal turgor, moist mucous membranes, no lesions/rashes noticed.  HEENT: ATNC, white sclera, normal conjunctiva.  Respiratory: Symmetric lung excursion, no accessory respiratory muscle use.  Abdomen: Non distended.  Neurological: awake, " cooperative, follows commands, no aphasia or dysarthria noted, cranial nerves II-XII: no ptosis, face is symmetric, equally moves all extremities, no dysmetria bilaterally, casual gait is normal.  ASSESSMENT AND PLAN:                                                    Assessment: 43 year old female patient presents for follow-up of migraines, dizziness, and cognitive complaints.  She reports mild improvement of her headaches, but did not start Qulipta as advised.    Regarding her migraines, she needs to start Qulipta when it is available.  Following that we will plan to taper her off Topamax if possible.  Additional treatment options would be Aimovig, Ajovy, and Emgality.  Nurtec is an additional option for migraine prevention.  Maxalt works well for acute therapy.  I refilled topiramate and Maxalt prescriptions today.    Regarding her dizzy spells and cognitive complaints, tapering off Topamax might provide additional help.  No other recommendations at this time.    Diagnoses:    ICD-10-CM    1. Migraine without aura and with status migrainosus, not intractable  G43.001       2. Dizzy spells  R42       3. Cognitive complaints  R41.9         Plan: At today's visit we thoroughly discussed current symptoms, available treatment options, and the plan.    We decided to start Qulipta when available to see if it helps migraines and tolerated. After that we will plan to reduce the topiramate dose.    I advised the patient to keep the headache diary and bring it to the next follow-up visit or upload via My Chart.     Next follow-up appointment is in the next 4 months or earlier if needed.    Total Time: 24 minutes spent on the date of the encounter doing chart review, history and exam, documentation and further activities per the note.    Nicholas Garcia MD  [facility] Neurology  (Chart documentation was completed in part with Dragon voice-recognition software. Even though reviewed, some grammatical, spelling,  and word errors may remain.)      Again, thank you for allowing me to participate in the care of your patient.        Sincerely,        Nicholas Garcia MD

## 2023-07-26 NOTE — PROGRESS NOTES
"ESTABLISHED PATIENT NEUROLOGY NOTE    DATE OF VISIT: 7/26/2023  CLINIC LOCATION: [facility]  MRN: [id number]  PATIENT NAME: Ninoska Cottrell  YOB: 1980    REASON FOR VISIT:   Chief Complaint   Patient presents with    Headache     Headaches and Migraines      SUBJECTIVE:                                                      HISTORY OF PRESENT ILLNESS: Patient is here to follow up regarding migraines, dizziness and cognitive complaints.  The last visit was on 2/17/2023.  At that time we decided to start Qulipta.  Please refer to my initial/other prior notes for further information.    Since the last visit, the patient reports that her headaches are better, but still periodically flare.  She does not keep diary.  She did not start Qulipta for unclear reasons (pharmacy told her that they never received the prescription, and she did not let us know until now).  Currently she takes 50 mg of topiramate daily for headache prevention.  For acute therapy she uses Maxalt with good results.  No significant side effects or interval development of new neurological symptoms.  Her dizziness and cognitive complaints are stable (dizziness is infrequent, memory issues continue, but not worse).  EXAM:                                                    Physical Exam:   Vitals: /75   Pulse 80   Ht 1.679 m (5' 6.1\")   Wt 119.7 kg (264 lb)   SpO2 99%   BMI 42.48 kg/m      General: pt is in NAD, cooperative.  Skin: normal turgor, moist mucous membranes, no lesions/rashes noticed.  HEENT: ATNC, white sclera, normal conjunctiva.  Respiratory: Symmetric lung excursion, no accessory respiratory muscle use.  Abdomen: Non distended.  Neurological: awake, cooperative, follows commands, no aphasia or dysarthria noted, cranial nerves II-XII: no ptosis, face is symmetric, equally moves all extremities, no dysmetria bilaterally, casual gait is normal.  ASSESSMENT AND PLAN:                                         "            Assessment: 43 year old female patient presents for follow-up of migraines, dizziness, and cognitive complaints.  She reports mild improvement of her headaches, but did not start Qulipta as advised.    Regarding her migraines, she needs to start Qulipta when it is available.  Following that we will plan to taper her off Topamax if possible.  Additional treatment options would be Aimovig, Ajovy, and Emgality.  Nurtec is an additional option for migraine prevention.  Maxalt works well for acute therapy.  I refilled topiramate and Maxalt prescriptions today.    Regarding her dizzy spells and cognitive complaints, tapering off Topamax might provide additional help.  No other recommendations at this time.    Diagnoses:    ICD-10-CM    1. Migraine without aura and with status migrainosus, not intractable  G43.001       2. Dizzy spells  R42       3. Cognitive complaints  R41.9         Plan: At today's visit we thoroughly discussed current symptoms, available treatment options, and the plan.    We decided to start Qulipta when available to see if it helps migraines and tolerated. After that we will plan to reduce the topiramate dose.    I advised the patient to keep the headache diary and bring it to the next follow-up visit or upload via My Chart.     Next follow-up appointment is in the next 4 months or earlier if needed.    Total Time: 24 minutes spent on the date of the encounter doing chart review, history and exam, documentation and further activities per the note.    Nicholas Garcia MD  [facility] Neurology  (Chart documentation was completed in part with Dragon voice-recognition software. Even though reviewed, some grammatical, spelling, and word errors may remain.)    Addendum.  My support staff contacted pharmacy.  They informed us that it has to go through specialty pharmacy to be approved by her insurance.  We will resubmit for prescription to [place] [facility].  Nicholas CLEANING  MD Jose.

## 2023-07-26 NOTE — TELEPHONE ENCOUNTER
Per patient she was told the pharmacy never received the RX for Qulipta tabs and so she was unable to start them. Per Dr Garcia I reached out to [facility] in [employer] in [place] [phone] and was advised they had RX and it was on hold as per insurance it needs to be filled by a speciality  pharmacy with [facility]. Per staff they had sent communication back in Feb to advise this but I do not see any documentation that that was received.  Dr Garcia sent a new script to [facility]. Writer reached out to Ninoska and advised that after speaking with [facility] I had been able to find out that there insurce would only cover it if it went through the speciality pharmacy and advised her they should contact her when it is ready and she can request it is mailed. I appoligized that the [facility] staff had not been more clear so we could have gotten this taken care of for her sooner. Will reach out to [facility] in a bit and make sure there is no PA needed or they need merry further info prior to processing.  Called [facility] and confirmed they had received the RX.

## 2023-07-26 NOTE — NURSING NOTE
"Ninoska Cottrell is a 43 year old female who presents for:  Chief Complaint   Patient presents with    Headache     Headaches and Migraines         Initial Vitals:  /75   Pulse 80   Ht 1.679 m (5' 6.1\")   Wt 119.7 kg (264 lb)   SpO2 99%   BMI 42.48 kg/m   Estimated body mass index is 42.48 kg/m  as calculated from the following:    Height as of this encounter: 1.679 m (5' 6.1\").    Weight as of this encounter: 119.7 kg (264 lb).. Body surface area is 2.36 meters squared. BP completed using cuff size: regular    Kelsie Rathai   "

## 2023-07-27 ENCOUNTER — TELEPHONE (OUTPATIENT)
Dept: NEUROLOGY | Facility: CLINIC | Age: 43
End: 2023-07-27

## 2023-07-27 DIAGNOSIS — G43.001 MIGRAINE WITHOUT AURA AND WITH STATUS MIGRAINOSUS, NOT INTRACTABLE: Primary | ICD-10-CM

## 2023-07-27 NOTE — TELEPHONE ENCOUNTER
[facility]    Fax: [phone]    Spec: [phone]    MO: [phone]

## 2023-08-01 NOTE — TELEPHONE ENCOUNTER
Central Prior Authorization Team  Phone: [phone]    PA Initiation    Medication: QULIPTA 30 MG PO TABS  Insurance Company: [employer] - Phone [phone] Fax [phone]  Pharmacy Filling the Rx: [facility] - [place], [place] - [address]  Filling Pharmacy Phone: [phone]  Filling Pharmacy Fax:    Start Date: 8/1/2023

## 2023-08-02 NOTE — TELEPHONE ENCOUNTER
PRIOR AUTHORIZATION DENIED    Medication: QULIPTA 30 MG PO TABS  Insurance Company: [employer] - Phone [phone] Fax [phone]  Denial Date: 8/2/2023    Denial Rational:   We are not approving your request because:  - Documentation has not shown that you have tried and failed Ajovy and Emgality each for a  duration of at least 12 weeks. Ajovy and Emgality require prior authorization for coverage and  must not be used in combination with botulinum toxin (Botox or Dysport).    Appeal Information: If provider would like to appeal please provide a letter of medical necessity.

## 2023-08-04 NOTE — TELEPHONE ENCOUNTER
Willa sent to pt to see if willing to try injection.   Natalie ASHFORD, RN, BSN  [facility]

## 2023-08-07 NOTE — TELEPHONE ENCOUNTER
Pt did not answer myChart.     Called pt and she said she is willing to try Ajovy.     RN let pt know that Ajovy would need PA as well so it make take some time.  Advised her to call if she has further questions.  She verbalized understanding.     Medication pended. Will route to provider to sign order.     Natalie ASHFORD RN, BSN  [facility]

## 2023-09-03 ENCOUNTER — MYC MEDICAL ADVICE (OUTPATIENT)
Dept: NEUROLOGY | Facility: CLINIC | Age: 43
End: 2023-09-03
Payer: COMMERCIAL

## 2023-09-05 ENCOUNTER — TELEPHONE (OUTPATIENT)
Dept: NEUROLOGY | Facility: CLINIC | Age: 43
End: 2023-09-05
Payer: COMMERCIAL

## 2023-09-05 NOTE — TELEPHONE ENCOUNTER
Central Prior Authorization Team   Phone: [phone]    PA Initiation    Medication: AJOVY 225 MG/1.5ML SC SOAJ  Insurance Company: [employer] - Phone [phone] Fax [phone]  Pharmacy Filling the Rx[id number] [place] [facility] [address]  Filling Pharmacy Phone: [phone]  Filling Pharmacy Fax:    Start Date: 9/5/2023

## 2023-09-05 NOTE — TELEPHONE ENCOUNTER
Prior Authorization Retail Medication Request    Medication/Dose: Ajovy injection 225mg subcutaneous once every 30 days  ICD code (if different than what is on RX):    Previously Tried and Failed:  topamax- having dizzy spells/not tolerating   Rationale:  Needs migraine prevention medication, not tolerating topamax.    Insurance Name:    Insurance ID:        Pharmacy Information (if different than what is on RX)  Name:    Phone:      Emiliano Littlejohn RN, BSN  [facility]

## 2023-09-05 NOTE — TELEPHONE ENCOUNTER
PA for Zia requested in separate TE, sent to PA retail team.     Emiliano Littlejohn, RN, BSN  [facility] Neurology

## 2023-09-05 NOTE — TELEPHONE ENCOUNTER
Prior Authorization Approval    Medication: AJOVY 225 MG/1.5ML SC SOAJ  Authorization Effective Date: 8/6/2023  Authorization Expiration Date: 9/4/2024  Approved Dose/Quantity: 1 per 30 days, or 3 per 90 days.  Reference #: [id number]   Insurance Company: [employer] - Phone [phone] Fax [phone]  Expected CoPay:       CoPay Card Available:      Financial Assistance Needed:   Which Pharmacy is filling the prescription[facility] [id number]  Pharmacy Notified: Yes  Patient Notified: No

## 2023-12-11 ENCOUNTER — OFFICE VISIT (OUTPATIENT)
Dept: NEUROLOGY | Facility: CLINIC | Age: 43
End: 2023-12-11
Payer: COMMERCIAL

## 2023-12-11 VITALS — HEART RATE: 80 BPM | SYSTOLIC BLOOD PRESSURE: 138 MMHG | OXYGEN SATURATION: 99 % | DIASTOLIC BLOOD PRESSURE: 84 MMHG

## 2023-12-11 DIAGNOSIS — F32.1 CURRENT MODERATE EPISODE OF MAJOR DEPRESSIVE DISORDER, UNSPECIFIED WHETHER RECURRENT (H): ICD-10-CM

## 2023-12-11 DIAGNOSIS — G44.209 TENSION HEADACHE: ICD-10-CM

## 2023-12-11 DIAGNOSIS — R42 DIZZY SPELLS: ICD-10-CM

## 2023-12-11 DIAGNOSIS — G43.001 MIGRAINE WITHOUT AURA AND WITH STATUS MIGRAINOSUS, NOT INTRACTABLE: Primary | ICD-10-CM

## 2023-12-11 DIAGNOSIS — R41.9 COGNITIVE COMPLAINTS: ICD-10-CM

## 2023-12-11 PROCEDURE — 99214 OFFICE O/P EST MOD 30 MIN: CPT | Performed by: PSYCHIATRY & NEUROLOGY

## 2023-12-11 RX ORDER — RIZATRIPTAN BENZOATE 10 MG/1
10 TABLET ORAL
Qty: 12 TABLET | Refills: 3 | Status: SHIPPED | OUTPATIENT
Start: 2023-12-11 | End: 2024-08-08

## 2023-12-11 RX ORDER — TOPIRAMATE 50 MG/1
50 TABLET, FILM COATED ORAL DAILY
Qty: 90 TABLET | Refills: 1 | Status: SHIPPED | OUTPATIENT
Start: 2023-12-11 | End: 2024-04-05

## 2023-12-11 ASSESSMENT — PATIENT HEALTH QUESTIONNAIRE - PHQ9
SUM OF ALL RESPONSES TO PHQ QUESTIONS 1-9: 14
10. IF YOU CHECKED OFF ANY PROBLEMS, HOW DIFFICULT HAVE THESE PROBLEMS MADE IT FOR YOU TO DO YOUR WORK, TAKE CARE OF THINGS AT HOME, OR GET ALONG WITH OTHER PEOPLE: VERY DIFFICULT
SUM OF ALL RESPONSES TO PHQ QUESTIONS 1-9: 14

## 2023-12-11 NOTE — LETTER
12/11/2023         RE: Ninoska Cottrell  [address]  [facility] [address]        Dear Colleague,    Thank you for referring your patient, Ninoska Cottrell, to the [facility] NEUROLOGY CLINICS [facility]. Please see a copy of my visit note below.    ESTABLISHED PATIENT NEUROLOGY NOTE    DATE OF VISIT: 12/11/2023  CLINIC LOCATION: [facility]  MRN: [id number]  PATIENT NAME: Ninoska Cottrell  YOB: 1980    REASON FOR VISIT:   Chief Complaint   Patient presents with     Follow Up     Headaches/Migraine- has had a multi day migraine     SUBJECTIVE:                                                      HISTORY OF PRESENT ILLNESS: Patient is here to follow up regarding migraines, dizziness, and cognitive complaints.  The last visit was on 7/26/2023.  Please refer to my initial/other prior notes for further information.    Since the last visit, the patient reports that she continues to struggle with headaches/migraines.  She estimates that tension headaches are at least twice a week, while migraines fluctuate from 2/week to 2/month, but could last multiple days.  She just got approved for Ajovy, but needs it filled through specialty pharmacy (Qulipta was denied by her insurance).  She is also on 50 mg of topiramate daily.  For acute therapy she uses Maxalt.  It works well.  No significant side effects or interval development of new focal neurological symptoms.  Scored high on her depression screen, indicating suicidal thoughts.  On direct questioning she stated that she does not have any suicidal ideations, but feels that at times with increased stress life feels overwhelming.  No plans.  Open to mental health referral.  EXAM:                                                    Physical Exam:   Vitals: /84 (BP Location: Left arm, Patient Position: Sitting, Cuff Size: Adult Large)   Pulse 80   SpO2 99%     General: pt is in NAD, cooperative.  Skin: normal turgor, moist mucous membranes,  no lesions/rashes noticed.  HEENT: ATNC, white sclera, normal conjunctiva.  Respiratory: Symmetric lung excursion, no accessory respiratory muscle use.  Abdomen: Non distended.  Neurological: awake, cooperative, follows commands, no exam changes compared to the previous visit  ASSESSMENT AND PLAN:                                                    Assessment: 43 year old female patient presents for follow-up of migraine, tension headaches, dizziness, and cognitive complaints.  She reports continued migraines/tension headaches.  Other symptoms are unchanged.    Regarding her migraines, she was approved, but did not start Ajovy.  I will resubmit new prescription to specialty pharmacy.  Hopefully, it will help her headaches.  Qulipta was denied by insurance.  Previously we discussed tapering her off Topamax if she notices improvement after starting CGRP antagonist.  Additional treatment options include Aimovig, Emgality, and Qulipta, as well as Nurtec.  She feels that Maxalt works well.  The prescription was refilled.    Regarding her dizziness/cognitive complaints, we will plan to taper off topiramate as the next step once we know how CGRP antagonist work to see if it helps these symptoms.    Diagnoses:    ICD-10-CM    1. Migraine without aura and with status migrainosus, not intractable  G43.001 fremanezumab-vfrm (AJOVY) SOSY subcutaneous     topiramate (TOPAMAX) 50 MG tablet     rizatriptan (MAXALT) 10 MG tablet      2. Dizzy spells  R42       3. Cognitive complaints  R41.9       4. Current moderate episode of major depressive disorder, unspecified whether recurrent (H)  F32.1 Adult Mental Health  Referral      5. Tension headache  G44.209         Plan: At today's visit we thoroughly discussed current symptoms, available treatment options, and the plan.    We decided to see how Ajovy works without making any additional medication changes.  I refilled topiramate and Maxalt.    I advised the patient to keep  "the headache diary and bring it to the next follow-up visit or upload via My Chart.     Next follow-up appointment is in the next 3-4 months or earlier if needed.    Total Time: 26 minutes spent on the date of the encounter doing chart review, history and exam, documentation and further activities per the note.    Nicholas Garcia MD  [facility] Neurology  (Chart documentation was completed in part with Dragon voice-recognition software. Even though reviewed, some grammatical, spelling, and word errors may remain.)    Ninoska Cottrell is a 43 year old female who presents for:  Chief Complaint   Patient presents with     Follow Up     Headaches/Migraine- has had a multi day migraine        Initial Vitals:  /84 (BP Location: Left arm, Patient Position: Sitting, Cuff Size: Adult Large)   Pulse 80   SpO2 99%  Estimated body mass index is 42.48 kg/m  as calculated from the following:    Height as of 7/26/23: 1.679 m (5' 6.1\").    Weight as of 7/26/23: 119.7 kg (264 lb).. There is no height or weight on file to calculate BSA. BP completed using cuff size: large    Answers submitted by the patient for this visit:  Patient Health Questionnaire (Submitted on 12/11/2023)  If you checked off any problems, how difficult have these problems made it for you to do your work, take care of things at home, or get along with other people?: Very difficult  PHQ9 TOTAL SCORE: 14      Depression Response    Patient completed the PHQ-9 assessment for depression and scored >9? No  Question 9 on the PHQ-9 was positive for suicidality? Yes  Does patient have current mental health provider? No    Is this a virtual visit? No    I personally notified the following: visit provider            Lo Portillo       Again, thank you for allowing me to participate in the care of your patient.        Sincerely,        Nicholas Garcia MD  "

## 2023-12-11 NOTE — PROGRESS NOTES
ESTABLISHED PATIENT NEUROLOGY NOTE    DATE OF VISIT: 12/11/2023  CLINIC LOCATION: [facility]  MRN: [id number]  PATIENT NAME: Ninoska Cottrell  YOB: 1980    REASON FOR VISIT:   Chief Complaint   Patient presents with    Follow Up     Headaches/Migraine- has had a multi day migraine     SUBJECTIVE:                                                      HISTORY OF PRESENT ILLNESS: Patient is here to follow up regarding migraines, dizziness, and cognitive complaints.  The last visit was on 7/26/2023.  Please refer to my initial/other prior notes for further information.    Since the last visit, the patient reports that she continues to struggle with headaches/migraines.  She estimates that tension headaches are at least twice a week, while migraines fluctuate from 2/week to 2/month, but could last multiple days.  She just got approved for Ajovy, but needs it filled through specialty pharmacy (Qulipta was denied by her insurance).  She is also on 50 mg of topiramate daily.  For acute therapy she uses Maxalt.  It works well.  No significant side effects or interval development of new focal neurological symptoms.  Scored high on her depression screen, indicating suicidal thoughts.  On direct questioning she stated that she does not have any suicidal ideations, but feels that at times with increased stress life feels overwhelming.  No plans.  Open to mental health referral.  EXAM:                                                    Physical Exam:   Vitals: /84 (BP Location: Left arm, Patient Position: Sitting, Cuff Size: Adult Large)   Pulse 80   SpO2 99%     General: pt is in NAD, cooperative.  Skin: normal turgor, moist mucous membranes, no lesions/rashes noticed.  HEENT: ATNC, white sclera, normal conjunctiva.  Respiratory: Symmetric lung excursion, no accessory respiratory muscle use.  Abdomen: Non distended.  Neurological: awake, cooperative, follows commands, no exam changes compared  to the previous visit  ASSESSMENT AND PLAN:                                                    Assessment: 43 year old female patient presents for follow-up of migraine, tension headaches, dizziness, and cognitive complaints.  She reports continued migraines/tension headaches.  Other symptoms are unchanged.    Regarding her migraines, she was approved, but did not start Ajovy.  I will resubmit new prescription to specialty pharmacy.  Hopefully, it will help her headaches.  Qulipta was denied by insurance.  Previously we discussed tapering her off Topamax if she notices improvement after starting CGRP antagonist.  Additional treatment options include Aimovig, Emgality, and Qulipta, as well as Nurtec.  She feels that Maxalt works well.  The prescription was refilled.    Regarding her dizziness/cognitive complaints, we will plan to taper off topiramate as the next step once we know how CGRP antagonist work to see if it helps these symptoms.    Diagnoses:    ICD-10-CM    1. Migraine without aura and with status migrainosus, not intractable  G43.001 fremanezumab-vfrm (AJOVY) SOSY subcutaneous     topiramate (TOPAMAX) 50 MG tablet     rizatriptan (MAXALT) 10 MG tablet      2. Dizzy spells  R42       3. Cognitive complaints  R41.9       4. Current moderate episode of major depressive disorder, unspecified whether recurrent (H)  F32.1 Adult Mental Health  Referral      5. Tension headache  G44.209         Plan: At today's visit we thoroughly discussed current symptoms, available treatment options, and the plan.    We decided to see how Ajovy works without making any additional medication changes.  I refilled topiramate and Maxalt.    I advised the patient to keep the headache diary and bring it to the next follow-up visit or upload via My Chart.     Next follow-up appointment is in the next 3-4 months or earlier if needed.    Total Time: 26 minutes spent on the date of the encounter doing chart review, history and  exam, documentation and further activities per the note.    Nicholas Garcia MD  [facility] Neurology  (Chart documentation was completed in part with Dragon voice-recognition software. Even though reviewed, some grammatical, spelling, and word errors may remain.)

## 2023-12-11 NOTE — PROGRESS NOTES
"Ninoska Cottrell is a 43 year old female who presents for:  Chief Complaint   Patient presents with    Follow Up     Headaches/Migraine- has had a multi day migraine        Initial Vitals:  /84 (BP Location: Left arm, Patient Position: Sitting, Cuff Size: Adult Large)   Pulse 80   SpO2 99%  Estimated body mass index is 42.48 kg/m  as calculated from the following:    Height as of 7/26/23: 1.679 m (5' 6.1\").    Weight as of 7/26/23: 119.7 kg (264 lb).. There is no height or weight on file to calculate BSA. BP completed using cuff size: large    Answers submitted by the patient for this visit:  Patient Health Questionnaire (Submitted on 12/11/2023)  If you checked off any problems, how difficult have these problems made it for you to do your work, take care of things at home, or get along with other people?: Very difficult  PHQ9 TOTAL SCORE: 14      Depression Response    Patient completed the PHQ-9 assessment for depression and scored >9? No  Question 9 on the PHQ-9 was positive for suicidality? Yes  Does patient have current mental health provider? No    Is this a virtual visit? No    I personally notified the following: visit provider            Lo Portillo     "

## 2023-12-11 NOTE — PATIENT INSTRUCTIONS
AFTER VISIT SUMMARY (AVS):    At today's visit we thoroughly discussed current symptoms, available treatment options, and the plan.    We decided to see how Ajovy works without making any additional medication changes.  I refilled your topiramate and Maxalt.    Please keep the headache diary and bring it to the next follow-up visit or upload via My Chart.     Next follow-up appointment is in the next 3-4 months or earlier if needed.    Please do not hesitate to call me with any questions or concerns.    Thanks.

## 2023-12-26 ENCOUNTER — OFFICE VISIT (OUTPATIENT)
Dept: URGENT CARE | Facility: URGENT CARE | Age: 43
End: 2023-12-26
Payer: COMMERCIAL

## 2023-12-26 VITALS
TEMPERATURE: 97.6 F | OXYGEN SATURATION: 98 % | HEART RATE: 63 BPM | SYSTOLIC BLOOD PRESSURE: 140 MMHG | DIASTOLIC BLOOD PRESSURE: 83 MMHG

## 2023-12-26 DIAGNOSIS — R07.0 THROAT PAIN: Primary | ICD-10-CM

## 2023-12-26 LAB
DEPRECATED S PYO AG THROAT QL EIA: NEGATIVE
GROUP A STREP BY PCR: NOT DETECTED

## 2023-12-26 PROCEDURE — 99213 OFFICE O/P EST LOW 20 MIN: CPT | Performed by: FAMILY MEDICINE

## 2023-12-26 PROCEDURE — 87651 STREP A DNA AMP PROBE: CPT | Performed by: FAMILY MEDICINE

## 2023-12-26 NOTE — PROGRESS NOTES
"SUBJECTIVE: Ninoska Cottrell is a 43 year old female presenting with a chief complaint of \"cold symptoms\" and sore throat.  Onset of symptoms was day(s) ago.  Predisposing factors include strep exposure.    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     History of pre-eclampsia     MDD (major depressive disorder)     Migraine without aura     Morbid obesity (H)      Allergies   Allergen Reactions    Amoxicillin Unknown    Latex Rash    Morphine And Related Hives     Social History     Tobacco Use    Smoking status: Former     Packs/day: 0.50     Years: 3.00     Additional pack years: 0.00     Total pack years: 1.50     Types: Cigarettes     Quit date: 2000     Years since quittin.0    Smokeless tobacco: Never   Substance Use Topics    Alcohol use: Yes     Comment: couple drinks/month       ROS:  SKIN: no rash  GI: no vomiting    OBJECTIVE:  BP (!) 140/83   Pulse 63   Temp 97.6  F (36.4  C) (Tympanic)   SpO2 98% GENERAL APPEARANCE: healthy, alert and no distress  EYES: EOMI,  PERRL, conjunctiva clear  HENT: ear canals and TM's normal.  Nose and mouth without ulcers, erythema or lesions  RESP: lungs clear to auscultation - no rales, rhonchi or wheezes  SKIN: no suspicious lesions or rashes      ICD-10-CM    1. Throat pain  R07.0 Streptococcus A Rapid Screen w/Reflex to PCR - Clinic Collect     Group A Streptococcus PCR Throat Swab          Fluids/Rest, f/u if worse/not any better    "

## 2024-01-31 ENCOUNTER — PATIENT OUTREACH (OUTPATIENT)
Dept: CARE COORDINATION | Facility: CLINIC | Age: 44
End: 2024-01-31
Payer: COMMERCIAL

## 2024-02-14 ENCOUNTER — PATIENT OUTREACH (OUTPATIENT)
Dept: CARE COORDINATION | Facility: CLINIC | Age: 44
End: 2024-02-14
Payer: COMMERCIAL

## 2024-04-04 ENCOUNTER — TELEPHONE (OUTPATIENT)
Dept: NEUROLOGY | Facility: CLINIC | Age: 44
End: 2024-04-04
Payer: COMMERCIAL

## 2024-04-04 NOTE — TELEPHONE ENCOUNTER
A message was left asking patient if available to reschedule appointment from Monday April 8 to Friday April 5 at 8:00 a.      Patient advised to call [phone] if able to move appointment.

## 2024-04-04 NOTE — PROGRESS NOTES
ESTABLISHED PATIENT NEUROLOGY NOTE    DATE OF VISIT: 4/5/2024  CLINIC LOCATION: [facility]  MRN: [id number]  PATIENT NAME: Ninoska Cottrell  YOB: 1980    REASON FOR VISIT: No chief complaint on file.    SUBJECTIVE:                                                      HISTORY OF PRESENT ILLNESS: Patient is here to follow up regarding migraine, tension headaches, dizziness, and cognitive complaints.  She was last seen on 12/11/2023.  Please refer to my initial/other prior notes for further information.    Since the last visit, the patient reports ***.  She is on Ajovy subcutaneously along with 50 mg of topiramate daily.  For acute therapy she uses Maxalt, which works well.  She denies any significant side effects interval development of new focal neurological symptoms.  EXAM:                                                    Physical Exam:   Vitals: There were no vitals taken for this visit.    General: pt is in NAD, cooperative.  Skin: normal turgor, moist mucous membranes, no lesions/rashes noticed.  HEENT: ATNC, white sclera, normal conjunctiva.  Respiratory: Symmetric lung excursion, no accessory respiratory muscle use.  Abdomen: Non distended.  Neurological: awake, cooperative, follows commands, no aphasia or dysarthria noted, cranial nerves II-XII: no ptosis, face is symmetric, equally moves all extremities, no dysmetria bilaterally.  ASSESSMENT AND PLAN:                                                    Assessment: 44 year old female patient presents for follow-up of migraine, tension headache, dizziness, and cognitive complaints.  She reports *** after starting Ajovy.  Previously we discussed tapering her off topiramate to see if dizziness/cognitive complaints improve.  Today ***.    Diagnoses:    ICD-10-CM    1. Migraine without aura and with status migrainosus, not intractable  G43.001       2. Dizzy spells  R42       3. Cognitive complaints  R41.9       4. Tension headache   G44.209         Plan:  There are no Patient Instructions on file for this visit.    Total Time: *** minutes spent on the date of the encounter doing chart review, history and exam, documentation and further activities per the note.    Nicholas Garcia MD  [facility] Neurology  (Chart documentation was completed in part with Dragon voice-recognition software. Even though reviewed, some grammatical, spelling, and word errors may remain.)

## 2024-04-05 ENCOUNTER — OFFICE VISIT (OUTPATIENT)
Dept: NEUROLOGY | Facility: CLINIC | Age: 44
End: 2024-04-05
Payer: COMMERCIAL

## 2024-04-05 VITALS
DIASTOLIC BLOOD PRESSURE: 81 MMHG | SYSTOLIC BLOOD PRESSURE: 125 MMHG | BODY MASS INDEX: 44.52 KG/M2 | HEART RATE: 92 BPM | HEIGHT: 66 IN | WEIGHT: 277 LBS | OXYGEN SATURATION: 98 %

## 2024-04-05 DIAGNOSIS — R42 DIZZY SPELLS: ICD-10-CM

## 2024-04-05 DIAGNOSIS — G44.209 TENSION HEADACHE: ICD-10-CM

## 2024-04-05 DIAGNOSIS — R41.9 COGNITIVE COMPLAINTS: ICD-10-CM

## 2024-04-05 DIAGNOSIS — G43.001 MIGRAINE WITHOUT AURA AND WITH STATUS MIGRAINOSUS, NOT INTRACTABLE: Primary | ICD-10-CM

## 2024-04-05 PROCEDURE — G2211 COMPLEX E/M VISIT ADD ON: HCPCS | Performed by: PSYCHIATRY & NEUROLOGY

## 2024-04-05 PROCEDURE — 99214 OFFICE O/P EST MOD 30 MIN: CPT | Performed by: PSYCHIATRY & NEUROLOGY

## 2024-04-05 RX ORDER — TOPIRAMATE 50 MG/1
50 TABLET, FILM COATED ORAL DAILY
Qty: 90 TABLET | Refills: 1 | Status: SHIPPED | OUTPATIENT
Start: 2024-06-01 | End: 2024-08-08

## 2024-04-05 ASSESSMENT — PAIN SCALES - GENERAL: PAINLEVEL: SEVERE PAIN (6)

## 2024-04-05 NOTE — LETTER
"    4/5/2024         RE: Ninoska Cottrell  [address]  [place] [facility] [place] [address]        Dear Colleague,    Thank you for referring your patient, Ninoska Cottrell, to the [facility] NEUROLOGY [facility]. Please see a copy of my visit note below.    ESTABLISHED PATIENT NEUROLOGY NOTE    DATE OF VISIT: 4/5/2024  CLINIC LOCATION: [facility]  MRN: [id number]  PATIENT NAME: Ninoska Cottrell  YOB: 1980    REASON FOR VISIT:   Chief Complaint   Patient presents with     Headache     Headache and migraine follow up        SUBJECTIVE:                                                      HISTORY OF PRESENT ILLNESS: Patient is here to follow up regarding migraine, tension headaches, dizziness, and cognitive complaints.  She was last seen on 12/11/2023.  Please refer to my initial/other prior notes for further information.    Since the last visit, the patient reports that her headaches are unchanged.  She reports 3-4 migraines per month and several tension headaches.  She is on Ajovy subcutaneously along with 50 mg of topiramate daily.  She did 2 injections in December and January.  Both of them led to significant skin reactions that lasted weeks.  Then, she took a break in February and March and just had an injection yesterday, this time in her arm.  This did not cause skin reaction, but she still has a lump at the injection site.  Also felt lightheaded after each injection.  For acute therapy, she uses Maxalt, which works well.  She denies any significant side effects interval development of new focal neurological symptoms.  EXAM:                                                    Physical Exam:   Vitals: /81   Pulse 92   Ht 1.676 m (5' 6\")   Wt 125.6 kg (277 lb)   SpO2 98%   BMI 44.71 kg/m      General: pt is in NAD, cooperative.  Skin: normal turgor, moist mucous membranes, no lesions/rashes noticed.  HEENT: ATNC, white sclera, normal conjunctiva.  Respiratory: Symmetric lung " excursion, no accessory respiratory muscle use.  Abdomen: Non distended.  Neurological: awake, cooperative, follows commands, no aphasia or dysarthria noted, cranial nerves II-XII: no ptosis, face is symmetric, equally moves all extremities, no dysmetria bilaterally.  ASSESSMENT AND PLAN:                                                    Assessment: 44 year old female patient presents for follow-up of migraine, tension headache, dizziness, and cognitive complaints.  She reports no improvement of migraines after starting Ajovy (completed 3 injections), but experienced prolonged skin reactions.  In my opinion, this medication should be stopped.  Additional treatment options include Aimovig, Emgality, Qulipta, and Nurtec, preventative regimen.  We decided to try Emgality omitting the loading dose given her tendency to skin reactions.  For acute therapy, Maxalt seems to work well.  No changes needed.    Previously we discussed tapering her off topiramate to see if dizziness/cognitive complaints improve.  Today, we discussed it again, but decided to continue the same dose of topiramate until we find alternative preventive therapy that works and tolerated.    Ninoska to follow up with Primary Care provider regarding elevated blood pressure.     Diagnoses:    ICD-10-CM    1. Migraine without aura and with status migrainosus, not intractable  G43.001       2. Dizzy spells  R42       3. Cognitive complaints  R41.9       4. Tension headache  G44.209         Plan: At today's visit we thoroughly discussed current symptoms, available treatment options, and the plan.    We decided to stop Ajovy and try Emgality.  We will omit loading dose.  I advised the patient to do next injection approximately 30 days after her last Ajovy injection.  I also sent an updated prescription for topiramate (no change in the dose).  She will reach out if she needs Maxalt refill in the future.    I advised the patient to keep the headache diary and  bring it to the next follow-up visit or upload via My Chart.     Next follow-up appointment is in the next 4 months or earlier if needed.    Total Time: 31 minutes spent on the date of the encounter doing chart review, history and exam, documentation and further activities per the note.    Nicholas Garcia MD  [facility] Neurology  (Chart documentation was completed in part with Dragon voice-recognition software. Even though reviewed, some grammatical, spelling, and word errors may remain.)      Again, thank you for allowing me to participate in the care of your patient.        Sincerely,        Nicholas Garcia MD

## 2024-04-05 NOTE — PROGRESS NOTES
"ESTABLISHED PATIENT NEUROLOGY NOTE    DATE OF VISIT: 4/5/2024  CLINIC LOCATION: [facility]  MRN: [id number]  PATIENT NAME: Ninoska Cottrell  YOB: 1980    REASON FOR VISIT:   Chief Complaint   Patient presents with    Headache     Headache and migraine follow up        SUBJECTIVE:                                                      HISTORY OF PRESENT ILLNESS: Patient is here to follow up regarding migraine, tension headaches, dizziness, and cognitive complaints.  She was last seen on 12/11/2023.  Please refer to my initial/other prior notes for further information.    Since the last visit, the patient reports that her headaches are unchanged.  She reports 3-4 migraines per month and several tension headaches.  She is on Ajovy subcutaneously along with 50 mg of topiramate daily.  She did 2 injections in December and January.  Both of them led to significant skin reactions that lasted weeks.  Then, she took a break in February and March and just had an injection yesterday, this time in her arm.  This did not cause skin reaction, but she still has a lump at the injection site.  Also felt lightheaded after each injection.  For acute therapy, she uses Maxalt, which works well.  She denies any significant side effects interval development of new focal neurological symptoms.  EXAM:                                                    Physical Exam:   Vitals: /81   Pulse 92   Ht 1.676 m (5' 6\")   Wt 125.6 kg (277 lb)   SpO2 98%   BMI 44.71 kg/m      General: pt is in NAD, cooperative.  Skin: normal turgor, moist mucous membranes, no lesions/rashes noticed.  HEENT: ATNC, white sclera, normal conjunctiva.  Respiratory: Symmetric lung excursion, no accessory respiratory muscle use.  Abdomen: Non distended.  Neurological: awake, cooperative, follows commands, no aphasia or dysarthria noted, cranial nerves II-XII: no ptosis, face is symmetric, equally moves all extremities, no dysmetria " bilaterally.  ASSESSMENT AND PLAN:                                                    Assessment: 44 year old female patient presents for follow-up of migraine, tension headache, dizziness, and cognitive complaints.  She reports no improvement of migraines after starting Ajovy (completed 3 injections), but experienced prolonged skin reactions.  In my opinion, this medication should be stopped.  Additional treatment options include Aimovig, Emgality, Qulipta, and Nurtec, preventative regimen.  We decided to try Emgality omitting the loading dose given her tendency to skin reactions.  For acute therapy, Maxalt seems to work well.  No changes needed.    Previously we discussed tapering her off topiramate to see if dizziness/cognitive complaints improve.  Today, we discussed it again, but decided to continue the same dose of topiramate until we find alternative preventive therapy that works and tolerated.    Nionska to follow up with Primary Care provider regarding elevated blood pressure.     Diagnoses:    ICD-10-CM    1. Migraine without aura and with status migrainosus, not intractable  G43.001       2. Dizzy spells  R42       3. Cognitive complaints  R41.9       4. Tension headache  G44.209         Plan: At today's visit we thoroughly discussed current symptoms, available treatment options, and the plan.    We decided to stop Ajovy and try Emgality.  We will omit loading dose.  I advised the patient to do next injection approximately 30 days after her last Ajovy injection.  I also sent an updated prescription for topiramate (no change in the dose).  She will reach out if she needs Maxalt refill in the future.    I advised the patient to keep the headache diary and bring it to the next follow-up visit or upload via My Chart.     Next follow-up appointment is in the next 4 months or earlier if needed.    Total Time: 31 minutes spent on the date of the encounter doing chart review, history and exam, documentation and  further activities per the note.    Nicholas Garcia MD  [facility] Neurology  (Chart documentation was completed in part with Dragon voice-recognition software. Even though reviewed, some grammatical, spelling, and word errors may remain.)

## 2024-04-05 NOTE — PATIENT INSTRUCTIONS
AFTER VISIT SUMMARY (AVS):    At today's visit we thoroughly discussed current symptoms, available treatment options, and the plan.    We decided to stop Ajovy and try Emgality.  We will omit loading dose.  Please do next injection approximately 30 days after your last Ajovy injection.  I also sent an updated prescription for topiramate (no change in the dose).  Please reach out if you need Maxalt refill in the future.    Please keep the headache diary and bring it to the next follow-up visit or upload via My Chart.     Next follow-up appointment is in the next 4 months or earlier if needed.    Please do not hesitate to call me with any questions or concerns.    Thanks.

## 2024-04-10 ENCOUNTER — TELEPHONE (OUTPATIENT)
Dept: NEUROLOGY | Facility: CLINIC | Age: 44
End: 2024-04-10
Payer: COMMERCIAL

## 2024-04-10 NOTE — TELEPHONE ENCOUNTER
[facility] Prior Authorization Team   Phone: [phone]  Fax: [phone]    PA Initiation    Medication: EMGALITY 120 MG/ML SC SOAJ  Insurance Company: [employer] - Phone [phone] Fax [phone]  Pharmacy Filling the Rx: [facility] - [place], [place] - [address]  Filling Pharmacy Phone: [phone]  Filling Pharmacy Fax: [phone]  Start Date: 4/10/2024

## 2024-04-12 NOTE — TELEPHONE ENCOUNTER
[facility] Prior Authorization Team   Phone: [phone]  Fax: [phone]    Prior Authorization Approval    Medication: EMGALITY 120 MG/ML SC SOAJ  Authorization Effective Date:  03/11/2024  Authorization Expiration Date:  04/11/2025  Approved Dose/Quantity: 120 mg once monthly  Reference #: [id number]   Insurance Company: [employer] - Phone [phone] Fax [phone]  Expected CoPay: $  33.00  CoPay Card Available: Yes    Financial Assistance Needed: No  Which Pharmacy is filling the prescription: [facility] - [place], [place] - [address]  Pharmacy Notified: Yes  Patient Notified: Pharmacy will notify

## 2024-04-20 ENCOUNTER — HEALTH MAINTENANCE LETTER (OUTPATIENT)
Age: 44
End: 2024-04-20

## 2024-04-24 ENCOUNTER — OFFICE VISIT (OUTPATIENT)
Dept: INTERNAL MEDICINE | Facility: CLINIC | Age: 44
End: 2024-04-24
Payer: COMMERCIAL

## 2024-04-24 VITALS
DIASTOLIC BLOOD PRESSURE: 84 MMHG | HEART RATE: 81 BPM | RESPIRATION RATE: 18 BRPM | HEIGHT: 66 IN | OXYGEN SATURATION: 98 % | BODY MASS INDEX: 44.87 KG/M2 | WEIGHT: 279.2 LBS | SYSTOLIC BLOOD PRESSURE: 128 MMHG

## 2024-04-24 DIAGNOSIS — Z13.1 SCREENING FOR DIABETES MELLITUS: ICD-10-CM

## 2024-04-24 DIAGNOSIS — Z23 HIGH PRIORITY FOR 2019-NCOV VACCINE: ICD-10-CM

## 2024-04-24 DIAGNOSIS — Z12.31 ENCOUNTER FOR SCREENING MAMMOGRAM FOR BREAST CANCER: ICD-10-CM

## 2024-04-24 DIAGNOSIS — J45.40 MODERATE PERSISTENT ASTHMA WITHOUT COMPLICATION: ICD-10-CM

## 2024-04-24 DIAGNOSIS — M25.661 JOINT STIFFNESS OF RIGHT LOWER LEG: ICD-10-CM

## 2024-04-24 DIAGNOSIS — E66.01 MORBID OBESITY (H): ICD-10-CM

## 2024-04-24 DIAGNOSIS — Z00.00 ROUTINE HISTORY AND PHYSICAL EXAMINATION OF ADULT: Primary | ICD-10-CM

## 2024-04-24 DIAGNOSIS — Z13.220 SCREENING FOR CHOLESTEROL LEVEL: ICD-10-CM

## 2024-04-24 DIAGNOSIS — F33.42 RECURRENT MAJOR DEPRESSIVE DISORDER, IN FULL REMISSION (H): ICD-10-CM

## 2024-04-24 DIAGNOSIS — F41.1 GAD (GENERALIZED ANXIETY DISORDER): ICD-10-CM

## 2024-04-24 PROCEDURE — 91320 SARSCV2 VAC 30MCG TRS-SUC IM: CPT | Performed by: INTERNAL MEDICINE

## 2024-04-24 PROCEDURE — 90480 ADMN SARSCOV2 VAC 1/ONLY CMP: CPT | Performed by: INTERNAL MEDICINE

## 2024-04-24 PROCEDURE — 99396 PREV VISIT EST AGE 40-64: CPT | Mod: 25 | Performed by: INTERNAL MEDICINE

## 2024-04-24 RX ORDER — FLUTICASONE PROPIONATE 220 UG/1
2 AEROSOL, METERED RESPIRATORY (INHALATION) 2 TIMES DAILY
Qty: 12 G | Refills: 3 | Status: SHIPPED | OUTPATIENT
Start: 2024-04-24 | End: 2024-09-11

## 2024-04-24 RX ORDER — ALBUTEROL SULFATE 90 UG/1
2 AEROSOL, METERED RESPIRATORY (INHALATION) EVERY 6 HOURS PRN
Qty: 18 G | Refills: 0 | Status: SHIPPED | OUTPATIENT
Start: 2024-04-24

## 2024-04-24 SDOH — HEALTH STABILITY: PHYSICAL HEALTH: ON AVERAGE, HOW MANY DAYS PER WEEK DO YOU ENGAGE IN MODERATE TO STRENUOUS EXERCISE (LIKE A BRISK WALK)?: 3 DAYS

## 2024-04-24 ASSESSMENT — ASTHMA QUESTIONNAIRES
QUESTION_3 LAST FOUR WEEKS HOW OFTEN DID YOUR ASTHMA SYMPTOMS (WHEEZING, COUGHING, SHORTNESS OF BREATH, CHEST TIGHTNESS OR PAIN) WAKE YOU UP AT NIGHT OR EARLIER THAN USUAL IN THE MORNING: NOT AT ALL
QUESTION_1 LAST FOUR WEEKS HOW MUCH OF THE TIME DID YOUR ASTHMA KEEP YOU FROM GETTING AS MUCH DONE AT WORK, SCHOOL OR AT HOME: NONE OF THE TIME
QUESTION_2 LAST FOUR WEEKS HOW OFTEN HAVE YOU HAD SHORTNESS OF BREATH: THREE TO SIX TIMES A WEEK
QUESTION_5 LAST FOUR WEEKS HOW WOULD YOU RATE YOUR ASTHMA CONTROL: SOMEWHAT CONTROLLED
ACT_TOTALSCORE: 19
QUESTION_4 LAST FOUR WEEKS HOW OFTEN HAVE YOU USED YOUR RESCUE INHALER OR NEBULIZER MEDICATION (SUCH AS ALBUTEROL): TWO OR THREE TIMES PER WEEK
ACT_TOTALSCORE: 19

## 2024-04-24 ASSESSMENT — PATIENT HEALTH QUESTIONNAIRE - PHQ9
10. IF YOU CHECKED OFF ANY PROBLEMS, HOW DIFFICULT HAVE THESE PROBLEMS MADE IT FOR YOU TO DO YOUR WORK, TAKE CARE OF THINGS AT HOME, OR GET ALONG WITH OTHER PEOPLE: VERY DIFFICULT
SUM OF ALL RESPONSES TO PHQ QUESTIONS 1-9: 13
SUM OF ALL RESPONSES TO PHQ QUESTIONS 1-9: 13

## 2024-04-24 ASSESSMENT — SOCIAL DETERMINANTS OF HEALTH (SDOH): HOW OFTEN DO YOU GET TOGETHER WITH FRIENDS OR RELATIVES?: ONCE A WEEK

## 2024-04-24 NOTE — PROGRESS NOTES
ASSESSMENT/PLAN                                                       (Z00.00) Routine history and physical examination of adult  (primary encounter diagnosis)  Comment: PMH, PSH, FH, SH, medications, allergies, immunizations, and preventative health measures reviewed and updated as appropriate.  Plan: see below for plans.      (Z13.220) Screening for cholesterol level  (Z13.1) Screening for diabetes mellitus  Plan: fasting labs ordered - patient to schedule.     (Z12.31) Encounter for screening mammogram for breast cancer  Plan: screening mammogram ordered - patient to schedule.     (F41.1) AMY (generalized anxiety disorder)  (F33.42) Recurrent major depressive disorder, in full remission (H24)  Comment: well-controlled without medication.    (Z23) High priority for 2019-nCoV vaccine  Comment: COVID-19 booster given today.    (J45.40) Moderate persistent asthma without complication  Comment: well-controlled on current regimen.    Plan: continue present management; refills provided.     (M25.661) Joint stiffness of right lower leg  Plan: Physical Therapy  Referral placed - patient will be contacted to schedule.      (E66.01) Morbid obesity (H)  Comment: known issue that I take into account for their medical decisions, no current exacerbations or new concerns.    Jaci Sharma MD   [address] [address]  T: [phone], F: [phone]    SUBJECTIVE                                                      Ninoska Cottrell is a very pleasant 44 year old female who presents for a physical.    ROS:  Constitutional: no unintentional weight loss or gain reported; no fevers, chills, or sweats reported  Cardiovascular: no chest pain, palpitations, or edema reported  Respiratory: see PMH below  Gastrointestinal: no nausea, vomiting, constipation, diarrhea, or abdominal pain reported  Genitourinary: no urinary frequency, urgency, dysuria, or hematuria reported  Integumentary:  no rash or pruritus reported  Musculoskeletal: ongoing right leg stiffness and? 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     History of pre-eclampsia 2012    MDD (major depressive disorder)     Migraine without aura     Moderate persistent asthma     Morbid obesity (H)      Past Surgical History:   Procedure Laterality Date    APPENDECTOMY      OPEN REDUCTION INTERNAL FIXATION ANKLE Right     hardware still in place     Family History   Problem Relation Age of Onset    Breast Cancer Mother     Hemophilia Father     Chronic Obstructive Pulmonary Disease Father         smoker    Hypertension Maternal Grandmother     Pancreatic Cancer Maternal Grandmother     Cerebrovascular Disease Maternal Grandfather     Alzheimer Disease Paternal Grandmother     Hemophilia Paternal Grandmother     Myocardial Infarction Paternal Grandfather     Breast Cancer Maternal Aunt     Bone Cancer Maternal Aunt     Diabetes No family hx of     Coronary Artery Disease Early Onset No family hx of     Colon Cancer No family hx of     Ovarian Cancer No family hx of      Social History     Occupational History    Occupation: Stay-at-home mom   Tobacco Use    Smoking status: Former     Types: Cigarettes    Smokeless tobacco: Never    Tobacco comments:     Quit in 2000; smoked for 3 years; 0.5ppd at most.    Vaping Use    Vaping status: Never Used   Substance and Sexual Activity    Alcohol use: Yes     Comment: couple drinks/month    Drug use: No    Sexual activity: Yes     Partners: Male     Birth control/protection: I.U.D.     Comment: Mirena placed in 2022   Social History Narrative    .    3 kids (11, 10, and 6 as of 2024).    No formal exercise.       Allergies   Allergen Reactions    Amoxicillin Unknown    Latex Rash    Morphine And Related Hives     Current Outpatient Medications   Medication Sig    albuterol (PROAIR HFA/PROVENTIL HFA/VENTOLIN HFA) 108 (90 Base) MCG/ACT inhaler Inhale 2 puffs into the lungs every 6 hours as needed for shortness of breath, wheezing or cough    azelastine (ASTELIN) 0.1 % nasal spray Spray 1 spray into both nostrils as needed for rhinitis    fexofenadine (ALLEGRA) 180 MG tablet Take 180 mg by mouth daily    fluticasone (FLOVENT HFA) 220 MCG/ACT inhaler Inhale 2 puffs into the lungs 2 times daily    galcanezumab-gnlm (EMGALITY) 120 MG/ML injection Inject 1 mL (120 mg) Subcutaneous every 30 days    levonorgestrel (MIRENA) 20 MCG/DAY IUD 1 each (20 mcg) by Intrauterine route once    multivitamin w/minerals (THERA-VIT-M) tablet Take 1 tablet by mouth daily    rizatriptan (MAXALT) 10 MG tablet Take 1 tablet (10 mg) by mouth at onset of headache for migraine (Please limit use to less then 10 days per month)    [START ON 6/1/2024] topiramate (TOPAMAX) 50 MG tablet Take 1 tablet (50 mg) by mouth daily     Immunization History   Administered Date(s) Administered    COVID-19 12+ (2023-24) (Pfizer) 04/24/2024    COVID-19 Bivalent 12+ (Pfizer) 03/01/2023    COVID-19 MONOVALENT 12+ (Pfizer) 04/24/2021, 05/15/2021, 12/15/2021    Influenza Vaccine >6 months,quad, PF 02/24/2017    MMR 09/03/2017    TDAP Vaccine (Adacel) 05/29/2013, 07/18/2017    TDAP Vaccine (Boostrix) 02/01/2010     PREVENTATIVE HEALTH                                                      BMI: morbidly obese  Blood pressure: within normal limits   Breast CA screening: DUE  Cervical CA screening: up to date   Colon CA screening: not medically indicated at this time   Lung CA screening: patient does not meet screening criteria  Dexa: not medically indicated at this time   Screening cholesterol: DUE  Screening diabetes: DUE  STD testing: no risk factors present  Alcohol misuse  "screening: alcohol use reviewed - no intervention indicated at this time  Immunizations: reviewed;  COVID-19 booster DUE    OBJECTIVE                                                      /84   Pulse 81   Resp 18   Ht 1.67 m (5' 5.75\")   Wt 126.6 kg (279 lb 3.2 oz)   SpO2 98%   BMI 45.41 kg/m    Constitutional: well-appearing  Head, Ears, and Eyes: normocephalic; normal external auditory canal and pinna; tympanic membranes visualized and normal; normal lids and conjunctivae  Neck: supple, symmetric, no thyromegaly or lymphadenopathy  Respiratory: normal respiratory effort; clear to auscultation bilaterally  Cardiovascular: regular rate and rhythm; no edema  Gastrointestinal: soft, non-tender, and non-distended; no organomegaly or masses  Musculoskeletal: walks with a cane  Psych: normal judgment and insight; normal mood and affect; recent and remote memory intact    ---  (Note was completed, in part, with [employer] voice-recognition software. Documentation was reviewed, but some grammatical, spelling, and word errors may remain.)    "

## 2024-04-30 ENCOUNTER — THERAPY VISIT (OUTPATIENT)
Dept: PHYSICAL THERAPY | Facility: CLINIC | Age: 44
End: 2024-04-30
Attending: INTERNAL MEDICINE
Payer: COMMERCIAL

## 2024-04-30 DIAGNOSIS — M25.661 JOINT STIFFNESS OF RIGHT LOWER LEG: Primary | ICD-10-CM

## 2024-04-30 DIAGNOSIS — R29.898 RIGHT LEG WEAKNESS: ICD-10-CM

## 2024-04-30 PROCEDURE — 97161 PT EVAL LOW COMPLEX 20 MIN: CPT | Mod: GP | Performed by: PHYSICAL THERAPIST

## 2024-04-30 PROCEDURE — 97110 THERAPEUTIC EXERCISES: CPT | Mod: GP | Performed by: PHYSICAL THERAPIST

## 2024-04-30 NOTE — PROGRESS NOTES
PHYSICAL THERAPY EVALUATION  Type of Visit: Evaluation  See electronic medical record for Abuse and Falls Screening details.    Subjective       Presenting condition or subjective complaint: End of March, was going up stairs and heard/felt her knee pop and she fell. Difficulty going up and down stairs after that; pain has improved but still feels unsteady. Sometimes will trip over her foot while walking (started using a cane to help steady her gait). PMH May 2022 had a stroke-like event which significantly impacted her gait and balance.  Date of onset: 04/24/24    Relevant medical history: Asthma; Concussions; Depression; Dizziness; Fibromyalgia; High blood pressure; History of fractures; Mental Illness; Migraines or headaches; Neck injury; Overweight; Pregnant or breastfeeding; Significant weakness   Dates & types of surgery: R ankle ORIF; Appendectomy    Prior diagnostic imaging/testing results:       Prior therapy history for the same diagnosis, illness or injury:        Living Environment  Social support:     Type of home:     Stairs to enter the home:         Ramp:     Stairs inside the home:         Help at home:    Equipment owned: Straight Cane; Walker; Walker with wheels; Crutches     Employment:   Homemaker  Hobbies/Interests: Reading, walking, candle making, gardening, writing, video games    Patient goals for therapy:   Improve walking, steadiness    Pain assessment:  Intermittent pain in the knee, foot/ankle     Objective   Standing Alignment:        Lumbar:  Anterior pelvic tilt      Knee:  Genu recuvatum L and genu recurvatum R  Ankle/Foot:  Pes planus R and pes planus L    Gait:       Weight Bearing Status:  WBAT   Assistive Devices:  Cane  Deviations:  Knee:  Excessive extension RGeneral Deviations:  Base of support incr                                                   Knee Evaluation:  ROM:    Prom wnl knee: Genu Recurvatum B; Mild limitation R knee end range flexion with general tightness.   Strength wnl knee: Good QS B; labored SLR on R. Glute med 3/5 R with pain; 4+/5 L.            Ligament Testing:  Ligament testing knee: general hyperlaxity.                Special Tests:     Left knee negative for the following special tests:  Meninscal    Right knee negative for the following special tests:  Meniscal  Palpation:      Right knee tenderness present at:  Popliteal and Patellar Superior        Assessment & Plan   CLINICAL IMPRESSIONS  Medical Diagnosis: LE stiffness    Treatment Diagnosis:     Impression/Assessment: Patient is a 44 year old female with R Knee and LE complaints.  The following significant findings have been identified: Pain, Decreased strength, Impaired balance, Impaired gait, and Decreased activity tolerance. These impairments interfere with their ability to perform self care tasks, recreational activities, household chores, household mobility, and community mobility as compared to previous level of function.     Clinical Decision Making (Complexity):  Clinical Presentation: Stable/Uncomplicated  Clinical Presentation Rationale: based on medical and personal factors listed in PT evaluation  Clinical Decision Making (Complexity): Low complexity    PLAN OF CARE  Treatment Interventions:  Interventions: Gait Training, Manual Therapy, Neuromuscular Re-education, Therapeutic Activity, Therapeutic Exercise, Self-Care/Home Management    Long Term Goals            Frequency of Treatment: wkly to bi-monthly  Duration of Treatment: up to 12 wks    Recommended Referrals to Other Professionals: Physical Therapy  Education Assessment:      Risks and benefits of evaluation/treatment have been explained.   Patient/Family/caregiver agrees with Plan of Care.     Evaluation Time:        Signing Clinician: Kirti Loera PT

## 2024-05-01 PROBLEM — M25.661 JOINT STIFFNESS OF RIGHT LOWER LEG: Status: ACTIVE | Noted: 2024-05-01

## 2024-05-01 PROBLEM — R29.898 RIGHT LEG WEAKNESS: Status: ACTIVE | Noted: 2022-05-15

## 2024-05-10 ENCOUNTER — THERAPY VISIT (OUTPATIENT)
Dept: PHYSICAL THERAPY | Facility: CLINIC | Age: 44
End: 2024-05-10
Payer: COMMERCIAL

## 2024-05-10 DIAGNOSIS — M25.661 JOINT STIFFNESS OF RIGHT LOWER LEG: ICD-10-CM

## 2024-05-10 DIAGNOSIS — R29.898 RIGHT LEG WEAKNESS: Primary | ICD-10-CM

## 2024-05-10 PROCEDURE — 97140 MANUAL THERAPY 1/> REGIONS: CPT | Mod: GP | Performed by: PHYSICAL THERAPIST

## 2024-05-10 PROCEDURE — 97110 THERAPEUTIC EXERCISES: CPT | Mod: GP | Performed by: PHYSICAL THERAPIST

## 2024-05-10 PROCEDURE — 97530 THERAPEUTIC ACTIVITIES: CPT | Mod: GP | Performed by: PHYSICAL THERAPIST

## 2024-05-15 ENCOUNTER — LAB (OUTPATIENT)
Dept: LAB | Facility: CLINIC | Age: 44
End: 2024-05-15
Payer: COMMERCIAL

## 2024-05-15 ENCOUNTER — ANCILLARY PROCEDURE (OUTPATIENT)
Dept: MAMMOGRAPHY | Facility: CLINIC | Age: 44
End: 2024-05-15
Attending: INTERNAL MEDICINE
Payer: COMMERCIAL

## 2024-05-15 DIAGNOSIS — Z13.1 SCREENING FOR DIABETES MELLITUS: ICD-10-CM

## 2024-05-15 DIAGNOSIS — Z12.31 VISIT FOR SCREENING MAMMOGRAM: ICD-10-CM

## 2024-05-15 DIAGNOSIS — Z13.220 SCREENING FOR CHOLESTEROL LEVEL: ICD-10-CM

## 2024-05-15 DIAGNOSIS — Z12.31 ENCOUNTER FOR SCREENING MAMMOGRAM FOR BREAST CANCER: ICD-10-CM

## 2024-05-15 LAB
ALBUMIN SERPL BCG-MCNC: 4.2 G/DL (ref 3.5–5.2)
ALP SERPL-CCNC: 116 U/L (ref 40–150)
ALT SERPL W P-5'-P-CCNC: 17 U/L (ref 0–50)
ANION GAP SERPL CALCULATED.3IONS-SCNC: 10 MMOL/L (ref 7–15)
AST SERPL W P-5'-P-CCNC: 17 U/L (ref 0–45)
BILIRUB SERPL-MCNC: 0.5 MG/DL
BUN SERPL-MCNC: 15.1 MG/DL (ref 6–20)
CALCIUM SERPL-MCNC: 8.9 MG/DL (ref 8.6–10)
CHLORIDE SERPL-SCNC: 107 MMOL/L (ref 98–107)
CHOLEST SERPL-MCNC: 206 MG/DL
CREAT SERPL-MCNC: 0.84 MG/DL (ref 0.51–0.95)
DEPRECATED HCO3 PLAS-SCNC: 24 MMOL/L (ref 22–29)
EGFRCR SERPLBLD CKD-EPI 2021: 87 ML/MIN/1.73M2
FASTING STATUS PATIENT QL REPORTED: YES
FASTING STATUS PATIENT QL REPORTED: YES
GLUCOSE SERPL-MCNC: 109 MG/DL (ref 70–99)
HDLC SERPL-MCNC: 38 MG/DL
LDLC SERPL CALC-MCNC: 146 MG/DL
NONHDLC SERPL-MCNC: 168 MG/DL
POTASSIUM SERPL-SCNC: 3.9 MMOL/L (ref 3.4–5.3)
PROT SERPL-MCNC: 6.9 G/DL (ref 6.4–8.3)
SODIUM SERPL-SCNC: 141 MMOL/L (ref 135–145)
TRIGL SERPL-MCNC: 110 MG/DL

## 2024-05-15 PROCEDURE — 80053 COMPREHEN METABOLIC PANEL: CPT

## 2024-05-15 PROCEDURE — 36415 COLL VENOUS BLD VENIPUNCTURE: CPT

## 2024-05-15 PROCEDURE — 77063 BREAST TOMOSYNTHESIS BI: CPT | Mod: TC | Performed by: RADIOLOGY

## 2024-05-15 PROCEDURE — 77067 SCR MAMMO BI INCL CAD: CPT | Mod: TC | Performed by: RADIOLOGY

## 2024-05-15 PROCEDURE — 80061 LIPID PANEL: CPT

## 2024-05-17 ENCOUNTER — THERAPY VISIT (OUTPATIENT)
Dept: PHYSICAL THERAPY | Facility: CLINIC | Age: 44
End: 2024-05-17
Payer: COMMERCIAL

## 2024-05-17 DIAGNOSIS — R29.898 RIGHT LEG WEAKNESS: Primary | ICD-10-CM

## 2024-05-17 DIAGNOSIS — M25.661 JOINT STIFFNESS OF RIGHT LOWER LEG: ICD-10-CM

## 2024-05-17 PROCEDURE — 97110 THERAPEUTIC EXERCISES: CPT | Mod: GP | Performed by: PHYSICAL THERAPIST

## 2024-05-17 PROCEDURE — 97140 MANUAL THERAPY 1/> REGIONS: CPT | Mod: GP | Performed by: PHYSICAL THERAPIST

## 2024-05-24 ENCOUNTER — THERAPY VISIT (OUTPATIENT)
Dept: PHYSICAL THERAPY | Facility: CLINIC | Age: 44
End: 2024-05-24
Payer: COMMERCIAL

## 2024-05-24 DIAGNOSIS — R29.898 RIGHT LEG WEAKNESS: Primary | ICD-10-CM

## 2024-05-24 DIAGNOSIS — M25.661 JOINT STIFFNESS OF RIGHT LOWER LEG: ICD-10-CM

## 2024-05-24 PROCEDURE — 97140 MANUAL THERAPY 1/> REGIONS: CPT | Mod: GP | Performed by: PHYSICAL THERAPIST

## 2024-05-24 PROCEDURE — 97110 THERAPEUTIC EXERCISES: CPT | Mod: GP | Performed by: PHYSICAL THERAPIST

## 2024-05-31 ENCOUNTER — THERAPY VISIT (OUTPATIENT)
Dept: PHYSICAL THERAPY | Facility: CLINIC | Age: 44
End: 2024-05-31
Payer: COMMERCIAL

## 2024-05-31 DIAGNOSIS — M25.661 JOINT STIFFNESS OF RIGHT LOWER LEG: ICD-10-CM

## 2024-05-31 DIAGNOSIS — R29.898 RIGHT LEG WEAKNESS: Primary | ICD-10-CM

## 2024-05-31 PROCEDURE — 97140 MANUAL THERAPY 1/> REGIONS: CPT | Mod: GP | Performed by: PHYSICAL THERAPIST

## 2024-05-31 PROCEDURE — 97110 THERAPEUTIC EXERCISES: CPT | Mod: GP | Performed by: PHYSICAL THERAPIST

## 2024-06-26 ENCOUNTER — OFFICE VISIT (OUTPATIENT)
Dept: ORTHOPEDICS | Facility: CLINIC | Age: 44
End: 2024-06-26
Attending: INTERNAL MEDICINE
Payer: COMMERCIAL

## 2024-06-26 ENCOUNTER — ANCILLARY PROCEDURE (OUTPATIENT)
Dept: GENERAL RADIOLOGY | Facility: CLINIC | Age: 44
End: 2024-06-26
Attending: FAMILY MEDICINE
Payer: COMMERCIAL

## 2024-06-26 DIAGNOSIS — M25.561 ACUTE PAIN OF RIGHT KNEE: ICD-10-CM

## 2024-06-26 PROCEDURE — 99203 OFFICE O/P NEW LOW 30 MIN: CPT | Performed by: FAMILY MEDICINE

## 2024-06-26 PROCEDURE — 2894A XR KNEE STANDING AP SUNRISE BILAT LAT RIGHT: CPT | Mod: TC | Performed by: RADIOLOGY

## 2024-06-26 PROCEDURE — 73562 X-RAY EXAM OF KNEE 3: CPT | Mod: TC | Performed by: RADIOLOGY

## 2024-06-26 NOTE — PROGRESS NOTES
CHIEF COMPLAINT:  Pain of the Right Knee       HISTORY OF PRESENT ILLNESS  Ms. Cottrell is a pleasant 44 year old year old female who presents to clinic today with right knee pain.  Ninoska explains that she fell forward/sideway while going up the stairs while carrying crafting supplies in March at home.    Onset: sudden  Location: right knee, medial joint pain, crepitus, no locking  Quality:  sharp and shooting  Duration: March 2024 or about 3 months   Severity: 10/10 at worst  Timing:constant  Modifying factors:  resting and non-use makes it better, movement and use makes it worse  Associated signs & symptoms: pain and swelling  Previous similar pain: No  Treatments to date:Aleve, cane, rest, physical therapy,     Additional history: as documented, right ankle surgery at age 19,     Review of Systems:  A 10-point review of systems was obtained and is negative except for as noted in the HPI.       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    MDD (major depressive disorder)    AMY (generalized anxiety disorder)    History of pre-eclampsia    Morbid obesity (H)    Right leg weakness    Migraine without aura    Moderate persistent asthma    Joint stiffness of right lower leg       Current Outpatient Medications   Medication Sig Dispense Refill    albuterol (PROAIR HFA/PROVENTIL HFA/VENTOLIN HFA) 108 (90 Base) MCG/ACT inhaler Inhale 2 puffs into the lungs every 6 hours as needed for shortness of breath, wheezing or cough 18 g 0    azelastine (ASTELIN) 0.1 % nasal spray Spray 1 spray into both nostrils as needed for rhinitis      fexofenadine (ALLEGRA) 180 MG tablet Take 180 mg by mouth daily      fluticasone (FLOVENT HFA) 220 MCG/ACT inhaler Inhale 2 puffs into the lungs 2 times daily 12 g 3    galcanezumab-gnlm (EMGALITY) 120 MG/ML injection Inject 1 mL (120 mg) Subcutaneous every 30 days 1 mL 11    levonorgestrel (MIRENA) 20 MCG/DAY IUD 1 each (20 mcg) by Intrauterine route once       multivitamin w/minerals (THERA-VIT-M) tablet Take 1 tablet by mouth daily      rizatriptan (MAXALT) 10 MG tablet Take 1 tablet (10 mg) by mouth at onset of headache for migraine (Please limit use to less then 10 days per month) 12 tablet 3    topiramate (TOPAMAX) 50 MG tablet Take 1 tablet (50 mg) by mouth daily 90 tablet 1       Allergies   Allergen Reactions    Amoxicillin Unknown    Latex Rash    Morphine And Codeine Hives       Family History   Problem Relation Age of Onset    Breast Cancer Mother     Hemophilia Father     Chronic Obstructive Pulmonary Disease Father         smoker    Hypertension Maternal Grandmother     Pancreatic Cancer Maternal Grandmother     Cerebrovascular Disease Maternal Grandfather     Alzheimer Disease Paternal Grandmother     Hemophilia Paternal Grandmother     Myocardial Infarction Paternal Grandfather     Breast Cancer Maternal Aunt     Bone Cancer Maternal Aunt     Diabetes No family hx of     Coronary Artery Disease Early Onset No family hx of     Colon Cancer No family hx of     Ovarian Cancer No family hx of        Additional medical/Social/Surgical histories reviewed in [facility] and updated as appropriate.       PHYSICAL EXAM  There were no vitals taken for this visit.    General  - normal appearance, in no obvious distress  Musculoskeletal -right knee  - stance: Antalgic gait, decreased knee flexion during phase of gait, use of cane  - inspection: trace effusion  - palpation: medial joint line tenderness  - ROM: 135 degrees flexion, -5 degrees extension, pain at terminal extension passively  - strength: 5/5 in flexion, 5/5 in extension  - special tests:  (-) Lachman  (-) anterior drawer  (+) Deanne  (-) varus at 0 and 30 degrees flexion  (-) valgus at 0 and 30 degrees flexion  Neuro  - no sensory or motor deficit, grossly normal coordination, normal muscle tone    IMAGING : X-ray right knee. Final results and radiologist's interpretation, available in the [facility] health record.  "Images were reviewed with the patient/family members in the office today. My personal interpretation of the performed imaging is mild medial joint space narrowing without osteophytosis.     ASSESSMENT & PLAN  Ms. Cottrell is a 44 year old year old female who presents to clinic today with acute right knee pain since experiencing severe pain on a \"pop\" while negotiating a flight of stairs in March 2024.    Diagnosis: Subacute pain of right knee    Further diagnosis as well as treatment options were discussed.  At this time given severity of her pain, and ability to weight-bear constantly without the use of a cane over the past 3+ months if I recommended an MRI of her right knee.  She does have mild osteoarthritic changes on x-rays however I have a greater suspicion for acute injury to her meniscus.  Less likely ligamentous given stable exam today.    Continue use of cane, ice as needed, use of Tylenol/Aleve sparingly.  Follow-up in person or virtually after MRI is completed.    It was a pleasure seeing Ninoska today.    Shane Sosa DO, CAQSM  Primary Care Sports Medicine    "

## 2024-06-26 NOTE — LETTER
6/26/2024      Ninoska Cottrell  [address] [place] [id number]      Dear Colleague,    Thank you for referring your patient, Ninoska Cottrell, to the [facility] [place]. Please see a copy of my visit note below.    CHIEF COMPLAINT:  Pain of the Right Knee       HISTORY OF PRESENT ILLNESS  Ms. Cottrell is a pleasant 44 year old year old female who presents to clinic today with right knee pain.  Ninoska explains that she fell forward/sideway while going up the stairs while carrying crafting supplies in March at home.    Onset: sudden  Location: right knee, medial joint pain, crepitus, no locking  Quality:  sharp and shooting  Duration: March 2024 or about 3 months   Severity: 10/10 at worst  Timing:constant  Modifying factors:  resting and non-use makes it better, movement and use makes it worse  Associated signs & symptoms: pain and swelling  Previous similar pain: No  Treatments to date:Aleve, cane, rest, physical therapy,     Additional history: as documented, right ankle surgery at age 19,     Review of Systems:  A 10-point review of systems was obtained and is negative except for as noted in the HPI.       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     MDD (major depressive disorder)     AMY (generalized anxiety disorder)     History of pre-eclampsia     Morbid obesity (H)     Right leg weakness     Migraine without aura     Moderate persistent asthma     Joint stiffness of right lower leg       Current Outpatient Medications   Medication Sig Dispense Refill     albuterol (PROAIR HFA/PROVENTIL HFA/VENTOLIN HFA) 108 (90 Base) MCG/ACT inhaler Inhale 2 puffs into the lungs every 6 hours as needed for shortness of breath, wheezing or cough 18 g 0     azelastine (ASTELIN) 0.1 % nasal spray Spray 1 spray into both nostrils as needed for rhinitis       fexofenadine (ALLEGRA) 180 MG tablet Take 180 mg by mouth daily       fluticasone (FLOVENT HFA) 220 MCG/ACT  inhaler Inhale 2 puffs into the lungs 2 times daily 12 g 3     galcanezumab-gnlm (EMGALITY) 120 MG/ML injection Inject 1 mL (120 mg) Subcutaneous every 30 days 1 mL 11     levonorgestrel (MIRENA) 20 MCG/DAY IUD 1 each (20 mcg) by Intrauterine route once       multivitamin w/minerals (THERA-VIT-M) tablet Take 1 tablet by mouth daily       rizatriptan (MAXALT) 10 MG tablet Take 1 tablet (10 mg) by mouth at onset of headache for migraine (Please limit use to less then 10 days per month) 12 tablet 3     topiramate (TOPAMAX) 50 MG tablet Take 1 tablet (50 mg) by mouth daily 90 tablet 1       Allergies   Allergen Reactions     Amoxicillin Unknown     Latex Rash     Morphine And Codeine Hives       Family History   Problem Relation Age of Onset     Breast Cancer Mother      Hemophilia Father      Chronic Obstructive Pulmonary Disease Father         smoker     Hypertension Maternal Grandmother      Pancreatic Cancer Maternal Grandmother      Cerebrovascular Disease Maternal Grandfather      Alzheimer Disease Paternal Grandmother      Hemophilia Paternal Grandmother      Myocardial Infarction Paternal Grandfather      Breast Cancer Maternal Aunt      Bone Cancer Maternal Aunt      Diabetes No family hx of      Coronary Artery Disease Early Onset No family hx of      Colon Cancer No family hx of      Ovarian Cancer No family hx of        Additional medical/Social/Surgical histories reviewed in [facility] and updated as appropriate.       PHYSICAL EXAM  There were no vitals taken for this visit.    General  - normal appearance, in no obvious distress  Musculoskeletal -right knee  - stance: Antalgic gait, decreased knee flexion during phase of gait, use of cane  - inspection: trace effusion  - palpation: medial joint line tenderness  - ROM: 135 degrees flexion, -5 degrees extension, pain at terminal extension passively  - strength: 5/5 in flexion, 5/5 in extension  - special tests:  (-) Lachman  (-) anterior drawer  (+) Deanne  (-)  "varus at 0 and 30 degrees flexion  (-) valgus at 0 and 30 degrees flexion  Neuro  - no sensory or motor deficit, grossly normal coordination, normal muscle tone    IMAGING : X-ray right knee. Final results and radiologist's interpretation, available in the [facility] health record. Images were reviewed with the patient/family members in the office today. My personal interpretation of the performed imaging is no acute osseous abnormality or significant degenerative changes of joint.     ASSESSMENT & PLAN  Ms. Cottrell is a 44 year old year old female who presents to clinic today with acute right knee pain since experiencing severe pain on a \"pop\" while negotiating a flight of stairs in March 2024.    Diagnosis: Subacute pain of right knee    Further diagnosis as well as treatment options were discussed.  At this time given severity of her pain, and ability to weight-bear constantly without the use of a cane over the past 3+ months if I recommended an MRI of her right knee.  She does have mild osteoarthritic changes on x-rays however I have a greater suspicion for acute injury to her meniscus.  Less likely ligamentous given stable exam today.    Continue use of cane, ice as needed, use of Tylenol/Aleve sparingly.  Follow-up in person or virtually after MRI is completed.    It was a pleasure seeing Ninoska today.    Shane Sosa DO, [facility]  Primary Care Sports Medicine      Again, thank you for allowing me to participate in the care of your patient.        Sincerely,        Shane Sosa DO  "

## 2024-06-26 NOTE — PATIENT INSTRUCTIONS
Thank you for choosing [facility]    [facility]  [place] [address] [address] [facility], [address]th [place], [address] [place] [address]   [phone]       APPOINTMENTS: [phone]    CARE QUESTIONS: [phone],    BILLING QUESTIONS: [phone]    FAX NUMBER: [phone]        Follow up: next available      1. Acute pain of right knee            An order for an MRI was placed today. You may call directly to schedule at [phone] at your convenience.

## 2024-07-10 ENCOUNTER — OFFICE VISIT (OUTPATIENT)
Dept: URGENT CARE | Facility: URGENT CARE | Age: 44
End: 2024-07-10
Payer: COMMERCIAL

## 2024-07-10 ENCOUNTER — HOSPITAL ENCOUNTER (EMERGENCY)
Facility: CLINIC | Age: 44
Discharge: HOME | End: 2024-07-11
Attending: EMERGENCY MEDICINE | Admitting: EMERGENCY MEDICINE
Payer: COMMERCIAL

## 2024-07-10 VITALS
DIASTOLIC BLOOD PRESSURE: 103 MMHG | HEART RATE: 71 BPM | SYSTOLIC BLOOD PRESSURE: 142 MMHG | BODY MASS INDEX: 44.84 KG/M2 | HEIGHT: 66 IN | TEMPERATURE: 98.2 F | WEIGHT: 279 LBS | OXYGEN SATURATION: 100 % | RESPIRATION RATE: 18 BRPM
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
## 2024-07-10 DIAGNOSIS — M25.561 ACUTE PAIN OF RIGHT KNEE: ICD-10-CM
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
## 2024-07-10 DIAGNOSIS — M25.531 RIGHT WRIST PAIN: ICD-10-CM

## 2024-07-10 DIAGNOSIS — M79.10 MYALGIA: ICD-10-CM

## 2024-07-10 PROCEDURE — 250N000013 HC RX MED GY IP 250 OP 250 PS 637: Performed by: EMERGENCY MEDICINE

## 2024-07-10 PROCEDURE — 99284 EMERGENCY DEPT VISIT MOD MDM: CPT

## 2024-07-10 PROCEDURE — 99214 OFFICE O/P EST MOD 30 MIN: CPT | Performed by: PHYSICIAN ASSISTANT

## 2024-07-10 RX ORDER — OXYCODONE HYDROCHLORIDE 5 MG/1
5 TABLET ORAL ONCE
Status: COMPLETED | OUTPATIENT
Start: 2024-07-11 | End: 2024-07-10

## 2024-07-10 RX ADMIN — OXYCODONE HYDROCHLORIDE 5 MG: 5 TABLET ORAL at 23:57

## 2024-07-10 ASSESSMENT — COLUMBIA-SUICIDE SEVERITY RATING SCALE - C-SSRS
2. HAVE YOU ACTUALLY HAD ANY THOUGHTS OF KILLING YOURSELF IN THE PAST MONTH?: NO
1. IN THE PAST MONTH, HAVE YOU WISHED YOU WERE DEAD OR WISHED YOU COULD GO TO SLEEP AND NOT WAKE UP?: NO
6. HAVE YOU EVER DONE ANYTHING, STARTED TO DO ANYTHING, OR PREPARED TO DO ANYTHING TO END YOUR LIFE?: NO

## 2024-07-10 ASSESSMENT — ACTIVITIES OF DAILY LIVING (ADL): ADLS_ACUITY_SCORE: 38

## 2024-07-10 ASSESSMENT — PAIN SCALES - GENERAL: PAINLEVEL: SEVERE PAIN (7)

## 2024-07-11 ENCOUNTER — APPOINTMENT (OUTPATIENT)
Dept: GENERAL RADIOLOGY | Facility: CLINIC | Age: 44
End: 2024-07-11
Attending: EMERGENCY MEDICINE
Payer: COMMERCIAL

## 2024-07-11 ENCOUNTER — PATIENT OUTREACH (OUTPATIENT)
Dept: INTERNAL MEDICINE | Facility: CLINIC | Age: 44
End: 2024-07-11
Payer: COMMERCIAL

## 2024-07-11 VITALS
BODY MASS INDEX: 44.2 KG/M2 | WEIGHT: 275 LBS | TEMPERATURE: 97.9 F | HEART RATE: 72 BPM | DIASTOLIC BLOOD PRESSURE: 98 MMHG | OXYGEN SATURATION: 99 % | SYSTOLIC BLOOD PRESSURE: 150 MMHG | HEIGHT: 66 IN | RESPIRATION RATE: 16 BRPM

## 2024-07-11 PROCEDURE — 73590 X-RAY EXAM OF LOWER LEG: CPT | Mod: RT

## 2024-07-11 PROCEDURE — 73560 X-RAY EXAM OF KNEE 1 OR 2: CPT | Mod: RT

## 2024-07-11 PROCEDURE — 73610 X-RAY EXAM OF ANKLE: CPT | Mod: RT

## 2024-07-11 PROCEDURE — 73110 X-RAY EXAM OF WRIST: CPT | Mod: RT

## 2024-07-11 RX ORDER — OXYCODONE HYDROCHLORIDE 5 MG/1
5 TABLET ORAL EVERY 6 HOURS PRN
Qty: 6 TABLET | Refills: 0 | Status: SHIPPED | OUTPATIENT
Start: 2024-07-11 | End: 2024-07-14

## 2024-07-11 ASSESSMENT — ACTIVITIES OF DAILY LIVING (ADL): ADLS_ACUITY_SCORE: 40

## 2024-07-11 NOTE — TELEPHONE ENCOUNTER
"Called and spoke to the patient following her ER visit yesterday. Patient fell on an Escalator and hit the R side of her body. Patient states they completed XR's on her knee, ankle, shin, and wrist. ER Doctor did not feel the need to do a head CT despite the patient complaining of a constant headache since the fall and ringing in the R ear. No vision changes. Patient cannot recall if she hit her head or not during the fall.     RN expressed concern and advised patient a HP message would be sent to PCP for review. Does patient need to return back to the ER for a Head CT? Advised patient if her headache becomes more severe, she develops any vision changes, dizziness/light headedness, patient needs to proceed to the ER. Patient expressed understanding. Patient would also like to get a follow-up appointment scheduled with PCP: please advise regarding scheduling an appointment.     Transitions of Care Outreach  Chief Complaint   Patient presents with    Hospital F/U       Most Recent Admission Date: 7/10/2024   Most Recent Admission Diagnosis:  Fall, initial encounter - W19.XXXA  Myalgia - M79.10  Acute pain of right knee - M25.561  Right wrist pain - M25.531    Most Recent Discharge Date: 7/11/2024   Most Recent Discharge Diagnosis: Fall, initial encounter - W19.XXXA  Myalgia - M79.10  Acute pain of right knee - M25.561  Right wrist pain - M25.531     Transitions of Care Assessment    Discharge Assessment  How are you doing now that you are home?: \"I have been resting today. Hardly have moved much today. Hard to move due the pain on the R side of her body. Took a dose of Oxycodone: took a little bit of the edge off. I have also been taking Tylenol and Ibupfoen. Icing my leg. Taking it easy. I have a splint for the R wrist.\"  How are your symptoms? (Red Flag symptoms escalate to triage hotline per guidelines): Unchanged  Do you know how to contact your clinic care team if you have future questions or changes to your " health status? : Yes  Does the patient have their discharge instructions? : Yes  Does the patient have questions regarding their discharge instructions? : No  Were you started on any new medications or were there changes to any of your previous medications? : Yes  Does the patient have all of their medications?: Yes  Do you have questions regarding any of your medications? : No  Do you have all of your needed medical supplies or equipment (DME)?  (i.e. oxygen tank, CPAP, cane, etc.): Yes    Follow up Plan     Discharge Follow-Up  Discharge follow up appointment scheduled in alignment with recommended follow up timeframe or Transitions of Risk Category? (Low = within 30 days; Moderate= within 14 days; High= within 7 days): Yes  Discharge Follow Up Appointment Date: 07/16/24  Discharge Follow Up Appointment Scheduled with?: Specialty Care Provider    Future Appointments   Date Time Provider Department Center   [facility]  [facility]   8/8/2024  9:00 AM Nicholas Garcia MD [facility]       Outpatient Plan as outlined on AVS reviewed with patient.    For any urgent concerns, please contact our 24 hour nurse triage line: [phone] ([phone])       Sobia Devi RN

## 2024-07-11 NOTE — PROGRESS NOTES
SUBJECTIVE:  Chief Complaint   Patient presents with    Urgent Care     Patient fell at the bottom of the escalator at [employer] at approx 1630 today.  Patient is unsure if they hit their head.  Both legs, right arm in pain. Neck hurts, Head aches. Tingling numbness in right hand.     Ninoska Cottrell is a 44 year old female presents with a chief complaint of pain, tenderness, limited ROM, tingling in multiple regions of the body from foot to clavicle.  Lace caught in escalator at end of downward travel. 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     History of pre-eclampsia 2012    MDD (major depressive disorder)     Migraine without aura     Moderate persistent asthma     Morbid obesity (H)      Current Outpatient Medications   Medication Sig Dispense Refill    albuterol (PROAIR HFA/PROVENTIL HFA/VENTOLIN HFA) 108 (90 Base) MCG/ACT inhaler Inhale 2 puffs into the lungs every 6 hours as needed for shortness of breath, wheezing or cough 18 g 0    fexofenadine (ALLEGRA) 180 MG tablet Take 180 mg by mouth daily      fluticasone (FLOVENT HFA) 220 MCG/ACT inhaler Inhale 2 puffs into the lungs 2 times daily 12 g 3    galcanezumab-gnlm (EMGALITY) 120 MG/ML injection Inject 1 mL (120 mg) Subcutaneous every 30 days 1 mL 11    levonorgestrel (MIRENA) 20 MCG/DAY IUD 1 each (20 mcg) by Intrauterine route once      multivitamin w/minerals (THERA-VIT-M) tablet Take 1 tablet by mouth daily      rizatriptan (MAXALT) 10 MG tablet Take 1 tablet (10 mg) by mouth at onset of headache for migraine (Please limit use to less then 10 days per month) 12 tablet 3    topiramate (TOPAMAX) 50 MG tablet Take 1 tablet (50 mg) by mouth daily 90 tablet 1    azelastine (ASTELIN) 0.1 % nasal spray Spray 1 spray into both nostrils as needed for rhinitis (Patient not taking: Reported on 7/10/2024)       Social History     Tobacco Use    Smoking status: Former     Types:  "Cigarettes    Smokeless tobacco: Never    Tobacco comments:     Quit in 2000; smoked for 3 years; 0.5ppd at most.    Substance Use Topics    Alcohol use: Yes     Comment: couple drinks/month       ROS:  Review of systems negative except as stated above.    EXAM:   BP (!) 142/103 (BP Location: Left arm)   Pulse 71   Temp 98.2  F (36.8  C) (Tympanic)   Resp 18   Ht 1.676 m (5' 6\")   Wt 126.6 kg (279 lb)   SpO2 100%   BMI 45.03 kg/m    Gen: healthy,alert, midl distress  Extremity: limited rom in hand, shoulder, elbow  GENERAL APPEARANCE: healthy, alert and no distress  SKIN: no suspicious lesions or rashes, cool hand  NEURO: Normal strength and tone, sensory exam grossly normal, mentation intact and speech normal      ASSESSMENT:   (W19.XXXA) Fall, initial encounter  (primary encounter diagnosis)  Comment: injury in business   Plan: multiple xrays indicated  To ED for assessment      "

## 2024-07-11 NOTE — TELEPHONE ENCOUNTER
Relayed provider message. Pt reporting symptoms have not worsened, they have stayed about the same from the ER visit. Pt reporting right side pain arm, leg, head, with oxy 5-6/10.    Pt will go to ER if symptoms worsen.

## 2024-07-11 NOTE — TELEPHONE ENCOUNTER
RN called and spoke to the patient (RN unsure what the end result of the phone call was before as note has not been completed). Patient states her symptoms have not worsened at this time. She is going to stay home for now and continue to monitor. Patient is aware to return back to the ER if any of her symptoms get worse.     Patient had no additional questions and/or concerns at this time.    Will route to PCP as CHRISTIANO.     Sobia Devi RN

## 2024-07-11 NOTE — TELEPHONE ENCOUNTER
Agree that if patient sees worsening symptoms should return to the ER for potential CT imaging of head.

## 2024-07-11 NOTE — ED TRIAGE NOTES
"    Patient reports fall after her shoelace got suck in escalator. She complains of pain \"all over\" but is primary on the right side. No LOC. No thinners. No obvious deformity, wounds or bleeding.   "

## 2024-07-11 NOTE — ED PROVIDER NOTES
"  Emergency Department Note      History of Present Illness     Chief Complaint   Fall    HPI   Ninoska Cottrell is a 44 year old female with a history as noted below who presents to the emergency department for a fall. The patient states that tonight at 1630, she was standing on an escalator when her shoelace became stuck in the escalator, she fell and landed on the right side of her body. She ambulates at baseline with a cane but did not have her cane present during the fall. Denies head trauma or loss of consciousness. She reports that since the fall, she has been experiencing right-sided pain down her entire body, worse in her right knee, right foot, and right wrist. No paresthesias, back pain, chest pain, dyspnea or other symptoms. She did not take any medications for this pain. Denies taking blood thinners. She is ambidextrous.     Independent Historian   None    Review of External Notes   Urgent Care note from earlier today (07/10/24).    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  Pre-eclampsia  MDD  Migraine with aura  Obesity    Medications   albuterol (PROAIR HFA/PROVENTIL HFA/VENTOLIN HFA) 108 (90 Base) MCG/ACT inhaler  fexofenadine (ALLEGRA) 180 MG tablet  fluticasone (FLOVENT HFA) 220 MCG/ACT inhaler  galcanezumab-gnlm (EMGALITY) 120 MG/ML injection  levonorgestrel (MIRENA) 20 MCG/DAY IUD  rizatriptan (MAXALT) 10 MG tablet  topiramate (TOPAMAX) 50 MG tablet    Surgical History   Appendectomy  ORIF right ankle    Physical Exam     Patient Vitals for the past 24 hrs:   BP Temp Temp src Pulse Resp SpO2 Height Weight   07/10/24 2035 (!) 178/100 -- -- -- -- -- -- --   07/10/24 2033 -- 97.9  F (36.6  C) Temporal 74 16 100 % 1.676 m (5' 6\") 124.7 kg (275 lb)     Physical Exam  General: Alert.   Head:  The scalp, face, and head appear normal without trauma  Eyes:  Sclera white; Pupils are equal and round  ENT:    External ears normal.      External nares normal.  No septal " hematoma.    Neck:  No midline tenderness or pain with full ROM.  CV:  Rate as above with regular rhythm   2/2 radial and dorsal pedal pulses  Resp:  Breath sounds clear and equal bilaterally    Non-labored, no retractions or accessory muscle use  GI:  Abdomen soft, non-tender, non-distended    No rebound tenderness or guarding  MSK:  No midline tenderness or bony step-off    Moves all extremities equally and symmetrically other tenderness to R. Wrist with flexion/extension; no erythema/warmth/swelling. Moves all fingers easily.  No R. Elbow/shoulder tenderness, full active ROM. R. Knee with minimal tenderness, no ecchymosis. Full active ROM. R. Ankle with minimal tenderness, no swelling, full active ROM  Skin:  No rash or lesions noted.  Neuro:   No apparent deficit.    Strength 5/5 x4.  Sensation intact x4.     GCS: 15  Psych:  Anxious appearing         Diagnostics     Lab Results   None    Imaging   XR Knee Right 1/2 Views   Final Result   IMPRESSION: Normal joint spaces and alignment. No fracture or joint effusion.      Ankle XR, G/E 3 views, right   Final Result   IMPRESSION: Previous distal fibular ORIF. Bony alignment normal. No acute fracture      XR Tibia and Fibula Right 2 Views   Final Result   IMPRESSION: Previous distal fibular ORIF. Bony alignment normal. No acute fracture. No hardware failure evident      XR Wrist Right G/E 3 Views   Final Result   IMPRESSION: Normal joint spaces and alignment. No fracture.        Independent Interpretation   I reviewed the patient's imaging, no evidence of fracture or dislocation.    ED Course      Medications Administered   Medications   oxyCODONE (ROXICODONE) tablet 5 mg (5 mg Oral $Given 7/10/24 1547)     Discussion of Management   None    ED Course   ED Course as of 07/11/24 0119   Wed Jul 10, 2024   2346 I obtained history and examined the patient as noted above.      Thu Jul 11, 2024 0119 I discussed findings and discharge with the patient. All questions  answered.        Optional/Additional Documentation  None    Medical Decision Making / Diagnosis     CMS Diagnoses: None    MIPS   None    MDM   Ninoska Cottrell is a 44 year old female presenting status post.  Patient complains of predominantly pain to the right side of her body particularly her right wrist, knee and ankle.  She has no signs of head trauma on exam.  X-ray wrist, knee and ankle all reassuring without focal fracture or dislocation.  I did discuss with patient I cannot exclude ligamentous injury.  She is greatest pain predominately in the wrist so was placed in a Velcro wrist splint for comfort.  Lower clinical suspicion for ligamentous injury to the knee/ankle.  She feels comfortable declining formal immobilization.  I recommended Tylenol/Motrin, few oxycodone provided as needed and will provide orthopedic referral on discharge as additional outpatient imaging may be of benefit should symptoms persist.  Ice encouraged.  The remainder of her trauma exam is without evidence to suggest serious traumatic injury. Return precautions given.    Disposition   The patient was discharged.     Diagnosis     ICD-10-CM    1. Fall, initial encounter  W19.XXXA       2. Myalgia  M79.10       3. Acute pain of right knee  M25.561       4. Right wrist pain  M25.531 Wrist/Arm/Hand Bracking Supplies Order Wrist Brace; Right; non-thumb spica         Discharge Medications   New Prescriptions    OXYCODONE (ROXICODONE) 5 MG TABLET    Take 1 tablet (5 mg) by mouth every 6 hours as needed for moderate pain     Scribe Disclosure:  LINDSEY, Mehrdad Duran, am serving as a scribe at 11:28 PM on 7/10/2024 to document services personally performed by Love Barrios DO based on my observations and the provider's statements to me.        Love Barrios DO  07/11/24 0581

## 2024-07-16 ENCOUNTER — HOSPITAL ENCOUNTER (OUTPATIENT)
Dept: MRI IMAGING | Facility: CLINIC | Age: 44
Discharge: HOME | End: 2024-07-16
Attending: FAMILY MEDICINE | Admitting: FAMILY MEDICINE
Payer: COMMERCIAL

## 2024-07-16 DIAGNOSIS — M25.561 ACUTE PAIN OF RIGHT KNEE: ICD-10-CM

## 2024-07-16 PROCEDURE — 73721 MRI JNT OF LWR EXTRE W/O DYE: CPT | Mod: 26 | Performed by: RADIOLOGY

## 2024-07-16 PROCEDURE — 73721 MRI JNT OF LWR EXTRE W/O DYE: CPT | Mod: RT

## 2024-07-23 NOTE — PROGRESS NOTES
ESTABLISHED PATIENT FOLLOW-UP:  Follow Up of the Right Knee    HISTORY OF PRESENT ILLNESS  Ms. Cottrell is a pleasant 44 year old year old female who presents to clinic today for follow-up of right knee pain. She is here to discuss MRI results from 7/16/24.    Date of injury: March 2024  Date last seen: 6/26/2024  Following Therapeutic Plan: got MRI on 7/16./24, took a few oxycodone, icing, resting.   Pain: Pain comes and goes  Function: has had a few other near falls and feels unstable   Interval History: On 7/10/24, she was standing on an escalator when her shoelace became stuck in the escalator, she fell and landed on the right side of her body. She ambulates at baseline with a cane but did not have her cane present during the fall. Denies head trauma or loss of consciousness.     She continues to feel unsteady on her feet and utilizes a cane.  She does have a neurologist and sees Dr. Garcia for her migraine headaches and dizzy spells.  Last visit with neurology was on 4/5/2024.    Additional medical/Social/Surgical histories reviewed in [facility] and updated as appropriate.    REVIEW OF SYSTEMS (7/23/2024)  CONSTITUTIONAL: Denies fever and weight loss  GASTROINTESTINAL: Denies abdominal pain, nausea, vomiting  MUSCULOSKELETAL: See HPI  SKIN: Denies any recent rash or lesion  NEUROLOGICAL: Denies numbness or focal weakness     PHYSICAL EXAM  Wt 124.7 kg (275 lb)   BMI 44.39 kg/m      General  - normal appearance, in no obvious distress  Musculoskeletal -right knee  - stance: Antalgic gait, decreased knee flexion during phase of gait, use of cane  - inspection: trace effusion  - palpation: medial joint line tenderness, tenderness overlying the patellar tendon. Tender medial tibial plateau.  - ROM: 135 degrees flexion, -5 degrees extension, pain at terminal extension passively  - strength: 5/5 in flexion, 5/5 in extension  - special tests:  (-) Lachman  (-) anterior drawer  (-) varus at 0 and 30 degrees flexion  (-)  valgus at 0 and 30 degrees flexion  Neuro  - no sensory or motor deficit, grossly normal coordination, normal muscle tone    IMAGING : MRI right knee without contrast    Impression:  1. Anterolateral subcutaneous edema at the level of the patella and  patellar tendon, recommend clinical correlation with mechanism of  injury. No acute osseous abnormalities.  2. Abnormal contour of the trochlea without associated edema chronic  appearing, likely sequela of prior injury.   3. Cartilage heterogeneity and minor cartilage fissuring (Grade I - II  chondromalacia) in the patellofemoral compartment. Grade II  chondromalacia in the medial compartment.  4. ACL, PCL, medial and lateral supporting structures remains intact.     I have personally reviewed the examination and initial interpretation  and I agree with the findings.     JUTTA ELLERMANN, MD      ASSESSMENT & PLAN  Ms. Cottrell is a 44 year old year old female who presents to clinic today for follow-up regarding right knee pain after 2 subsequent falls, most recently on 7/10/2024.    MRI revealing findings consistent with patellar and patellar tendon contusion.  Additional underlying early cartilage degeneration of the trochlea and medial compartment.    Diagnosis: Contusion of right knee, mild primary osteoarthritis of right knee    Treatment options discussed today and I have recommended continuing ACE wrap for stability and compression to the knee.  Consideration for injection was reviewed for the arthritic changes, however I suspect that the contusion and soft tissue edema is the more likely cause for pain and we opted against this.  In lieu of this, we decided to start diclofenac twice daily over the next 2 weeks for anti-inflammatory means.  She has been working with Lidia and physical therapy and we discussed returning to see her.  PT order has been placed.  I have also recommended she continue to follow with her neurologist for increasing dizzy spells.  Follow-up 6  weeks if pain does persist.    It was a pleasure seeing Ninoska.    Shane Sosa DO, [facility]  Primary Care Sports Medicine

## 2024-07-25 NOTE — PROGRESS NOTES
[facility]    Outpatient Physical Therapy Discharge Note  Patient: Ninoska Cottrell  : 1980    Patient was seen for 5 visits for R knee pain from 24 to 23 with no follow up appointments.      Plan:  Discharge from therapy.     Reason for Discharge: Patient has failed to schedule further appointments.  Return to Ortho    Discharge Plan: Patient to continue home program.     If patient would like to return to therapy, they will need a new PT order from referring provider.    Lidia Sarabia, PT   2024

## 2024-08-01 ENCOUNTER — TELEPHONE (OUTPATIENT)
Dept: ORTHOPEDICS | Facility: CLINIC | Age: 44
End: 2024-08-01

## 2024-08-01 ENCOUNTER — OFFICE VISIT (OUTPATIENT)
Dept: ORTHOPEDICS | Facility: CLINIC | Age: 44
End: 2024-08-01
Payer: COMMERCIAL

## 2024-08-01 VITALS — BODY MASS INDEX: 44.39 KG/M2 | WEIGHT: 275 LBS

## 2024-08-01 DIAGNOSIS — M25.561 ACUTE PAIN OF RIGHT KNEE: Primary | ICD-10-CM

## 2024-08-01 DIAGNOSIS — S80.01XD CONTUSION OF RIGHT KNEE, SUBSEQUENT ENCOUNTER: ICD-10-CM

## 2024-08-01 PROCEDURE — 99213 OFFICE O/P EST LOW 20 MIN: CPT | Performed by: FAMILY MEDICINE

## 2024-08-01 RX ORDER — DICLOFENAC SODIUM 75 MG/1
75 TABLET, DELAYED RELEASE ORAL 2 TIMES DAILY
Qty: 30 TABLET | Refills: 0 | Status: SHIPPED | OUTPATIENT
Start: 2024-08-01

## 2024-08-01 NOTE — TELEPHONE ENCOUNTER
Writer notified clinical team. They will discuss with provider upon patient arrival. No action needed at this time.     Erika Melara MSA, ATC  Certified Athletic Trainer

## 2024-08-01 NOTE — TELEPHONE ENCOUNTER
Other: pt is on a bus going to the 9 am appt with Dr. Sosa in [place].  She will be up to 10 min late.     I told her that she may be asked to reschedule.      Could we send this information to you in [employer] or would you prefer to receive a phone call?:   Patient would prefer a phone call   Okay to leave a detailed message?: No at Cell number on file:    Telephone Information:   Mobile [phone]

## 2024-08-01 NOTE — LETTER
8/1/2024      Ninoska Cottrell  [address] [place] [id number]      Dear Colleague,    Thank you for referring your patient, Ninoska Cottrell, to the [facility] [place]. Please see a copy of my visit note below.    ESTABLISHED PATIENT FOLLOW-UP:  Follow Up of the Right Knee    HISTORY OF PRESENT ILLNESS  Ms. Cottrell is a pleasant 44 year old year old female who presents to clinic today for follow-up of right knee pain. She is here to discuss MRI results from 7/16/24.    Date of injury: March 2024  Date last seen: 6/26/2024  Following Therapeutic Plan: got MRI on 7/16./24, took a few oxycodone, icing, resting.   Pain: Pain comes and goes  Function: has had a few other near falls and feels unstable   Interval History: On 7/10/24, she was standing on an escalator when her shoelace became stuck in the escalator, she fell and landed on the right side of her body. She ambulates at baseline with a cane but did not have her cane present during the fall. Denies head trauma or loss of consciousness.     She continues to feel unsteady on her feet and utilizes a cane.  She does have a neurologist and sees Dr. Garcia for her migraine headaches and dizzy spells.  Last visit with neurology was on 4/5/2024.    Additional medical/Social/Surgical histories reviewed in [facility] and updated as appropriate.    REVIEW OF SYSTEMS (7/23/2024)  CONSTITUTIONAL: Denies fever and weight loss  GASTROINTESTINAL: Denies abdominal pain, nausea, vomiting  MUSCULOSKELETAL: See HPI  SKIN: Denies any recent rash or lesion  NEUROLOGICAL: Denies numbness or focal weakness     PHYSICAL EXAM  Wt 124.7 kg (275 lb)   BMI 44.39 kg/m      General  - normal appearance, in no obvious distress  Musculoskeletal -right knee  - stance: Antalgic gait, decreased knee flexion during phase of gait, use of cane  - inspection: trace effusion  - palpation: medial joint line tenderness, tenderness overlying the patellar tendon. Tender medial tibial  plateau.  - ROM: 135 degrees flexion, -5 degrees extension, pain at terminal extension passively  - strength: 5/5 in flexion, 5/5 in extension  - special tests:  (-) Lachman  (-) anterior drawer  (-) varus at 0 and 30 degrees flexion  (-) valgus at 0 and 30 degrees flexion  Neuro  - no sensory or motor deficit, grossly normal coordination, normal muscle tone    IMAGING : MRI right knee without contrast    Impression:  1. Anterolateral subcutaneous edema at the level of the patella and  patellar tendon, recommend clinical correlation with mechanism of  injury. No acute osseous abnormalities.  2. Abnormal contour of the trochlea without associated edema chronic  appearing, likely sequela of prior injury.   3. Cartilage heterogeneity and minor cartilage fissuring (Grade I - II  chondromalacia) in the patellofemoral compartment. Grade II  chondromalacia in the medial compartment.  4. ACL, PCL, medial and lateral supporting structures remains intact.     I have personally reviewed the examination and initial interpretation  and I agree with the findings.     JUTTA ELLERMANN, MD      ASSESSMENT & PLAN  Ms. Cottrell is a 44 year old year old female who presents to clinic today for follow-up regarding right knee pain after 2 subsequent falls, most recently on 7/10/2024.    MRI revealing findings consistent with patellar and patellar tendon contusion.  Additional underlying early cartilage degeneration of the trochlea and medial compartment.    Diagnosis: Contusion of right knee, mild primary osteoarthritis of right knee    Treatment options discussed today and I have recommended continuing ACE wrap for stability and compression to the knee.  Consideration for injection was reviewed for the arthritic changes, however I suspect that the contusion and soft tissue edema is the more likely cause for pain and we opted against this.  In lieu of this, we decided to start diclofenac twice daily over the next 2 weeks for anti-inflammatory  means.  She has been working with Lidia and physical therapy and we discussed returning to see her.  PT order has been placed.  I have also recommended she continue to follow with her neurologist for increasing dizzy spells.  Follow-up 6 weeks if pain does persist.    It was a pleasure seeing Ninoska.    Shane Sosa DO, [facility]  Primary Care Sports Medicine         Again, thank you for allowing me to participate in the care of your patient.        Sincerely,        Shane Sosa DO

## 2024-08-07 ENCOUNTER — TELEPHONE (OUTPATIENT)
Dept: NEUROLOGY | Facility: CLINIC | Age: 44
End: 2024-08-07
Payer: COMMERCIAL

## 2024-08-07 NOTE — PROGRESS NOTES
ESTABLISHED PATIENT NEUROLOGY NOTE    DATE OF VISIT: 8/8/2024  CLINIC LOCATION: [facility]  MRN: [id number]  PATIENT NAME: Ninoska Cottrell  YOB: 1980    REASON FOR VISIT: No chief complaint on file.    SUBJECTIVE:                                                      HISTORY OF PRESENT ILLNESS: Patient is here to follow up regarding multifactorial headaches, dizziness, and cognitive complaints.  The last visit was on 4/5/2024.  At that time we switched her Ajovy to Emgality due to skin reactions.  Please refer to my initial/other prior notes for further information.    Since the last visit, the patient reports ***.  She is on Emgality and 50 mg of topiramate daily for headache prevention.  For acute therapy she uses Maxalt, which works well.  No significant side effects or interval development of new neurological symptoms.  EXAM:                                                    Physical Exam:   Vitals: There were no vitals taken for this visit.    General: pt is in NAD, cooperative.  Skin: normal turgor, moist mucous membranes, no lesions/rashes noticed.  HEENT: ATNC, white sclera, normal conjunctiva.  Respiratory: Symmetric lung excursion, no accessory respiratory muscle use.  Abdomen: Non distended.  Neurological: awake, cooperative, follows commands, no exam changes compared to the previous visit.  ASSESSMENT AND PLAN:                                                    Assessment: 44 year old female patient presents for follow-up of migraine, tension headache, dizziness, and cognitive complaints.  She reports *** after switching from Ajovy to Emgality.  Previously we discussed that additional preventive options include Aimovig, Qulipta, and Nurtec.  For acute therapy, I renewed Maxalt prescription.    Regarding her cognitive complaints/dizziness, previously we discussed tapering her off topiramate.  Today, we reviewed it again and decided ***.    Diagnoses:    ICD-10-CM    1.  Migraine without aura and with status migrainosus, not intractable  G43.001       2. Dizzy spells  R42       3. Cognitive complaints  R41.9       4. Tension headache  G44.209         Plan:  There are no Patient Instructions on file for this visit.    Total Time: *** minutes spent on the date of the encounter doing chart review, history and exam, documentation and further activities per the note.    Nicholas Garcia MD  [facility]  (Chart documentation was completed in part with Dragon voice-recognition software. Even though reviewed, some grammatical, spelling, and word errors may remain.)

## 2024-08-08 ENCOUNTER — OFFICE VISIT (OUTPATIENT)
Dept: NEUROLOGY | Facility: CLINIC | Age: 44
End: 2024-08-08
Payer: COMMERCIAL

## 2024-08-08 VITALS — SYSTOLIC BLOOD PRESSURE: 135 MMHG | OXYGEN SATURATION: 98 % | DIASTOLIC BLOOD PRESSURE: 84 MMHG | HEART RATE: 65 BPM

## 2024-08-08 DIAGNOSIS — G43.001 MIGRAINE WITHOUT AURA AND WITH STATUS MIGRAINOSUS, NOT INTRACTABLE: Primary | ICD-10-CM

## 2024-08-08 DIAGNOSIS — R41.9 COGNITIVE COMPLAINTS: ICD-10-CM

## 2024-08-08 DIAGNOSIS — R42 DIZZY SPELLS: ICD-10-CM

## 2024-08-08 DIAGNOSIS — G44.209 TENSION HEADACHE: ICD-10-CM

## 2024-08-08 PROCEDURE — G2211 COMPLEX E/M VISIT ADD ON: HCPCS | Performed by: PSYCHIATRY & NEUROLOGY

## 2024-08-08 PROCEDURE — 99214 OFFICE O/P EST MOD 30 MIN: CPT | Performed by: PSYCHIATRY & NEUROLOGY

## 2024-08-08 RX ORDER — TOPIRAMATE 50 MG/1
50 TABLET, FILM COATED ORAL DAILY
Qty: 90 TABLET | Refills: 1 | Status: SHIPPED | OUTPATIENT
Start: 2024-08-08

## 2024-08-08 NOTE — PATIENT INSTRUCTIONS
AFTER VISIT SUMMARY (AVS):    At today's visit we thoroughly discussed current symptoms, available treatment options, and the plan.    We decided to stop Emgality and Maxalt and try preventative Nurtec.  Please take 75 mg every other day (every 48 hours).  We discussed that you may use extra Nurtec pill up to 3 times per month on the days when you do not take scheduled Nurtec if you have migraine.    Please keep the headache diary and bring it to the next follow-up visit or upload via My Chart.     Next follow-up appointment is in the next 4 months or earlier if needed.    Please do not hesitate to call me with any questions or concerns.    Thanks.

## 2024-08-08 NOTE — PROGRESS NOTES
"Ninoska Cottrell is a 44 year old female who presents for:  Chief Complaint   Patient presents with    Follow Up     Migraine's- were doing better until recent fall in July         Initial Vitals:  /84 (BP Location: Right arm, Patient Position: Sitting, Cuff Size: Adult Large)   Pulse 65   SpO2 98%  Estimated body mass index is 44.39 kg/m  as calculated from the following:    Height as of 7/10/24: 1.676 m (5' 6\").    Weight as of 8/1/24: 124.7 kg (275 lb).. There is no height or weight on file to calculate BSA. BP completed using cuff size: suyapa Portillo   "

## 2024-08-08 NOTE — LETTER
8/8/2024      Ninoska Cottrell  [address] [place] [id number]      Dear Colleague,    Thank you for referring your patient, Ninoska Cottrell, to the [facility]. Please see a copy of my visit note below.    ESTABLISHED PATIENT NEUROLOGY NOTE    DATE OF VISIT: 8/8/2024  CLINIC LOCATION: [facility]  MRN: [id number]  PATIENT NAME: Ninoska Cottrell  YOB: 1980    REASON FOR VISIT:   Chief Complaint   Patient presents with     Follow Up     Migraine's- were doing better until recent fall in July      SUBJECTIVE:                                                      HISTORY OF PRESENT ILLNESS: Patient is here to follow up regarding multifactorial headaches, dizziness, and cognitive complaints.  The last visit was on 4/5/2024.  At that time we switched her Ajovy to Emgality due to skin reactions.  Please refer to my initial/other prior notes for further information.    Since the last visit, the patient reports that her headaches improved for a while, but after she tripped and fell stepping off the escalator, headaches become more intense and frequent again.  Dizziness is also worsening.  She is on Emgality and 50 mg of topiramate daily for headache prevention.  She feels that Emgality is tolerated better, but she still has erythematous/edematous skin reactions that could last up to 2 weeks.  For acute therapy, she uses Maxalt, which works well.  No significant side effects or interval development of new neurological symptoms.  EXAM:                                                    Physical Exam:   Vitals: /84 (BP Location: Right arm, Patient Position: Sitting, Cuff Size: Adult Large)   Pulse 65   SpO2 98%     General: pt is in NAD, cooperative.  Skin: normal turgor, moist mucous membranes, no lesions/rashes noticed.  HEENT: ATNC, white sclera, normal conjunctiva.  Respiratory: Symmetric lung excursion, no accessory respiratory muscle use.  Abdomen: Non  distended.  Neurological: awake, cooperative, follows commands, no exam changes compared to the previous visit.  ASSESSMENT AND PLAN:                                                    Assessment: 44 year old female patient presents for follow-up of migraine, tension headache, dizziness, and cognitive complaints.  She reports continued skin reactions, though milder, after switching from Ajovy to Emgality.  Previously we discussed that additional preventive options include Aimovig, Qulipta, and Nurtec.  Today, we reviewed them again and decided to try preventative Nurtec, which appears to be better covered by her insurance.  It will also be used for acute therapy, and I will discontinue Maxalt.    Regarding her cognitive complaints/dizziness, previously we discussed tapering her off topiramate.  Today, we reviewed it again and decided to continue it while we make medication changes.  Once we know that Nurtec is helpful and tolerated, we might consider tapering her off topiramate.  For now, I renewed her prescription.    Diagnoses:    ICD-10-CM    1. Migraine without aura and with status migrainosus, not intractable  G43.001       2. Dizzy spells  R42       3. Cognitive complaints  R41.9       4. Tension headache  G44.209         Plan: At today's visit we thoroughly discussed current symptoms, available treatment options, and the plan.    We decided to stop Emgality and Maxalt and try preventative Nurtec.  Advised the patient to take 75 mg every other day (every 48 hours).  We discussed that she may use extra Nurtec pill up to 3 times per month on the days when she does not take scheduled Nurtec if she has migraine.    I advised the patient to keep the headache diary and bring it to the next follow-up visit or upload via My Chart.     Next follow-up appointment is in the next 4 months or earlier if needed.    Total Time: 26 minutes spent on the date of the encounter doing chart review, history and exam, documentation  "and further activities per the note.    Nicholas Garcia MD  [facility]  (Chart documentation was completed in part with Dragon voice-recognition software. Even though reviewed, some grammatical, spelling, and word errors may remain.)    Ninoska Cottrell is a 44 year old female who presents for:  Chief Complaint   Patient presents with     Follow Up     Migraine's- were doing better until recent fall in July         Initial Vitals:  /84 (BP Location: Right arm, Patient Position: Sitting, Cuff Size: Adult Large)   Pulse 65   SpO2 98%  Estimated body mass index is 44.39 kg/m  as calculated from the following:    Height as of 7/10/24: 1.676 m (5' 6\").    Weight as of 8/1/24: 124.7 kg (275 lb).. There is no height or weight on file to calculate BSA. BP completed using cuff size: large    Lo Portillo       Again, thank you for allowing me to participate in the care of your patient.        Sincerely,        Nicholas Garcia MD  "

## 2024-08-08 NOTE — PROGRESS NOTES
ESTABLISHED PATIENT NEUROLOGY NOTE    DATE OF VISIT: 8/8/2024  CLINIC LOCATION: [facility]  MRN: [id number]  PATIENT NAME: Ninoska Cottrell  YOB: 1980    REASON FOR VISIT:   Chief Complaint   Patient presents with    Follow Up     Migraine's- were doing better until recent fall in July      SUBJECTIVE:                                                      HISTORY OF PRESENT ILLNESS: Patient is here to follow up regarding multifactorial headaches, dizziness, and cognitive complaints.  The last visit was on 4/5/2024.  At that time we switched her Ajovy to Emgality due to skin reactions.  Please refer to my initial/other prior notes for further information.    Since the last visit, the patient reports that her headaches improved for a while, but after she tripped and fell stepping off the escalator, headaches become more intense and frequent again.  Dizziness is also worsening.  She is on Emgality and 50 mg of topiramate daily for headache prevention.  She feels that Emgality is tolerated better, but she still has erythematous/edematous skin reactions that could last up to 2 weeks.  For acute therapy, she uses Maxalt, which works well.  No significant side effects or interval development of new neurological symptoms.  EXAM:                                                    Physical Exam:   Vitals: /84 (BP Location: Right arm, Patient Position: Sitting, Cuff Size: Adult Large)   Pulse 65   SpO2 98%     General: pt is in NAD, cooperative.  Skin: normal turgor, moist mucous membranes, no lesions/rashes noticed.  HEENT: ATNC, white sclera, normal conjunctiva.  Respiratory: Symmetric lung excursion, no accessory respiratory muscle use.  Abdomen: Non distended.  Neurological: awake, cooperative, follows commands, no exam changes compared to the previous visit.  ASSESSMENT AND PLAN:                                                    Assessment: 44 year old female patient presents for  follow-up of migraine, tension headache, dizziness, and cognitive complaints.  She reports continued skin reactions, though milder, after switching from Ajovy to Emgality.  Previously we discussed that additional preventive options include Aimovig, Qulipta, and Nurtec.  Today, we reviewed them again and decided to try preventative Nurtec, which appears to be better covered by her insurance.  It will also be used for acute therapy, and I will discontinue Maxalt.    Regarding her cognitive complaints/dizziness, previously we discussed tapering her off topiramate.  Today, we reviewed it again and decided to continue it while we make medication changes.  Once we know that Nurtec is helpful and tolerated, we might consider tapering her off topiramate.  For now, I renewed her prescription.    Diagnoses:    ICD-10-CM    1. Migraine without aura and with status migrainosus, not intractable  G43.001       2. Dizzy spells  R42       3. Cognitive complaints  R41.9       4. Tension headache  G44.209         Plan: At today's visit we thoroughly discussed current symptoms, available treatment options, and the plan.    We decided to stop Emgality and Maxalt and try preventative Nurtec.  Advised the patient to take 75 mg every other day (every 48 hours).  We discussed that she may use extra Nurtec pill up to 3 times per month on the days when she does not take scheduled Nurtec if she has migraine.    I advised the patient to keep the headache diary and bring it to the next follow-up visit or upload via My Chart.     Next follow-up appointment is in the next 4 months or earlier if needed.    Total Time: 26 minutes spent on the date of the encounter doing chart review, history and exam, documentation and further activities per the note.    Nicholas Garcia MD  [facility]  (Chart documentation was completed in part with Dragon voice-recognition software. Even though reviewed, some grammatical, spelling, and  word errors may remain.)

## 2024-08-23 ENCOUNTER — THERAPY VISIT (OUTPATIENT)
Dept: PHYSICAL THERAPY | Facility: CLINIC | Age: 44
End: 2024-08-23
Payer: COMMERCIAL

## 2024-08-23 DIAGNOSIS — G89.29 CHRONIC PAIN OF RIGHT KNEE: Primary | ICD-10-CM

## 2024-08-23 DIAGNOSIS — M25.561 CHRONIC PAIN OF RIGHT KNEE: Primary | ICD-10-CM

## 2024-08-23 DIAGNOSIS — S80.01XD CONTUSION OF RIGHT KNEE, SUBSEQUENT ENCOUNTER: ICD-10-CM

## 2024-08-23 PROCEDURE — 97110 THERAPEUTIC EXERCISES: CPT | Mod: GP | Performed by: PHYSICAL THERAPIST

## 2024-08-23 PROCEDURE — 97161 PT EVAL LOW COMPLEX 20 MIN: CPT | Mod: GP | Performed by: PHYSICAL THERAPIST

## 2024-08-23 ASSESSMENT — ACTIVITIES OF DAILY LIVING (ADL)
AS_A_RESULT_OF_YOUR_KNEE_INJURY,_HOW_WOULD_YOU_RATE_YOUR_CURRENT_LEVEL_OF_DAILY_ACTIVITY?: ABNORMAL
KNEE_ACTIVITY_OF_DAILY_LIVING_SUM: 2
PLEASE_INDICATE_YOR_PRIMARY_REASON_FOR_REFERRAL_TO_THERAPY:: KNEE
AS_A_RESULT_OF_YOUR_KNEE_INJURY,_HOW_WOULD_YOU_RATE_YOUR_CURRENT_LEVEL_OF_DAILY_ACTIVITY?: ABNORMAL
HOW_WOULD_YOU_RATE_THE_OVERALL_FUNCTION_OF_YOUR_KNEE_DURING_YOUR_USUAL_DAILY_ACTIVITIES?: ABNORMAL
PAIN: THE SYMPTOM AFFECTS MY ACTIVITY MODERATELY
HOW_WOULD_YOU_RATE_THE_OVERALL_FUNCTION_OF_YOUR_KNEE_DURING_YOUR_USUAL_DAILY_ACTIVITIES?: ABNORMAL
HOW_WOULD_YOU_RATE_THE_CURRENT_FUNCTION_OF_YOUR_KNEE_DURING_YOUR_USUAL_DAILY_ACTIVITIES_ON_A_SCALE_FROM_0_TO_100_WITH_100_BEING_YOUR_LEVEL_OF_KNEE_FUNCTION_PRIOR_TO_YOUR_INJURY_AND_0_BEING_THE_INABILITY_TO_PERFORM_ANY_OF_YOUR_USUAL_DAILY_ACTIVITIES?: 25
HOW_WOULD_YOU_RATE_THE_CURRENT_FUNCTION_OF_YOUR_KNEE_DURING_YOUR_USUAL_DAILY_ACTIVITIES_ON_A_SCALE_FROM_0_TO_100_WITH_100_BEING_YOUR_LEVEL_OF_KNEE_FUNCTION_PRIOR_TO_YOUR_INJURY_AND_0_BEING_THE_INABILITY_TO_PERFORM_ANY_OF_YOUR_USUAL_DAILY_ACTIVITIES?: 25
PAIN: THE SYMPTOM AFFECTS MY ACTIVITY MODERATELY

## 2024-08-24 PROBLEM — M25.561 CHRONIC PAIN OF RIGHT KNEE: Status: ACTIVE | Noted: 2024-08-24

## 2024-08-24 PROBLEM — G89.29 CHRONIC PAIN OF RIGHT KNEE: Status: ACTIVE | Noted: 2024-08-24

## 2024-08-24 NOTE — PROGRESS NOTES
"PHYSICAL THERAPY EVALUATION  Type of Visit: Evaluation     Fall Risk Screen:  Fall screen completed by: PT  Have you fallen 2 or more times in the past year?: Yes  Have you fallen and had an injury in the past year?: Yes  Timed Up and Go score (seconds): 12  Is patient a fall risk?: No  Fall screen comments: demonstrates good stability on her feet with ambulation    Subjective       Presenting condition or subjective complaint: i am have trouble standing from a sitting position and walking up and down stairs. My knee hurts and doesn't want to support my weight nicely all the time. Patient initially injured her R knee in April when she felt a \"pop\" in the knee while walking. She did 1 month of PT in May with moderate improvement. She then fell while getting off an escalator at the mall landing directly on her R knee. An MRI shows cartilage degeneration and fissuring. She now has pain and limitation again with walking, stairs, and sit to stand transfers.   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     History of pre-eclampsia 2012    MDD (major depressive disorder)     Migraine without aura     Moderate persistent asthma     Morbid obesity (H)        Dates & types of surgery: appedixsidce-11/1987 right ankle reconstructed-5/1999   Past Surgical History:   Procedure Laterality Date    APPENDECTOMY      OPEN REDUCTION INTERNAL FIXATION ANKLE Right     hardware still in place         Prior diagnostic imaging/testing results: MRI; X-ray     Prior therapy history for the same diagnosis, illness or injury: No      Prior Level of Function  Transfers: Independent  Ambulation: Independent  ADL: Independent      Living Environment  Social " support: With a significant other or spouse   Type of home: House; 2-story; Basement   Stairs to enter the home: Yes 2 Is there a railing: No     Ramp: No   Stairs inside the home: Yes 12 Is there a railing: Yes     Help at home: Self Cares (home health aide/personal care attendant, family, etc)  Equipment owned: Straight Cane; Walker; Crutches; Grab bars     Employment: No    Hobbies/Interests: reading,listening to music, art& crafts, walking,gardening,candle making,leading girl scouts, collecting rocks &gemstones,baking,camping,swimming,astronomy    Patient goals for therapy: I would like to go for my long walks again without pain or my knee giving out.I would also like to use the exersice bike my  got me for the winter.    Pain assessment: Location: R knee joint/Ratin/10     Objective   KNEE EVALUATION  PAIN: Pain Level at Rest: 2/10  Pain Level with Use: 6/10  Pain is Exacerbated By: walking, stairs, sit to stand transfers  INTEGUMENTARY (edema, incisions):  minimal edema anterior R knee  GAIT:  Weightbearing Status: WBAT  Assistive Device(s): Cane (single end)  Gait Deviations:  moderate limp on R with each step  BALANCE/PROPRIOCEPTION:  n/t  WEIGHTBEARING ALIGNMENT: WNL  ROM:  AROM R 0-0-114; L 0-0-120  STRENGTH:  R quad 4-/5; hams 4-/5; hip flex 4-/5; abd 4-/5  SPECIAL TESTS:  meniscal testing (+) R  PALPATION:  tender at R medial joint line and directly over patella  JOINT MOBILITY: WNL    Assessment & Plan   CLINICAL IMPRESSIONS  Medical Diagnosis: R knee pain    Treatment Diagnosis: R knee pain   Impression/Assessment: Patient is a 44 year old female with R knee complaints.  The following significant findings have been identified: Pain, Decreased ROM/flexibility, Decreased strength, Impaired gait, Impaired muscle performance, and Decreased activity tolerance. These impairments interfere with their ability to perform self care tasks, recreational activities, household chores, household mobility,  and community mobility as compared to previous level of function.     Clinical Decision Making (Complexity):  Clinical Presentation: Stable/Uncomplicated  Clinical Presentation Rationale: based on medical and personal factors listed in PT evaluation  Clinical Decision Making (Complexity): Low complexity    PLAN OF CARE  Treatment Interventions:  Interventions: Gait Training, Manual Therapy, Neuromuscular Re-education, Therapeutic Activity, Therapeutic Exercise, Self-Care/Home Management    Long Term Goals     PT Goal 1  Goal Identifier: ambulation  Goal Description: minutes patient will be able to walk with pain1/10 and no AD - 30  Rationale: to maximize safety and independence within the community  Target Date: 10/25/24  PT Goal 2  Goal Identifier: stairs  Goal Description: patient will be able to ascend/descend stairs in reciprocal fashion  Rationale: to maximize safety and independence within the home;to maximize safety and independence within the community  Target Date: 10/25/24      Frequency of Treatment: 3 x month  Duration of Treatment: 2 months      Education Assessment:   Learner/Method: Patient;Listening;Demonstration    Risks and benefits of evaluation/treatment have been explained.   Patient/Family/caregiver agrees with Plan of Care.     Evaluation Time:     PT Eval, Low Complexity Minutes (94310): 16       Signing Clinician: Fabi Romano, PT

## 2024-08-30 ENCOUNTER — THERAPY VISIT (OUTPATIENT)
Dept: PHYSICAL THERAPY | Facility: CLINIC | Age: 44
End: 2024-08-30
Payer: COMMERCIAL

## 2024-08-30 DIAGNOSIS — M25.561 CHRONIC PAIN OF RIGHT KNEE: Primary | ICD-10-CM

## 2024-08-30 DIAGNOSIS — G89.29 CHRONIC PAIN OF RIGHT KNEE: Primary | ICD-10-CM

## 2024-08-30 DIAGNOSIS — S80.01XD CONTUSION OF RIGHT KNEE, SUBSEQUENT ENCOUNTER: ICD-10-CM

## 2024-08-30 PROCEDURE — 97140 MANUAL THERAPY 1/> REGIONS: CPT | Mod: GP | Performed by: PHYSICAL THERAPIST

## 2024-08-30 PROCEDURE — 97110 THERAPEUTIC EXERCISES: CPT | Mod: GP | Performed by: PHYSICAL THERAPIST

## 2024-09-05 ENCOUNTER — THERAPY VISIT (OUTPATIENT)
Dept: PHYSICAL THERAPY | Facility: CLINIC | Age: 44
End: 2024-09-05
Payer: COMMERCIAL

## 2024-09-05 DIAGNOSIS — M25.561 CHRONIC PAIN OF RIGHT KNEE: Primary | ICD-10-CM

## 2024-09-05 DIAGNOSIS — G89.29 CHRONIC PAIN OF RIGHT KNEE: Primary | ICD-10-CM

## 2024-09-05 PROCEDURE — 97110 THERAPEUTIC EXERCISES: CPT | Mod: GP | Performed by: PHYSICAL THERAPIST

## 2024-09-05 PROCEDURE — 97140 MANUAL THERAPY 1/> REGIONS: CPT | Mod: GP | Performed by: PHYSICAL THERAPIST

## 2024-09-11 DIAGNOSIS — J45.40 MODERATE PERSISTENT ASTHMA WITHOUT COMPLICATION: ICD-10-CM

## 2024-09-11 RX ORDER — FLUTICASONE PROPIONATE 220 UG/1
2 AEROSOL, METERED RESPIRATORY (INHALATION) 2 TIMES DAILY
Qty: 12 G | Refills: 3 | Status: SHIPPED | OUTPATIENT
Start: 2024-09-11

## 2024-09-13 ENCOUNTER — THERAPY VISIT (OUTPATIENT)
Dept: PHYSICAL THERAPY | Facility: CLINIC | Age: 44
End: 2024-09-13
Payer: COMMERCIAL

## 2024-09-13 DIAGNOSIS — M25.561 CHRONIC PAIN OF RIGHT KNEE: Primary | ICD-10-CM

## 2024-09-13 DIAGNOSIS — G89.29 CHRONIC PAIN OF RIGHT KNEE: Primary | ICD-10-CM

## 2024-09-13 PROCEDURE — 97140 MANUAL THERAPY 1/> REGIONS: CPT | Mod: GP | Performed by: PHYSICAL THERAPIST

## 2024-09-19 ENCOUNTER — THERAPY VISIT (OUTPATIENT)
Dept: PHYSICAL THERAPY | Facility: CLINIC | Age: 44
End: 2024-09-19
Payer: COMMERCIAL

## 2024-09-19 DIAGNOSIS — G89.29 CHRONIC PAIN OF RIGHT KNEE: Primary | ICD-10-CM

## 2024-09-19 DIAGNOSIS — M25.561 CHRONIC PAIN OF RIGHT KNEE: Primary | ICD-10-CM

## 2024-09-19 PROCEDURE — 97110 THERAPEUTIC EXERCISES: CPT | Mod: GP | Performed by: PHYSICAL THERAPIST

## 2024-09-19 PROCEDURE — 97140 MANUAL THERAPY 1/> REGIONS: CPT | Mod: GP | Performed by: PHYSICAL THERAPIST

## 2024-10-03 ENCOUNTER — THERAPY VISIT (OUTPATIENT)
Dept: PHYSICAL THERAPY | Facility: CLINIC | Age: 44
End: 2024-10-03
Payer: COMMERCIAL

## 2024-10-03 DIAGNOSIS — M25.561 CHRONIC PAIN OF RIGHT KNEE: Primary | ICD-10-CM

## 2024-10-03 DIAGNOSIS — G89.29 CHRONIC PAIN OF RIGHT KNEE: Primary | ICD-10-CM

## 2024-10-03 PROCEDURE — 97140 MANUAL THERAPY 1/> REGIONS: CPT | Mod: GP | Performed by: PHYSICAL THERAPIST

## 2024-10-03 PROCEDURE — 97110 THERAPEUTIC EXERCISES: CPT | Mod: GP | Performed by: PHYSICAL THERAPIST

## 2024-10-03 ASSESSMENT — ACTIVITIES OF DAILY LIVING (ADL)
RISE FROM A CHAIR: ACTIVITY IS MINIMALLY DIFFICULT
HOW_WOULD_YOU_RATE_THE_OVERALL_FUNCTION_OF_YOUR_KNEE_DURING_YOUR_USUAL_DAILY_ACTIVITIES?: NEARLY NORMAL
SWELLING: THE SYMPTOM AFFECTS MY ACTIVITY SLIGHTLY
KNEEL ON THE FRONT OF YOUR KNEE: ACTIVITY IS SOMEWHAT DIFFICULT
WALK: ACTIVITY IS MINIMALLY DIFFICULT
GIVING WAY, BUCKLING OR SHIFTING OF KNEE: I HAVE THE SYMPTOM BUT IT DOES NOT AFFECT MY ACTIVITY
AS_A_RESULT_OF_YOUR_KNEE_INJURY,_HOW_WOULD_YOU_RATE_YOUR_CURRENT_LEVEL_OF_DAILY_ACTIVITY?: NEARLY NORMAL
LIMPING: I HAVE THE SYMPTOM BUT IT DOES NOT AFFECT MY ACTIVITY
SIT WITH YOUR KNEE BENT: ACTIVITY IS FAIRLY DIFFICULT
KNEE_ACTIVITY_OF_DAILY_LIVING_SCORE: 67.14
RISE FROM A CHAIR: ACTIVITY IS MINIMALLY DIFFICULT
LIMPING: I HAVE THE SYMPTOM BUT IT DOES NOT AFFECT MY ACTIVITY
STIFFNESS: THE SYMPTOM AFFECTS MY ACTIVITY SLIGHTLY
GO UP STAIRS: ACTIVITY IS SOMEWHAT DIFFICULT
SQUAT: ACTIVITY IS FAIRLY DIFFICULT
SQUAT: ACTIVITY IS FAIRLY DIFFICULT
KNEEL ON THE FRONT OF YOUR KNEE: ACTIVITY IS SOMEWHAT DIFFICULT
AS_A_RESULT_OF_YOUR_KNEE_INJURY,_HOW_WOULD_YOU_RATE_YOUR_CURRENT_LEVEL_OF_DAILY_ACTIVITY?: NEARLY NORMAL
RAW_SCORE: 47
PAIN: THE SYMPTOM AFFECTS MY ACTIVITY SLIGHTLY
GO DOWN STAIRS: ACTIVITY IS MINIMALLY DIFFICULT
STAND: ACTIVITY IS NOT DIFFICULT
PAIN: THE SYMPTOM AFFECTS MY ACTIVITY SLIGHTLY
GIVING WAY, BUCKLING OR SHIFTING OF KNEE: I HAVE THE SYMPTOM BUT IT DOES NOT AFFECT MY ACTIVITY
GO DOWN STAIRS: ACTIVITY IS MINIMALLY DIFFICULT
GO UP STAIRS: ACTIVITY IS SOMEWHAT DIFFICULT
WEAKNESS: THE SYMPTOM AFFECTS MY ACTIVITY SLIGHTLY
STAND: ACTIVITY IS NOT DIFFICULT
SWELLING: THE SYMPTOM AFFECTS MY ACTIVITY SLIGHTLY
KNEE_ACTIVITY_OF_DAILY_LIVING_SUM: 47
WALK: ACTIVITY IS MINIMALLY DIFFICULT
STIFFNESS: THE SYMPTOM AFFECTS MY ACTIVITY SLIGHTLY
WEAKNESS: THE SYMPTOM AFFECTS MY ACTIVITY SLIGHTLY
SIT WITH YOUR KNEE BENT: ACTIVITY IS FAIRLY DIFFICULT
HOW_WOULD_YOU_RATE_THE_OVERALL_FUNCTION_OF_YOUR_KNEE_DURING_YOUR_USUAL_DAILY_ACTIVITIES?: NEARLY NORMAL
HOW_WOULD_YOU_RATE_THE_CURRENT_FUNCTION_OF_YOUR_KNEE_DURING_YOUR_USUAL_DAILY_ACTIVITIES_ON_A_SCALE_FROM_0_TO_100_WITH_100_BEING_YOUR_LEVEL_OF_KNEE_FUNCTION_PRIOR_TO_YOUR_INJURY_AND_0_BEING_THE_INABILITY_TO_PERFORM_ANY_OF_YOUR_USUAL_DAILY_ACTIVITIES?: 50
HOW_WOULD_YOU_RATE_THE_CURRENT_FUNCTION_OF_YOUR_KNEE_DURING_YOUR_USUAL_DAILY_ACTIVITIES_ON_A_SCALE_FROM_0_TO_100_WITH_100_BEING_YOUR_LEVEL_OF_KNEE_FUNCTION_PRIOR_TO_YOUR_INJURY_AND_0_BEING_THE_INABILITY_TO_PERFORM_ANY_OF_YOUR_USUAL_DAILY_ACTIVITIES?: 50

## 2024-10-11 ENCOUNTER — MYC MEDICAL ADVICE (OUTPATIENT)
Dept: NEUROLOGY | Facility: CLINIC | Age: 44
End: 2024-10-11
Payer: COMMERCIAL

## 2024-10-21 ENCOUNTER — THERAPY VISIT (OUTPATIENT)
Dept: PHYSICAL THERAPY | Facility: CLINIC | Age: 44
End: 2024-10-21
Payer: COMMERCIAL

## 2024-10-21 DIAGNOSIS — M25.561 CHRONIC PAIN OF RIGHT KNEE: Primary | ICD-10-CM

## 2024-10-21 DIAGNOSIS — G89.29 CHRONIC PAIN OF RIGHT KNEE: Primary | ICD-10-CM

## 2024-10-21 PROCEDURE — 97140 MANUAL THERAPY 1/> REGIONS: CPT | Mod: GP | Performed by: PHYSICAL THERAPIST

## 2024-11-26 NOTE — PROGRESS NOTES
ESTABLISHED PATIENT NEUROLOGY NOTE    DATE OF VISIT: 11/27/2024  CLINIC LOCATION: [facility]  MRN: [id number]  PATIENT NAME: Ninoska Cottrell  YOB: 1980    REASON FOR VISIT: No chief complaint on file.    SUBJECTIVE:                                                      HISTORY OF PRESENT ILLNESS: Patient is here to follow up regarding multifactorial headaches, dizziness, and cognitive complaints.  She was last seen on 8/8/2024.  At that time, additional medication changes were made.  Please refer to my initial/other prior notes for further information.    Since the last visit, the patient reports *** after switching to a preventative Nurtec regimen.  She is also on topiramate, 50 mg daily.  No interval development of new neurological symptoms.  EXAM:                                                    Physical Exam:   Vitals: There were no vitals taken for this visit.    General: pt is in NAD, cooperative.  Skin: normal turgor, moist mucous membranes, no lesions/rashes noticed.  HEENT: ATNC, white sclera, normal conjunctiva.  Respiratory: Symmetric lung excursion, no accessory respiratory muscle use.  Abdomen: Non distended.  Neurological: awake, cooperative, follows commands, face is symmetric, equally moves all extremities, no dysmetria bilaterally.  ASSESSMENT AND PLAN:                                                    Assessment: 44 year old female patient presents for follow-up of migraine, tension headaches, dizziness, and cognitive complaints.  She reports *** after switching to preventative Nurtec.  Previously she had skin reactions on Ajovy and Emgality.  Aimovig could lead to similar reactions, but Qulipta could be considered if Nurtec is not effective.    Diagnoses:    ICD-10-CM    1. Migraine without aura and with status migrainosus, not intractable  G43.001       2. Tension headache  G44.209       3. Cognitive complaints  R41.9       4. Dizzy spells  R42         Plan:  There  are no Patient Instructions on file for this visit.    Total Time: *** minutes spent on the date of the encounter doing chart review, history and exam, documentation and further activities per the note.    Nicholas Garcia MD  [facility]  (Chart documentation was completed in part with Dragon voice-recognition software. Even though reviewed, some grammatical, spelling, and word errors may remain.)

## 2024-11-27 ENCOUNTER — TELEPHONE (OUTPATIENT)
Dept: NEUROLOGY | Facility: CLINIC | Age: 44
End: 2024-11-27

## 2024-11-27 ENCOUNTER — OFFICE VISIT (OUTPATIENT)
Dept: NEUROLOGY | Facility: CLINIC | Age: 44
End: 2024-11-27
Payer: COMMERCIAL

## 2024-11-27 VITALS — HEART RATE: 91 BPM | SYSTOLIC BLOOD PRESSURE: 136 MMHG | OXYGEN SATURATION: 98 % | DIASTOLIC BLOOD PRESSURE: 80 MMHG

## 2024-11-27 DIAGNOSIS — G44.209 TENSION HEADACHE: ICD-10-CM

## 2024-11-27 DIAGNOSIS — G43.001 MIGRAINE WITHOUT AURA AND WITH STATUS MIGRAINOSUS, NOT INTRACTABLE: Primary | ICD-10-CM

## 2024-11-27 DIAGNOSIS — R42 DIZZY SPELLS: ICD-10-CM

## 2024-11-27 DIAGNOSIS — R41.9 COGNITIVE COMPLAINTS: ICD-10-CM

## 2024-11-27 NOTE — TELEPHONE ENCOUNTER
[facility]ail Pharmacy Prior Authorization Team   Phone: [phone]    PA Initiation    Medication: NURTEC 75 MG PO TBDP  Insurance Company: [employer] - Phone [phone] Fax [phone]  Pharmacy Filling the Rx: [facility] - [place], [place] - [address]  Filling Pharmacy Phone: [phone]  Filling Pharmacy Fax:    Start Date: 11/27/2024    COMPLETED FORM AND MANUALLY FAXED TO INSURANCE

## 2024-11-27 NOTE — PROGRESS NOTES
ESTABLISHED PATIENT NEUROLOGY NOTE    DATE OF VISIT: 11/27/2024  CLINIC LOCATION: [facility]  MRN: [id number]  PATIENT NAME: Ninoska Cottrell  YOB: 1980    REASON FOR VISIT:   Chief Complaint   Patient presents with    Follow Up     Migraines- unable to get more than 8 tabs and patient needs 16 to take every other day (reviewed file and PA is for 8 tabs monthly need to apply for PA with exception that allows 16 tabs)      SUBJECTIVE:                                                      HISTORY OF PRESENT ILLNESS: Patient is here to follow up regarding multifactorial headaches, dizziness, and cognitive complaints.  She was last seen on 8/8/2024.  At that time, additional medication changes were made.  Please refer to my initial/other prior notes for further information.    Since the last visit, the patient reports that Nurtec seems to work, but she has difficulty with insurance getting enough medication for a month (only given 8 pills instead of 16).  She feels that it is effective during the time she takes it.  She is also on topiramate, 50 mg daily.  No interval development of new neurological symptoms.  EXAM:                                                    Physical Exam:   Vitals: /80 (BP Location: Right arm, Patient Position: Sitting, Cuff Size: Adult Large)   Pulse 91   SpO2 98%     General: pt is in NAD, cooperative.  Skin: normal turgor, moist mucous membranes, no lesions/rashes noticed.  HEENT: ATNC, white sclera, normal conjunctiva.  Respiratory: Symmetric lung excursion, no accessory respiratory muscle use.  Abdomen: Non distended.  Neurological: awake, cooperative, follows commands, face is symmetric, equally moves all extremities, no dysmetria bilaterally.  ASSESSMENT AND PLAN:                                                    Assessment: 44 year old female patient presents for follow-up of migraine, tension headaches, dizziness, and cognitive complaints.  She was  unable to get sufficient amount of Nurtec for preventative regimen, but feels that it works when she is able to take it.  Previously she had skin reactions on Ajovy and Emgality.  Aimovig could lead to similar reactions, but Qulipta could be reconsidered (it was previously denied by her insurance company) if Nurtec is not effective or not covered.  Once Nurtec is fully approved and working, we might consider reduction in dose of topiramate to address her cognitive complaints.    Diagnoses:    ICD-10-CM    1. Migraine without aura and with status migrainosus, not intractable  G43.001       2. Tension headache  G44.209       3. Cognitive complaints  R41.9       4. Dizzy spells  R42         Plan: At today's visit we thoroughly discussed current symptoms, available treatment options, and the plan.    We decided continue the same dose of topiramate while we contact patient's insurance company to approve adequate quantity of Nurtec.  I asked the patient to reach out if she needs topiramate refill prior to her next visit.    I advised the patient to keep the headache diary and bring it to the next follow-up visit or upload via My Chart.     Next follow-up appointment is in the next 3-4 months or earlier if needed.    Total Time: 18 minutes spent on the date of the encounter doing chart review, history and exam, documentation and further activities per the note.    Nicholas Garcia MD  [facility]  (Chart documentation was completed in part with Dragon voice-recognition software. Even though reviewed, some grammatical, spelling, and word errors may remain.)

## 2024-11-27 NOTE — TELEPHONE ENCOUNTER
Prior Authorization Specialty Medication Request    Medication/Dose: rimegepant (NURTEC) 75 mg ODT tabs- place 1 tab (75mg) under tongue every 48 hours   Diagnosis and ICD code (if different than what is on RX):  G43.001  New/renewal/insurance change PA/secondary ins. PA: Patient needs approval for 16 tabs per month (over the allowed 8) to take every other day as this medication is now being used as a preventive rather than for acute therapy   Previously Tried and Failed:  Amitriptyline,Rizatriptan, Emgality, Ajovy, Phrenilin     Important Lab Values:   Rationale: Patient needs 16 tabs per month to use as a preventative rather than for acute therapy. When able to take every other day decreases migraine incidents     Insurance   Primary: [employer]   Insurance ID:  [id number]    Secondary (if applicable):  Insurance ID:      Pharmacy Information (if different than what is on RX)  Name:  [facility]   Phone:  [phone]  Fax:[phone]    Clinic Information  Preferred routing pool for dept communication: [facility] Pool

## 2024-11-27 NOTE — PATIENT INSTRUCTIONS
AFTER VISIT SUMMARY (AVS):    At today's visit we thoroughly discussed current symptoms, available treatment options, and the plan.    We decided continue the same dose of topiramate while we contact your insurance company to approve adequate quantity of Nurtec.  Please reach out if you need topiramate refill prior to her next visit.    Please keep the headache diary and bring it to the next follow-up visit or upload via My Chart.     Next follow-up appointment is in the next 3-4 months or earlier if needed.    Please do not hesitate to call me with any questions or concerns.    Thanks.

## 2024-11-27 NOTE — PROGRESS NOTES
"Ninoska Cottrell is a 44 year old female who presents for:  Chief Complaint   Patient presents with    Follow Up     Migraines- unable to get more than 8 tabs and patient needs 16 to take every other day (reviewed file and PA is for 8 tabs monthly need to apply for PA with exception that allows 16 tabs)         Initial Vitals:  /80 (BP Location: Right arm, Patient Position: Sitting, Cuff Size: Adult Large)   Pulse 91   SpO2 98%  Estimated body mass index is 44.39 kg/m  as calculated from the following:    Height as of 7/10/24: 1.676 m (5' 6\").    Weight as of 8/1/24: 124.7 kg (275 lb).. There is no height or weight on file to calculate BSA. BP completed using cuff size: suyapa Portillo   "

## 2024-11-27 NOTE — LETTER
11/27/2024      Ninoska Cottrell  [address] [place] [id number]      Dear Colleague,    Thank you for referring your patient, Ninoska Cottrell, to the [facility] NEUROLOGY CLINICS [facility]. Please see a copy of my visit note below.    ESTABLISHED PATIENT NEUROLOGY NOTE    DATE OF VISIT: 11/27/2024  CLINIC LOCATION: [facility]  MRN: [id number]  PATIENT NAME: Ninoska Cottrell  YOB: 1980    REASON FOR VISIT:   Chief Complaint   Patient presents with     Follow Up     Migraines- unable to get more than 8 tabs and patient needs 16 to take every other day (reviewed file and PA is for 8 tabs monthly need to apply for PA with exception that allows 16 tabs)      SUBJECTIVE:                                                      HISTORY OF PRESENT ILLNESS: Patient is here to follow up regarding multifactorial headaches, dizziness, and cognitive complaints.  She was last seen on 8/8/2024.  At that time, additional medication changes were made.  Please refer to my initial/other prior notes for further information.    Since the last visit, the patient reports that Nurtec seems to work, but she has difficulty with insurance getting enough medication for a month (only given 8 pills instead of 16).  She feels that it is effective during the time she takes it.  She is also on topiramate, 50 mg daily.  No interval development of new neurological symptoms.  EXAM:                                                    Physical Exam:   Vitals: /80 (BP Location: Right arm, Patient Position: Sitting, Cuff Size: Adult Large)   Pulse 91   SpO2 98%     General: pt is in NAD, cooperative.  Skin: normal turgor, moist mucous membranes, no lesions/rashes noticed.  HEENT: ATNC, white sclera, normal conjunctiva.  Respiratory: Symmetric lung excursion, no accessory respiratory muscle use.  Abdomen: Non distended.  Neurological: awake, cooperative, follows commands, face is symmetric, equally moves all  extremities, no dysmetria bilaterally.  ASSESSMENT AND PLAN:                                                    Assessment: 44 year old female patient presents for follow-up of migraine, tension headaches, dizziness, and cognitive complaints.  She was unable to get sufficient amount of Nurtec for preventative regimen, but feels that it works when she is able to take it.  Previously she had skin reactions on Ajovy and Emgality.  Aimovig could lead to similar reactions, but Qulipta could be reconsidered (it was previously denied by her insurance company) if Nurtec is not effective or not covered.  Once Nurtec is fully approved and working, we might consider reduction in dose of topiramate to address her cognitive complaints.    Diagnoses:    ICD-10-CM    1. Migraine without aura and with status migrainosus, not intractable  G43.001       2. Tension headache  G44.209       3. Cognitive complaints  R41.9       4. Dizzy spells  R42         Plan: At today's visit we thoroughly discussed current symptoms, available treatment options, and the plan.    We decided continue the same dose of topiramate while we contact patient's insurance company to approve adequate quantity of Nurtec.  I asked the patient to reach out if she needs topiramate refill prior to her next visit.    I advised the patient to keep the headache diary and bring it to the next follow-up visit or upload via My Chart.     Next follow-up appointment is in the next 3-4 months or earlier if needed.    Total Time: 18 minutes spent on the date of the encounter doing chart review, history and exam, documentation and further activities per the note.    Nicholas Garcia MD  [facility] Neurology  (Chart documentation was completed in part with Dragon voice-recognition software. Even though reviewed, some grammatical, spelling, and word errors may remain.)    Ninoska Cottrell is a 44 year old female who presents for:  Chief Complaint   Patient presents with  "    Follow Up     Migraines- unable to get more than 8 tabs and patient needs 16 to take every other day (reviewed file and PA is for 8 tabs monthly need to apply for PA with exception that allows 16 tabs)         Initial Vitals:  /80 (BP Location: Right arm, Patient Position: Sitting, Cuff Size: Adult Large)   Pulse 91   SpO2 98%  Estimated body mass index is 44.39 kg/m  as calculated from the following:    Height as of 7/10/24: 1.676 m (5' 6\").    Weight as of 8/1/24: 124.7 kg (275 lb).. There is no height or weight on file to calculate BSA. BP completed using cuff size: large    Lo Portillo       Again, thank you for allowing me to participate in the care of your patient.        Sincerely,        Nicholas Garcia MD  "

## 2024-11-27 NOTE — TELEPHONE ENCOUNTER
Prior Authorization Approval    Medication: NURTEC 75 MG PO TBDP  Authorization Effective Date: 10/27/2024  Authorization Expiration Date: 11/27/2025  Insurance Company: [employer] - Phone [phone] Fax [phone]  Which Pharmacy is filling the prescription: [facility] - [place], [place] - [address]  Pharmacy Notified: YES  Patient Notified: YES (faxed approval letter to pharmacy and notified patient via [employer] message)

## 2025-03-03 NOTE — PROGRESS NOTES
OB/GYN clinic visit    CC: BP f/u PP    HPI: Ninoska Cottrell is a 37 year old female  here for BP check following  on 17 following IOL for GHTN. Started on procardia XL 30 mg on  when clinic BPs were in the 150s/90s. No HA, no new edema. She does get light headed now with pumping, which she is doing 3 times daily and supplementing with formula. Drinking 3L of fluid daily and eating regularly. No exertional light headedness or dyspnea. Ongoing back pain which is a chronic issue, has not seen her physical therapy at [facility] for a while.      /76 (BP Location: Right arm, Patient Position: Chair, Cuff Size: Adult Large)  Wt 280 lb (127 kg)  LMP 2016 (Approximate)  BMI 45.19 kg/m2   Gen: resting comfortably, in NAD  Breast Exam: No visible masses or suspicious skin changes. Very mild erythema to the lateral right breast, tenderness to palpation of medial left breast in the right upper quadrant, no induration.  No discrete or dominant masses to palpation. No nipple discharge. No axillary lymphadenopathy.     A/P: Ninoska Cottrell is a 37 year old female  PPD#10 s/p  c/b GHTN.     GHTN: continue procardia XL for the next 3 weeks. If light headedness persists consider discontinuing with 48-72 hour clinic nurse BP check.    Breast tenderness: not overly engorged, however discussed that pumping three times daily is likely contributing. Encouraged her to see lactation today for further education of breast feeding/pumping. No signs of mastitis.    Back pain: chronic. Return to physical therapy at [facility].     Return to clinic in 3 weeks to evaluate further need for procardia.     Traci Padron MD   
negative...

## 2025-03-11 DIAGNOSIS — G43.001 MIGRAINE WITHOUT AURA AND WITH STATUS MIGRAINOSUS, NOT INTRACTABLE: ICD-10-CM

## 2025-03-11 NOTE — TELEPHONE ENCOUNTER
Please see note below for last office visit and upcoming office visit.     Last Written Prescription Date:  8/8/2024  Last Fill Quantity: 16,  # refills: 3    Patient is due for a refill. Routing refill request to provider for review/approval because:  Drug not on the [facility] refill protocol     Elidia MCCRACKEN RN, BSN  [facility]       No

## 2025-03-11 NOTE — TELEPHONE ENCOUNTER
Received fax from pharmacy requesting refill(s) for     rimegepant (NURTEC) 75 MG ODT tablet     Last refilled on 2/1/25    Pt last seen on 11/27/24  Next appt scheduled for 3/27/25    E-prescribe to:       [facility] - [place], [place] - [address]     Will route to [facility] for review

## 2025-03-25 ENCOUNTER — PATIENT OUTREACH (OUTPATIENT)
Dept: CARE COORDINATION | Facility: CLINIC | Age: 45
End: 2025-03-25
Payer: COMMERCIAL

## 2025-03-26 ENCOUNTER — TELEPHONE (OUTPATIENT)
Dept: NEUROLOGY | Facility: CLINIC | Age: 45
End: 2025-03-26
Payer: COMMERCIAL

## 2025-03-26 NOTE — TELEPHONE ENCOUNTER
Attempted to reach patient to remind them about appointment scheduled with Nicholas Garcia MD on 3/27/25 in our [place] clinic.    A voicemail was left with a call back number if the patient has questions or would like to reschedule.

## 2025-04-08 ENCOUNTER — PATIENT OUTREACH (OUTPATIENT)
Dept: CARE COORDINATION | Facility: CLINIC | Age: 45
End: 2025-04-08
Payer: COMMERCIAL

## 2025-04-22 DIAGNOSIS — G43.001 MIGRAINE WITHOUT AURA AND WITH STATUS MIGRAINOSUS, NOT INTRACTABLE: ICD-10-CM

## 2025-04-30 NOTE — PROGRESS NOTES
ESTABLISHED PATIENT NEUROLOGY NOTE    DATE OF VISIT: 5/1/2025  CLINIC LOCATION: [facility]  MRN: [id number]  PATIENT NAME: Ninoska Cottrell  YOB: 1980    REASON FOR VISIT: No chief complaint on file.    SUBJECTIVE:                                                      HISTORY OF PRESENT ILLNESS: Patient is here to follow up regarding multifactorial headaches, dizziness, and cognitive complaints.  The last visit was on 11/27/2024.  Please refer to my initial/other prior notes for further information.    Since the last visit, the patient reports ***.  She is on 75 mg of Nurtec every 48 hours in addition to topiramate 50 mg daily.  No significant side effects or interval development of new focal neurological symptoms.  EXAM:                                                    Physical Exam:   Vitals: There were no vitals taken for this visit.    General: pt is in NAD, cooperative.  Skin: normal turgor, moist mucous membranes, no lesions/rashes noticed.  HEENT: ATNC, white sclera, normal conjunctiva.  Respiratory: Symmetric lung excursion, no accessory respiratory muscle use.  Abdomen: Non distended.  Neurological: awake, cooperative, follows commands, no exam changes compared to the previous visits.  ASSESSMENT AND PLAN:                                                    Assessment: 45 year old female patient presents for follow-up of migraine, tension headaches, dizziness, and cognitive complaints.  She reports *** on preventative Nurtec.  Appears to be discussed that we could consider reducing the dose of topiramate or tapering her off yet if headaches are controlled, which in turn could help her cognitive complaints.    Diagnoses:  No diagnosis found.    Plan:  There are no Patient Instructions on file for this visit.    Total Time: *** minutes spent on the date of the encounter doing chart review, history and exam, documentation and further activities per the note.    Nicholas CLEANING  MD Jose  [facility] Neurology  (Chart documentation was completed in part with Dragon voice-recognition software. Even though reviewed, some grammatical, spelling, and word errors may remain.)

## 2025-05-01 ENCOUNTER — OFFICE VISIT (OUTPATIENT)
Dept: NEUROLOGY | Facility: CLINIC | Age: 45
End: 2025-05-01
Payer: COMMERCIAL

## 2025-05-01 VITALS — OXYGEN SATURATION: 97 % | SYSTOLIC BLOOD PRESSURE: 128 MMHG | DIASTOLIC BLOOD PRESSURE: 75 MMHG | HEART RATE: 77 BPM

## 2025-05-01 DIAGNOSIS — G44.209 TENSION HEADACHE: ICD-10-CM

## 2025-05-01 DIAGNOSIS — G43.001 MIGRAINE WITHOUT AURA AND WITH STATUS MIGRAINOSUS, NOT INTRACTABLE: Primary | ICD-10-CM

## 2025-05-01 RX ORDER — TOPIRAMATE 50 MG/1
50 TABLET, FILM COATED ORAL DAILY
Qty: 90 TABLET | Refills: 1 | Status: SHIPPED | OUTPATIENT
Start: 2025-05-01

## 2025-05-01 NOTE — PROGRESS NOTES
ESTABLISHED PATIENT NEUROLOGY NOTE    DATE OF VISIT: 5/1/2025  CLINIC LOCATION: [facility]  MRN: [id number]  PATIENT NAME: Ninoska Cottrell  YOB: 1980    REASON FOR VISIT:   Chief Complaint   Patient presents with    Follow Up     medication     SUBJECTIVE:                                                      HISTORY OF PRESENT ILLNESS: Patient is here to follow up regarding multifactorial headaches, dizziness, and cognitive complaints.  The last visit was on 11/27/2024.  Please refer to my initial/other prior notes for further information.    Since the last visit, the patient reports that headaches are stable, though she ran out of refills of Nurtec.  She is on 75 mg of Nurtec every 48 hours in addition to topiramate 50 mg daily.  No significant side effects or interval development of new focal neurological symptoms.  EXAM:                                                    Physical Exam:   Vitals: /75   Pulse 77   SpO2 97%     General: pt is in NAD, cooperative.  Skin: normal turgor, moist mucous membranes, no lesions/rashes noticed.  HEENT: ATNC, white sclera, normal conjunctiva.  Respiratory: Symmetric lung excursion, no accessory respiratory muscle use.  Abdomen: Non distended.  Neurological: awake, cooperative, follows commands, no exam changes compared to the previous visits.  ASSESSMENT AND PLAN:                                                    Assessment: 45 year old female patient presents for follow-up of mainly migraine and tension headaches.  I also previously saw her for dizziness and cognitive complaints.  She reports that her headaches are controlled on preventative Nurtec.  Previously we discussed that we could consider reducing the dose of topiramate or tapering her off it if headaches are controlled, which in turn could help her cognitive complaints.  Today, we discussed this option, but decided to continue on the same dose of topiramate for several months  once Nurtec gets restarted.  After that we will might consider gradually tapering her off or at least reducing the dose of topiramate.    Diagnoses:    ICD-10-CM    1. Migraine without aura and with status migrainosus, not intractable  G43.001       2. Tension headache  G44.209           Plan: At today's visit we thoroughly discussed current symptoms, available treatment options, and the plan.    We decided to continue the same regimen.  Topiramate and Nurtec prescriptions were refilled.    Advised the patient to keep the headache diary and bring it to the next follow-up visit or upload via My Chart.     Next follow-up appointment is in the next 6 months or earlier if needed.    Total Time: 14 minutes spent on the date of the encounter doing chart review, history and exam, documentation and further activities per the note.    Nicholas Garcia MD  [facility] Neurology  (Chart documentation was completed in part with Dragon voice-recognition software. Even though reviewed, some grammatical, spelling, and word errors may remain.)

## 2025-05-01 NOTE — LETTER
5/1/2025      Ninoska Cottrell  [address] [place] [phone]      Dear Colleague,    Thank you for referring your patient, Ninoska Cottrell, to the [facility] NEUROLOGY CLINICS [facility]. Please see a copy of my visit note below.    ESTABLISHED PATIENT NEUROLOGY NOTE    DATE OF VISIT: 5/1/2025  CLINIC LOCATION: [facility]  MRN: [id number]  PATIENT NAME: Ninoska Cottrell  YOB: 1980    REASON FOR VISIT:   Chief Complaint   Patient presents with     Follow Up     medication     SUBJECTIVE:                                                      HISTORY OF PRESENT ILLNESS: Patient is here to follow up regarding multifactorial headaches, dizziness, and cognitive complaints.  The last visit was on 11/27/2024.  Please refer to my initial/other prior notes for further information.    Since the last visit, the patient reports that headaches are stable, though she ran out of refills of Nurtec.  She is on 75 mg of Nurtec every 48 hours in addition to topiramate 50 mg daily.  No significant side effects or interval development of new focal neurological symptoms.  EXAM:                                                    Physical Exam:   Vitals: /75   Pulse 77   SpO2 97%     General: pt is in NAD, cooperative.  Skin: normal turgor, moist mucous membranes, no lesions/rashes noticed.  HEENT: ATNC, white sclera, normal conjunctiva.  Respiratory: Symmetric lung excursion, no accessory respiratory muscle use.  Abdomen: Non distended.  Neurological: awake, cooperative, follows commands, no exam changes compared to the previous visits.  ASSESSMENT AND PLAN:                                                    Assessment: 45 year old female patient presents for follow-up of mainly migraine and tension headaches.  I also previously saw her for dizziness and cognitive complaints.  She reports that her headaches are controlled on preventative Nurtec.  Previously we discussed that we could consider  reducing the dose of topiramate or tapering her off it if headaches are controlled, which in turn could help her cognitive complaints.  Today, we discussed this option, but decided to continue on the same dose of topiramate for several months once Nurtec gets restarted.  After that we will might consider gradually tapering her off or at least reducing the dose of topiramate.    Diagnoses:    ICD-10-CM    1. Migraine without aura and with status migrainosus, not intractable  G43.001       2. Tension headache  G44.209           Plan: At today's visit we thoroughly discussed current symptoms, available treatment options, and the plan.    We decided to continue the same regimen.  Topiramate and Nurtec prescriptions were refilled.    Advised the patient to keep the headache diary and bring it to the next follow-up visit or upload via My Chart.     Next follow-up appointment is in the next 6 months or earlier if needed.    Total Time: 14 minutes spent on the date of the encounter doing chart review, history and exam, documentation and further activities per the note.    Nicholas Garcia MD  [facility] Neurology  (Chart documentation was completed in part with Dragon voice-recognition software. Even though reviewed, some grammatical, spelling, and word errors may remain.)      Again, thank you for allowing me to participate in the care of your patient.        Sincerely,        Nicholas Garcia MD    Electronically signed

## 2025-05-01 NOTE — PATIENT INSTRUCTIONS
AFTER VISIT SUMMARY (AVS):    At today's visit we thoroughly discussed current symptoms, available treatment options, and the plan.    We decided to continue the same regimen.  Topiramate and Nurtec prescriptions were refilled.    Please keep the headache diary and bring it to the next follow-up visit or upload via My Chart.     Next follow-up appointment is in the next 6 months or earlier if needed.    Please do not hesitate to call me with any questions or concerns.    Thanks.

## 2025-08-18 ENCOUNTER — APPOINTMENT (OUTPATIENT)
Dept: MRI IMAGING | Facility: CLINIC | Age: 45
End: 2025-08-18
Attending: EMERGENCY MEDICINE
Payer: COMMERCIAL

## 2025-08-18 ENCOUNTER — HOSPITAL ENCOUNTER (OUTPATIENT)
Facility: CLINIC | Age: 45
Setting detail: OBSERVATION
Discharge: HOME HEALTH | End: 2025-08-20
Admitting: STUDENT IN AN ORGANIZED HEALTH CARE EDUCATION/TRAINING PROGRAM
Payer: COMMERCIAL

## 2025-08-18 DIAGNOSIS — M54.41 ACUTE RIGHT-SIDED LOW BACK PAIN WITH RIGHT-SIDED SCIATICA: Primary | ICD-10-CM

## 2025-08-18 LAB
ANION GAP SERPL CALCULATED.3IONS-SCNC: 17 MMOL/L (ref 7–15)
B-OH-BUTYR SERPL-SCNC: 0.19 MMOL/L
BASOPHILS # BLD AUTO: 0.06 10E3/UL (ref 0–0.2)
BASOPHILS NFR BLD AUTO: 0.7 %
BUN SERPL-MCNC: 8 MG/DL (ref 6–20)
CALCIUM SERPL-MCNC: 9.2 MG/DL (ref 8.8–10.4)
CHLORIDE SERPL-SCNC: 108 MMOL/L (ref 98–107)
CREAT SERPL-MCNC: 0.7 MG/DL (ref 0.51–0.95)
EGFRCR SERPLBLD CKD-EPI 2021: >90 ML/MIN/1.73M2
EOSINOPHIL # BLD AUTO: 0.05 10E3/UL (ref 0–0.7)
EOSINOPHIL NFR BLD AUTO: 0.6 %
ERYTHROCYTE [DISTWIDTH] IN BLOOD BY AUTOMATED COUNT: 13.3 % (ref 10–15)
GLUCOSE SERPL-MCNC: 107 MG/DL (ref 70–99)
HCO3 SERPL-SCNC: 14 MMOL/L (ref 22–29)
HCT VFR BLD AUTO: 44.1 % (ref 35–47)
HGB BLD-MCNC: 14.8 G/DL (ref 11.7–15.7)
IMM GRANULOCYTES # BLD: <0.03 10E3/UL
IMM GRANULOCYTES NFR BLD: 0.2 %
LACTATE SERPL-SCNC: 2.5 MMOL/L (ref 0.7–2)
LYMPHOCYTES # BLD AUTO: 0.96 10E3/UL (ref 0.8–5.3)
LYMPHOCYTES NFR BLD AUTO: 11.4 %
MCH RBC QN AUTO: 28.1 PG (ref 26.5–33)
MCHC RBC AUTO-ENTMCNC: 33.6 G/DL (ref 31.5–36.5)
MCV RBC AUTO: 83.7 FL (ref 78–100)
MONOCYTES # BLD AUTO: 0.13 10E3/UL (ref 0–1.3)
MONOCYTES NFR BLD AUTO: 1.5 %
NEUTROPHILS # BLD AUTO: 7.18 10E3/UL (ref 1.6–8.3)
NEUTROPHILS NFR BLD AUTO: 85.6 %
NRBC # BLD AUTO: <0.03 10E3/UL
NRBC BLD AUTO-RTO: 0 /100
PLAT MORPH BLD: ABNORMAL
PLATELET # BLD AUTO: ABNORMAL 10*3/UL
POTASSIUM SERPL-SCNC: 4.1 MMOL/L (ref 3.4–5.3)
RBC # BLD AUTO: 5.27 10E6/UL (ref 3.8–5.2)
RBC MORPH BLD: ABNORMAL
SODIUM SERPL-SCNC: 139 MMOL/L (ref 135–145)
WBC # BLD AUTO: 8.4 10E3/UL (ref 4–11)

## 2025-08-18 PROCEDURE — 258N000003 HC RX IP 258 OP 636

## 2025-08-18 PROCEDURE — 250N000011 HC RX IP 250 OP 636

## 2025-08-18 PROCEDURE — 96374 THER/PROPH/DIAG INJ IV PUSH: CPT

## 2025-08-18 PROCEDURE — 85004 AUTOMATED DIFF WBC COUNT: CPT

## 2025-08-18 PROCEDURE — 72148 MRI LUMBAR SPINE W/O DYE: CPT

## 2025-08-18 PROCEDURE — 99223 1ST HOSP IP/OBS HIGH 75: CPT | Performed by: STUDENT IN AN ORGANIZED HEALTH CARE EDUCATION/TRAINING PROGRAM

## 2025-08-18 PROCEDURE — 250N000012 HC RX MED GY IP 250 OP 636 PS 637

## 2025-08-18 PROCEDURE — 82010 KETONE BODYS QUAN: CPT | Performed by: STUDENT IN AN ORGANIZED HEALTH CARE EDUCATION/TRAINING PROGRAM

## 2025-08-18 PROCEDURE — 36415 COLL VENOUS BLD VENIPUNCTURE: CPT

## 2025-08-18 PROCEDURE — G0378 HOSPITAL OBSERVATION PER HR: HCPCS

## 2025-08-18 PROCEDURE — 250N000011 HC RX IP 250 OP 636: Mod: JW | Performed by: EMERGENCY MEDICINE

## 2025-08-18 PROCEDURE — 80048 BASIC METABOLIC PNL TOTAL CA: CPT

## 2025-08-18 PROCEDURE — 36415 COLL VENOUS BLD VENIPUNCTURE: CPT | Performed by: STUDENT IN AN ORGANIZED HEALTH CARE EDUCATION/TRAINING PROGRAM

## 2025-08-18 PROCEDURE — 250N000013 HC RX MED GY IP 250 OP 250 PS 637

## 2025-08-18 PROCEDURE — 99285 EMERGENCY DEPT VISIT HI MDM: CPT | Mod: 25

## 2025-08-18 PROCEDURE — 83605 ASSAY OF LACTIC ACID: CPT | Performed by: STUDENT IN AN ORGANIZED HEALTH CARE EDUCATION/TRAINING PROGRAM

## 2025-08-18 PROCEDURE — 96375 TX/PRO/DX INJ NEW DRUG ADDON: CPT

## 2025-08-18 RX ORDER — METHYLPREDNISOLONE 4 MG/1
4 TABLET ORAL
Status: DISCONTINUED | OUTPATIENT
Start: 2025-08-20 | End: 2025-08-20 | Stop reason: HOSPADM

## 2025-08-18 RX ORDER — HYDROMORPHONE HYDROCHLORIDE 1 MG/ML
0.5 INJECTION, SOLUTION INTRAMUSCULAR; INTRAVENOUS; SUBCUTANEOUS
Refills: 0 | Status: DISCONTINUED | OUTPATIENT
Start: 2025-08-18 | End: 2025-08-18

## 2025-08-18 RX ORDER — TOPIRAMATE 50 MG/1
50 TABLET, FILM COATED ORAL AT BEDTIME
COMMUNITY

## 2025-08-18 RX ORDER — ACETAMINOPHEN 650 MG/1
650 SUPPOSITORY RECTAL EVERY 4 HOURS PRN
Status: DISCONTINUED | OUTPATIENT
Start: 2025-08-18 | End: 2025-08-20 | Stop reason: HOSPADM

## 2025-08-18 RX ORDER — DIAZEPAM 10 MG/2ML
5 INJECTION, SOLUTION INTRAMUSCULAR; INTRAVENOUS ONCE
Status: COMPLETED | OUTPATIENT
Start: 2025-08-18 | End: 2025-08-18

## 2025-08-18 RX ORDER — METHOCARBAMOL 750 MG/1
750 TABLET, FILM COATED ORAL ONCE
Status: COMPLETED | OUTPATIENT
Start: 2025-08-18 | End: 2025-08-18

## 2025-08-18 RX ORDER — LIDOCAINE 4 G/G
1 PATCH TOPICAL ONCE
Status: COMPLETED | OUTPATIENT
Start: 2025-08-18 | End: 2025-08-19

## 2025-08-18 RX ORDER — ONDANSETRON 2 MG/ML
4 INJECTION INTRAMUSCULAR; INTRAVENOUS EVERY 6 HOURS PRN
Status: DISCONTINUED | OUTPATIENT
Start: 2025-08-18 | End: 2025-08-20 | Stop reason: HOSPADM

## 2025-08-18 RX ORDER — HYDROXYZINE HYDROCHLORIDE 25 MG/1
25 TABLET, FILM COATED ORAL EVERY 6 HOURS PRN
Status: DISCONTINUED | OUTPATIENT
Start: 2025-08-18 | End: 2025-08-19

## 2025-08-18 RX ORDER — METHOCARBAMOL 500 MG/1
500 TABLET, FILM COATED ORAL 4 TIMES DAILY
Status: CANCELLED | OUTPATIENT
Start: 2025-08-19

## 2025-08-18 RX ORDER — OXYCODONE HYDROCHLORIDE 5 MG/1
5 TABLET ORAL EVERY 4 HOURS PRN
Status: DISCONTINUED | OUTPATIENT
Start: 2025-08-18 | End: 2025-08-20 | Stop reason: HOSPADM

## 2025-08-18 RX ORDER — ACETAMINOPHEN 325 MG/1
975 TABLET ORAL ONCE
Status: COMPLETED | OUTPATIENT
Start: 2025-08-18 | End: 2025-08-18

## 2025-08-18 RX ORDER — HYDROMORPHONE HCL IN WATER/PF 6 MG/30 ML
0.2 PATIENT CONTROLLED ANALGESIA SYRINGE INTRAVENOUS
Status: DISCONTINUED | OUTPATIENT
Start: 2025-08-18 | End: 2025-08-20 | Stop reason: HOSPADM

## 2025-08-18 RX ORDER — METHYLPREDNISOLONE 4 MG/1
4 TABLET ORAL ONCE
Status: COMPLETED | OUTPATIENT
Start: 2025-08-19 | End: 2025-08-19

## 2025-08-18 RX ORDER — TOPIRAMATE 50 MG/1
50 TABLET, FILM COATED ORAL AT BEDTIME
Status: DISCONTINUED | OUTPATIENT
Start: 2025-08-19 | End: 2025-08-20 | Stop reason: HOSPADM

## 2025-08-18 RX ORDER — ACETAMINOPHEN 325 MG/1
650 TABLET ORAL EVERY 4 HOURS PRN
Status: DISCONTINUED | OUTPATIENT
Start: 2025-08-18 | End: 2025-08-20 | Stop reason: HOSPADM

## 2025-08-18 RX ORDER — PROCHLORPERAZINE MALEATE 5 MG/1
10 TABLET ORAL EVERY 6 HOURS PRN
Status: DISCONTINUED | OUTPATIENT
Start: 2025-08-18 | End: 2025-08-20 | Stop reason: HOSPADM

## 2025-08-18 RX ORDER — METHYLPREDNISOLONE 4 MG/1
4 TABLET ORAL AT BEDTIME
Status: DISCONTINUED | OUTPATIENT
Start: 2025-08-21 | End: 2025-08-20 | Stop reason: HOSPADM

## 2025-08-18 RX ORDER — HYDROMORPHONE HYDROCHLORIDE 1 MG/ML
0.5 INJECTION, SOLUTION INTRAMUSCULAR; INTRAVENOUS; SUBCUTANEOUS ONCE
Refills: 0 | Status: COMPLETED | OUTPATIENT
Start: 2025-08-18 | End: 2025-08-18

## 2025-08-18 RX ORDER — HYDROMORPHONE HCL IN WATER/PF 6 MG/30 ML
0.4 PATIENT CONTROLLED ANALGESIA SYRINGE INTRAVENOUS
Status: DISCONTINUED | OUTPATIENT
Start: 2025-08-18 | End: 2025-08-20 | Stop reason: HOSPADM

## 2025-08-18 RX ORDER — AMOXICILLIN 250 MG
2 CAPSULE ORAL 2 TIMES DAILY PRN
Status: DISCONTINUED | OUTPATIENT
Start: 2025-08-18 | End: 2025-08-20 | Stop reason: HOSPADM

## 2025-08-18 RX ORDER — AMOXICILLIN 250 MG
1 CAPSULE ORAL 2 TIMES DAILY PRN
Status: DISCONTINUED | OUTPATIENT
Start: 2025-08-18 | End: 2025-08-20 | Stop reason: HOSPADM

## 2025-08-18 RX ORDER — HYDROXYZINE HYDROCHLORIDE 50 MG/1
50 TABLET, FILM COATED ORAL EVERY 6 HOURS PRN
Status: DISCONTINUED | OUTPATIENT
Start: 2025-08-18 | End: 2025-08-19

## 2025-08-18 RX ORDER — METHYLPREDNISOLONE 4 MG/1
8 TABLET ORAL AT BEDTIME
Status: DISCONTINUED | OUTPATIENT
Start: 2025-08-19 | End: 2025-08-20 | Stop reason: HOSPADM

## 2025-08-18 RX ORDER — KETOROLAC TROMETHAMINE 30 MG/ML
30 INJECTION, SOLUTION INTRAMUSCULAR; INTRAVENOUS EVERY 6 HOURS PRN
Status: DISCONTINUED | OUTPATIENT
Start: 2025-08-18 | End: 2025-08-20 | Stop reason: HOSPADM

## 2025-08-18 RX ORDER — MULTIPLE VITAMINS W/ MINERALS TAB 9MG-400MCG
1 TAB ORAL AT BEDTIME
Status: DISCONTINUED | OUTPATIENT
Start: 2025-08-19 | End: 2025-08-20 | Stop reason: HOSPADM

## 2025-08-18 RX ORDER — PREDNISONE 20 MG/1
60 TABLET ORAL ONCE
Status: COMPLETED | OUTPATIENT
Start: 2025-08-18 | End: 2025-08-18

## 2025-08-18 RX ORDER — OXYCODONE HYDROCHLORIDE 5 MG/1
5 TABLET ORAL ONCE
Refills: 0 | Status: COMPLETED | OUTPATIENT
Start: 2025-08-18 | End: 2025-08-18

## 2025-08-18 RX ORDER — FLUTICASONE PROPIONATE 50 MCG
2 SPRAY, SUSPENSION (ML) NASAL 2 TIMES DAILY PRN
Status: DISCONTINUED | OUTPATIENT
Start: 2025-08-18 | End: 2025-08-20 | Stop reason: HOSPADM

## 2025-08-18 RX ORDER — ONDANSETRON 4 MG/1
4 TABLET, ORALLY DISINTEGRATING ORAL EVERY 6 HOURS PRN
Status: DISCONTINUED | OUTPATIENT
Start: 2025-08-18 | End: 2025-08-20 | Stop reason: HOSPADM

## 2025-08-18 RX ORDER — FLUTICASONE PROPIONATE 50 MCG
2 SPRAY, SUSPENSION (ML) NASAL 2 TIMES DAILY PRN
COMMUNITY

## 2025-08-18 RX ORDER — FEXOFENADINE HCL 180 MG/1
180 TABLET ORAL AT BEDTIME
Status: DISCONTINUED | OUTPATIENT
Start: 2025-08-19 | End: 2025-08-20 | Stop reason: HOSPADM

## 2025-08-18 RX ORDER — METHYLPREDNISOLONE 4 MG/1
8 TABLET ORAL ONCE
Status: COMPLETED | OUTPATIENT
Start: 2025-08-19 | End: 2025-08-19

## 2025-08-18 RX ADMIN — METHOCARBAMOL 750 MG: 750 TABLET ORAL at 17:22

## 2025-08-18 RX ADMIN — HYDROMORPHONE HYDROCHLORIDE 0.5 MG: 1 INJECTION, SOLUTION INTRAMUSCULAR; INTRAVENOUS; SUBCUTANEOUS at 21:13

## 2025-08-18 RX ADMIN — ACETAMINOPHEN 975 MG: 325 TABLET ORAL at 17:22

## 2025-08-18 RX ADMIN — PREDNISONE 60 MG: 20 TABLET ORAL at 17:22

## 2025-08-18 RX ADMIN — DIAZEPAM 5 MG: 10 INJECTION, SOLUTION INTRAMUSCULAR; INTRAVENOUS at 22:08

## 2025-08-18 RX ADMIN — OXYCODONE HYDROCHLORIDE 5 MG: 5 TABLET ORAL at 18:31

## 2025-08-18 RX ADMIN — SODIUM CHLORIDE 1000 ML: 9 INJECTION, SOLUTION INTRAVENOUS at 22:45

## 2025-08-18 RX ADMIN — LIDOCAINE 1 PATCH: 4 PATCH TOPICAL at 17:22

## 2025-08-18 ASSESSMENT — ACTIVITIES OF DAILY LIVING (ADL)
ADLS_ACUITY_SCORE: 57

## 2025-08-18 ASSESSMENT — COLUMBIA-SUICIDE SEVERITY RATING SCALE - C-SSRS
1. IN THE PAST MONTH, HAVE YOU WISHED YOU WERE DEAD OR WISHED YOU COULD GO TO SLEEP AND NOT WAKE UP?: NO
6. HAVE YOU EVER DONE ANYTHING, STARTED TO DO ANYTHING, OR PREPARED TO DO ANYTHING TO END YOUR LIFE?: NO
2. HAVE YOU ACTUALLY HAD ANY THOUGHTS OF KILLING YOURSELF IN THE PAST MONTH?: NO

## 2025-08-19 ENCOUNTER — APPOINTMENT (OUTPATIENT)
Dept: PHYSICAL THERAPY | Facility: CLINIC | Age: 45
End: 2025-08-19
Attending: STUDENT IN AN ORGANIZED HEALTH CARE EDUCATION/TRAINING PROGRAM
Payer: COMMERCIAL

## 2025-08-19 LAB
ANION GAP SERPL CALCULATED.3IONS-SCNC: 9 MMOL/L (ref 7–15)
BUN SERPL-MCNC: 7.7 MG/DL (ref 6–20)
CALCIUM SERPL-MCNC: 9 MG/DL (ref 8.8–10.4)
CHLORIDE SERPL-SCNC: 107 MMOL/L (ref 98–107)
CREAT SERPL-MCNC: 0.74 MG/DL (ref 0.51–0.95)
EGFRCR SERPLBLD CKD-EPI 2021: >90 ML/MIN/1.73M2
ERYTHROCYTE [DISTWIDTH] IN BLOOD BY AUTOMATED COUNT: 13.2 % (ref 10–15)
GLUCOSE SERPL-MCNC: 127 MG/DL (ref 70–99)
HCO3 SERPL-SCNC: 21 MMOL/L (ref 22–29)
HCT VFR BLD AUTO: 40.6 % (ref 35–47)
HGB BLD-MCNC: 13.7 G/DL (ref 11.7–15.7)
LACTATE SERPL-SCNC: 3.3 MMOL/L (ref 0.7–2)
MCH RBC QN AUTO: 28 PG (ref 26.5–33)
MCHC RBC AUTO-ENTMCNC: 33.7 G/DL (ref 31.5–36.5)
MCV RBC AUTO: 83 FL (ref 78–100)
PLATELET # BLD AUTO: 278 10E3/UL (ref 150–450)
POTASSIUM SERPL-SCNC: 4.3 MMOL/L (ref 3.4–5.3)
RBC # BLD AUTO: 4.89 10E6/UL (ref 3.8–5.2)
SODIUM SERPL-SCNC: 137 MMOL/L (ref 135–145)
WBC # BLD AUTO: 9.73 10E3/UL (ref 4–11)

## 2025-08-19 PROCEDURE — 97530 THERAPEUTIC ACTIVITIES: CPT | Mod: GP

## 2025-08-19 PROCEDURE — 80048 BASIC METABOLIC PNL TOTAL CA: CPT | Performed by: STUDENT IN AN ORGANIZED HEALTH CARE EDUCATION/TRAINING PROGRAM

## 2025-08-19 PROCEDURE — 250N000012 HC RX MED GY IP 250 OP 636 PS 637: Performed by: STUDENT IN AN ORGANIZED HEALTH CARE EDUCATION/TRAINING PROGRAM

## 2025-08-19 PROCEDURE — 250N000013 HC RX MED GY IP 250 OP 250 PS 637: Performed by: STUDENT IN AN ORGANIZED HEALTH CARE EDUCATION/TRAINING PROGRAM

## 2025-08-19 PROCEDURE — G0378 HOSPITAL OBSERVATION PER HR: HCPCS

## 2025-08-19 PROCEDURE — 99233 SBSQ HOSP IP/OBS HIGH 50: CPT | Performed by: PHYSICIAN ASSISTANT

## 2025-08-19 PROCEDURE — 97161 PT EVAL LOW COMPLEX 20 MIN: CPT | Mod: GP

## 2025-08-19 PROCEDURE — 83605 ASSAY OF LACTIC ACID: CPT | Performed by: STUDENT IN AN ORGANIZED HEALTH CARE EDUCATION/TRAINING PROGRAM

## 2025-08-19 PROCEDURE — 36415 COLL VENOUS BLD VENIPUNCTURE: CPT | Performed by: STUDENT IN AN ORGANIZED HEALTH CARE EDUCATION/TRAINING PROGRAM

## 2025-08-19 PROCEDURE — 85027 COMPLETE CBC AUTOMATED: CPT | Performed by: STUDENT IN AN ORGANIZED HEALTH CARE EDUCATION/TRAINING PROGRAM

## 2025-08-19 RX ORDER — NALOXONE HYDROCHLORIDE 0.4 MG/ML
0.4 INJECTION, SOLUTION INTRAMUSCULAR; INTRAVENOUS; SUBCUTANEOUS
Status: DISCONTINUED | OUTPATIENT
Start: 2025-08-19 | End: 2025-08-20 | Stop reason: HOSPADM

## 2025-08-19 RX ORDER — NALOXONE HYDROCHLORIDE 0.4 MG/ML
0.2 INJECTION, SOLUTION INTRAMUSCULAR; INTRAVENOUS; SUBCUTANEOUS
Status: DISCONTINUED | OUTPATIENT
Start: 2025-08-19 | End: 2025-08-20 | Stop reason: HOSPADM

## 2025-08-19 RX ORDER — HYDROXYZINE HYDROCHLORIDE 50 MG/1
50 TABLET, FILM COATED ORAL EVERY 6 HOURS PRN
Status: DISCONTINUED | OUTPATIENT
Start: 2025-08-19 | End: 2025-08-20 | Stop reason: HOSPADM

## 2025-08-19 RX ORDER — HYDROXYZINE HYDROCHLORIDE 25 MG/1
25 TABLET, FILM COATED ORAL EVERY 6 HOURS PRN
Status: DISCONTINUED | OUTPATIENT
Start: 2025-08-19 | End: 2025-08-20 | Stop reason: HOSPADM

## 2025-08-19 RX ORDER — CYCLOBENZAPRINE HCL 10 MG
10 TABLET ORAL EVERY 8 HOURS PRN
Status: DISCONTINUED | OUTPATIENT
Start: 2025-08-19 | End: 2025-08-20 | Stop reason: HOSPADM

## 2025-08-19 RX ADMIN — TOPIRAMATE 50 MG: 50 TABLET, FILM COATED ORAL at 22:03

## 2025-08-19 RX ADMIN — Medication 1 TABLET: at 00:51

## 2025-08-19 RX ADMIN — FEXOFENADINE HCL 180 MG: 180 TABLET ORAL at 22:03

## 2025-08-19 RX ADMIN — ACETAMINOPHEN 650 MG: 325 TABLET ORAL at 04:53

## 2025-08-19 RX ADMIN — OXYCODONE HYDROCHLORIDE 2.5 MG: 5 TABLET ORAL at 11:54

## 2025-08-19 RX ADMIN — OXYCODONE HYDROCHLORIDE 2.5 MG: 5 TABLET ORAL at 07:47

## 2025-08-19 RX ADMIN — HYDROXYZINE HYDROCHLORIDE 50 MG: 50 TABLET ORAL at 04:53

## 2025-08-19 RX ADMIN — METHYLPREDNISOLONE 8 MG: 4 TABLET ORAL at 07:47

## 2025-08-19 RX ADMIN — TOPIRAMATE 50 MG: 50 TABLET, FILM COATED ORAL at 00:51

## 2025-08-19 RX ADMIN — Medication 1 TABLET: at 22:02

## 2025-08-19 RX ADMIN — METHYLPREDNISOLONE 4 MG: 4 TABLET ORAL at 11:54

## 2025-08-19 RX ADMIN — METHYLPREDNISOLONE 4 MG: 4 TABLET ORAL at 17:17

## 2025-08-19 RX ADMIN — METHYLPREDNISOLONE 8 MG: 4 TABLET ORAL at 22:03

## 2025-08-19 RX ADMIN — FEXOFENADINE HCL 180 MG: 180 TABLET ORAL at 00:51

## 2025-08-19 ASSESSMENT — ACTIVITIES OF DAILY LIVING (ADL)
ADLS_ACUITY_SCORE: 52
ADLS_ACUITY_SCORE: 53
ADLS_ACUITY_SCORE: 52
ADLS_ACUITY_SCORE: 52
ADLS_ACUITY_SCORE: 53
ADLS_ACUITY_SCORE: 52
ADLS_ACUITY_SCORE: 53
ADLS_ACUITY_SCORE: 52
ADLS_ACUITY_SCORE: 53
ADLS_ACUITY_SCORE: 53
ADLS_ACUITY_SCORE: 52
ADLS_ACUITY_SCORE: 53
ADLS_ACUITY_SCORE: 52
ADLS_ACUITY_SCORE: 53

## 2025-08-20 VITALS
DIASTOLIC BLOOD PRESSURE: 88 MMHG | TEMPERATURE: 98.2 F | OXYGEN SATURATION: 99 % | BODY MASS INDEX: 42.91 KG/M2 | HEIGHT: 66 IN | RESPIRATION RATE: 16 BRPM | WEIGHT: 267 LBS | SYSTOLIC BLOOD PRESSURE: 151 MMHG | HEART RATE: 69 BPM

## 2025-08-20 LAB
ANION GAP SERPL CALCULATED.3IONS-SCNC: 10 MMOL/L (ref 7–15)
BUN SERPL-MCNC: 13.5 MG/DL (ref 6–20)
CALCIUM SERPL-MCNC: 8.5 MG/DL (ref 8.8–10.4)
CHLORIDE SERPL-SCNC: 109 MMOL/L (ref 98–107)
CREAT SERPL-MCNC: 0.71 MG/DL (ref 0.51–0.95)
EGFRCR SERPLBLD CKD-EPI 2021: >90 ML/MIN/1.73M2
GLUCOSE SERPL-MCNC: 106 MG/DL (ref 70–99)
HCO3 SERPL-SCNC: 21 MMOL/L (ref 22–29)
POTASSIUM SERPL-SCNC: 3.8 MMOL/L (ref 3.4–5.3)
SODIUM SERPL-SCNC: 140 MMOL/L (ref 135–145)

## 2025-08-20 PROCEDURE — 250N000012 HC RX MED GY IP 250 OP 636 PS 637: Performed by: STUDENT IN AN ORGANIZED HEALTH CARE EDUCATION/TRAINING PROGRAM

## 2025-08-20 PROCEDURE — 80048 BASIC METABOLIC PNL TOTAL CA: CPT | Performed by: PHYSICIAN ASSISTANT

## 2025-08-20 PROCEDURE — 250N000013 HC RX MED GY IP 250 OP 250 PS 637: Performed by: STUDENT IN AN ORGANIZED HEALTH CARE EDUCATION/TRAINING PROGRAM

## 2025-08-20 PROCEDURE — 36415 COLL VENOUS BLD VENIPUNCTURE: CPT | Performed by: PHYSICIAN ASSISTANT

## 2025-08-20 PROCEDURE — 250N000013 HC RX MED GY IP 250 OP 250 PS 637: Performed by: INTERNAL MEDICINE

## 2025-08-20 PROCEDURE — G0378 HOSPITAL OBSERVATION PER HR: HCPCS

## 2025-08-20 PROCEDURE — 99207 PR NO BILLABLE SERVICE THIS VISIT: CPT | Performed by: PHYSICIAN ASSISTANT

## 2025-08-20 PROCEDURE — 250N000013 HC RX MED GY IP 250 OP 250 PS 637: Performed by: PHYSICIAN ASSISTANT

## 2025-08-20 RX ORDER — LIDOCAINE 4 G/G
1 PATCH TOPICAL EVERY 24 HOURS
Qty: 5 PATCH | Refills: 0 | Status: SHIPPED | OUTPATIENT
Start: 2025-08-20

## 2025-08-20 RX ORDER — METHYLPREDNISOLONE 4 MG/1
TABLET ORAL
Qty: 30 TABLET | Refills: 0 | Status: SHIPPED | OUTPATIENT
Start: 2025-08-20 | End: 2025-08-25

## 2025-08-20 RX ORDER — OXYCODONE HYDROCHLORIDE 5 MG/1
2.5 TABLET ORAL EVERY 4 HOURS PRN
Qty: 15 TABLET | Refills: 0 | Status: SHIPPED | OUTPATIENT
Start: 2025-08-20

## 2025-08-20 RX ORDER — MAGNESIUM HYDROXIDE/ALUMINUM HYDROXICE/SIMETHICONE 120; 1200; 1200 MG/30ML; MG/30ML; MG/30ML
15 SUSPENSION ORAL ONCE
Status: COMPLETED | OUTPATIENT
Start: 2025-08-20 | End: 2025-08-20

## 2025-08-20 RX ORDER — ACETAMINOPHEN 500 MG
1000 TABLET ORAL 3 TIMES DAILY
COMMUNITY
Start: 2025-08-20

## 2025-08-20 RX ADMIN — METHYLPREDNISOLONE 4 MG: 4 TABLET ORAL at 07:50

## 2025-08-20 RX ADMIN — HYDROXYZINE HYDROCHLORIDE 25 MG: 25 TABLET ORAL at 00:08

## 2025-08-20 RX ADMIN — ALUMINUM HYDROXIDE, MAGNESIUM HYDROXIDE, AND DIMETHICONE 15 ML: 200; 20; 200 SUSPENSION ORAL at 00:54

## 2025-08-20 RX ADMIN — OXYCODONE HYDROCHLORIDE 2.5 MG: 5 TABLET ORAL at 07:53

## 2025-08-20 ASSESSMENT — ACTIVITIES OF DAILY LIVING (ADL)
ADLS_ACUITY_SCORE: 53

## 2025-08-23 PROBLEM — M54.41 ACUTE RIGHT-SIDED LOW BACK PAIN WITH RIGHT-SIDED SCIATICA: Status: RESOLVED | Noted: 2025-08-18 | Resolved: 2025-08-23

## 2025-08-23 PROBLEM — G89.29 CHRONIC PAIN OF RIGHT KNEE: Status: RESOLVED | Noted: 2024-08-24 | Resolved: 2025-08-23

## 2025-08-23 PROBLEM — M25.561 CHRONIC PAIN OF RIGHT KNEE: Status: RESOLVED | Noted: 2024-08-24 | Resolved: 2025-08-23

## 2025-08-23 PROBLEM — R29.898 RIGHT LEG WEAKNESS: Status: RESOLVED | Noted: 2022-05-15 | Resolved: 2025-08-23

## 2025-08-23 PROBLEM — M25.661 JOINT STIFFNESS OF RIGHT LOWER LEG: Status: RESOLVED | Noted: 2024-05-01 | Resolved: 2025-08-23

## 2025-08-25 ENCOUNTER — OFFICE VISIT (OUTPATIENT)
Dept: INTERNAL MEDICINE | Facility: CLINIC | Age: 45
End: 2025-08-25
Payer: COMMERCIAL

## 2025-08-25 VITALS
SYSTOLIC BLOOD PRESSURE: 136 MMHG | WEIGHT: 276.6 LBS | OXYGEN SATURATION: 100 % | RESPIRATION RATE: 14 BRPM | BODY MASS INDEX: 44.45 KG/M2 | HEIGHT: 66 IN | DIASTOLIC BLOOD PRESSURE: 87 MMHG | TEMPERATURE: 97.4 F | HEART RATE: 71 BPM

## 2025-08-25 DIAGNOSIS — M54.41 ACUTE RIGHT-SIDED LOW BACK PAIN WITH RIGHT-SIDED SCIATICA: ICD-10-CM

## 2025-08-25 DIAGNOSIS — Z13.1 SCREENING FOR DIABETES MELLITUS: ICD-10-CM

## 2025-08-25 DIAGNOSIS — K59.03 DRUG-INDUCED CONSTIPATION: ICD-10-CM

## 2025-08-25 DIAGNOSIS — J45.20 MILD INTERMITTENT ASTHMA WITHOUT COMPLICATION: ICD-10-CM

## 2025-08-25 DIAGNOSIS — Z00.00 ROUTINE HISTORY AND PHYSICAL EXAMINATION OF ADULT: Primary | ICD-10-CM

## 2025-08-25 DIAGNOSIS — F33.42 RECURRENT MAJOR DEPRESSIVE DISORDER, IN FULL REMISSION: ICD-10-CM

## 2025-08-25 DIAGNOSIS — F41.1 GAD (GENERALIZED ANXIETY DISORDER): ICD-10-CM

## 2025-08-25 DIAGNOSIS — Z12.11 SCREENING FOR COLON CANCER: ICD-10-CM

## 2025-08-25 DIAGNOSIS — E66.01 MORBID OBESITY (H): ICD-10-CM

## 2025-08-25 DIAGNOSIS — Z13.220 SCREENING FOR CHOLESTEROL LEVEL: ICD-10-CM

## 2025-08-25 DIAGNOSIS — H91.91 HEARING DIFFICULTY OF RIGHT EAR: ICD-10-CM

## 2025-08-25 DIAGNOSIS — Z12.31 ENCOUNTER FOR SCREENING MAMMOGRAM FOR BREAST CANCER: ICD-10-CM

## 2025-08-25 DIAGNOSIS — G43.009 MIGRAINE WITHOUT AURA AND WITHOUT STATUS MIGRAINOSUS, NOT INTRACTABLE: ICD-10-CM

## 2025-08-25 LAB
ALBUMIN SERPL BCG-MCNC: 4.2 G/DL (ref 3.5–5.2)
ALP SERPL-CCNC: 106 U/L (ref 40–150)
ALT SERPL W P-5'-P-CCNC: 23 U/L (ref 0–50)
ANION GAP SERPL CALCULATED.3IONS-SCNC: 9 MMOL/L (ref 7–15)
AST SERPL W P-5'-P-CCNC: 18 U/L (ref 0–45)
BILIRUB SERPL-MCNC: 0.3 MG/DL
BUN SERPL-MCNC: 10.2 MG/DL (ref 6–20)
CALCIUM SERPL-MCNC: 9.1 MG/DL (ref 8.8–10.4)
CHLORIDE SERPL-SCNC: 104 MMOL/L (ref 98–107)
CHOLEST SERPL-MCNC: 191 MG/DL
CREAT SERPL-MCNC: 0.82 MG/DL (ref 0.51–0.95)
EGFRCR SERPLBLD CKD-EPI 2021: 89 ML/MIN/1.73M2
FASTING STATUS PATIENT QL REPORTED: YES
FASTING STATUS PATIENT QL REPORTED: YES
GLUCOSE SERPL-MCNC: 85 MG/DL (ref 70–99)
HCO3 SERPL-SCNC: 26 MMOL/L (ref 22–29)
HDLC SERPL-MCNC: 44 MG/DL
LDLC SERPL CALC-MCNC: 102 MG/DL
NONHDLC SERPL-MCNC: 147 MG/DL
POTASSIUM SERPL-SCNC: 4.2 MMOL/L (ref 3.4–5.3)
PROT SERPL-MCNC: 6.8 G/DL (ref 6.4–8.3)
SODIUM SERPL-SCNC: 139 MMOL/L (ref 135–145)
TRIGL SERPL-MCNC: 225 MG/DL

## 2025-08-25 PROCEDURE — 3075F SYST BP GE 130 - 139MM HG: CPT | Performed by: INTERNAL MEDICINE

## 2025-08-25 PROCEDURE — 3079F DIAST BP 80-89 MM HG: CPT | Performed by: INTERNAL MEDICINE

## 2025-08-25 PROCEDURE — 36415 COLL VENOUS BLD VENIPUNCTURE: CPT | Performed by: INTERNAL MEDICINE

## 2025-08-25 PROCEDURE — 99396 PREV VISIT EST AGE 40-64: CPT | Performed by: INTERNAL MEDICINE

## 2025-08-25 PROCEDURE — 80061 LIPID PANEL: CPT | Performed by: INTERNAL MEDICINE

## 2025-08-25 PROCEDURE — 80053 COMPREHEN METABOLIC PANEL: CPT | Performed by: INTERNAL MEDICINE

## 2025-08-25 RX ORDER — AMOXICILLIN 250 MG
2 CAPSULE ORAL DAILY PRN
Qty: 60 TABLET | Refills: 1 | Status: SHIPPED | OUTPATIENT
Start: 2025-08-25

## 2025-08-25 SDOH — HEALTH STABILITY: PHYSICAL HEALTH: ON AVERAGE, HOW MANY DAYS PER WEEK DO YOU ENGAGE IN MODERATE TO STRENUOUS EXERCISE (LIKE A BRISK WALK)?: 0 DAYS

## 2025-08-25 ASSESSMENT — ASTHMA QUESTIONNAIRES
QUESTION_1 LAST FOUR WEEKS HOW MUCH OF THE TIME DID YOUR ASTHMA KEEP YOU FROM GETTING AS MUCH DONE AT WORK, SCHOOL OR AT HOME: NONE OF THE TIME
QUESTION_2 LAST FOUR WEEKS HOW OFTEN HAVE YOU HAD SHORTNESS OF BREATH: ONCE OR TWICE A WEEK
QUESTION_4 LAST FOUR WEEKS HOW OFTEN HAVE YOU USED YOUR RESCUE INHALER OR NEBULIZER MEDICATION (SUCH AS ALBUTEROL): ONCE A WEEK OR LESS
QUESTION_5 LAST FOUR WEEKS HOW WOULD YOU RATE YOUR ASTHMA CONTROL: WELL CONTROLLED
QUESTION_3 LAST FOUR WEEKS HOW OFTEN DID YOUR ASTHMA SYMPTOMS (WHEEZING, COUGHING, SHORTNESS OF BREATH, CHEST TIGHTNESS OR PAIN) WAKE YOU UP AT NIGHT OR EARLIER THAN USUAL IN THE MORNING: NOT AT ALL
ACT_TOTALSCORE: 22

## 2025-08-25 ASSESSMENT — PATIENT HEALTH QUESTIONNAIRE - PHQ9
10. IF YOU CHECKED OFF ANY PROBLEMS, HOW DIFFICULT HAVE THESE PROBLEMS MADE IT FOR YOU TO DO YOUR WORK, TAKE CARE OF THINGS AT HOME, OR GET ALONG WITH OTHER PEOPLE: SOMEWHAT DIFFICULT
SUM OF ALL RESPONSES TO PHQ QUESTIONS 1-9: 11
SUM OF ALL RESPONSES TO PHQ QUESTIONS 1-9: 11

## 2025-08-26 ENCOUNTER — PATIENT OUTREACH (OUTPATIENT)
Dept: CARE COORDINATION | Facility: CLINIC | Age: 45
End: 2025-08-26
Payer: COMMERCIAL

## 2025-08-27 ENCOUNTER — THERAPY VISIT (OUTPATIENT)
Dept: PHYSICAL THERAPY | Facility: CLINIC | Age: 45
End: 2025-08-27
Payer: COMMERCIAL

## 2025-08-27 DIAGNOSIS — M54.41 ACUTE BILATERAL LOW BACK PAIN WITH RIGHT-SIDED SCIATICA: Primary | ICD-10-CM

## 2025-08-27 PROCEDURE — 97110 THERAPEUTIC EXERCISES: CPT | Mod: GP

## 2025-08-27 PROCEDURE — 97112 NEUROMUSCULAR REEDUCATION: CPT | Mod: GP

## 2025-08-28 ENCOUNTER — PATIENT OUTREACH (OUTPATIENT)
Dept: CARE COORDINATION | Facility: CLINIC | Age: 45
End: 2025-08-28
Payer: COMMERCIAL